# Patient Record
Sex: MALE | Race: OTHER | NOT HISPANIC OR LATINO | URBAN - METROPOLITAN AREA
[De-identification: names, ages, dates, MRNs, and addresses within clinical notes are randomized per-mention and may not be internally consistent; named-entity substitution may affect disease eponyms.]

---

## 2021-04-21 ENCOUNTER — INPATIENT (INPATIENT)
Facility: HOSPITAL | Age: 81
LOS: 25 days | Discharge: NURSING, REHAB OR LONG-TERM CARE | End: 2021-05-17
Attending: INTERNAL MEDICINE | Admitting: INTERNAL MEDICINE
Payer: MEDICARE

## 2021-04-21 VITALS
HEART RATE: 112 BPM | OXYGEN SATURATION: 98 % | RESPIRATION RATE: 18 BRPM | SYSTOLIC BLOOD PRESSURE: 168 MMHG | DIASTOLIC BLOOD PRESSURE: 73 MMHG | TEMPERATURE: 98 F
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
## 2021-04-21 LAB
ALBUMIN SERPL ELPH-MCNC: 3.4 G/DL — LOW (ref 3.5–5.2)
ALP SERPL-CCNC: 65 U/L — SIGNIFICANT CHANGE UP (ref 30–115)
ALT FLD-CCNC: 7 U/L — SIGNIFICANT CHANGE UP (ref 0–41)
ANION GAP SERPL CALC-SCNC: 10 MMOL/L — SIGNIFICANT CHANGE UP (ref 7–14)
ANISOCYTOSIS BLD QL: SIGNIFICANT CHANGE UP
APTT BLD: 33.4 SEC — SIGNIFICANT CHANGE UP (ref 27–39.2)
AST SERPL-CCNC: 15 U/L — SIGNIFICANT CHANGE UP (ref 0–41)
BASOPHILS # BLD AUTO: 0.07 K/UL — SIGNIFICANT CHANGE UP (ref 0–0.2)
BASOPHILS NFR BLD AUTO: 0.8 % — SIGNIFICANT CHANGE UP (ref 0–1)
BILIRUB SERPL-MCNC: 0.4 MG/DL — SIGNIFICANT CHANGE UP (ref 0.2–1.2)
BLD GP AB SCN SERPL QL: SIGNIFICANT CHANGE UP
BUN SERPL-MCNC: 14 MG/DL — SIGNIFICANT CHANGE UP (ref 10–20)
CALCIUM SERPL-MCNC: 8.4 MG/DL — LOW (ref 8.5–10.1)
CHLORIDE SERPL-SCNC: 104 MMOL/L — SIGNIFICANT CHANGE UP (ref 98–110)
CO2 SERPL-SCNC: 23 MMOL/L — SIGNIFICANT CHANGE UP (ref 17–32)
CREAT SERPL-MCNC: 0.6 MG/DL — LOW (ref 0.7–1.5)
EOSINOPHIL # BLD AUTO: 0 K/UL — SIGNIFICANT CHANGE UP (ref 0–0.7)
EOSINOPHIL NFR BLD AUTO: 0 % — SIGNIFICANT CHANGE UP (ref 0–8)
GIANT PLATELETS BLD QL SMEAR: PRESENT — SIGNIFICANT CHANGE UP
GLUCOSE SERPL-MCNC: 100 MG/DL — HIGH (ref 70–99)
HCT VFR BLD CALC: 22.6 % — LOW (ref 42–52)
HGB BLD-MCNC: 5.8 G/DL — CRITICAL LOW (ref 14–18)
INR BLD: 1.09 RATIO — SIGNIFICANT CHANGE UP (ref 0.65–1.3)
LACTATE SERPL-SCNC: 3 MMOL/L — HIGH (ref 0.7–2)
LIDOCAIN IGE QN: 44 U/L — SIGNIFICANT CHANGE UP (ref 7–60)
LYMPHOCYTES # BLD AUTO: 1.03 K/UL — LOW (ref 1.2–3.4)
LYMPHOCYTES # BLD AUTO: 12.2 % — LOW (ref 20.5–51.1)
MANUAL SMEAR VERIFICATION: SIGNIFICANT CHANGE UP
MCHC RBC-ENTMCNC: 17.7 PG — LOW (ref 27–31)
MCHC RBC-ENTMCNC: 25.7 G/DL — LOW (ref 32–37)
MCV RBC AUTO: 68.9 FL — LOW (ref 80–94)
MICROCYTES BLD QL: SIGNIFICANT CHANGE UP
MONOCYTES # BLD AUTO: 0.22 K/UL — SIGNIFICANT CHANGE UP (ref 0.1–0.6)
MONOCYTES NFR BLD AUTO: 2.6 % — SIGNIFICANT CHANGE UP (ref 1.7–9.3)
NEUTROPHILS # BLD AUTO: 7.13 K/UL — HIGH (ref 1.4–6.5)
NEUTROPHILS NFR BLD AUTO: 83.5 % — HIGH (ref 42.2–75.2)
NEUTS BAND # BLD: 0.9 % — SIGNIFICANT CHANGE UP (ref 0–6)
PLAT MORPH BLD: NORMAL — SIGNIFICANT CHANGE UP
PLATELET # BLD AUTO: 486 K/UL — HIGH (ref 130–400)
POIKILOCYTOSIS BLD QL AUTO: SIGNIFICANT CHANGE UP
POLYCHROMASIA BLD QL SMEAR: SLIGHT — SIGNIFICANT CHANGE UP
POTASSIUM SERPL-MCNC: 4.1 MMOL/L — SIGNIFICANT CHANGE UP (ref 3.5–5)
POTASSIUM SERPL-SCNC: 4.1 MMOL/L — SIGNIFICANT CHANGE UP (ref 3.5–5)
PROT SERPL-MCNC: 6.8 G/DL — SIGNIFICANT CHANGE UP (ref 6–8)
PROTHROM AB SERPL-ACNC: 12.5 SEC — SIGNIFICANT CHANGE UP (ref 9.95–12.87)
RBC # BLD: 3.28 M/UL — LOW (ref 4.7–6.1)
RBC # FLD: 19.3 % — HIGH (ref 11.5–14.5)
RBC BLD AUTO: NORMAL — SIGNIFICANT CHANGE UP
SICKLE CELLS BLD QL SMEAR: SLIGHT — SIGNIFICANT CHANGE UP
SODIUM SERPL-SCNC: 137 MMOL/L — SIGNIFICANT CHANGE UP (ref 135–146)
TROPONIN T SERPL-MCNC: <0.01 NG/ML — SIGNIFICANT CHANGE UP
WBC # BLD: 8.45 K/UL — SIGNIFICANT CHANGE UP (ref 4.8–10.8)
WBC # FLD AUTO: 8.45 K/UL — SIGNIFICANT CHANGE UP (ref 4.8–10.8)

## 2021-04-21 PROCEDURE — 99285 EMERGENCY DEPT VISIT HI MDM: CPT

## 2021-04-21 PROCEDURE — 93010 ELECTROCARDIOGRAM REPORT: CPT

## 2021-04-21 PROCEDURE — 71045 X-RAY EXAM CHEST 1 VIEW: CPT | Mod: 26

## 2021-04-21 RX ORDER — SODIUM CHLORIDE 9 MG/ML
1000 INJECTION, SOLUTION INTRAVENOUS ONCE
Refills: 0 | Status: COMPLETED | OUTPATIENT
Start: 2021-04-21 | End: 2021-04-21

## 2021-04-21 RX ADMIN — SODIUM CHLORIDE 1000 MILLILITER(S): 9 INJECTION, SOLUTION INTRAVENOUS at 21:59

## 2021-04-21 NOTE — ED PROVIDER NOTE - CARE PLAN
Principal Discharge DX:	Proctocolitis  Secondary Diagnosis:	Diarrhea  Secondary Diagnosis:	Social problem

## 2021-04-21 NOTE — ED PROVIDER NOTE - ATTENDING CONTRIBUTION TO CARE
80M unknown pmh / as not seen a doctor x many yrs bib daughter for worsening diarrhea. Pt awake & alert, conversant, but confused - hpi provided by daughter who states that her father has had chronic diarrhea x 1 yr, but has been worsening over last few days, is coming out uncontrollably. Daughter is an RN and states that diarrhea "smells like blood". Pt and clothes stained with feces. Per daughter pt lives alone, no HHA. Pt has no current complaints. No ha, cp/sob, nv, abd pain, flank pain, urinary sx, rash. No PCP.    PE:  elderly m, unkempt, scattered feces on body & clothes  skin warm, dry, +pallor  ncat  neck supple  tachy 110s reg rhythm nl s1s2 no mrg  ctab no wrr  abd soft ntnd no palpable masses no rgr  rectal- as per mlp note  back non-tender no cvat  ext no c/c dpi  neuro aaox3 but appears confused, grossly nf exam

## 2021-04-21 NOTE — ED ADULT NURSE NOTE - OBJECTIVE STATEMENT
Pt. is a 80yr male presenting to the ED for complaints of diarrhea for 1 year, pt. states he does not have any other complaints besides the watery stools, pt. also states he drinks 3-4 beers a day

## 2021-04-21 NOTE — ED ADULT NURSE NOTE - NSIMPLEMENTINTERV_GEN_ALL_ED
[FreeTextEntry1] : Cough\par Now resolved\par Likely multifactorial-\par ? related to fluid overload\par ? related to GERD\par ? related to postnasal drip with allergic rhinitis\par ? related to URI\par R/O tracheomalacia\par \par Cardiology f/u with Dr. Paiz - patient being monitored for possible TAVR\par Diuretics as per Cardiology\par Low salt diet\par Daily weight\par Fluid restriction\par GERD diet/precautions\par Daily PPI\par GI f/u\par Flonase and Allegra daily\par Allergy/ENT f/u\par To do dynamic CT of chest if cough recurs\par \par RTC 6-8 weeks and as needed\par To call for any medical issues\par To call if worse \par RX for PT given at his request\par D/W patient and wife in detail and all questions answered\par Continue meds, diet, exercise as outlined and to f/u with all consulting MD's\par TSH sent Implemented All Fall with Harm Risk Interventions:  [place] to call system. Call bell, personal items and telephone within reach. Instruct patient to call for assistance. Room bathroom lighting operational. Non-slip footwear when patient is off stretcher. Physically safe environment: no spills, clutter or unnecessary equipment. Stretcher in lowest position, wheels locked, appropriate side rails in place. Provide visual cue, wrist band, yellow gown, etc. Monitor gait and stability. Monitor for mental status changes and reorient to person, place, and time. Review medications for side effects contributing to fall risk. Reinforce activity limits and safety measures with patient and family. Provide visual clues: red socks.

## 2021-04-21 NOTE — ED PROVIDER NOTE - PHYSICAL EXAMINATION
CONSTITUTIONAL: Well-developed; well-nourished; in no acute distress.   SKIN: warm, dry, pale   HEAD: Normocephalic; atraumatic.  EYES: no conjunctival injection. EOMI.   ENT: No nasal discharge; airway clear.  NECK: Supple; non tender.  CARD: S1, S2 normal; Regular rate and rhythm.   RESP: No wheezes, rales or rhonchi.  ABD: soft ntnd. No CVA TTP.     RECTAL: nontender, no masses or fissures, no melena or BRBPR  EXT: Normal ROM.  No clubbing, cyanosis or edema.   LYMPH: No acute cervical adenopathy.  NEURO: Alert, oriented, grossly unremarkable. No FND.   PSYCH: Cooperative, appropriate.

## 2021-04-21 NOTE — ED ADULT TRIAGE NOTE - CHIEF COMPLAINT QUOTE
BIBA for diarrhea for 1 year, worsening over the past few weeks. Daughter reports he has been more confused lately. Pt admits to drinking 3 beers today

## 2021-04-21 NOTE — ED PROVIDER NOTE - CLINICAL SUMMARY MEDICAL DECISION MAKING FREE TEXT BOX
worsening diarrhea, no medical care x yrs - labs wig for Hg 5.8, no priors, no melena or brbpr on rectal exam, prbc ordered, ct ap sig for distal sigmoid/rectal thickening, ddx proctocolitis v underlying CA, subacute L 10th rib fx (pt & daughter deny any recent falls or trauma), RLL nodule - iv abx given - pt lives alone, confused, covered in feces on arrival - admitted for further mgmt, inpatient GI/PT-Rehab/SW consult

## 2021-04-21 NOTE — ED PROVIDER NOTE - NS ED ROS FT
Review of Systems:  CONSTITUTIONAL: No fever, No diaphoresis   SKIN: No rash  HEMATOLOGIC: No abnormal bleeding or bruising  EYES: No eye pain, No blurred vision  ENT:  No sore throat, No neck pain, No rhinorrhea   RESPIRATORY: No shortness of breath, No cough  CARDIAC: No chest pain, No palpitations  GI: No abdominal pain, No nausea, No vomiting, +diarrhea, No constipation, No bright red blood per rectum or melena. No flank pain  : No dysuria, frequency, hematuria.   MUSCULOSKELETAL: No joint paint, No swelling, No back pain  NEUROLOGIC: No numbness, No focal weakness, No headache, No dizziness  All other systems negative, unless specified in HPI

## 2021-04-22 DIAGNOSIS — S68.119A COMPLETE TRAUMATIC METACARPOPHALANGEAL AMPUTATION OF UNSPECIFIED FINGER, INITIAL ENCOUNTER: Chronic | ICD-10-CM

## 2021-04-22 LAB
A1C WITH ESTIMATED AVERAGE GLUCOSE RESULT: 5.2 % — SIGNIFICANT CHANGE UP (ref 4–5.6)
ABO RH CONFIRMATION: SIGNIFICANT CHANGE UP
ALBUMIN SERPL ELPH-MCNC: 2.9 G/DL — LOW (ref 3.5–5.2)
ALP SERPL-CCNC: 60 U/L — SIGNIFICANT CHANGE UP (ref 30–115)
ALT FLD-CCNC: 6 U/L — SIGNIFICANT CHANGE UP (ref 0–41)
ANION GAP SERPL CALC-SCNC: 11 MMOL/L — SIGNIFICANT CHANGE UP (ref 7–14)
APPEARANCE UR: CLEAR — SIGNIFICANT CHANGE UP
APTT BLD: 30.8 SEC — SIGNIFICANT CHANGE UP (ref 27–39.2)
AST SERPL-CCNC: 14 U/L — SIGNIFICANT CHANGE UP (ref 0–41)
BILIRUB SERPL-MCNC: 0.7 MG/DL — SIGNIFICANT CHANGE UP (ref 0.2–1.2)
BILIRUB UR-MCNC: NEGATIVE — SIGNIFICANT CHANGE UP
BUN SERPL-MCNC: 8 MG/DL — LOW (ref 10–20)
C DIFF BY PCR RESULT: NEGATIVE — SIGNIFICANT CHANGE UP
C DIFF TOX GENS STL QL NAA+PROBE: SIGNIFICANT CHANGE UP
CALCIUM SERPL-MCNC: 8.2 MG/DL — LOW (ref 8.5–10.1)
CHLORIDE SERPL-SCNC: 104 MMOL/L — SIGNIFICANT CHANGE UP (ref 98–110)
CHOLEST SERPL-MCNC: 128 MG/DL — SIGNIFICANT CHANGE UP
CO2 SERPL-SCNC: 24 MMOL/L — SIGNIFICANT CHANGE UP (ref 17–32)
COLOR SPEC: YELLOW — SIGNIFICANT CHANGE UP
CREAT SERPL-MCNC: 0.5 MG/DL — LOW (ref 0.7–1.5)
DIFF PNL FLD: NEGATIVE — SIGNIFICANT CHANGE UP
ESTIMATED AVERAGE GLUCOSE: 103 MG/DL — SIGNIFICANT CHANGE UP (ref 68–114)
GLUCOSE SERPL-MCNC: 84 MG/DL — SIGNIFICANT CHANGE UP (ref 70–99)
GLUCOSE UR QL: NEGATIVE — SIGNIFICANT CHANGE UP
HCT VFR BLD CALC: 28 % — LOW (ref 42–52)
HCT VFR BLD CALC: 30.6 % — LOW (ref 42–52)
HDLC SERPL-MCNC: 37 MG/DL — LOW
HGB BLD-MCNC: 7.8 G/DL — LOW (ref 14–18)
HGB BLD-MCNC: 8.9 G/DL — LOW (ref 14–18)
INR BLD: 1.1 RATIO — SIGNIFICANT CHANGE UP (ref 0.65–1.3)
KETONES UR-MCNC: NEGATIVE — SIGNIFICANT CHANGE UP
LACTATE SERPL-SCNC: 1.7 MMOL/L — SIGNIFICANT CHANGE UP (ref 0.7–2)
LEUKOCYTE ESTERASE UR-ACNC: NEGATIVE — SIGNIFICANT CHANGE UP
LIPID PNL WITH DIRECT LDL SERPL: 81 MG/DL — SIGNIFICANT CHANGE UP
MAGNESIUM SERPL-MCNC: 1.9 MG/DL — SIGNIFICANT CHANGE UP (ref 1.8–2.4)
MCHC RBC-ENTMCNC: 20.3 PG — LOW (ref 27–31)
MCHC RBC-ENTMCNC: 21.3 PG — LOW (ref 27–31)
MCHC RBC-ENTMCNC: 27.9 G/DL — LOW (ref 32–37)
MCHC RBC-ENTMCNC: 29.1 G/DL — LOW (ref 32–37)
MCV RBC AUTO: 72.7 FL — LOW (ref 80–94)
MCV RBC AUTO: 73.4 FL — LOW (ref 80–94)
NITRITE UR-MCNC: NEGATIVE — SIGNIFICANT CHANGE UP
NON HDL CHOLESTEROL: 91 MG/DL — SIGNIFICANT CHANGE UP
NRBC # BLD: 0 /100 WBCS — SIGNIFICANT CHANGE UP (ref 0–0)
NRBC # BLD: 0 /100 WBCS — SIGNIFICANT CHANGE UP (ref 0–0)
PH UR: 6.5 — SIGNIFICANT CHANGE UP (ref 5–8)
PLATELET # BLD AUTO: 312 K/UL — SIGNIFICANT CHANGE UP (ref 130–400)
PLATELET # BLD AUTO: 357 K/UL — SIGNIFICANT CHANGE UP (ref 130–400)
POTASSIUM SERPL-MCNC: 4.1 MMOL/L — SIGNIFICANT CHANGE UP (ref 3.5–5)
POTASSIUM SERPL-SCNC: 4.1 MMOL/L — SIGNIFICANT CHANGE UP (ref 3.5–5)
PROT SERPL-MCNC: 6.4 G/DL — SIGNIFICANT CHANGE UP (ref 6–8)
PROT UR-MCNC: NEGATIVE — SIGNIFICANT CHANGE UP
PROTHROM AB SERPL-ACNC: 12.6 SEC — SIGNIFICANT CHANGE UP (ref 9.95–12.87)
RAPID RVP RESULT: SIGNIFICANT CHANGE UP
RBC # BLD: 3.85 M/UL — LOW (ref 4.7–6.1)
RBC # BLD: 4.17 M/UL — LOW (ref 4.7–6.1)
RBC # FLD: 21.1 % — HIGH (ref 11.5–14.5)
RBC # FLD: 21.7 % — HIGH (ref 11.5–14.5)
SARS-COV-2 RNA SPEC QL NAA+PROBE: SIGNIFICANT CHANGE UP
SODIUM SERPL-SCNC: 139 MMOL/L — SIGNIFICANT CHANGE UP (ref 135–146)
SP GR SPEC: 1.03 — HIGH (ref 1.01–1.03)
TRIGL SERPL-MCNC: 47 MG/DL — SIGNIFICANT CHANGE UP
UROBILINOGEN FLD QL: ABNORMAL
WBC # BLD: 7.47 K/UL — SIGNIFICANT CHANGE UP (ref 4.8–10.8)
WBC # BLD: 8.93 K/UL — SIGNIFICANT CHANGE UP (ref 4.8–10.8)
WBC # FLD AUTO: 7.47 K/UL — SIGNIFICANT CHANGE UP (ref 4.8–10.8)
WBC # FLD AUTO: 8.93 K/UL — SIGNIFICANT CHANGE UP (ref 4.8–10.8)

## 2021-04-22 PROCEDURE — 99223 1ST HOSP IP/OBS HIGH 75: CPT

## 2021-04-22 PROCEDURE — 74177 CT ABD & PELVIS W/CONTRAST: CPT | Mod: 26,MA

## 2021-04-22 PROCEDURE — 70450 CT HEAD/BRAIN W/O DYE: CPT | Mod: 26

## 2021-04-22 RX ORDER — CIPROFLOXACIN LACTATE 400MG/40ML
400 VIAL (ML) INTRAVENOUS ONCE
Refills: 0 | Status: COMPLETED | OUTPATIENT
Start: 2021-04-22 | End: 2021-04-22

## 2021-04-22 RX ORDER — METRONIDAZOLE 500 MG
500 TABLET ORAL ONCE
Refills: 0 | Status: COMPLETED | OUTPATIENT
Start: 2021-04-22 | End: 2021-04-22

## 2021-04-22 RX ORDER — PANTOPRAZOLE SODIUM 20 MG/1
40 TABLET, DELAYED RELEASE ORAL
Refills: 0 | Status: DISCONTINUED | OUTPATIENT
Start: 2021-04-22 | End: 2021-04-28

## 2021-04-22 RX ORDER — CIPROFLOXACIN LACTATE 400MG/40ML
400 VIAL (ML) INTRAVENOUS EVERY 12 HOURS
Refills: 0 | Status: DISCONTINUED | OUTPATIENT
Start: 2021-04-22 | End: 2021-04-28

## 2021-04-22 RX ORDER — SOD SULF/SODIUM/NAHCO3/KCL/PEG
4000 SOLUTION, RECONSTITUTED, ORAL ORAL ONCE
Refills: 0 | Status: COMPLETED | OUTPATIENT
Start: 2021-04-22 | End: 2021-04-22

## 2021-04-22 RX ORDER — SODIUM CHLORIDE 9 MG/ML
1000 INJECTION INTRAMUSCULAR; INTRAVENOUS; SUBCUTANEOUS
Refills: 0 | Status: DISCONTINUED | OUTPATIENT
Start: 2021-04-22 | End: 2021-04-29

## 2021-04-22 RX ORDER — CIPROFLOXACIN LACTATE 400MG/40ML
200 VIAL (ML) INTRAVENOUS EVERY 12 HOURS
Refills: 0 | Status: DISCONTINUED | OUTPATIENT
Start: 2021-04-22 | End: 2021-04-22

## 2021-04-22 RX ORDER — CHLORHEXIDINE GLUCONATE 213 G/1000ML
1 SOLUTION TOPICAL
Refills: 0 | Status: DISCONTINUED | OUTPATIENT
Start: 2021-04-22 | End: 2021-05-06

## 2021-04-22 RX ORDER — METRONIDAZOLE 500 MG
500 TABLET ORAL EVERY 8 HOURS
Refills: 0 | Status: DISCONTINUED | OUTPATIENT
Start: 2021-04-22 | End: 2021-04-28

## 2021-04-22 RX ADMIN — Medication 200 MILLIGRAM(S): at 06:00

## 2021-04-22 RX ADMIN — PANTOPRAZOLE SODIUM 40 MILLIGRAM(S): 20 TABLET, DELAYED RELEASE ORAL at 17:27

## 2021-04-22 RX ADMIN — SODIUM CHLORIDE 75 MILLILITER(S): 9 INJECTION INTRAMUSCULAR; INTRAVENOUS; SUBCUTANEOUS at 10:34

## 2021-04-22 RX ADMIN — Medication 100 MILLIGRAM(S): at 04:48

## 2021-04-22 RX ADMIN — Medication 200 MILLIGRAM(S): at 17:27

## 2021-04-22 RX ADMIN — PANTOPRAZOLE SODIUM 40 MILLIGRAM(S): 20 TABLET, DELAYED RELEASE ORAL at 07:54

## 2021-04-22 RX ADMIN — Medication 4000 MILLILITER(S): at 17:16

## 2021-04-22 RX ADMIN — Medication 100 MILLIGRAM(S): at 13:10

## 2021-04-22 NOTE — H&P ADULT - ASSESSMENT
80 year old male with no past medical history (has not seen a doctor in many years) presented to the ED due to loose bowel movements.    #Chronic Diarrhea   #Bloody Bowel Movements  #Bowel Wall thickening   #lactic acidosis   - Hb 5.8  - s/p 2 units PRBC, f/u repeat  - active T + S  - Transfuse if Hb < 7  -CT of the abdomen:  Marked wall thickening of the distal sigmoid colon and rectum. Differential includes proctocolitis although underlying neoplastic process cannot be excluded.  -lactate 3, f/u repeat   - protonix 40mg IV BID  - GI consult   - c/w Cipro/ flagyl   - NPO     #AMS  - Metabolic vs dementia   - f/u CT head   - TSH, B12, folate, RPR   - Lipid pannel, TSH     #Lung nodule   - CT:  Right lower lobe solid nodule measuring 9 mm.   - f/u OP    #Subacute Rib Fracture   - CT:  Subacute fracture of the left 10th lateral rib.   - Pain control prn     #DVT PPX: Sequentials  #GI PPX: Protonix   #activity as tolerated  #Dispo: acute

## 2021-04-22 NOTE — H&P ADULT - HISTORY OF PRESENT ILLNESS
80 year old male with no past medical history (has not seen a doctor in many years) presented to the ED due to loose bowel movements. Patient is alert and orient, but appears to be confused and is a poor historian. History obtained from patient and the charts. Patient has had chronic diarrhea for the past year which is foul smelling, then over the past week it has gotten worse with amount and frequency. The patients daughter believed that their was blood in here stools so she brought him to the ED. The patient endorses decreased PO intake and nausea, but denies abdominal pain. He is not on AC, but will take a baby aspirin from time to time. He denies SOB, CP, changes in urination, headache. He lives alone and ambulates with a walker.   In the ED Vitals: Temp 97.6F, , / 73, RR 18, SpO2 98% on RA. Hb was found to be 5.8. Rectal performed by ED team negative. Given 2 units of PRBC. Lactate of 3. A CT of the abdomen:  Marked wall thickening of the distal sigmoid colon and rectum. Differential includes proctocolitis although underlying neoplastic process cannot be excluded. Direct visualization with colonoscopy is recommended. Subacute fracture of the left 10th lateral rib. Right lower lobe solid nodule measuring 9 mm. According to Fleischner society criteria, pulmonary nodules greater than 8 mm require 3 month follow-up, and possible correlation with pet scan or tissue sampling as clinically indicated. He was given Cipro flagyl in ED. Patient admitted for bloody bowel movements.        80 year old male with no past medical history (has not seen a doctor in many years, not on any meds) presented to the ED due to loose bowel movements. Patient is alert and orient, but appears to be confused and is a poor historian. History obtained from patient and the charts. Patient has had chronic diarrhea for the past year which is foul smelling, then over the past week it has gotten worse with amount and frequency. The patients daughter believed that their was blood in here stools so she brought him to the ED. The patient endorses decreased PO intake and nausea, but denies abdominal pain. He is not on AC, but will take a baby aspirin from time to time. He denies SOB, CP, changes in urination, headache. He lives alone and ambulates with a walker.   In the ED Vitals: Temp 97.6F, , / 73, RR 18, SpO2 98% on RA. Hb was found to be 5.8. Rectal performed by ED team negative. Given 2 units of PRBC. Lactate of 3. A CT of the abdomen:  Marked wall thickening of the distal sigmoid colon and rectum. Differential includes proctocolitis although underlying neoplastic process cannot be excluded. Direct visualization with colonoscopy is recommended. Subacute fracture of the left 10th lateral rib. Right lower lobe solid nodule measuring 9 mm. He was given Cipro flagyl in ED. Patient admitted for bloody bowel movements.

## 2021-04-22 NOTE — SBIRT NOTE ADULT - NSSBIRTBRIEFINTDET_GEN_A_CORE
Provided SBIRT services:  Full Screen Positive. Brief Intervention Performed. Screening results were reviewed with the patient and patient was provided information about healthy guidelines and potential negative consequences associated with level of risk. Motivation and readiness to reduce or stop use was discussed and goals and activities to make changes were suggested/offered.

## 2021-04-22 NOTE — H&P ADULT - NSHPPHYSICALEXAM_GEN_ALL_CORE
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

## 2021-04-22 NOTE — CONSULT NOTE ADULT - SUBJECTIVE AND OBJECTIVE BOX
Gastroenterology Consultation:    Patient is a 80y old  Male who presents with a chief complaint of     Admitted on: 04-22-21  HPI:  80 year old male with no past medical history (has not seen a doctor in many years, not on any meds) presented to the ED due to loose bowel movements. Patient has had chronic diarrhea for the past year which is foul smelling, then over the past week it has gotten worse with amount and frequency. The patients daughter believed that their was blood in here stools so she brought him to the ED. The patient endorses decreased PO intake and nausea, but denies abdominal pain. He is not on AC, but will take a baby aspirin from time to time. He denies SOB, CP, changes in urination, headache. He lives alone and ambulates with a walker.   In the ED Vitals: Temp 97.6F, , / 73, RR 18, SpO2 98% on RA. Hb was found to be 5.8. Rectal performed by ED team negative. Given 2 units of PRBC. Lactate of 3. A CT of the abdomen:  Marked wall thickening of the distal sigmoid colon and rectum. Differential includes proctocolitis although underlying neoplastic process cannot be excluded. Direct visualization with colonoscopy is recommended. Subacute fracture of the left 10th lateral rib. Right lower lobe solid nodule measuring 9 mm. He was given Cipro flagyl in ED.        Prior records Reviewed (Y/N): Y  History obtained from person other than patient (Y/N): N    Prior EGD: N  Prior Colonoscopy: n      PAST MEDICAL & SURGICAL HISTORY:  No pertinent past medical history    Amputation of digit of left hand        FAMILY HISTORY:  No pertinent family history in first degree relatives        Social History:  Tobacco: N  Alcohol: 3 beers daily  Drugs: N    Home Medications:    MEDICATIONS  (STANDING):  chlorhexidine 4% Liquid 1 Application(s) Topical <User Schedule>  ciprofloxacin   IVPB 400 milliGRAM(s) IV Intermittent every 12 hours  metroNIDAZOLE  IVPB 500 milliGRAM(s) IV Intermittent every 8 hours  pantoprazole  Injectable 40 milliGRAM(s) IV Push two times a day  sodium chloride 0.9%. 1000 milliLiter(s) (75 mL/Hr) IV Continuous <Continuous>    MEDICATIONS  (PRN):      Allergies  No Known Allergies      Review of Systems:   Constitutional:  No Fever, No Chills  ENT/Mouth:  No Hearing Changes,  No Difficulty Swallowing  Eyes:  No Eye Pain, No Vision Changes  Cardiovascular:  No Chest Pain, No Palpitations  Respiratory:  No Cough, No Dyspnea  Gastrointestinal:  As described in HPI  Musculoskeletal:  No Joint Swelling, No Back Pain  Skin:  No Skin Lesions, No Jaundice  Neuro:  No Syncope, No Dizziness  Heme/Lymph:  No Bruising, No Bleeding.          Physical Examination:  T(C): 37.1 (04-22-21 @ 07:22), Max: 37.1 (04-22-21 @ 07:22)  HR: 83 (04-22-21 @ 07:22) (83 - 112)  BP: 137/65 (04-22-21 @ 07:22) (137/65 - 168/73)  RR: 18 (04-22-21 @ 07:22) (18 - 18)  SpO2: 98% (04-22-21 @ 07:22) (98% - 98%)        Constitutional: No acute distress.  Eyes:. Conjunctivae are clear, Sclera is non-icteric.  Ears Nose and Throat: The external ears are normal appearing,  Oral mucosa is pink and moist.  Respiratory:  No signs of respiratory distress. Lung sounds are clear bilaterally.  Cardiovascular:  S1 S2, Regular rate and rhythm.  GI: Abdomen is soft, symmetric, and non-tender without distention. There are no visible lesions. Bowel sounds are present and normoactive in all four quadrants. No masses, hepatomegaly, or splenomegaly are noted.   Neuro: No Tremor, No involuntary movements  Skin: No rashes, No Jaundice.          Data: (reviewed by attending)                        5.8    8.45  )-----------( 486      ( 21 Apr 2021 21:54 )             22.6     Hgb Trend:  5.8  04-21-21 @ 21:54      04-21    137  |  104  |  14  ----------------------------<  100<H>  4.1   |  23  |  0.6<L>    Ca    8.4<L>      21 Apr 2021 21:54    TPro  6.8  /  Alb  3.4<L>  /  TBili  0.4  /  DBili  x   /  AST  15  /  ALT  7   /  AlkPhos  65  04-21    Liver panel trend:  TBili 0.4   /   AST 15   /   ALT 7   /   AlkP 65   /   Tptn 6.8   /   Alb 3.4    /   DBili --      04-21      PT/INR - ( 21 Apr 2021 21:54 )   PT: 12.50 sec;   INR: 1.09 ratio         PTT - ( 21 Apr 2021 21:54 )  PTT:33.4 sec        Radiology:(reviewed by attending)  CT Abdomen and Pelvis w/ IV Cont:   EXAM:  CT ABDOMEN AND PELVIS IC            PROCEDURE DATE:  04/22/2021            INTERPRETATION:  CLINICAL HISTORY / REASON FOR EXAM: Chronic diarrhea.    TECHNIQUE: Contiguous axial CT images were obtained from the lower chest to the pubic symphysis following administration of intravenous contrast. Oral contrast was not administered. Reformatted images in the coronal and sagittal planes were acquired.    COMPARISON: None.    FINDINGS:    LOWER CHEST: Trace left pleural effusion. Bilateral basilar subsegmental atelectasis. Right lower lobe solid nodule measuring 9 mm.    HEPATOBILIARY: Unremarkable.    SPLEEN: There are wedge-shaped hypodensities at the superior aspect of the spleen which may reflect splenic infarcts.    PANCREAS: 1.8 cm hypodensity in the posterior pancreatic body appears to measure fluid attenuation, possible cyst/IPMN. No main duct dilatation.    ADRENAL GLANDS: Unremarkable.    KIDNEYS: Symmetric enhancement. No hydronephrosis. Left renal cyst and hypodensities toosmall to characterize.    ABDOMINOPELVIC NODES: No definite pathologically enlarged lymph nodes.    PELVIC ORGANS: Distended urinary bladder with diverticula. Rectal mass abuts the right posterior lateral prostate gland    PERITONEUM/MESENTERY/BOWEL: There is circumferential rectal wall thickening spanning at least 14 cm in length with a partially exophytic mass extending into the right mesorectal fat measuring approximately 5.7 x 3.0 x 6.8 cm. The exophytic mass abuts the right posterior lateralprostate. No bowel obstruction, ascites or intraperitoneal free air.      BONES/SOFT TISSUES: Degenerative change of the spine with bridging syndesmophytes. Subacute or chronic fracture of the left 10th lateral rib.    OTHER: Atherosclerotic calcification.    IMPRESSION:    Circumferential rectal wall thickening spanning at least 14 cm in length with a partially exophytic mass extending into the right mesorectal fat measuring approximately 5.7 x 3.0 x 6.8 cm. The exophytic mass abuts the right posterior lateral prostate gland. Primary consideration is rectal cancer. Limited ability to exclude superimposed proctitis.    9 mm right lower lobe pulmonary nodule.    Small wedge-shaped hypodensities at the superior aspect of the spleen may reflect splenic infarcts.                  LUDY PETTIT M.D., RESIDENT RADIOLOGIST  This document has been electronically signed.  FARIBA SIERRA MD; Attending Radiologist  This document has been electronically signed. Apr 22 2021  5:08AM (04-22-21 @ 01:12)

## 2021-04-22 NOTE — CONSULT NOTE ADULT - ASSESSMENT
80 year old male with no past medical history (has not seen a doctor in many years, not on any meds) presented to the ED due to loose bowel movements. Patient has had chronic diarrhea for the past year which is foul smelling, then over the past week it has gotten worse with amount and frequency. The patients daughter believed that their was blood in here stools so she brought him to the ED. The patient endorses decreased PO intake and nausea, but denies abdominal pain. He is not on AC, but will take a baby aspirin from time to time. He denies SOB, CP, changes in urination, headache. He lives alone and ambulates with a walker.         # Microcytic anemia with rectal wall thickening suspicious for CRC:  -Hb was found to be 5.8 ( No baseline)  -ROYCE brown stool  - VS stable  - s/p 2 units of PRBC  - Lactate of 3.   -CT of the abdomen:  Marked wall thickening of the distal sigmoid colon and rectum. Differential includes proctocolitis although underlying neoplastic process cannot be excluded. Direct visualization with colonoscopy is recommended. Subacute fracture of the left 10th lateral rib. Right lower lobe solid nodule measuring 9 mm.     Rec:  - Send stool C. diff + GI PCR  - Iron studies  - Monitor CBC  - active type and screen  - keep hgb>8  - clear liquid diet today  - 4 L golytely start at 4 pm today  - dulcolax 20 mg PO once at 10 pm  - NPO after midnight  - will plan for EGD/colonoscopy tomorrow      # 1.8 cm hypodensity in the posterior pancreatic body:   - appears to measure fluid attenuation  - possible cyst/IPMN. No main duct dilatation    Rec:  - MRI pancreatic protocol which can be done as outpatient  80 year old male with no past medical history (has not seen a doctor in many years, not on any meds) presented to the ED due to loose bowel movements. Patient has had chronic diarrhea for the past year which is foul smelling, then over the past week it has gotten worse with amount and frequency. The patients daughter believed that their was blood in here stools so she brought him to the ED. The patient endorses decreased PO intake and nausea, but denies abdominal pain. He is not on AC, but will take a baby aspirin from time to time. He denies SOB, CP, changes in urination, headache. He lives alone and ambulates with a walker.         # Microcytic anemia with rectal wall thickening suspicious for CRC:  -Hb was found to be 5.8 ( No baseline)  -ROYCE brown stool  - VS stable  - s/p 2 units of PRBC  - Lactate of 3.   -CT of the abdomen:  Marked wall thickening of the distal sigmoid colon and rectum. Differential includes proctocolitis although underlying neoplastic process cannot be excluded. Direct visualization with colonoscopy is recommended. Subacute fracture of the left 10th lateral rib. Right lower lobe solid nodule measuring 9 mm.     Rec:  - Send stool C. diff + GI PCR  - Iron studies  - Monitor CBC  - active type and screen  - keep hgb>8  - clear liquid diet today  - 4 L golytely start at 4 pm today  - dulcolax 20 mg PO once at 10 pm  - NPO after midnight  - will plan for colonoscopy tomorrow      # 1.8 cm hypodensity in the posterior pancreatic body:   - appears to measure fluid attenuation  - possible cyst/IPMN. No main duct dilatation    Rec:  - MRI pancreatic protocol which can be done as outpatient

## 2021-04-22 NOTE — H&P ADULT - ATTENDING COMMENTS
HPI:  80 year old male with no past medical history (has not seen a doctor in many years, not on any meds) presented to the ED due to loose bowel movements. Patient is alert and orient, but appears to be confused and is a poor historian. History obtained from patient and the charts. Patient has had chronic diarrhea for the past year which is foul smelling, then over the past week it has gotten worse with amount and frequency. The patients daughter believed that their was blood in here stools so she brought him to the ED. The patient endorses decreased PO intake and nausea, but denies abdominal pain. He is not on AC, but will take a baby aspirin from time to time. He denies SOB, CP, changes in urination, headache. He lives alone and ambulates with a walker.   In the ED Vitals: Temp 97.6F, , / 73, RR 18, SpO2 98% on RA. Hb was found to be 5.8. Rectal performed by ED team negative. Given 2 units of PRBC. Lactate of 3. A CT of the abdomen:  Marked wall thickening of the distal sigmoid colon and rectum. Differential includes proctocolitis although underlying neoplastic process cannot be excluded. Direct visualization with colonoscopy is recommended. Subacute fracture of the left 10th lateral rib. Right lower lobe solid nodule measuring 9 mm. He was given Cipro flagyl in ED. Patient admitted for bloody bowel movements.        (22 Apr 2021 04:35)    REVIEW OF SYSTEMS: see cc/HPI   CONSTITUTIONAL: No weakness, fevers or chills  EYES/ENT: No visual changes;  No vertigo or throat pain   NECK: No pain or stiffness  RESPIRATORY: No cough, wheezing, hemoptysis; No shortness of breath  CARDIOVASCULAR: No chest pain or palpitations  GASTROINTESTINAL: (+) abdominal  pain. No nausea, vomiting, or hematemesis; (+) diarrhea or constipation. No melena (+) hematochezia.  GENITOURINARY: No dysuria, frequency or hematuria  NEUROLOGICAL: No numbness or weakness  SKIN: No itching, rashes    Physical Exam:  General: WN/WD NAD  Neurology: A&Ox3, nonfocal, follows commands  Eyes: PERRLA/ EOMI  ENT/Neck: Neck supple, trachea midline, No JVD  Respiratory: CTA B/L, No wheezing, rales, rhonchi  CV: Normal rate regular rhythm, S1S2, no murmurs, rubs or gallops  Abdominal: Soft, NT, ND +BS,   Extremities: No edema, + peripheral pulses  Skin: No Rashes, Hematoma, Ecchymosis  Incisions: n/a  Tubes: n/a    A/p  Acute proctocolitis w/ acute blood loss rectally  Anemia 2/2 blood loss   Chronic diarrhea  Lactic acidosis   -IV fluid resuscitation / IV PPI   -serial LA level   -IV Abx  -GI eval - will need outpatient colonoscopy   -serial H/H S/p transfusion     Dementia w/ suspected metabolic encephalopathy   -agree w/ CT head   -TSH/B12 /Folate / RPR /TSH    DVT prophylaxis - SCD to Bilateral LEs HPI:  80 year old male with no past medical history (has not seen a doctor in many years, not on any meds) presented to the ED due to loose bowel movements. Patient is alert and orient, but appears to be confused and is a poor historian. History obtained from patient and the charts. Patient has had chronic diarrhea for the past year which is foul smelling, then over the past week it has gotten worse with amount and frequency. The patients daughter believed that their was blood in here stools so she brought him to the ED. The patient endorses decreased PO intake and nausea, but denies abdominal pain. He is not on AC, but will take a baby aspirin from time to time. He denies SOB, CP, changes in urination, headache. He lives alone and ambulates with a walker.   In the ED Vitals: Temp 97.6F, , / 73, RR 18, SpO2 98% on RA. Hb was found to be 5.8. Rectal performed by ED team negative. Given 2 units of PRBC. Lactate of 3. A CT of the abdomen:  Marked wall thickening of the distal sigmoid colon and rectum. Differential includes proctocolitis although underlying neoplastic process cannot be excluded. Direct visualization with colonoscopy is recommended. Subacute fracture of the left 10th lateral rib. Right lower lobe solid nodule measuring 9 mm. He was given Cipro flagyl in ED. Patient admitted for bloody bowel movements.        (22 Apr 2021 04:35)    REVIEW OF SYSTEMS: see cc/HPI   CONSTITUTIONAL: No weakness, fevers or chills  EYES/ENT: No visual changes;  No vertigo or throat pain   NECK: No pain or stiffness  RESPIRATORY: No cough, wheezing, hemoptysis; No shortness of breath  CARDIOVASCULAR: No chest pain or palpitations  GASTROINTESTINAL: (+) abdominal  pain. No nausea, vomiting, or hematemesis; (+) diarrhea or constipation. No melena (+) hematochezia.  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

## 2021-04-22 NOTE — CHART NOTE - NSCHARTNOTEFT_GEN_A_CORE
Pt. seen on am rounds, Pt. reports no complaints, last BM early morning was loose, pending collection for stool studies.  Hb on admission was 5.8 on admission s/p 2 units rbc, pending repeat Hb, vitals have been stable.  Pt. never had a c-scope before and is pending GI consult.  Lactate was 3 on admission s/p 1 L LR, will start LR @75 pending repeat lactate.

## 2021-04-22 NOTE — H&P ADULT - NSHPLABSRESULTS_GEN_ALL_CORE
VITALS:   T(F): 97.6  HR: 112  BP: 168/73  RR: 18  SpO2: 98%    LABS:                        5.8    8.45  )-----------( 486      ( 21 Apr 2021 21:54 )             22.6     04-21    137  |  104  |  14  ----------------------------<  100<H>  4.1   |  23  |  0.6<L>    Ca    8.4<L>      21 Apr 2021 21:54    TPro  6.8  /  Alb  3.4<L>  /  TBili  0.4  /  DBili  x   /  AST  15  /  ALT  7   /  AlkPhos  65  04-21    PT/INR - ( 21 Apr 2021 21:54 )   PT: 12.50 sec;   INR: 1.09 ratio         PTT - ( 21 Apr 2021 21:54 )  PTT:33.4 sec      Troponin T, Serum: <0.01 ng/mL (04-21-21 @ 21:54)  Lactate, Blood: 3.0 mmol/L *H* (04-21-21 @ 21:54)      CARDIAC MARKERS ( 21 Apr 2021 21:54 )  x     / <0.01 ng/mL / x     / x     / x

## 2021-04-22 NOTE — ED ADULT NURSE REASSESSMENT NOTE - NS ED NURSE REASSESS COMMENT FT1
pt is aaox3, nad, respirations easy and regular, skin is warm, dry, and normal in color, pt is resting and comfortable

## 2021-04-23 ENCOUNTER — TRANSCRIPTION ENCOUNTER (OUTPATIENT)
Age: 81
End: 2021-04-23

## 2021-04-23 LAB
FOLATE SERPL-MCNC: 5.1 NG/ML — SIGNIFICANT CHANGE UP
SARS-COV-2 RNA SPEC QL NAA+PROBE: SIGNIFICANT CHANGE UP
T PALLIDUM AB TITR SER: NEGATIVE — SIGNIFICANT CHANGE UP
TSH SERPL-MCNC: 4.23 UIU/ML — HIGH (ref 0.27–4.2)
VIT B12 SERPL-MCNC: 277 PG/ML — SIGNIFICANT CHANGE UP (ref 232–1245)

## 2021-04-23 PROCEDURE — 99233 SBSQ HOSP IP/OBS HIGH 50: CPT

## 2021-04-23 RX ADMIN — Medication 100 MILLIGRAM(S): at 17:26

## 2021-04-23 RX ADMIN — PANTOPRAZOLE SODIUM 40 MILLIGRAM(S): 20 TABLET, DELAYED RELEASE ORAL at 05:53

## 2021-04-23 RX ADMIN — Medication 20 MILLIGRAM(S): at 21:39

## 2021-04-23 RX ADMIN — Medication 100 MILLIGRAM(S): at 05:53

## 2021-04-23 RX ADMIN — Medication 200 MILLIGRAM(S): at 17:56

## 2021-04-23 RX ADMIN — Medication 200 MILLIGRAM(S): at 05:53

## 2021-04-23 RX ADMIN — Medication 100 MILLIGRAM(S): at 21:38

## 2021-04-23 RX ADMIN — CHLORHEXIDINE GLUCONATE 1 APPLICATION(S): 213 SOLUTION TOPICAL at 05:53

## 2021-04-23 RX ADMIN — PANTOPRAZOLE SODIUM 40 MILLIGRAM(S): 20 TABLET, DELAYED RELEASE ORAL at 17:53

## 2021-04-23 NOTE — DISCHARGE NOTE PROVIDER - NSDCPNSUBOBJ_GEN_ALL_CORE
<<<RESIDENT DISCHARGE NOTE>>>     MISAEL HUFFMAN  MRN-[id number]    VITAL SIGNS:  T(F): 97.5 (05-10-21 @ 05:09), Max: 97.7 (05-09-21 @ 19:35)  HR: 68 (05-10-21 @ 05:09)  BP: 114/61 (05-10-21 @ 05:09)  SpO2: 98% (05-10-21 @ 07:47)      PHYSICAL EXAMINATION:  General: NAD  Head & Neck: NCAT  Pulmonary: audible breath sounds bilaterally  Cardiovascular: Regular rate and rhythm   Gastrointestinal/Abdomen & Pelvis: soft, nontender, nondistended   Neurologic/Motor: AOx3     TEST RESULTS:                        8.9    6.46  )-----------( 216      ( 10 May 2021 07:41 )             30.6       05-10    141  |  106  |  15  ----------------------------<  92  5.1<H>   |  25  |  0.5<L>    Ca    8.5      10 May 2021 07:41  Mg     1.9     05-10        FINAL DISCHARGE INTERVIEW:  Resident(s) Present: (Name:CINDI Durant Present: (Name:  ___________)    DISCHARGE MEDICATION RECONCILIATION  reviewed with Attending (Name: Dr. Stanton)    DISPOSITION:   [  ] Home,    [  ] Home with Visiting Nursing Services,   [  x  ]  SNF/ NH,    [   ] Acute Rehab (4A),   [   ] Other (Specify:_________)

## 2021-04-23 NOTE — DISCHARGE NOTE PROVIDER - CARE PROVIDERS DIRECT ADDRESSES
[email or web address]_Unknown

## 2021-04-23 NOTE — DISCHARGE NOTE PROVIDER - CARE PROVIDER_API CALL
Yamel Tubbs)  Hematology; Internal Medicine; Medical Oncology  [address] [place] [address]  Phone: [phone]  Fax: [phone]  Follow Up Time: 1-3 days   Yamel Tubbs)  Hematology; Internal Medicine; Medical Oncology  [address] [place] [address]  Phone: [phone]  Fax: [phone]  Follow Up Time: 1-3 days    Nelson Mast ()  Medicine  Physicians  [address] [facility], [address] [place] [address]  Phone: [phone]  Fax: [phone]  Follow Up Time: 1 week    Tyree Gomez)  Critical Care Medicine; Pulmonary Disease; Sleep Medicine  [address] [place] [address]  Phone: [phone]  Fax: [phone]  Follow Up Time: Routine

## 2021-04-23 NOTE — DISCHARGE NOTE PROVIDER - PROVIDER TOKENS
PROVIDER:[TOKEN:[[id number]],FOLLOWUP:[1-3 days]] PROVIDER:[TOKEN:[[id number]],FOLLOWUP:[1-3 days]],PROVIDER:[TOKEN:[[id number]],FOLLOWUP:[1 week]],PROVIDER:[TOKEN:[[id number]],FOLLOWUP:[Routine]]

## 2021-04-23 NOTE — DISCHARGE NOTE PROVIDER - NSDCMRMEDTOKEN_GEN_ALL_CORE_FT
ferrous sulfate 325 mg (65 mg elemental iron) oral tablet: 1 tab(s) orally 3 times a day  pantoprazole 40 mg oral delayed release tablet: 1 tab(s) orally once a day (before a meal)   aluminum hydroxide-magnesium hydroxide 200 mg-200 mg/5 mL oral suspension: 30 milliliter(s) orally every 6 hours, As needed, Dyspepsia  ferrous sulfate 325 mg (65 mg elemental iron) oral tablet: 1 tab(s) orally 3 times a day  levoFLOXacin 750 mg oral tablet: 1 tab(s) orally every 24 hours until 5/21  loperamide 2 mg oral capsule: 1 cap(s) orally 2 times a day, As needed, Diarrhea  oxycodone-acetaminophen 5 mg-325 mg oral tablet: 1 tab(s) orally every 6 hours, As needed, Moderate Pain (4 - 6)  pantoprazole 40 mg oral delayed release tablet: 1 tab(s) orally once a day (before a meal)  simethicone 80 mg oral tablet, chewable: 1 tab(s) orally 2 times a day, As needed, Gas   ferrous sulfate 325 mg (65 mg elemental iron) oral tablet: 1 tab(s) orally 3 times a day  levoFLOXacin 750 mg oral tablet: 1 tab(s) orally every 24 hours on 5/18 then stop  loperamide 2 mg oral capsule: 1 cap(s) orally 2 times a day, As needed, Diarrhea  oxycodone-acetaminophen 5 mg-325 mg oral tablet: 1 tab(s) orally every 6 hours, As needed, Moderate Pain (4 - 6)  pantoprazole 40 mg oral delayed release tablet: 1 tab(s) orally once a day (before a meal)  simethicone 80 mg oral tablet, chewable: 1 tab(s) orally 2 times a day, As needed, Gas

## 2021-04-23 NOTE — DISCHARGE NOTE PROVIDER - HOSPITAL COURSE
80 year old male with no past medical history (has not seen a doctor in many years, not on any meds) presented to the ED due to loose bowel movements. Patient is alert and orient, but appears to be confused and is a poor historian. History obtained from patient and the charts. Patient has had chronic diarrhea for the past year which is foul smelling, then over the past week it has gotten worse with amount and frequency. The patients daughter believed that their was blood in here stools so she brought him to the ED. The patient endorses decreased PO intake and nausea, but denies abdominal pain. He is not on AC, but will take a baby aspirin from time to time. He denies SOB, CP, changes in urination, headache. He lives alone and ambulates with a walker.   In the ED Vitals: Temp 97.6F, , / 73, RR 18, SpO2 98% on RA. Hb was found to be 5.8. Rectal performed by ED team negative. Given 2 units of PRBC. Lactate of 3. A CT of the abdomen:  Marked wall thickening of the distal sigmoid colon and rectum. Differential includes proctocolitis although underlying neoplastic process cannot be excluded. Direct visualization with colonoscopy is recommended. Subacute fracture of the left 10th lateral rib. Right lower lobe solid nodule measuring 9 mm. He was given Cipro flagyl in ED. Patient admitted for bloo  dy bowel movements.   Pt. was given 3 units prbc transfusion. Was seen by GI and went for a colonscopy. 80 year old male with no past medical history (has not seen a doctor in many years, not on any meds) presented to the ED due to loose bowel movements. Patient is alert and orient, but appears to be confused and is a poor historian. History obtained from patient and the charts. Patient has had chronic diarrhea for the past year which is foul smelling, then over the past week it has gotten worse with amount and frequency.  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   pt underwent placement of mediport and will undergo subsequent Chemo/RT.   pt's daughter is unable to support him socially, pt will be discharged to SNF.          79 yo M with no significant PMHx (has not seen a doctor in many years, not on any meds) presented to the ED due to loose bowel movements.   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   pt underwent placement of mediport, s/p first cycle of FOLFOX on 5/7, will be getting 4 cycles every 2 weeks followed by evaluation for chemo RT and then surgical eval.  pt's daughter is unable to support him socially, pt will be discharged to SNF.

## 2021-04-23 NOTE — CHART NOTE - NSCHARTNOTEFT_GEN_A_CORE
Patient was brought to endoscopy unit for colonoscopy.  Patient had brown stool in endoscopy suit.  Procedure was cancelled due to poor prep      Rec:  - Miralax BID  - Senna BID  - Monitor CBC  - active type and screen  - Transfuse as needed to keep hgb>8  - will plan for colonoscopy next week

## 2021-04-23 NOTE — PROGRESS NOTE ADULT - SUBJECTIVE AND OBJECTIVE BOX
SUBJECTIVE:    Patient is a 80y old Male who presents with a chief complaint of   Currently admitted to medicine with the primary diagnosis of Proctocolitis       Today is hospital day 1d. This morning he is resting comfortably in bed and reports no new issues or overnight events.   Per GI give 2 tap water enemas, planned for c-scope with GI today    PAST MEDICAL & SURGICAL HISTORY  No pertinent past medical history    Amputation of digit of left hand      SOCIAL HISTORY:  Negative for smoking/alcohol/drug use.     ALLERGIES:  No Known Allergies    MEDICATIONS:  STANDING MEDICATIONS  bisacodyl 20 milliGRAM(s) Oral at bedtime  chlorhexidine 4% Liquid 1 Application(s) Topical <User Schedule>  ciprofloxacin   IVPB 400 milliGRAM(s) IV Intermittent every 12 hours  metroNIDAZOLE  IVPB 500 milliGRAM(s) IV Intermittent every 8 hours  pantoprazole  Injectable 40 milliGRAM(s) IV Push two times a day  sodium chloride 0.9%. 1000 milliLiter(s) IV Continuous <Continuous>    PRN MEDICATIONS    VITALS:   T(F): 98.8  HR: 85  BP: 145/77  RR: 18  SpO2: 98%    LABS:                        8.9    8.93  )-----------( 312      ( 2021 20:14 )             30.6     04-22    139  |  104  |  8<L>  ----------------------------<  84  4.1   |  24  |  0.5<L>    Ca    8.2<L>      2021 11:00  Mg     1.9     -    TPro  6.4  /  Alb  2.9<L>  /  TBili  0.7  /  DBili  x   /  AST  14  /  ALT  6   /  AlkPhos  60  04-22    PT/INR - ( 2021 11:00 )   PT: 12.60 sec;   INR: 1.10 ratio         PTT - ( 2021 11:00 )  PTT:30.8 sec  Urinalysis Basic - ( 2021 07:53 )    Color: Yellow / Appearance: Clear / S.032 / pH: x  Gluc: x / Ketone: Negative  / Bili: Negative / Urobili: 6 mg/dL   Blood: x / Protein: Negative / Nitrite: Negative   Leuk Esterase: Negative / RBC: x / WBC x   Sq Epi: x / Non Sq Epi: x / Bacteria: x        Lactate, Blood: 1.7 mmol/L (21 @ 11:00)      Culture - Blood (collected 2021 21:58)  Source: .Blood Blood-Peripheral  Preliminary Report (2021 04:02):    No growth to date.    Culture - Blood (collected 2021 21:58)  Source: .Blood Blood-Peripheral  Preliminary Report (2021 04:02):    No growth to date.      CARDIAC MARKERS ( 2021 21:54 )  x     / <0.01 ng/mL / x     / x     / x          RADIOLOGY:  Chest X-ray:  Xray Chest 1 View- PORTABLE-Urgent:   EXAM:  XR CHEST PORTABLE URGENT 1V            PROCEDURE DATE:  2021            INTERPRETATION:  Clinical History / Reason for exam: Sepsis    Comparison : Chest radiograph None.    Technique/Positioning: AP portable.    Findings:    Support devices: None.    Cardiac/mediastinum/hilum: Unremarkable.    Lung parenchyma/Pleura: Within normal limits.    Skeleton/soft tissues: Unremarkable.    Impression:    No radiographic evidence of acute cardiopulmonary disease.                  DANIEL MCCLURE MD; Attending Radiologist  This document has been electronically signed. 2021 11:16AM (21 @ 22:51)    PHYSICAL EXAM:  GEN: No acute distress  LUNGS: Clear to auscultation bilaterally   HEART: Regular  ABD: Soft, non-tender, non-distended.  EXT: NC/NC/NE/2+PP/ANDERSON/Skin Intact.   NEURO: AAOX3    Intravenous access:   NG tube:   Villeda Catheter:        SUBJECTIVE:    Patient is a 80y old Male who presents with a chief complaint of   Currently admitted to medicine with the primary diagnosis of Proctocolitis       Today is hospital day 1d. This morning he is resting comfortably in bed and reports no new issues or overnight events.   Per GI give 2 tap water enemas, planned for c-scope with GI today    Attending Note: Pt seen and examined at bedside. No cp or sob. Pt did prep in evening.     PAST MEDICAL & SURGICAL HISTORY  No pertinent past medical history    Amputation of digit of left hand      SOCIAL HISTORY:  Negative for smoking/alcohol/drug use.     ALLERGIES:  No Known Allergies    MEDICATIONS:  STANDING MEDICATIONS  bisacodyl 20 milliGRAM(s) Oral at bedtime  chlorhexidine 4% Liquid 1 Application(s) Topical <User Schedule>  ciprofloxacin   IVPB 400 milliGRAM(s) IV Intermittent every 12 hours  metroNIDAZOLE  IVPB 500 milliGRAM(s) IV Intermittent every 8 hours  pantoprazole  Injectable 40 milliGRAM(s) IV Push two times a day  sodium chloride 0.9%. 1000 milliLiter(s) IV Continuous <Continuous>    PRN MEDICATIONS    VITALS:   T(F): 98.8  HR: 85  BP: 145/77  RR: 18  SpO2: 98%    LABS:                        8.9    8.93  )-----------( 312      ( 2021 20:14 )             30.6     04-22    139  |  104  |  8<L>  ----------------------------<  84  4.1   |  24  |  0.5<L>    Ca    8.2<L>      2021 11:00  Mg     1.9     -    TPro  6.4  /  Alb  2.9<L>  /  TBili  0.7  /  DBili  x   /  AST  14  /  ALT  6   /  AlkPhos  60  04-22    PT/INR - ( 2021 11:00 )   PT: 12.60 sec;   INR: 1.10 ratio         PTT - ( 2021 11:00 )  PTT:30.8 sec  Urinalysis Basic - ( 2021 07:53 )    Color: Yellow / Appearance: Clear / S.032 / pH: x  Gluc: x / Ketone: Negative  / Bili: Negative / Urobili: 6 mg/dL   Blood: x / Protein: Negative / Nitrite: Negative   Leuk Esterase: Negative / RBC: x / WBC x   Sq Epi: x / Non Sq Epi: x / Bacteria: x        Lactate, Blood: 1.7 mmol/L (21 @ 11:00)      Culture - Blood (collected 2021 21:58)  Source: .Blood Blood-Peripheral  Preliminary Report (2021 04:02):    No growth to date.    Culture - Blood (collected 2021 21:58)  Source: .Blood Blood-Peripheral  Preliminary Report (2021 04:02):    No growth to date.      CARDIAC MARKERS ( 2021 21:54 )  x     / <0.01 ng/mL / x     / x     / x          RADIOLOGY:  Chest X-ray:  Xray Chest 1 View- PORTABLE-Urgent:   EXAM:  XR CHEST PORTABLE URGENT 1V            PROCEDURE DATE:  2021            INTERPRETATION:  Clinical History / Reason for exam: Sepsis    Comparison : Chest radiograph None.    Technique/Positioning: AP portable.    Findings:    Support devices: None.    Cardiac/mediastinum/hilum: Unremarkable.    Lung parenchyma/Pleura: Within normal limits.    Skeleton/soft tissues: Unremarkable.    Impression:    No radiographic evidence of acute cardiopulmonary disease.        DANIEL MCCLURE MD; Attending Radiologist  This document has been electronically signed. 2021 11:16AM (21 @ 22:51)    PHYSICAL EXAM:  GEN: No acute distress  LUNGS: Clear to auscultation bilaterally   HEART: Regular  ABD: Soft, non-tender, non-distended.  EXT: NC/NC/NE/2+PP/ANDERSON/Skin Intact.   NEURO: AAOX3    Intravenous access:   NG tube:   Villeda Catheter:

## 2021-04-23 NOTE — DISCHARGE NOTE PROVIDER - NSDCCAREPROVSEEN_GEN_ALL_CORE_FT
[facility] Medicine   [facility] Heme/onc  Dr. Almanzra Dr. Gannon

## 2021-04-23 NOTE — PROGRESS NOTE ADULT - ASSESSMENT
80 year old male with no past medical history (has not seen a doctor in many years) presented to the ED due to loose bowel movements.       Today is hospital day 1d. This morning he is resting comfortably in bed and reports no new issues or overnight events.   Per GI give 2 tap water enemas, planned for c-scope with GI today      #Chronic Diarrhea   #Bloody Bowel Movements  #Bowel Wall thickening - cant rule out rectal Ca  #lactic acidosis -resolved  - Hb 5.8> 7.8 s/p 2 units then was given one more unit last night> now 8.9  -CT of the abdomen:  Marked wall thickening of the distal sigmoid colon and rectum. Differential includes proctocolitis although underlying neoplastic process cannot be excluded.  -lactate 3>1.8  - protonix 40mg IV BID  - GI > scope today   - c/w Cipro/ flagyl   - NPO     #AMS> appears to be baseline at this point, per daughter Pt. will not follow up and has not taken care of himself and has not seen a doctor in years   - CT head- no acute changes   - TSH, B12, folate, RPR     #Lung nodule   - CT:  Right lower lobe solid nodule measuring 9 mm.   - f/u OP    #Subacute Rib Fracture   - CT:  Subacute fracture of the left 10th lateral rib.   - Pain control prn     #DVT PPX: Sequentials  #GI PPX: Protonix   #activity as tolerated  #Dispo: acute

## 2021-04-24 LAB
ALBUMIN SERPL ELPH-MCNC: 2.7 G/DL — LOW (ref 3.5–5.2)
ALP SERPL-CCNC: 56 U/L — SIGNIFICANT CHANGE UP (ref 30–115)
ALT FLD-CCNC: 5 U/L — SIGNIFICANT CHANGE UP (ref 0–41)
ANION GAP SERPL CALC-SCNC: 12 MMOL/L — SIGNIFICANT CHANGE UP (ref 7–14)
AST SERPL-CCNC: 12 U/L — SIGNIFICANT CHANGE UP (ref 0–41)
BASOPHILS # BLD AUTO: 0.06 K/UL — SIGNIFICANT CHANGE UP (ref 0–0.2)
BASOPHILS NFR BLD AUTO: 1 % — SIGNIFICANT CHANGE UP (ref 0–1)
BILIRUB SERPL-MCNC: 0.7 MG/DL — SIGNIFICANT CHANGE UP (ref 0.2–1.2)
BUN SERPL-MCNC: 9 MG/DL — LOW (ref 10–20)
CALCIUM SERPL-MCNC: 8.5 MG/DL — SIGNIFICANT CHANGE UP (ref 8.5–10.1)
CHLORIDE SERPL-SCNC: 103 MMOL/L — SIGNIFICANT CHANGE UP (ref 98–110)
CO2 SERPL-SCNC: 21 MMOL/L — SIGNIFICANT CHANGE UP (ref 17–32)
CREAT SERPL-MCNC: 0.6 MG/DL — LOW (ref 0.7–1.5)
CULTURE RESULTS: SIGNIFICANT CHANGE UP
EOSINOPHIL # BLD AUTO: 0.15 K/UL — SIGNIFICANT CHANGE UP (ref 0–0.7)
EOSINOPHIL NFR BLD AUTO: 2.4 % — SIGNIFICANT CHANGE UP (ref 0–8)
GLUCOSE SERPL-MCNC: 95 MG/DL — SIGNIFICANT CHANGE UP (ref 70–99)
HCT VFR BLD CALC: 31.1 % — LOW (ref 42–52)
HGB BLD-MCNC: 8.8 G/DL — LOW (ref 14–18)
IMM GRANULOCYTES NFR BLD AUTO: 1.5 % — HIGH (ref 0.1–0.3)
LYMPHOCYTES # BLD AUTO: 0.85 K/UL — LOW (ref 1.2–3.4)
LYMPHOCYTES # BLD AUTO: 13.8 % — LOW (ref 20.5–51.1)
MCHC RBC-ENTMCNC: 20.8 PG — LOW (ref 27–31)
MCHC RBC-ENTMCNC: 28.3 G/DL — LOW (ref 32–37)
MCV RBC AUTO: 73.5 FL — LOW (ref 80–94)
MONOCYTES # BLD AUTO: 0.63 K/UL — HIGH (ref 0.1–0.6)
MONOCYTES NFR BLD AUTO: 10.2 % — HIGH (ref 1.7–9.3)
NEUTROPHILS # BLD AUTO: 4.4 K/UL — SIGNIFICANT CHANGE UP (ref 1.4–6.5)
NEUTROPHILS NFR BLD AUTO: 71.1 % — SIGNIFICANT CHANGE UP (ref 42.2–75.2)
NRBC # BLD: 0 /100 WBCS — SIGNIFICANT CHANGE UP (ref 0–0)
PLATELET # BLD AUTO: 275 K/UL — SIGNIFICANT CHANGE UP (ref 130–400)
POTASSIUM SERPL-MCNC: 3.5 MMOL/L — SIGNIFICANT CHANGE UP (ref 3.5–5)
POTASSIUM SERPL-SCNC: 3.5 MMOL/L — SIGNIFICANT CHANGE UP (ref 3.5–5)
PROT SERPL-MCNC: 6.1 G/DL — SIGNIFICANT CHANGE UP (ref 6–8)
RBC # BLD: 4.23 M/UL — LOW (ref 4.7–6.1)
RBC # FLD: 22.4 % — HIGH (ref 11.5–14.5)
SODIUM SERPL-SCNC: 136 MMOL/L — SIGNIFICANT CHANGE UP (ref 135–146)
SPECIMEN SOURCE: SIGNIFICANT CHANGE UP
WBC # BLD: 6.18 K/UL — SIGNIFICANT CHANGE UP (ref 4.8–10.8)
WBC # FLD AUTO: 6.18 K/UL — SIGNIFICANT CHANGE UP (ref 4.8–10.8)

## 2021-04-24 PROCEDURE — 99233 SBSQ HOSP IP/OBS HIGH 50: CPT

## 2021-04-24 RX ORDER — SENNA PLUS 8.6 MG/1
1 TABLET ORAL
Refills: 0 | Status: DISCONTINUED | OUTPATIENT
Start: 2021-04-24 | End: 2021-05-04

## 2021-04-24 RX ORDER — POLYETHYLENE GLYCOL 3350 17 G/17G
17 POWDER, FOR SOLUTION ORAL
Refills: 0 | Status: DISCONTINUED | OUTPATIENT
Start: 2021-04-24 | End: 2021-05-06

## 2021-04-24 RX ADMIN — SODIUM CHLORIDE 75 MILLILITER(S): 9 INJECTION INTRAMUSCULAR; INTRAVENOUS; SUBCUTANEOUS at 05:09

## 2021-04-24 RX ADMIN — CHLORHEXIDINE GLUCONATE 1 APPLICATION(S): 213 SOLUTION TOPICAL at 05:10

## 2021-04-24 RX ADMIN — SODIUM CHLORIDE 75 MILLILITER(S): 9 INJECTION INTRAMUSCULAR; INTRAVENOUS; SUBCUTANEOUS at 21:57

## 2021-04-24 RX ADMIN — PANTOPRAZOLE SODIUM 40 MILLIGRAM(S): 20 TABLET, DELAYED RELEASE ORAL at 05:37

## 2021-04-24 RX ADMIN — Medication 100 MILLIGRAM(S): at 21:53

## 2021-04-24 RX ADMIN — Medication 200 MILLIGRAM(S): at 05:10

## 2021-04-24 RX ADMIN — PANTOPRAZOLE SODIUM 40 MILLIGRAM(S): 20 TABLET, DELAYED RELEASE ORAL at 18:28

## 2021-04-24 RX ADMIN — Medication 100 MILLIGRAM(S): at 05:10

## 2021-04-24 RX ADMIN — Medication 200 MILLIGRAM(S): at 18:28

## 2021-04-24 RX ADMIN — Medication 100 MILLIGRAM(S): at 13:43

## 2021-04-24 RX ADMIN — POLYETHYLENE GLYCOL 3350 17 GRAM(S): 17 POWDER, FOR SOLUTION ORAL at 18:28

## 2021-04-24 RX ADMIN — Medication 20 MILLIGRAM(S): at 21:52

## 2021-04-24 RX ADMIN — SENNA PLUS 1 TABLET(S): 8.6 TABLET ORAL at 18:29

## 2021-04-24 NOTE — PROGRESS NOTE ADULT - ASSESSMENT
80 year old male with no past medical history (has not seen a doctor in many years) presented to the ED due to loose bowel movements.      #Chronic Diarrhea   #Bloody Bowel Movements  #Bowel Wall thickening - cant rule out rectal Ca  #lactic acidosis -resolved  - Hb 5.8> 7.8 s/p 2 units then was given one more unit last night> now 8.9  -CT of the abdomen:  Marked wall thickening of the distal sigmoid colon and rectum. Differential includes proctocolitis although underlying neoplastic process cannot be excluded.  -lactate 3>1.8  - protonix 40mg IV BID  - c/w Cipro/ flagyl   - pt had poor prep yesterday, GI to do scope next week. Started on mirralax and senna BID   - Pt not a good candidate for oupt follow up, scope to be done inpt   - keep hgb >7, transfuse as needed , active TS, hgb stable today     #AMS> appears to be baseline at this point, per daughter Pt. will not follow up and has not taken care of himself and has not seen a doctor in years   - CT head- no acute changes   - TSH, B12, folate, RPR - wnl     #Lung nodule   - CT:  Right lower lobe solid nodule measuring 9 mm.   - f/u OP    #Subacute Rib Fracture   - CT:  Subacute fracture of the left 10th lateral rib.   - Pain control prn     #DVT PPX: Sequentials  #GI PPX: Protonix   #activity as tolerated  #Dispo: acute     #Progress Note Handoff  Pending (specify): GI scope next week., can be downgraded to rmf   Family discussion: dw daughter and agreed to plan   Disposition: [facility]   Pt seen during COVID 19 Pandemic.

## 2021-04-24 NOTE — PROGRESS NOTE ADULT - SUBJECTIVE AND OBJECTIVE BOX
Pt seen and examined at bedside. No CP or SOB. Pt had poor prep. Will need another scope this week.         PAST MEDICAL & SURGICAL HISTORY:  No pertinent past medical history    Amputation of digit of left hand        VITAL SIGNS (Last 24 hrs):  T(C): 37.1 (04-24-21 @ 16:11), Max: 37.1 (04-24-21 @ 16:11)  HR: 84 (04-24-21 @ 16:11) (75 - 85)  BP: 124/60 (04-24-21 @ 16:11) (124/60 - 152/70)  RR: 20 (04-24-21 @ 16:11) (18 - 20)  SpO2: 98% (04-24-21 @ 16:11) (97% - 98%)  Wt(kg): --  Daily     Daily     I&O's Summary    23 Apr 2021 07:01  -  24 Apr 2021 07:00  --------------------------------------------------------  IN: 1225 mL / OUT: 2 mL / NET: 1223 mL    24 Apr 2021 07:01  -  24 Apr 2021 17:00  --------------------------------------------------------  IN: 0 mL / OUT: 200 mL / NET: -200 mL        PHYSICAL EXAM:  GENERAL: NAD, well-developed  HEAD:  Atraumatic, Normocephalic  EYES: EOMI, PERRLA, conjunctiva and sclera clear  NECK: Supple, No JVD  CHEST/LUNG: Clear to auscultation bilaterally; No wheeze  HEART: Regular rate and rhythm; No murmurs, rubs, or gallops  ABDOMEN: Soft, Nontender, Nondistended; Bowel sounds present  EXTREMITIES:  2+ Peripheral Pulses, No clubbing, cyanosis, or edema  PSYCH: AAOx3  NEUROLOGY: non-focal  SKIN: No rashes or lesions    Labs Reviewed  Spoke to patient in regards to abnormal labs.    CBC Full  -  ( 24 Apr 2021 07:46 )  WBC Count : 6.18 K/uL  Hemoglobin : 8.8 g/dL  Hematocrit : 31.1 %  Platelet Count - Automated : 275 K/uL  Mean Cell Volume : 73.5 fL  Mean Cell Hemoglobin : 20.8 pg  Mean Cell Hemoglobin Concentration : 28.3 g/dL  Auto Neutrophil # : 4.40 K/uL  Auto Lymphocyte # : 0.85 K/uL  Auto Monocyte # : 0.63 K/uL  Auto Eosinophil # : 0.15 K/uL  Auto Basophil # : 0.06 K/uL  Auto Neutrophil % : 71.1 %  Auto Lymphocyte % : 13.8 %  Auto Monocyte % : 10.2 %  Auto Eosinophil % : 2.4 %  Auto Basophil % : 1.0 %    BMP:    04-24 @ 07:46    Blood Urea Nitrogen - 9  Calcium - 8.5  Carbond Dioxide - 21  Chloride - 103  Creatinine - 0.6  Glucose - 95  Potassium - 3.5  Sodium - 136      Hemoglobin A1c -   PT/INR - ( 22 Apr 2021 11:00 )   PT: 12.60 sec;   INR: 1.10 ratio         PTT - ( 22 Apr 2021 11:00 )  PTT:30.8 sec  Urine Culture:  04-22 @ 07:53 Urine culture: --    Culture Results:   >=3 organisms. Probable collection contamination.  Method Type: --  Organism: --  Organism Identification: --  Specimen Source: .Urine Clean Catch (Midstream)  04-21 @ 21:58 Urine culture: --    Culture Results:   No growth to date.  Method Type: --  Organism: --  Organism Identification: --  Specimen Source: .Blood Blood-Peripheral        MEDICATIONS  (STANDING):  bisacodyl 20 milliGRAM(s) Oral at bedtime  chlorhexidine 4% Liquid 1 Application(s) Topical <User Schedule>  ciprofloxacin   IVPB 400 milliGRAM(s) IV Intermittent every 12 hours  metroNIDAZOLE  IVPB 500 milliGRAM(s) IV Intermittent every 8 hours  pantoprazole  Injectable 40 milliGRAM(s) IV Push two times a day  sodium chloride 0.9%. 1000 milliLiter(s) (75 mL/Hr) IV Continuous <Continuous>    MEDICATIONS  (PRN):

## 2021-04-24 NOTE — CHART NOTE - NSCHARTNOTEFT_GEN_A_CORE
80 year old male with no past medical history (has not seen a doctor in many years) presented to the ED due to loose bowel movements. Bowel wall thickening on imaging. Patient was supposed to get colonoscopy/, poor prep       Today is hospital day 1d. This morning he is resting comfortably in bed and reports no new issues or overnight events.   Per GI give 2 tap water enemas, planned for c-scope with GI today      #Chronic Diarrhea   #Bloody Bowel Movements  #Bowel Wall thickening - cant rule out rectal Ca  #lactic acidosis -resolved  - Hb 5.8> 7.8 s/p 2 units then was given one more unit last night> now 8.9  -CT of the abdomen:  Marked wall thickening of the distal sigmoid colon and rectum. Differential includes proctocolitis although underlying neoplastic process cannot be excluded.  -lactate 3>1.8  - protonix 40mg IV BID  - GI > scope today   - c/w Cipro/ flagyl   - NPO     #AMS> appears to be baseline at this point, per daughter Pt. will not follow up and has not taken care of himself and has not seen a doctor in years   - CT head- no acute changes   - TSH, B12, folate, RPR     #Lung nodule   - CT:  Right lower lobe solid nodule measuring 9 mm.   - f/u OP    #Subacute Rib Fracture   - CT:  Subacute fracture of the left 10th lateral rib.   - Pain control prn     #DVT PPX: Sequentials  #GI PPX: Protonix   #activity as tolerated  #Dispo: acute 80 year old male with no past medical history (has not seen a doctor in many years) presented to the ED due to loose bowel movements. Bowel wall thickening on imaging. Patient was supposed to get colonoscopy, however it was poor prep, Pending colonoscopy next week. Reason for colonoscopy, ruling out malignancy      #Chronic Diarrhea   #Bloody Bowel Movements  #Bowel Wall thickening - cant rule out rectal Ca  #lactic acidosis -resolved  - Hb 5.8> s/p 2 unit  -CT of the abdomen:  Marked wall thickening of the distal sigmoid colon and rectum. Differential includes proctocolitis although underlying neoplastic process cannot be excluded.  - protonix 40mg IV BID  - GI > scope today   - c/w Cipro/ flagyl     #AMS> appears to be baseline at this point, per daughter Pt. will not follow up and has not taken care of himself and has not seen a doctor in years   - CT head- no acute changes   - B12, folate, RPR WNL  - TSH elevated, fu T4    #Lung nodule   - CT:  Right lower lobe solid nodule measuring 9 mm.   - f/u OP    #Subacute Rib Fracture   - CT:  Subacute fracture of the left 10th lateral rib.   - Pain control prn     #DVT PPX: Sequentials  #GI PPX: Protonix   #activity as tolerated  #Dispo: pending colonoscopy 80 year old male with no past medical history (has not seen a doctor in many years) presented to the ED due to loose bowel movements. Bowel wall thickening on imaging, suspecting GI malignancy. Patient was initially in SDU for GI bleed, s/p 2 unit. Patient was supposed to get colonoscopy, however it was poorly prep, Pending colonoscopy next week. Reason for colonoscopy, ruling out malignancy.       #Chronic Diarrhea   #Bloody Bowel Movements  #Bowel Wall thickening - cant rule out rectal Ca  #lactic acidosis -resolved  - Hb 5.8> s/p 2 unit  -CT of the abdomen:  Marked wall thickening of the distal sigmoid colon and rectum. Differential includes proctocolitis although underlying neoplastic process cannot be excluded.  - protonix 40mg IV BID  - GI > scope next week  - c/w Cipro/ flagyl     #AMS> appears to be baseline at this point, per daughter Pt. will not follow up and has not taken care of himself and has not seen a doctor in years   - CT head- no acute changes   - B12, folate, RPR WNL  - TSH elevated, fu T4    #Lung nodule   - CT:  Right lower lobe solid nodule measuring 9 mm.   - f/u OP    #Subacute Rib Fracture   - CT:  Subacute fracture of the left 10th lateral rib.   - Pain control prn     #DVT PPX: Sequentials  #GI PPX: Protonix   #activity as tolerated  #Dispo: pending colonoscopy

## 2021-04-25 LAB
ALBUMIN SERPL ELPH-MCNC: 2.7 G/DL — LOW (ref 3.5–5.2)
ALP SERPL-CCNC: 51 U/L — SIGNIFICANT CHANGE UP (ref 30–115)
ALT FLD-CCNC: <5 U/L — SIGNIFICANT CHANGE UP (ref 0–41)
ANION GAP SERPL CALC-SCNC: 8 MMOL/L — SIGNIFICANT CHANGE UP (ref 7–14)
AST SERPL-CCNC: 12 U/L — SIGNIFICANT CHANGE UP (ref 0–41)
BASOPHILS # BLD AUTO: 0.06 K/UL — SIGNIFICANT CHANGE UP (ref 0–0.2)
BASOPHILS NFR BLD AUTO: 1 % — SIGNIFICANT CHANGE UP (ref 0–1)
BILIRUB SERPL-MCNC: 0.4 MG/DL — SIGNIFICANT CHANGE UP (ref 0.2–1.2)
BUN SERPL-MCNC: 9 MG/DL — LOW (ref 10–20)
CALCIUM SERPL-MCNC: 8 MG/DL — LOW (ref 8.5–10.1)
CHLORIDE SERPL-SCNC: 108 MMOL/L — SIGNIFICANT CHANGE UP (ref 98–110)
CO2 SERPL-SCNC: 24 MMOL/L — SIGNIFICANT CHANGE UP (ref 17–32)
CREAT SERPL-MCNC: 0.6 MG/DL — LOW (ref 0.7–1.5)
EOSINOPHIL # BLD AUTO: 0.18 K/UL — SIGNIFICANT CHANGE UP (ref 0–0.7)
EOSINOPHIL NFR BLD AUTO: 3.1 % — SIGNIFICANT CHANGE UP (ref 0–8)
GLUCOSE SERPL-MCNC: 104 MG/DL — HIGH (ref 70–99)
HCT VFR BLD CALC: 30.9 % — LOW (ref 42–52)
HGB BLD-MCNC: 8.6 G/DL — LOW (ref 14–18)
IMM GRANULOCYTES NFR BLD AUTO: 1.4 % — HIGH (ref 0.1–0.3)
IRON SATN MFR SERPL: 12 % — LOW (ref 15–50)
IRON SATN MFR SERPL: 33 UG/DL — LOW (ref 35–150)
LYMPHOCYTES # BLD AUTO: 1.03 K/UL — LOW (ref 1.2–3.4)
LYMPHOCYTES # BLD AUTO: 17.5 % — LOW (ref 20.5–51.1)
MCHC RBC-ENTMCNC: 20.6 PG — LOW (ref 27–31)
MCHC RBC-ENTMCNC: 27.8 G/DL — LOW (ref 32–37)
MCV RBC AUTO: 74.1 FL — LOW (ref 80–94)
MONOCYTES # BLD AUTO: 0.5 K/UL — SIGNIFICANT CHANGE UP (ref 0.1–0.6)
MONOCYTES NFR BLD AUTO: 8.5 % — SIGNIFICANT CHANGE UP (ref 1.7–9.3)
NEUTROPHILS # BLD AUTO: 4.02 K/UL — SIGNIFICANT CHANGE UP (ref 1.4–6.5)
NEUTROPHILS NFR BLD AUTO: 68.5 % — SIGNIFICANT CHANGE UP (ref 42.2–75.2)
NRBC # BLD: 0 /100 WBCS — SIGNIFICANT CHANGE UP (ref 0–0)
PLATELET # BLD AUTO: 255 K/UL — SIGNIFICANT CHANGE UP (ref 130–400)
POTASSIUM SERPL-MCNC: 3.8 MMOL/L — SIGNIFICANT CHANGE UP (ref 3.5–5)
POTASSIUM SERPL-SCNC: 3.8 MMOL/L — SIGNIFICANT CHANGE UP (ref 3.5–5)
PROT SERPL-MCNC: 5.8 G/DL — LOW (ref 6–8)
RBC # BLD: 4.17 M/UL — LOW (ref 4.7–6.1)
RBC # FLD: 22.4 % — HIGH (ref 11.5–14.5)
SODIUM SERPL-SCNC: 140 MMOL/L — SIGNIFICANT CHANGE UP (ref 135–146)
TIBC SERPL-MCNC: 270 UG/DL — SIGNIFICANT CHANGE UP (ref 220–430)
UIBC SERPL-MCNC: 237 UG/DL — SIGNIFICANT CHANGE UP (ref 110–370)
WBC # BLD: 5.87 K/UL — SIGNIFICANT CHANGE UP (ref 4.8–10.8)
WBC # FLD AUTO: 5.87 K/UL — SIGNIFICANT CHANGE UP (ref 4.8–10.8)

## 2021-04-25 PROCEDURE — 99233 SBSQ HOSP IP/OBS HIGH 50: CPT

## 2021-04-25 RX ORDER — SOD SULF/SODIUM/NAHCO3/KCL/PEG
4000 SOLUTION, RECONSTITUTED, ORAL ORAL ONCE
Refills: 0 | Status: COMPLETED | OUTPATIENT
Start: 2021-04-25 | End: 2021-04-25

## 2021-04-25 RX ADMIN — PANTOPRAZOLE SODIUM 40 MILLIGRAM(S): 20 TABLET, DELAYED RELEASE ORAL at 17:29

## 2021-04-25 RX ADMIN — SENNA PLUS 1 TABLET(S): 8.6 TABLET ORAL at 05:09

## 2021-04-25 RX ADMIN — Medication 100 MILLIGRAM(S): at 05:09

## 2021-04-25 RX ADMIN — Medication 100 MILLIGRAM(S): at 20:56

## 2021-04-25 RX ADMIN — CHLORHEXIDINE GLUCONATE 1 APPLICATION(S): 213 SOLUTION TOPICAL at 05:08

## 2021-04-25 RX ADMIN — POLYETHYLENE GLYCOL 3350 17 GRAM(S): 17 POWDER, FOR SOLUTION ORAL at 17:58

## 2021-04-25 RX ADMIN — Medication 20 MILLIGRAM(S): at 20:58

## 2021-04-25 RX ADMIN — SENNA PLUS 1 TABLET(S): 8.6 TABLET ORAL at 17:58

## 2021-04-25 RX ADMIN — POLYETHYLENE GLYCOL 3350 17 GRAM(S): 17 POWDER, FOR SOLUTION ORAL at 05:09

## 2021-04-25 RX ADMIN — Medication 200 MILLIGRAM(S): at 17:29

## 2021-04-25 RX ADMIN — Medication 4000 MILLILITER(S): at 18:39

## 2021-04-25 RX ADMIN — PANTOPRAZOLE SODIUM 40 MILLIGRAM(S): 20 TABLET, DELAYED RELEASE ORAL at 05:09

## 2021-04-25 RX ADMIN — Medication 200 MILLIGRAM(S): at 05:09

## 2021-04-25 RX ADMIN — Medication 100 MILLIGRAM(S): at 14:02

## 2021-04-25 NOTE — PROGRESS NOTE ADULT - SUBJECTIVE AND OBJECTIVE BOX
Pt seen and examined at bedside. No CP or SOB. No bleeding overnight.       PAST MEDICAL & SURGICAL HISTORY:  No pertinent past medical history    Amputation of digit of left hand    VITAL SIGNS (Last 24 hrs):  T(C): 37.2 (04-25-21 @ 08:00), Max: 37.2 (04-25-21 @ 08:00)  HR: 72 (04-25-21 @ 08:00) (72 - 84)  BP: 134/63 (04-25-21 @ 08:00) (117/- - 134/63)  RR: 20 (04-25-21 @ 08:00) (20 - 20)  SpO2: 98% (04-25-21 @ 08:00) (98% - 98%)  Wt(kg): --  Daily     Daily     I&O's Summary    24 Apr 2021 07:01  -  25 Apr 2021 07:00  --------------------------------------------------------  IN: 975 mL / OUT: 500 mL / NET: 475 mL        PHYSICAL EXAM:  GENERAL: NAD, well-developed  HEAD:  Atraumatic, Normocephalic  EYES: EOMI, PERRLA, conjunctiva and sclera clear  NECK: Supple, No JVD  CHEST/LUNG: Clear to auscultation bilaterally; No wheeze  HEART: Regular rate and rhythm; No murmurs, rubs, or gallops  ABDOMEN: Soft, Nontender, Nondistended; Bowel sounds present  EXTREMITIES:  2+ Peripheral Pulses, No clubbing, cyanosis, or edema  PSYCH: AAOx3  NEUROLOGY: non-focal  SKIN: No rashes or lesions    Labs Reviewed  Spoke to patient in regards to abnormal labs.    CBC Full  -  ( 25 Apr 2021 06:53 )  WBC Count : 5.87 K/uL  Hemoglobin : 8.6 g/dL  Hematocrit : 30.9 %  Platelet Count - Automated : 255 K/uL  Mean Cell Volume : 74.1 fL  Mean Cell Hemoglobin : 20.6 pg  Mean Cell Hemoglobin Concentration : 27.8 g/dL  Auto Neutrophil # : 4.02 K/uL  Auto Lymphocyte # : 1.03 K/uL  Auto Monocyte # : 0.50 K/uL  Auto Eosinophil # : 0.18 K/uL  Auto Basophil # : 0.06 K/uL  Auto Neutrophil % : 68.5 %  Auto Lymphocyte % : 17.5 %  Auto Monocyte % : 8.5 %  Auto Eosinophil % : 3.1 %  Auto Basophil % : 1.0 %    BMP:    04-25 @ 06:53    Blood Urea Nitrogen - 9  Calcium - 8.0  Carbond Dioxide - 24  Chloride - 108  Creatinine - 0.6  Glucose - 104  Potassium - 3.8  Sodium - 140      Hemoglobin A1c -   PT/INR - ( 22 Apr 2021 11:00 )   PT: 12.60 sec;   INR: 1.10 ratio         PTT - ( 22 Apr 2021 11:00 )  PTT:30.8 sec  Urine Culture:  04-22 @ 07:53 Urine culture: --    Culture Results:   >=3 organisms. Probable collection contamination.  Method Type: --  Organism: --  Organism Identification: --  Specimen Source: .Urine Clean Catch (Midstream)  04-21 @ 21:58 Urine culture: --    Culture Results:   No growth to date.  Method Type: --  Organism: --  Organism Identification: --  Specimen Source: .Blood Blood-Peripheral        COVID Labs      D-Dimer:        Imaging reviewed      MEDICATIONS  (STANDING):  bisacodyl Suppository 20 milliGRAM(s) Rectal once  chlorhexidine 4% Liquid 1 Application(s) Topical <User Schedule>  ciprofloxacin   IVPB 400 milliGRAM(s) IV Intermittent every 12 hours  metroNIDAZOLE  IVPB 500 milliGRAM(s) IV Intermittent every 8 hours  pantoprazole  Injectable 40 milliGRAM(s) IV Push two times a day  polyethylene glycol 3350 17 Gram(s) Oral two times a day  polyethylene glycol/electrolyte Solution. 4000 milliLiter(s) Oral once  senna 1 Tablet(s) Oral two times a day  sodium chloride 0.9%. 1000 milliLiter(s) (75 mL/Hr) IV Continuous <Continuous>    MEDICATIONS  (PRN):

## 2021-04-25 NOTE — PROGRESS NOTE ADULT - ASSESSMENT
80 year old male with no past medical history (has not seen a doctor in many years) presented to the ED due to loose bowel movements.    #Chronic Diarrhea   #Bloody Bowel Movements  #Bowel Wall thickening - cant rule out rectal Ca  #lactic acidosis -resolved  - Hb 5.8> 7.8 s/p 2 units then was given one more unit last night> now 8.9  -CT of the abdomen:  Marked wall thickening of the distal sigmoid colon and rectum. Differential includes proctocolitis although underlying neoplastic process cannot be excluded.  -lactate 3>1.8  - protonix 40mg IV BID  - c/w Cipro/ flagyl   - pt had poor prep on friday scope, GI to do scope next week. Started on mirralax and senna BID   - Pt not a good candidate for oupt follow up, scope to be done inpt   - keep hgb >7, transfuse as needed , active TS, hgb stable today     #AMS> appears to be baseline at this point, per daughter Pt. will not follow up and has not taken care of himself and has not seen a doctor in years   - CT head- no acute changes   - TSH, B12, folate, RPR - wnl     #Lung nodule   - CT:  Right lower lobe solid nodule measuring 9 mm.   - f/u OP    #Subacute Rib Fracture   - CT:  Subacute fracture of the left 10th lateral rib.   - Pain control prn     #DVT PPX: Sequentials  #GI PPX: Protonix   #activity as tolerated  #Dispo: acute     #Progress Note Handoff  Pending (specify): GI scope next week., can be downgraded to rmf   Family discussion: dw daughter and agreed to plan   Disposition: RMF   Pt seen during COVID 19 Pandemic.

## 2021-04-26 LAB
ALBUMIN SERPL ELPH-MCNC: 2.8 G/DL — LOW (ref 3.5–5.2)
ALP SERPL-CCNC: 53 U/L — SIGNIFICANT CHANGE UP (ref 30–115)
ALT FLD-CCNC: <5 U/L — SIGNIFICANT CHANGE UP (ref 0–41)
ANION GAP SERPL CALC-SCNC: 6 MMOL/L — LOW (ref 7–14)
AST SERPL-CCNC: 13 U/L — SIGNIFICANT CHANGE UP (ref 0–41)
BASOPHILS # BLD AUTO: 0.07 K/UL — SIGNIFICANT CHANGE UP (ref 0–0.2)
BASOPHILS NFR BLD AUTO: 1.2 % — HIGH (ref 0–1)
BILIRUB SERPL-MCNC: 0.4 MG/DL — SIGNIFICANT CHANGE UP (ref 0.2–1.2)
BUN SERPL-MCNC: 4 MG/DL — LOW (ref 10–20)
CALCIUM SERPL-MCNC: 8.1 MG/DL — LOW (ref 8.5–10.1)
CHLORIDE SERPL-SCNC: 105 MMOL/L — SIGNIFICANT CHANGE UP (ref 98–110)
CO2 SERPL-SCNC: 26 MMOL/L — SIGNIFICANT CHANGE UP (ref 17–32)
CREAT SERPL-MCNC: 0.5 MG/DL — LOW (ref 0.7–1.5)
EOSINOPHIL # BLD AUTO: 0.19 K/UL — SIGNIFICANT CHANGE UP (ref 0–0.7)
EOSINOPHIL NFR BLD AUTO: 3.3 % — SIGNIFICANT CHANGE UP (ref 0–8)
FERRITIN SERPL-MCNC: 34 NG/ML — SIGNIFICANT CHANGE UP (ref 30–400)
GLUCOSE SERPL-MCNC: 83 MG/DL — SIGNIFICANT CHANGE UP (ref 70–99)
HCT VFR BLD CALC: 32.1 % — LOW (ref 42–52)
HGB BLD-MCNC: 9.2 G/DL — LOW (ref 14–18)
IMM GRANULOCYTES NFR BLD AUTO: 1 % — HIGH (ref 0.1–0.3)
LYMPHOCYTES # BLD AUTO: 0.97 K/UL — LOW (ref 1.2–3.4)
LYMPHOCYTES # BLD AUTO: 16.8 % — LOW (ref 20.5–51.1)
MAGNESIUM SERPL-MCNC: 1.8 MG/DL — SIGNIFICANT CHANGE UP (ref 1.8–2.4)
MCHC RBC-ENTMCNC: 21.1 PG — LOW (ref 27–31)
MCHC RBC-ENTMCNC: 28.7 G/DL — LOW (ref 32–37)
MCV RBC AUTO: 73.6 FL — LOW (ref 80–94)
MONOCYTES # BLD AUTO: 0.28 K/UL — SIGNIFICANT CHANGE UP (ref 0.1–0.6)
MONOCYTES NFR BLD AUTO: 4.8 % — SIGNIFICANT CHANGE UP (ref 1.7–9.3)
NEUTROPHILS # BLD AUTO: 4.21 K/UL — SIGNIFICANT CHANGE UP (ref 1.4–6.5)
NEUTROPHILS NFR BLD AUTO: 72.9 % — SIGNIFICANT CHANGE UP (ref 42.2–75.2)
NRBC # BLD: 0 /100 WBCS — SIGNIFICANT CHANGE UP (ref 0–0)
PLATELET # BLD AUTO: 222 K/UL — SIGNIFICANT CHANGE UP (ref 130–400)
POTASSIUM SERPL-MCNC: 3.9 MMOL/L — SIGNIFICANT CHANGE UP (ref 3.5–5)
POTASSIUM SERPL-SCNC: 3.9 MMOL/L — SIGNIFICANT CHANGE UP (ref 3.5–5)
PROT SERPL-MCNC: 5.9 G/DL — LOW (ref 6–8)
RBC # BLD: 4.36 M/UL — LOW (ref 4.7–6.1)
RBC # FLD: 22.6 % — HIGH (ref 11.5–14.5)
SARS-COV-2 RNA SPEC QL NAA+PROBE: SIGNIFICANT CHANGE UP
SODIUM SERPL-SCNC: 137 MMOL/L — SIGNIFICANT CHANGE UP (ref 135–146)
T4 AB SER-ACNC: 4.9 UG/DL — SIGNIFICANT CHANGE UP (ref 4.6–12)
WBC # BLD: 5.78 K/UL — SIGNIFICANT CHANGE UP (ref 4.8–10.8)
WBC # FLD AUTO: 5.78 K/UL — SIGNIFICANT CHANGE UP (ref 4.8–10.8)

## 2021-04-26 PROCEDURE — 99233 SBSQ HOSP IP/OBS HIGH 50: CPT

## 2021-04-26 RX ORDER — SOD SULF/SODIUM/NAHCO3/KCL/PEG
4000 SOLUTION, RECONSTITUTED, ORAL ORAL ONCE
Refills: 0 | Status: COMPLETED | OUTPATIENT
Start: 2021-04-26 | End: 2021-04-26

## 2021-04-26 RX ORDER — POLYETHYLENE GLYCOL 3350 17 G/17G
17 POWDER, FOR SOLUTION ORAL ONCE
Refills: 0 | Status: COMPLETED | OUTPATIENT
Start: 2021-04-26 | End: 2021-04-26

## 2021-04-26 RX ADMIN — PANTOPRAZOLE SODIUM 40 MILLIGRAM(S): 20 TABLET, DELAYED RELEASE ORAL at 17:56

## 2021-04-26 RX ADMIN — SENNA PLUS 1 TABLET(S): 8.6 TABLET ORAL at 04:40

## 2021-04-26 RX ADMIN — Medication 200 MILLIGRAM(S): at 04:39

## 2021-04-26 RX ADMIN — CHLORHEXIDINE GLUCONATE 1 APPLICATION(S): 213 SOLUTION TOPICAL at 04:39

## 2021-04-26 RX ADMIN — Medication 200 MILLIGRAM(S): at 17:56

## 2021-04-26 RX ADMIN — Medication 100 MILLIGRAM(S): at 04:39

## 2021-04-26 RX ADMIN — POLYETHYLENE GLYCOL 3350 17 GRAM(S): 17 POWDER, FOR SOLUTION ORAL at 04:39

## 2021-04-26 RX ADMIN — Medication 100 MILLIGRAM(S): at 21:25

## 2021-04-26 RX ADMIN — PANTOPRAZOLE SODIUM 40 MILLIGRAM(S): 20 TABLET, DELAYED RELEASE ORAL at 04:39

## 2021-04-26 RX ADMIN — SODIUM CHLORIDE 75 MILLILITER(S): 9 INJECTION INTRAMUSCULAR; INTRAVENOUS; SUBCUTANEOUS at 09:53

## 2021-04-26 RX ADMIN — Medication 100 MILLIGRAM(S): at 13:57

## 2021-04-26 RX ADMIN — Medication 4000 MILLILITER(S): at 17:56

## 2021-04-26 RX ADMIN — POLYETHYLENE GLYCOL 3350 17 GRAM(S): 17 POWDER, FOR SOLUTION ORAL at 09:07

## 2021-04-26 NOTE — CHART NOTE - NSCHARTNOTEFT_GEN_A_CORE
patient was scheduled for colonoscopy today , patient is still having brown stool , procedure to be postponed due to inadequate prep    REC:  Clear liquid diet   NPO after midnight   Golytely 4l this after noon   Colonoscopy in am

## 2021-04-26 NOTE — PROGRESS NOTE ADULT - ASSESSMENT
80 year old male with no past medical history (has not seen a doctor in many years) presented to the ED due to loose bowel movements.    #Chronic Diarrhea   #Bloody Bowel Movements  #Bowel Wall thickening - cant rule out rectal Ca  #lactic acidosis -resolved  - Hb 5.8> 7.8 s/p 2 units then was given one more unit last night> now 8.9  -CT of the abdomen:  Marked wall thickening of the distal sigmoid colon and rectum. Differential includes proctocolitis although underlying neoplastic process cannot be excluded.  -lactate 3>1.8  - protonix 40mg IV BID  - c/w Cipro/ flagyl   - pt had poor prep GI to do scope tomorrow. Clear liquid diet, golytely, and miralax ordered   - Pt not a good candidate for oupt follow up, scope to be done inpt   - keep hgb >7, transfuse as needed , active TS, hgb stable today     #AMS  - Appears to be baseline  - CT head- no acute changes   - TSH, B12, folate, RPR - wnl     #Lung nodule   - CT:  Right lower lobe solid nodule measuring 9 mm.   - f/u OP    #Subacute Rib Fracture   - CT:  Subacute fracture of the left 10th lateral rib.   - Pain control prn     #DVT PPX: Sequentials  #GI PPX: Protonix   #activity as tolerated  #Dispo: acute     #Progress Note Handoff  Pending (specify): GI scope tomorrow   Family discussion: dw daughter and agreed to plan   Disposition: RMF   Pt seen during COVID 19 Pandemic.    80 year old male with no past medical history (has not seen a doctor in many years) presented to the ED due to loose bowel movements.    #Chronic Diarrhea   #Bloody Bowel Movements  #Bowel Wall thickening - cant rule out rectal Ca  #lactic acidosis -resolved  - Hb 5.8> 7.8 s/p 2 units then was given one more unit last night> now 8.9  -CT of the abdomen:  Marked wall thickening of the distal sigmoid colon and rectum. Differential includes proctocolitis although underlying neoplastic process cannot be excluded.  -lactate 3>1.8  - protonix 40mg IV BID  - c/w Cipro/ flagyl   - pt had poor prep GI to do scope tomorrow. Clear liquid diet, golytely, and miralax ordered   - Pt not a good candidate for oupt follow up, scope to be done inpt   - keep hgb >7, transfuse as needed , active TS, hgb stable today     #encephalopathy  - Appears to be baseline  - CT head- no acute changes   - TSH, B12, folate, RPR - wnl     #Lung nodule   - CT:  Right lower lobe solid nodule measuring 9 mm.   - f/u OP    #Subacute Rib Fracture   - CT:  Subacute fracture of the left 10th lateral rib.   - Pain control prn     #DVT PPX: Sequentials  #GI PPX: Protonix   #activity as tolerated  #Dispo: acute     #Progress Note Handoff  Pending (specify): GI scope tomorrow   Family discussion: dw daughter and agreed to plan   Disposition: RMF   Pt seen during COVID 19 Pandemic.

## 2021-04-26 NOTE — PROGRESS NOTE ADULT - SUBJECTIVE AND OBJECTIVE BOX
SUBJECTIVE:    Patient is a 80y old  Male who presents with a chief complaint of loose bloody bowel movements  Currently admitted to medicine with the primary diagnosis of Proctocolitis    Today is hospital day 4d. Patient was seen and examined at bedside. This morning he is resting comfortably in bed and reports no new issues or overnight events. Patient was inadequately prepared for colonoscopy today. Aiming for colonoscopy tomorrow (4/27)    PAST MEDICAL & SURGICAL HISTORY  PAST MEDICAL & SURGICAL HISTORY:  No pertinent past medical history    Amputation of digit of left hand    ALLERGIES:  No Known Allergies    MEDICATIONS:  STANDING MEDICATIONS  chlorhexidine 4% Liquid 1 Application(s) Topical <User Schedule>  ciprofloxacin   IVPB 400 milliGRAM(s) IV Intermittent every 12 hours  metroNIDAZOLE  IVPB 500 milliGRAM(s) IV Intermittent every 8 hours  pantoprazole  Injectable 40 milliGRAM(s) IV Push two times a day  polyethylene glycol 3350 17 Gram(s) Oral two times a day  senna 1 Tablet(s) Oral two times a day  sodium chloride 0.9%. 1000 milliLiter(s) IV Continuous <Continuous>    PRN MEDICATIONS    VITALS:   T(F): 96.5  HR: 83  BP: 136/71  RR: 16  SpO2: 100%    LABS:                        8.6    5.87  )-----------( 255      ( 25 Apr 2021 06:53 )             30.9     04-25    140  |  108  |  9<L>  ----------------------------<  104<H>  3.8   |  24  |  0.6<L>    Ca    8.0<L>      25 Apr 2021 06:53    TPro  5.8<L>  /  Alb  2.7<L>  /  TBili  0.4  /  DBili  x   /  AST  12  /  ALT  <5  /  AlkPhos  51  04-25    PHYSICAL EXAM:  GENERAL: NAD, speaks in full sentences, no signs of respiratory distress  HEAD: Atraumatic  NECK: Supple  CHEST/LUNG: Clear to auscultation bilaterally; No wheeze or crackles  HEART: S1, S2; RRR; No murmurs, rubs, or gallops  ABDOMEN: BS+; Soft, Non-tender, Non-distended  EXTREMITIES:  2+ Peripheral Pulses, No clubbing, cyanosis, or edema  PSYCH: AAOx3  NEUROLOGY: non-focal  SKIN: No rashes or lesions     SUBJECTIVE:    Patient is a 80y old  Male who presents with a chief complaint of loose bloody bowel movements  Currently admitted to medicine with the primary diagnosis of Proctocolitis    Today is hospital day 4d. Patient was seen and examined at bedside. This morning he is resting comfortably in bed and reports no new issues or overnight events. Patient was inadequately prepared for colonoscopy today. Aiming for colonoscopy tomorrow (4/27)    Attending Note: Pt seen and examined at bedside. No cp or sob.  Pt had poor prep again in evening, GI cancelled procedure today and will reschedule til tom 4/27    PAST MEDICAL & SURGICAL HISTORY  PAST MEDICAL & SURGICAL HISTORY:  No pertinent past medical history    Amputation of digit of left hand    ALLERGIES:  No Known Allergies    MEDICATIONS:  STANDING MEDICATIONS  chlorhexidine 4% Liquid 1 Application(s) Topical <User Schedule>  ciprofloxacin   IVPB 400 milliGRAM(s) IV Intermittent every 12 hours  metroNIDAZOLE  IVPB 500 milliGRAM(s) IV Intermittent every 8 hours  pantoprazole  Injectable 40 milliGRAM(s) IV Push two times a day  polyethylene glycol 3350 17 Gram(s) Oral two times a day  senna 1 Tablet(s) Oral two times a day  sodium chloride 0.9%. 1000 milliLiter(s) IV Continuous <Continuous>    PRN MEDICATIONS    VITALS:   T(F): 96.5  HR: 83  BP: 136/71  RR: 16  SpO2: 100%    LABS:                        8.6    5.87  )-----------( 255      ( 25 Apr 2021 06:53 )             30.9     04-25    140  |  108  |  9<L>  ----------------------------<  104<H>  3.8   |  24  |  0.6<L>    Ca    8.0<L>      25 Apr 2021 06:53    TPro  5.8<L>  /  Alb  2.7<L>  /  TBili  0.4  /  DBili  x   /  AST  12  /  ALT  <5  /  AlkPhos  51  04-25    PHYSICAL EXAM:  GENERAL: NAD, speaks in full sentences, no signs of respiratory distress  HEAD: Atraumatic  NECK: Supple  CHEST/LUNG: Clear to auscultation bilaterally; No wheeze or crackles  HEART: S1, S2; RRR; No murmurs, rubs, or gallops  ABDOMEN: BS+; Soft, Non-tender, Non-distended  EXTREMITIES:  2+ Peripheral Pulses, No clubbing, cyanosis, or edema  PSYCH: AAOx3  NEUROLOGY: non-focal  SKIN: No rashes or lesions

## 2021-04-27 ENCOUNTER — TRANSCRIPTION ENCOUNTER (OUTPATIENT)
Age: 81
End: 2021-04-27

## 2021-04-27 ENCOUNTER — RESULT REVIEW (OUTPATIENT)
Age: 81
End: 2021-04-27

## 2021-04-27 LAB
BLD GP AB SCN SERPL QL: SIGNIFICANT CHANGE UP
CULTURE RESULTS: SIGNIFICANT CHANGE UP
CULTURE RESULTS: SIGNIFICANT CHANGE UP
SPECIMEN SOURCE: SIGNIFICANT CHANGE UP
SPECIMEN SOURCE: SIGNIFICANT CHANGE UP

## 2021-04-27 PROCEDURE — 99233 SBSQ HOSP IP/OBS HIGH 50: CPT

## 2021-04-27 PROCEDURE — 88341 IMHCHEM/IMCYTCHM EA ADD ANTB: CPT | Mod: 26

## 2021-04-27 PROCEDURE — 45385 COLONOSCOPY W/LESION REMOVAL: CPT

## 2021-04-27 PROCEDURE — 45380 COLONOSCOPY AND BIOPSY: CPT | Mod: 59

## 2021-04-27 PROCEDURE — 88305 TISSUE EXAM BY PATHOLOGIST: CPT | Mod: 26

## 2021-04-27 PROCEDURE — 88342 IMHCHEM/IMCYTCHM 1ST ANTB: CPT | Mod: 26

## 2021-04-27 RX ADMIN — SODIUM CHLORIDE 75 MILLILITER(S): 9 INJECTION INTRAMUSCULAR; INTRAVENOUS; SUBCUTANEOUS at 18:29

## 2021-04-27 RX ADMIN — Medication 200 MILLIGRAM(S): at 18:05

## 2021-04-27 RX ADMIN — CHLORHEXIDINE GLUCONATE 1 APPLICATION(S): 213 SOLUTION TOPICAL at 05:46

## 2021-04-27 RX ADMIN — PANTOPRAZOLE SODIUM 40 MILLIGRAM(S): 20 TABLET, DELAYED RELEASE ORAL at 05:46

## 2021-04-27 RX ADMIN — PANTOPRAZOLE SODIUM 40 MILLIGRAM(S): 20 TABLET, DELAYED RELEASE ORAL at 18:05

## 2021-04-27 RX ADMIN — Medication 100 MILLIGRAM(S): at 13:26

## 2021-04-27 RX ADMIN — Medication 100 MILLIGRAM(S): at 05:45

## 2021-04-27 RX ADMIN — Medication 200 MILLIGRAM(S): at 05:45

## 2021-04-27 RX ADMIN — Medication 100 MILLIGRAM(S): at 21:30

## 2021-04-27 NOTE — PROGRESS NOTE ADULT - SUBJECTIVE AND OBJECTIVE BOX
SUBJECTIVE:    Patient is a 80y old  Male who presents with a chief complaint of loose bloody bowel movements  Currently admitted to medicine with the primary diagnosis of Proctocolitis    Today is hospital day 5d. Patient was seen and examined at bedside. This morning he is resting comfortably in bed and reports no new issues or overnight events. Eager to have colonoscopy performed today      PAST MEDICAL & SURGICAL HISTORY  PAST MEDICAL & SURGICAL HISTORY:  No pertinent past medical history    Amputation of digit of left hand    ALLERGIES:  No Known Allergies    MEDICATIONS:  STANDING MEDICATIONS  chlorhexidine 4% Liquid 1 Application(s) Topical <User Schedule>  ciprofloxacin   IVPB 400 milliGRAM(s) IV Intermittent every 12 hours  metroNIDAZOLE  IVPB 500 milliGRAM(s) IV Intermittent every 8 hours  pantoprazole  Injectable 40 milliGRAM(s) IV Push two times a day  polyethylene glycol 3350 17 Gram(s) Oral two times a day  senna 1 Tablet(s) Oral two times a day  sodium chloride 0.9%. 1000 milliLiter(s) IV Continuous <Continuous>    PRN MEDICATIONS    VITALS:   T(F): 96.4  HR: 68  BP: 144/70  RR: 18  SpO2: 100%    LABS:                        9.2    5.78  )-----------( 222      ( 26 Apr 2021 16:00 )             32.1     04-26    137  |  105  |  4<L>  ----------------------------<  83  3.9   |  26  |  0.5<L>    Ca    8.1<L>      26 Apr 2021 16:00  Mg     1.8     04-26    TPro  5.9<L>  /  Alb  2.8<L>  /  TBili  0.4  /  DBili  x   /  AST  13  /  ALT  <5  /  AlkPhos  53  04-26    PHYSICAL EXAM:  GENERAL: NAD, speaks in full sentences, no signs of respiratory distress  HEAD: Atraumatic  NECK: Supple  CHEST/LUNG: Clear to auscultation bilaterally; No wheeze or crackles  HEART: S1, S2; RRR; No murmurs, rubs, or gallops  ABDOMEN: BS+; Soft, Non-tender, Non-distended  EXTREMITIES:  2+ Peripheral Pulses, No clubbing, cyanosis, or edema  PSYCH: AAOx3  NEUROLOGY: non-focal  SKIN: No rashes or lesions       SUBJECTIVE:    Patient is a 80y old  Male who presents with a chief complaint of loose bloody bowel movements  Currently admitted to medicine with the primary diagnosis of Proctocolitis    Today is hospital day 5d. Patient was seen and examined at bedside. This morning he is resting comfortably in bed and reports no new issues or overnight events. Eager to have colonoscopy performed today     Attending note: Pt seen and examined at bedside.  No cp or sob. No overnight events.      PAST MEDICAL & SURGICAL HISTORY  PAST MEDICAL & SURGICAL HISTORY:  No pertinent past medical history    Amputation of digit of left hand    ALLERGIES:  No Known Allergies    MEDICATIONS:  STANDING MEDICATIONS  chlorhexidine 4% Liquid 1 Application(s) Topical <User Schedule>  ciprofloxacin   IVPB 400 milliGRAM(s) IV Intermittent every 12 hours  metroNIDAZOLE  IVPB 500 milliGRAM(s) IV Intermittent every 8 hours  pantoprazole  Injectable 40 milliGRAM(s) IV Push two times a day  polyethylene glycol 3350 17 Gram(s) Oral two times a day  senna 1 Tablet(s) Oral two times a day  sodium chloride 0.9%. 1000 milliLiter(s) IV Continuous <Continuous>    PRN MEDICATIONS    VITALS:   T(F): 96.4  HR: 68  BP: 144/70  RR: 18  SpO2: 100%    LABS:                        9.2    5.78  )-----------( 222      ( 26 Apr 2021 16:00 )             32.1     04-26    137  |  105  |  4<L>  ----------------------------<  83  3.9   |  26  |  0.5<L>    Ca    8.1<L>      26 Apr 2021 16:00  Mg     1.8     04-26    TPro  5.9<L>  /  Alb  2.8<L>  /  TBili  0.4  /  DBili  x   /  AST  13  /  ALT  <5  /  AlkPhos  53  04-26    PHYSICAL EXAM:  GENERAL: NAD, speaks in full sentences, no signs of respiratory distress  HEAD: Atraumatic  NECK: Supple  CHEST/LUNG: Clear to auscultation bilaterally; No wheeze or crackles  HEART: S1, S2; RRR; No murmurs, rubs, or gallops  ABDOMEN: BS+; Soft, Non-tender, Non-distended  EXTREMITIES:  2+ Peripheral Pulses, No clubbing, cyanosis, or edema  PSYCH: AAOx3  NEUROLOGY: non-focal  SKIN: No rashes or lesions

## 2021-04-27 NOTE — CHART NOTE - NSCHARTNOTEFT_GEN_A_CORE
PACU ANESTHESIA ADMISSION NOTE      Procedure:   Post op diagnosis:      ____  Intubated  TV:______       Rate: ______      FiO2: ______    __x__  Patent Airway    __x__  Full return of protective reflexes    __x__  Full recovery from anesthesia / back to baseline     Vitals:   T:  36         R: 18                 BP: 133/63                  Sat:  100                 P: 82      Mental Status:  __x__ Awake   ___x__ Alert   _____ Drowsy   _____ Sedated    Nausea/Vomiting:  _x___ NO  ______Yes,   See Post - Op Orders          Pain Scale (0-10):  _____    Treatment: __x__ None    ____ See Post - Op/PCA Orders    Post - Operative Fluids:   ____ Oral   ___x_ See Post - Op Orders    Plan: Discharge:   __x__Home       _____Floor     _____Critical Care    _____  Other:_________________    Comments:    Uneventful anesthesia. Patient transported to  spontaneously breathing and hemodynamically stable.

## 2021-04-27 NOTE — PROGRESS NOTE ADULT - ASSESSMENT
80 year old male with no past medical history (has not seen a doctor in many years) presented to the ED due to loose bowel movements.    #Chronic Diarrhea   #Bloody Bowel Movements  #Bowel Wall thickening - cant rule out rectal Ca  #lactic acidosis -resolved  - Hb 5.8> 7.8 s/p 2 units then was given one more unit; hgb now >8  -CT of the abdomen:  Marked wall thickening of the distal sigmoid colon and rectum. Differential includes proctocolitis although underlying neoplastic process cannot be excluded.  -lactate 3>1.8  - protonix 40mg IV BID  - c/w Cipro/ flagyl   - pt had poor prep GI to do scope today. Has been tolerating clear liquid diet, golytely, and miralax   - Pt not a good candidate for oupt follow up, scope to be done inpatient   - keep hgb >7, transfuse as needed , active TS, hgb stable today     #encephalopathy  - Appears to be baseline  - CT head- no acute changes   - TSH, B12, folate, RPR - wnl     #Lung nodule   - CT:  Right lower lobe solid nodule measuring 9 mm.   - f/u OP    #Subacute Rib Fracture   - CT:  Subacute fracture of the left 10th lateral rib.   - Pain control prn     #DVT PPX: Sequentials  #GI PPX: Protonix   #activity as tolerated  #Dispo: acute     #Progress Note Handoff  Pending (specify): GI scope today  Disposition: [facility]

## 2021-04-27 NOTE — DIETITIAN INITIAL EVALUATION ADULT. - PERTINENT MEDS FT
MEDICATIONS  (STANDING):  ciprofloxacin   IVPB 400 milliGRAM(s) IV Intermittent every 12 hours  metroNIDAZOLE  IVPB 500 milliGRAM(s) IV Intermittent every 8 hours  pantoprazole  Injectable 40 milliGRAM(s) IV Push two times a day  polyethylene glycol 3350 17 Gram(s) Oral two times a day  senna 1 Tablet(s) Oral two times a day

## 2021-04-27 NOTE — DIETITIAN INITIAL EVALUATION ADULT. - OTHER INFO
Bloody Bowel Movements/Chronic diarrhea. Bowel Wall thickening - cant rule out rectal Ca. encephalopathy - at baseline. Lung nodule

## 2021-04-27 NOTE — DIETITIAN INITIAL EVALUATION ADULT. - ORAL INTAKE PTA/DIET HISTORY
Attempted to see pt but was off unit for colonoscopy today. No previous admissions to [facility]. NKFA per EMR.

## 2021-04-27 NOTE — DIETITIAN INITIAL EVALUATION ADULT. - OTHER CALCULATIONS
Calories: 1910 - 2069 kcals (MSJ x 1.2 - 1.3 AF) Protein: 83 - 100 gms (1.0 - 1.2 gm/kg) Fluid: per team

## 2021-04-28 PROCEDURE — 99222 1ST HOSP IP/OBS MODERATE 55: CPT

## 2021-04-28 PROCEDURE — 99233 SBSQ HOSP IP/OBS HIGH 50: CPT

## 2021-04-28 PROCEDURE — 71260 CT THORAX DX C+: CPT | Mod: 26

## 2021-04-28 RX ORDER — IRON SUCROSE 20 MG/ML
200 INJECTION, SOLUTION INTRAVENOUS
Refills: 0 | Status: COMPLETED | OUTPATIENT
Start: 2021-04-28 | End: 2021-05-02

## 2021-04-28 RX ORDER — PANTOPRAZOLE SODIUM 20 MG/1
40 TABLET, DELAYED RELEASE ORAL
Refills: 0 | Status: DISCONTINUED | OUTPATIENT
Start: 2021-04-28 | End: 2021-05-06

## 2021-04-28 RX ADMIN — Medication 100 MILLIGRAM(S): at 06:30

## 2021-04-28 RX ADMIN — Medication 200 MILLIGRAM(S): at 05:21

## 2021-04-28 RX ADMIN — POLYETHYLENE GLYCOL 3350 17 GRAM(S): 17 POWDER, FOR SOLUTION ORAL at 17:15

## 2021-04-28 RX ADMIN — SENNA PLUS 1 TABLET(S): 8.6 TABLET ORAL at 17:15

## 2021-04-28 RX ADMIN — CHLORHEXIDINE GLUCONATE 1 APPLICATION(S): 213 SOLUTION TOPICAL at 06:30

## 2021-04-28 RX ADMIN — PANTOPRAZOLE SODIUM 40 MILLIGRAM(S): 20 TABLET, DELAYED RELEASE ORAL at 05:35

## 2021-04-28 RX ADMIN — SODIUM CHLORIDE 75 MILLILITER(S): 9 INJECTION INTRAMUSCULAR; INTRAVENOUS; SUBCUTANEOUS at 19:01

## 2021-04-28 RX ADMIN — IRON SUCROSE 110 MILLIGRAM(S): 20 INJECTION, SOLUTION INTRAVENOUS at 18:57

## 2021-04-28 NOTE — CONSULT NOTE ADULT - ASSESSMENT
80 year old male with no past medical history (has not seen a doctor in many years, not on any meds) presented to the ED due to loose bowel movements, found to be anemic and has imaging and colonoscopy findings significant for large rectal mass, concerning for adenocarcinoma. Colorectal Surgery consulted for recommendations regarding potential surgery intervention.     Plan:  80 year old male with no past medical history (has not seen a doctor in many years, not on any meds) presented to the ED due to loose bowel movements, found to be anemic and has imaging and colonoscopy findings significant for large rectal mass, concerning for adenocarcinoma. Colorectal Surgery consulted for recommendations regarding potential surgery intervention.     Plan:   -Recommend EUS with [facility]   -F/U surgical pathology of mass biopsied on 4/27   -Heme/Onc recommendations, will likely require chemo/radiation prior to any potential surgical intervention   -Full staging scans including CT of the chest   -Plan discussed with Dr. Segovia    80 year old male with no past medical history (has not seen a doctor in many years, not on any meds) presented to the ED due to loose bowel movements, found to be anemic and has imaging and colonoscopy findings significant for large rectal mass, concerning for adenocarcinoma. Colorectal Surgery consulted for recommendations regarding potential surgery intervention.     Plan:   -Recommend EUS with [facility]   -Please obtain CEA and CA 19-9   -F/U surgical pathology of mass biopsied on 4/27   -Heme/Onc recommendations, will likely require chemo/radiation prior to any potential surgical intervention   -Full staging scans including CT of the chest   -Plan discussed with Dr. Segovia

## 2021-04-28 NOTE — PHYSICAL THERAPY INITIAL EVALUATION ADULT - PERTINENT HX OF CURRENT PROBLEM, REHAB EVAL
80 year old male with no past medical history (has not seen a doctor in many years, not on any meds) presented to the ED due to loose bowel movements. Patient is alert and orient, but appears to be confused and is a poor historian.

## 2021-04-28 NOTE — CONSULT NOTE ADULT - ASSESSMENT
80M w/ no past medical history presented to the ED due to loose bowel movements. Patient has had decreased PO intake, nausea, chronic diarrhea for about a year and hematochezia.  80M w/ no past medical history presented to the ED due to loose bowel movements. Patient has had decreased PO intake, nausea, chronic diarrhea for about a year and hematochezia.     # Rectal mass suspicious for colorectal adenocarcinoma   - CTAP 4/22: Circumferential rectal wall thickening with partially exophytic mass, 9mm Rt pulmonary lower lobe pulmonary nodule. (see full report)   - Colonoscopy 4/27: Circumferential, ulcerated, irregular and friable mass, Diverticulosis, multiple polyps in colon. (see full report)  - f/u Biopsy result    # Acute blood loss anemia 2/2 LGIB  # HUSEYIN  - Hgb 5.8 on admission, s/p PRBC x3  - Iron Saturation: 12%, Ferritin 34, MCV 68.9 - consistent with microcytic Iron Deficiency Anemia      # This is Prelim. Please wait for Final Recommendations.  80M w/ no past medical history presented to the ED due to loose bowel movements. Patient has had decreased PO intake, nausea, chronic diarrhea for about a year and hematochezia.     # Rectal mass suspicious for colorectal adenocarcinoma   - CTAP 4/22: Circumferential rectal wall thickening with partially exophytic mass, 9mm Rt pulmonary lower lobe pulmonary nodule. (see full report)   - CT chest IV: performed, read pending  - Colonoscopy 4/27: Circumferential, ulcerated, irregular and friable mass, Diverticulosis, multiple polyps in colon. (see full report)  - f/u Biopsy result    # Acute blood loss anemia 2/2 LGIB  # HUSEYIN  - Hgb 5.8 on admission, s/p PRBC x3  - Iron Saturation: 12%, Ferritin 34, MCV 68.9 - consistent with microcytic Iron Deficiency Anemia      # This is Prelim. Please wait for Final Recommendations.  80M w/ no past medical history presented to the ED due to loose bowel movements. Patient has had decreased PO intake, nausea, chronic diarrhea for about a year and hematochezia so the daughter decided to bring him to the hospital. Heme/Onc is consulted for Rectal mass.     # Rectal mass suspicious for colorectal adenocarcinoma   - CTAP 4/22: Circumferential rectal wall thickening with partially exophytic mass, 9mm Rt pulmonary lower lobe pulmonary nodule. (see full report)   - CT chest IV: performed, read pending  - Colonoscopy 4/27: Circumferential, ulcerated, irregular and friable mass, Diverticulosis, multiple polyps in colon. (see full report)  - f/u Biopsy result    # Acute blood loss anemia 2/2 LGIB  # HUSEYIN  - Hgb 5.8 on admission, s/p PRBC x3  - Iron Saturation: 12%, Ferritin 34, MCV 68.9 - consistent with microcytic Iron Deficiency Anemia      # This is Prelim. Please wait for Final Recommendations.  80M w/ no past medical history presented to the ED due to loose bowel movements. Patient has had decreased PO intake, nausea, chronic diarrhea for about a year and hematochezia so the daughter decided to bring him to the hospital. Heme/Onc is consulted for Rectal mass.     # Rectal mass suspicious for colorectal adenocarcinoma   - CTAP 4/22: Circumferential rectal wall thickening with partially exophytic mass, 9mm Rt pulmonary lower lobe pulmonary nodule. (see full report)   - CT chest IV: Small b/l pleural effusion, mediastinal lymph nodes (see full report)  - Colonoscopy 4/27: Circumferential, ulcerated, irregular and friable mass, Diverticulosis, multiple polyps in colon. (see full report)  - f/u Biopsy result  - obtain CEA, Echo, MRI of pelvis  - Consult Radiation Oncology    # Acute blood loss anemia 2/2 LGIB  # HUSEYIN  - Hgb 5.8 on admission, s/p PRBC x3  - Iron Saturation: 12%, Ferritin 34, MCV 68.9 - consistent with microcytic Iron Deficiency Anemia  - start IV Venofer 200mg qd x 5 doses.

## 2021-04-28 NOTE — PROGRESS NOTE ADULT - SUBJECTIVE AND OBJECTIVE BOX
Gastroenterology progress note:     Patient is a 80y old  Male who presents with a chief complaint of      Admitted on: 04-22-21    We are following the patient for: rectal cancer      Interval History: patient is S/P colonoscopy yesterday showing rectal cancer awaiting pathology results , multiple polyps s/p polypectomy , a 5 cm transverse colon polyp  that was not removed . Patient denies any abdominal pain , nausea or vomting , hematemesis , melena , or hematochezia     Patient's medical problems are stable   Prior records reviewed (Y/N): Y  History obtained from someone other than patient (Y/N): Y      PAST MEDICAL & SURGICAL HISTORY:  No pertinent past medical history    Amputation of digit of left hand        MEDICATIONS  (STANDING):  chlorhexidine 4% Liquid 1 Application(s) Topical <User Schedule>  ciprofloxacin   IVPB 400 milliGRAM(s) IV Intermittent every 12 hours  metroNIDAZOLE  IVPB 500 milliGRAM(s) IV Intermittent every 8 hours  pantoprazole  Injectable 40 milliGRAM(s) IV Push two times a day  polyethylene glycol 3350 17 Gram(s) Oral two times a day  senna 1 Tablet(s) Oral two times a day  sodium chloride 0.9%. 1000 milliLiter(s) (75 mL/Hr) IV Continuous <Continuous>    MEDICATIONS  (PRN):      Allergies  No Known Allergies      Review of Systems:   Cardiovascular:  No Chest Pain, No Palpitations  Respiratory:  No Cough, No Dyspnea  Gastrointestinal:  As described in HPI    Physical Examination:  T(C): 35.8 (04-28-21 @ 07:30), Max: 36.5 (04-28-21 @ 05:57)  HR: 79 (04-28-21 @ 07:30) (65 - 80)  BP: 150/72 (04-28-21 @ 07:30) (133/63 - 176/81)  RR: 18 (04-28-21 @ 07:30) (16 - 18)  SpO2: 99% (04-28-21 @ 00:00) (98% - 100%)  Weight (kg): 79.8 (04-28-21 @ 05:57)    04-27-21 @ 07:01  -  04-28-21 @ 07:00  --------------------------------------------------------  IN: 1175 mL / OUT: 695 mL / NET: 480 mL      Constitutional: No acute distress.  Respiratory:  No signs of respiratory distress. Lung sounds are clear bilaterally.  Cardiovascular:  S1 S2, Regular rate and rhythm.  Abdominal: Abdomen is soft, symmetric, and non-tender without distention.  Bowel sounds are present and normoactive in all four quadrants. No masses, hepatomegaly, or splenomegaly are noted.   Skin: No rashes, No Jaundice.        Data: (reviewed by attending)                        9.2    5.78  )-----------( 222      ( 26 Apr 2021 16:00 )             32.1     Hgb trend:  9.2  04-26-21 @ 16:00        04-26    137  |  105  |  4<L>  ----------------------------<  83  3.9   |  26  |  0.5<L>    Ca    8.1<L>      26 Apr 2021 16:00  Mg     1.8     04-26    TPro  5.9<L>  /  Alb  2.8<L>  /  TBili  0.4  /  DBili  x   /  AST  13  /  ALT  <5  /  AlkPhos  53  04-26    Liver panel trend:  TBili 0.4   /   AST 13   /   ALT <5   /   AlkP 53   /   Tptn 5.9   /   Alb 2.8    /   DBili --      04-26  TBili 0.4   /   AST 12   /   ALT <5   /   AlkP 51   /   Tptn 5.8   /   Alb 2.7    /   DBili --      04-25  TBili 0.7   /   AST 12   /   ALT 5   /   AlkP 56   /   Tptn 6.1   /   Alb 2.7    /   DBili --      04-24  TBili 0.7   /   AST 14   /   ALT 6   /   AlkP 60   /   Tptn 6.4   /   Alb 2.9    /   DBili --      04-22  TBili 0.4   /   AST 15   /   ALT 7   /   AlkP 65   /   Tptn 6.8   /   Alb 3.4    /   DBili --      04-21             Radiology: (reviewed by attending)

## 2021-04-28 NOTE — CHART NOTE - NSCHARTNOTEFT_GEN_A_CORE
spoke to daughter diego murguia and updated her about the colonoscopy findings of rectal mass suggestive of colon cancer awaiting pathology results , and that patient will undergo staging workup , and that surgery , oncology will also be involved

## 2021-04-28 NOTE — PROGRESS NOTE ADULT - ASSESSMENT
80 year old male with no past medical history (has not seen a doctor in many years) presented to the ED due to loose bowel movements.    #Chronic Diarrhea   #Bloody Bowel Movements  #Rectal mass  #lactic acidosis -resolved  - Hgb was 5.8, received 3u pRBC  - Lactate initially 3, improved; was on cipro/Flagyl, d/c  - Protonix change to PO  - Surgical consult for rectal mass  - CT chest with contrast for workup  - [facility] for transverse colon polyp  - F/u pathology  - Heme-onc    #encephalopathy, resolved  - Appears to be baseline  - CT head- no acute changes   - TSH, B12, folate, RPR - wnl     #Pancreatic hypodensity  - MRI pancreatic protocol (w/wo contrast)    #Lung nodule   - CT:  Right lower lobe solid nodule measuring 9 mm.   - f/u OP    #Subacute Rib Fracture   - CT:  Subacute fracture of the left 10th lateral rib.   - Pain control prn     DVT PPx: SCDs  GI PPX: Protonix 40 mg PO  Diet: Clears  Activity: Increase as tolerated  Dispo: from home  Code Status: full code

## 2021-04-28 NOTE — PROGRESS NOTE ADULT - ASSESSMENT
80 year old male with no past medical history presented with chronic diarrhea , hematochezia , and weight loss CT scan abdomen and pelvis showed marked wall thickening of the distal sigmoid colon and rectum, was found to have rectal cancer on colonoscopy 4/27         #rectal cancer / multiple polyps s/p polypectomy  , large flat transverse colon polyp not removed : s/p colonoscopy 4/27  rectal mass extending from the anus up to 17 cm proximally,   multiple polyps s/p polypectomy , a 5 cm transverse colon polyp  that was not removed    REC:   await pathology results   Staging workup including CT chest , r/o metastasis  , ct abdomen showed Right lower lobe solid nodule measuring 9 mm in lower chest   consult        - clear liquid diet today  - 4 L golytely start at 4 pm today  - dulcolax 20 mg PO once at 10 pm  - NPO after midnight  - will plan for colonoscopy tomorrow      # 1.8 cm hypodensity in the posterior pancreatic body:   - appears to measure fluid attenuation  - possible cyst/ IPMN. No main duct dilatation    Rec:  - MRI pancreatic protocol which can be done as outpatient  80 year old male with no past medical history presented with chronic diarrhea , hematochezia , and weight loss CT scan abdomen and pelvis showed marked wall thickening of the distal sigmoid colon and rectum, was found to have rectal cancer on colonoscopy 4/27         #rectal cancer  s/p colonoscopy 4/27  rectal mass extending from the anus up to 17 cm proximally,   multiple polyps s/p polypectomy , a 5 cm transverse colon polyp  that was not removed    REC:   await pathology results   Staging workup including CT chest , r/o metastasis  , ct abdomen showed Right lower lobe solid nodule measuring 9 mm in lower chest   consult surgery and oncology   will discuss with [facility] rectal EUS     #multiple polyps s/p polypectomy  , large flat transverse colon polyp not removed :  rec:   Await pathology results   EMR by [facility]       # 1.8 cm hypodensity in the posterior pancreatic body:   appears to measure fluid attenuation ,  possible cyst/ IPMN. No main duct dilatation  Rec:  MRI pancreatic protocol

## 2021-04-28 NOTE — PHYSICAL THERAPY INITIAL EVALUATION ADULT - GENERAL OBSERVATIONS, REHAB EVAL
9:25-9:50; Pt encountered in bed, + IV. Slightly agitated and confused. Required encouragement and education to participate.

## 2021-04-28 NOTE — CONSULT NOTE ADULT - SUBJECTIVE AND OBJECTIVE BOX
DATE OF ADMISSION: 04-22-21  HOSPITAL DAY: 6d    REASON FOR CONSULT: New Rectal Mass     HISTORY OF PRESENT ILLNESS:   80 year old male with no past medical history (has not seen a doctor in many years, not on any meds) presented to the ED due to loose bowel movements. Patient is alert and orient, but appears to be confused and is a poor historian. History obtained from patient and the charts. Patient has had chronic diarrhea for the past year which is foul smelling, then over the past week it has gotten worse with amount and frequency. The patients daughter believed that their was blood in here stools so she brought him to the ED. The patient endorses decreased PO intake and nausea, but denies abdominal pain. He is not on AC, but will take a baby aspirin from time to time. He denies SOB, CP, changes in urination, headache. He lives alone and ambulates with a walker.   In the ED Vitals: Temp 97.6F, , / 73, RR 18, SpO2 98% on RA. Hb was found to be 5.8. Rectal performed by ED team negative. Given 2 units of PRBC. Lactate of 3. A CT of the abdomen:  Marked wall thickening of the distal sigmoid colon and rectum. Differential includes proctocolitis although underlying neoplastic process cannot be excluded. Direct visualization with colonoscopy is recommended. Subacute fracture of the left 10th lateral rib. Right lower lobe solid nodule measuring 9 mm. He was given Cipro flagyl in ED. Patient admitted for bloody bowel movements.     PAST MEDICAL & SURGICAL HISTORY:  No pertinent past medical history    Amputation of digit of left hand    FAMILY HISTORY:  Mother:   Father:   Siblings:     SOCIAL HISTORY:  Smoker:  Alcohol use:  Drug use:     ALLERGIES:  No Known Allergies    MEDICATIONS:  MEDICATIONS  (STANDING):  chlorhexidine 4% Liquid 1 Application(s) Topical <User Schedule>  pantoprazole    Tablet 40 milliGRAM(s) Oral before breakfast  polyethylene glycol 3350 17 Gram(s) Oral two times a day  senna 1 Tablet(s) Oral two times a day  sodium chloride 0.9%. 1000 milliLiter(s) (75 mL/Hr) IV Continuous <Continuous>    MEDICATIONS  (PRN):    HOME MEDICATIONS:  Home Medications:      VITALS:   T(C): 35.8 (04-28-21 @ 07:30), Max: 36.5 (04-28-21 @ 05:57)  HR: 79 (04-28-21 @ 07:30) (65 - 80)  BP: 150/72 (04-28-21 @ 07:30) (133/63 - 176/81)  RR: 18 (04-28-21 @ 07:30) (16 - 18)  SpO2: 99% (04-28-21 @ 00:00) (98% - 100%)  Wt(kg): --    04-25-21 @ 07:01  -  04-26-21 @ 07:00  --------------------------------------------------------  IN: 75 mL / OUT: 650 mL / NET: -575 mL    04-26-21 @ 07:01  -  04-27-21 @ 07:00  --------------------------------------------------------  IN: 3160 mL / OUT: 1000 mL / NET: 2160 mL    04-27-21 @ 07:01  -  04-28-21 @ 07:00  --------------------------------------------------------  IN: 1175 mL / OUT: 695 mL / NET: 480 mL    04-28-21 @ 07:01  -  04-28-21 @ 11:22  --------------------------------------------------------  IN: 450 mL / OUT: 200 mL / NET: 250 mL      PHYSICAL EXAM:  GENERAL: NAD, Resting in bed  HEENT: NCAT  CHEST/LUNG: Clear to auscultation bilaterally; No wheezing or rubs.   HEART: Regular rate and rhythm; No murmurs, rubs, or gallops  ABDOMEN: Bowel sounds present; Soft, Nontender, Nondistended.   EXTREMITIES:  No clubbing, cyanosis, or edema  NERVOUS SYSTEM:  Alert & Oriented X3    LABS:                        9.2    5.78  )-----------( 222      ( 26 Apr 2021 16:00 )             32.1     04-26    137  |  105  |  4<L>  ----------------------------<  83  3.9   |  26  |  0.5<L>    Ca    8.1<L>      26 Apr 2021 16:00  Mg     1.8     04-26    TPro  5.9<L>  /  Alb  2.8<L>  /  TBili  0.4  /  DBili  x   /  AST  13  /  ALT  <5  /  AlkPhos  53  04-26    < from: CT Abdomen and Pelvis w/ IV Cont (04.22.21 @ 01:12) >  IMPRESSION: Circumferential rectal wall thickening spanning at least 14 cm in length with a partially exophytic mass extending into the right mesorectal fat measuring approximately 5.7 x 3.0 x 6.8 cm. The exophytic mass abuts the right posterior lateral prostate gland. Primary consideration is rectal cancer. Limited ability to exclude superimposed proctitis.    9 mm right lower lobe pulmonary nodule.  Small wedge-shaped hypodensities at the superior aspect of the spleen may reflect splenic infarcts.  < end of copied text >        < from: Colonoscopy (04.27.21 @ 08:30) >  Impressions:    Circumferential, ulcerated, irregular and friable infiltrating thickening  rectal thickening (mass) starting from anal verge up to 17 cm proximally and  suggestive of adenocarcinoma. (Biopsy).    Hard rectal mass felt on digital exam.    Diverticulosis of the whole colon.    Polyp (7 mm) in the ascending colon. (Polypectomy).    Polyp (5 mm) in the proximal transverse colon. (Polypectomy).    Polyp (8 mm) in the distal transverse colon. (Polypectomy).    Polyp (1 cm) in the mid-transverse colon. (Polypectomy).    Polyp (5 cm) inthe distal transverse colon.    Polyp (3 cm) in the descending colon. (Polypectomy).     < end of copied text >             DATE OF ADMISSION: 04-22-21  HOSPITAL DAY: 6d    REASON FOR CONSULT: New Rectal Mass     HISTORY OF PRESENT ILLNESS:   80 year old male with no past medical history (has not seen a doctor in many years, not on any meds) presented to the ED due to loose bowel movements. Patient is alert and orient, but appears to be confused and is a poor historian. History obtained from patient and the charts. Patient has had chronic diarrhea for the past year which is foul smelling, then over the past week it has gotten worse with amount and frequency. The patients daughter believed that their was blood in here stools so she brought him to the ED. The patient endorses decreased PO intake and nausea, but denies abdominal pain. He is not on AC, but will take a baby aspirin from time to time. He denies SOB, CP, changes in urination, headache. He lives alone and ambulates with a walker.   In the ED Vitals: Temp 97.6F, , / 73, RR 18, SpO2 98% on RA. Hb was found to be 5.8. Rectal performed by ED team negative. Given 2 units of PRBC. Lactate of 3. A CT of the abdomen:  Marked wall thickening of the distal sigmoid colon and rectum. Differential includes proctocolitis although underlying neoplastic process cannot be excluded. Direct visualization with colonoscopy is recommended. Subacute fracture of the left 10th lateral rib. Right lower lobe solid nodule measuring 9 mm. He was given Cipro flagyl in ED. Patient admitted for bloody bowel movements.     PAST MEDICAL & SURGICAL HISTORY:  No pertinent past medical history    Amputation of digit of left hand    FAMILY HISTORY:  Mother:   Father:   Siblings:     SOCIAL HISTORY:  Smoker:  Alcohol use:  Drug use:     ALLERGIES:  No Known Allergies    MEDICATIONS:  MEDICATIONS  (STANDING):  chlorhexidine 4% Liquid 1 Application(s) Topical <User Schedule>  pantoprazole    Tablet 40 milliGRAM(s) Oral before breakfast  polyethylene glycol 3350 17 Gram(s) Oral two times a day  senna 1 Tablet(s) Oral two times a day  sodium chloride 0.9%. 1000 milliLiter(s) (75 mL/Hr) IV Continuous <Continuous>    MEDICATIONS  (PRN):    HOME MEDICATIONS:  Home Medications:      VITALS:   T(C): 35.8 (04-28-21 @ 07:30), Max: 36.5 (04-28-21 @ 05:57)  HR: 79 (04-28-21 @ 07:30) (65 - 80)  BP: 150/72 (04-28-21 @ 07:30) (133/63 - 176/81)  RR: 18 (04-28-21 @ 07:30) (16 - 18)  SpO2: 99% (04-28-21 @ 00:00) (98% - 100%)  Wt(kg): --    04-25-21 @ 07:01  -  04-26-21 @ 07:00  --------------------------------------------------------  IN: 75 mL / OUT: 650 mL / NET: -575 mL    04-26-21 @ 07:01  -  04-27-21 @ 07:00  --------------------------------------------------------  IN: 3160 mL / OUT: 1000 mL / NET: 2160 mL    04-27-21 @ 07:01  -  04-28-21 @ 07:00  --------------------------------------------------------  IN: 1175 mL / OUT: 695 mL / NET: 480 mL    04-28-21 @ 07:01  -  04-28-21 @ 11:22  --------------------------------------------------------  IN: 450 mL / OUT: 200 mL / NET: 250 mL      PHYSICAL EXAM:  GENERAL: NAD, Resting in bed  HEENT: NCAT  CHEST/LUNG: Clear to auscultation bilaterally; No wheezing or rubs.   HEART: Regular rate and rhythm; No murmurs, rubs, or gallops  ABDOMEN: Bowel sounds present; Soft, Nontender, Nondistended.   EXTREMITIES:  No clubbing, cyanosis, or edema  NERVOUS SYSTEM:  Alert & Oriented X3    LABS:                        9.2    5.78  )-----------( 222      ( 26 Apr 2021 16:00 )             32.1     04-26    137  |  105  |  4<L>  ----------------------------<  83  3.9   |  26  |  0.5<L>    Ca    8.1<L>      26 Apr 2021 16:00  Mg     1.8     04-26    TPro  5.9<L>  /  Alb  2.8<L>  /  TBili  0.4  /  DBili  x   /  AST  13  /  ALT  <5  /  AlkPhos  53  04-26    < from: CT Abdomen and Pelvis w/ IV Cont (04.22.21 @ 01:12) >  IMPRESSION: Circumferential rectal wall thickening spanning at least 14 cm in length with a partially exophytic mass extending into the right mesorectal fat measuring approximately 5.7 x 3.0 x 6.8 cm. The exophytic mass abuts the right posterior lateral prostate gland. Primary consideration is rectal cancer. Limited ability to exclude superimposed proctitis.    9 mm right lower lobe pulmonary nodule.  Small wedge-shaped hypodensities at the superior aspect of the spleen may reflect splenic infarcts.  < end of copied text >        < from: Colonoscopy (04.27.21 @ 08:30) >  Impressions:    Circumferential, ulcerated, irregular and friable infiltrating thickening  rectal thickening (mass) starting from anal verge up to 17 cm proximally and  suggestive of adenocarcinoma. (Biopsy).    Hard rectal mass felt on digital exam.    Diverticulosis of the whole colon.    Polyp (7 mm) in the ascending colon. (Polypectomy).    Polyp (5 mm) in the proximal transverse colon. (Polypectomy).    Polyp (8 mm) in the distal transverse colon. (Polypectomy).    Polyp (1 cm) in the mid-transverse colon. (Polypectomy).    Polyp (5 cm) inthe distal transverse colon.    Polyp (3 cm) in the descending colon. (Polypectomy).   < end of copied text >   DATE OF ADMISSION: 04-22-21  HOSPITAL DAY: 6d    REASON FOR CONSULT: New Rectal Mass     HISTORY OF PRESENT ILLNESS:   80 year old male with no past medical history (has not seen a doctor in many years, not on any meds) presented to the ED due to loose bowel movements. Patient is alert and orient, but appears to be confused and is a poor historian. History obtained from patient and the charts. Patient has had chronic diarrhea for the past year which is foul smelling, then over the past week it has gotten worse with amount and frequency. The patients daughter believed that their was blood in here stools so she brought him to the ED. The patient endorses decreased PO intake and nausea, but denies abdominal pain. He is not on AC, but will take a baby aspirin from time to time. He denies SOB, CP, changes in urination, headache. He lives alone and ambulates with a walker.   In the ED Vitals: Temp 97.6F, , / 73, RR 18, SpO2 98% on RA. Hb was found to be 5.8. Rectal performed by ED team negative. Given 2 units of PRBC. Lactate of 3. A CT of the abdomen:  Marked wall thickening of the distal sigmoid colon and rectum. Differential includes proctocolitis although underlying neoplastic process cannot be excluded. Direct visualization with colonoscopy is recommended. Subacute fracture of the left 10th lateral rib. Right lower lobe solid nodule measuring 9 mm. He was given Cipro flagyl in ED. Patient admitted for bloody bowel movements.     PAST MEDICAL & SURGICAL HISTORY:  No pertinent past medical history    Amputation of digit of left hand    FAMILY HISTORY:  Mother:   Father:   Siblings:     SOCIAL HISTORY:  Smoker:  Alcohol use:  Drug use:     ALLERGIES:  No Known Allergies    MEDICATIONS:  MEDICATIONS  (STANDING):  chlorhexidine 4% Liquid 1 Application(s) Topical <User Schedule>  pantoprazole    Tablet 40 milliGRAM(s) Oral before breakfast  polyethylene glycol 3350 17 Gram(s) Oral two times a day  senna 1 Tablet(s) Oral two times a day  sodium chloride 0.9%. 1000 milliLiter(s) (75 mL/Hr) IV Continuous <Continuous>    MEDICATIONS  (PRN):    HOME MEDICATIONS:  Home Medications:      VITALS:   T(C): 35.8 (04-28-21 @ 07:30), Max: 36.5 (04-28-21 @ 05:57)  HR: 79 (04-28-21 @ 07:30) (65 - 80)  BP: 150/72 (04-28-21 @ 07:30) (133/63 - 176/81)  RR: 18 (04-28-21 @ 07:30) (16 - 18)  SpO2: 99% (04-28-21 @ 00:00) (98% - 100%)  Wt(kg): --    04-25-21 @ 07:01  -  04-26-21 @ 07:00  --------------------------------------------------------  IN: 75 mL / OUT: 650 mL / NET: -575 mL    04-26-21 @ 07:01  -  04-27-21 @ 07:00  --------------------------------------------------------  IN: 3160 mL / OUT: 1000 mL / NET: 2160 mL    04-27-21 @ 07:01  -  04-28-21 @ 07:00  --------------------------------------------------------  IN: 1175 mL / OUT: 695 mL / NET: 480 mL    04-28-21 @ 07:01  -  04-28-21 @ 11:22  --------------------------------------------------------  IN: 450 mL / OUT: 200 mL / NET: 250 mL      PHYSICAL EXAM:  GENERAL: NAD, Resting in bed  HEENT: NCAT  CHEST/LUNG: Clear to auscultation bilaterally; No wheezing or rubs.   HEART: Regular rate and rhythm; No murmurs, rubs, or gallops  ABDOMEN: Bowel sounds present; Soft, Nontender, Nondistended.   EXTREMITIES:  No clubbing, cyanosis, or edema  NERVOUS SYSTEM:  Alert & Oriented X3    LABS:                        9.2    5.78  )-----------( 222      ( 26 Apr 2021 16:00 )             32.1     04-26    137  |  105  |  4<L>  ----------------------------<  83  3.9   |  26  |  0.5<L>    Ca    8.1<L>      26 Apr 2021 16:00  Mg     1.8     04-26    TPro  5.9<L>  /  Alb  2.8<L>  /  TBili  0.4  /  DBili  x   /  AST  13  /  ALT  <5  /  AlkPhos  53  04-26    Ferritin, Serum: 34 ng/mL [30 - 400] (04-25-21 @ 09:56)  % Saturation, Iron: 12 % [15 - 50] (04-25-21 @ 09:56)  Iron Total, Serum: 33 ug/dL [35 - 150] (04-25-21 @ 09:56)  Iron - Total Binding Capacity.: 270 ug/dL [220 - 430] (04-25-21 @ 09:56)    < from: CT Abdomen and Pelvis w/ IV Cont (04.22.21 @ 01:12) >  IMPRESSION: Circumferential rectal wall thickening spanning at least 14 cm in length with a partially exophytic mass extending into the right mesorectal fat measuring approximately 5.7 x 3.0 x 6.8 cm. The exophytic mass abuts the right posterior lateral prostate gland. Primary consideration is rectal cancer. Limited ability to exclude superimposed proctitis.    9 mm right lower lobe pulmonary nodule.  Small wedge-shaped hypodensities at the superior aspect of the spleen may reflect splenic infarcts.  < end of copied text >    < from: Colonoscopy (04.27.21 @ 08:30) >  Impressions:    Circumferential, ulcerated, irregular and friable infiltrating thickening  rectal thickening (mass) starting from anal verge up to 17 cm proximally and  suggestive of adenocarcinoma. (Biopsy).    Hard rectal mass felt on digital exam.    Diverticulosis of the whole colon.    Polyp (7 mm) in the ascending colon. (Polypectomy).    Polyp (5 mm) in the proximal transverse colon. (Polypectomy).    Polyp (8 mm) in the distal transverse colon. (Polypectomy).    Polyp (1 cm) in the mid-transverse colon. (Polypectomy).    Polyp (5 cm) inthe distal transverse colon.    Polyp (3 cm) in the descending colon. (Polypectomy).   < end of copied text >   DATE OF ADMISSION: 04-22-21  HOSPITAL DAY: 6d    REASON FOR CONSULT: New Rectal Mass     HISTORY OF PRESENT ILLNESS:   80 year old male with no past medical history (has not seen a doctor in many years, not on any meds) presented to the ED due to loose bowel movements. Patient is alert and orient, but appears to be confused and is a poor historian. History obtained from patient and the charts. Patient has had chronic diarrhea for the past year which is foul smelling, then over the past week it has gotten worse with amount and frequency. The patients daughter believed that their was blood in here stools so she brought him to the ED. The patient endorses decreased PO intake and nausea, but denies abdominal pain. He is not on AC, but will take a baby aspirin from time to time. He denies SOB, CP, changes in urination, headache. He lives alone and ambulates with a walker.   In the ED Vitals: Temp 97.6F, , / 73, RR 18, SpO2 98% on RA. Hb was found to be 5.8. Rectal performed by ED team negative. Given 2 units of PRBC. Lactate of 3. A CT of the abdomen:  Marked wall thickening of the distal sigmoid colon and rectum. Differential includes proctocolitis although underlying neoplastic process cannot be excluded. Direct visualization with colonoscopy is recommended. Subacute fracture of the left 10th lateral rib. Right lower lobe solid nodule measuring 9 mm. He was given Cipro flagyl in ED. Patient admitted for bloody bowel movements.     PAST MEDICAL & SURGICAL HISTORY:  No pertinent past medical history    Amputation of digit of left hand    FAMILY HISTORY:  Mother: None Reported  Father: None Reported  Siblings: None Reported    SOCIAL HISTORY:  Smoker: Denies use  Alcohol use: former heavy drinker, now drinks about 4 beers/day  Drug use: Denies use    ALLERGIES:  No Known Allergies    MEDICATIONS:  MEDICATIONS  (STANDING):  chlorhexidine 4% Liquid 1 Application(s) Topical <User Schedule>  pantoprazole    Tablet 40 milliGRAM(s) Oral before breakfast  polyethylene glycol 3350 17 Gram(s) Oral two times a day  senna 1 Tablet(s) Oral two times a day  sodium chloride 0.9%. 1000 milliLiter(s) (75 mL/Hr) IV Continuous <Continuous>    MEDICATIONS  (PRN):    HOME MEDICATIONS:  Home Medications:      VITALS:   T(C): 35.8 (04-28-21 @ 07:30), Max: 36.5 (04-28-21 @ 05:57)  HR: 79 (04-28-21 @ 07:30) (65 - 80)  BP: 150/72 (04-28-21 @ 07:30) (133/63 - 176/81)  RR: 18 (04-28-21 @ 07:30) (16 - 18)  SpO2: 99% (04-28-21 @ 00:00) (98% - 100%)  Wt(kg): --    04-25-21 @ 07:01  -  04-26-21 @ 07:00  --------------------------------------------------------  IN: 75 mL / OUT: 650 mL / NET: -575 mL    04-26-21 @ 07:01  -  04-27-21 @ 07:00  --------------------------------------------------------  IN: 3160 mL / OUT: 1000 mL / NET: 2160 mL    04-27-21 @ 07:01  -  04-28-21 @ 07:00  --------------------------------------------------------  IN: 1175 mL / OUT: 695 mL / NET: 480 mL    04-28-21 @ 07:01  -  04-28-21 @ 11:22  --------------------------------------------------------  IN: 450 mL / OUT: 200 mL / NET: 250 mL      PHYSICAL EXAM:  GENERAL: NAD, Resting in bed  HEENT: NCAT  CHEST/LUNG: Clear to auscultation bilaterally; No wheezing or rubs.   HEART: Regular rate and rhythm; No murmurs, rubs, or gallops  ABDOMEN: Bowel sounds present; Soft, Nontender, Nondistended.   EXTREMITIES:  No clubbing, cyanosis, or edema  NERVOUS SYSTEM:  Alert & Oriented X3    LABS:                        9.2    5.78  )-----------( 222      ( 26 Apr 2021 16:00 )             32.1     04-26    137  |  105  |  4<L>  ----------------------------<  83  3.9   |  26  |  0.5<L>    Ca    8.1<L>      26 Apr 2021 16:00  Mg     1.8     04-26    TPro  5.9<L>  /  Alb  2.8<L>  /  TBili  0.4  /  DBili  x   /  AST  13  /  ALT  <5  /  AlkPhos  53  04-26    Ferritin, Serum: 34 ng/mL [30 - 400] (04-25-21 @ 09:56)  % Saturation, Iron: 12 % [15 - 50] (04-25-21 @ 09:56)  Iron Total, Serum: 33 ug/dL [35 - 150] (04-25-21 @ 09:56)  Iron - Total Binding Capacity.: 270 ug/dL [220 - 430] (04-25-21 @ 09:56)    < from: CT Abdomen and Pelvis w/ IV Cont (04.22.21 @ 01:12) >  IMPRESSION: Circumferential rectal wall thickening spanning at least 14 cm in length with a partially exophytic mass extending into the right mesorectal fat measuring approximately 5.7 x 3.0 x 6.8 cm. The exophytic mass abuts the right posterior lateral prostate gland. Primary consideration is rectal cancer. Limited ability to exclude superimposed proctitis.    9 mm right lower lobe pulmonary nodule.  Small wedge-shaped hypodensities at the superior aspect of the spleen may reflect splenic infarcts.  < end of copied text >    < from: Colonoscopy (04.27.21 @ 08:30) >  Impressions:    Circumferential, ulcerated, irregular and friable infiltrating thickening  rectal thickening (mass) starting from anal verge up to 17 cm proximally and  suggestive of adenocarcinoma. (Biopsy).    Hard rectal mass felt on digital exam.    Diverticulosis of the whole colon.    Polyp (7 mm) in the ascending colon. (Polypectomy).    Polyp (5 mm) in the proximal transverse colon. (Polypectomy).    Polyp (8 mm) in the distal transverse colon. (Polypectomy).    Polyp (1 cm) in the mid-transverse colon. (Polypectomy).    Polyp (5 cm) inthe distal transverse colon.    Polyp (3 cm) in the descending colon. (Polypectomy).   < end of copied text >

## 2021-04-28 NOTE — CONSULT NOTE ADULT - SUBJECTIVE AND OBJECTIVE BOX
MISAEL HUFFMAN [id number]  80y Male  6d    HPI:  80 year old male with no past medical history (has not seen a doctor in many years, not on any meds) presented to the ED due to loose bowel movements. Patient is alert and orient, but appears to be confused and is a poor historian. History obtained from patient and the charts. Patient has had chronic diarrhea for the past year which is foul smelling, then over the past week it has gotten worse with amount and frequency. The patients daughter believed that their was blood in here stools so she brought him to the ED. The patient endorses decreased PO intake and nausea, but denies abdominal pain. He is not on AC, but will take a baby aspirin from time to time. He denies SOB, CP, changes in urination, headache. He lives alone and ambulates with a walker.   In the ED Vitals: Temp 97.6F, , / 73, RR 18, SpO2 98% on RA. Hb was found to be 5.8. Rectal performed by ED team negative. Given 2 units of PRBC. Lactate of 3. A CT of the abdomen:  Marked wall thickening of the distal sigmoid colon and rectum. Differential includes proctocolitis although underlying neoplastic process cannot be excluded. Direct visualization with colonoscopy is recommended. Subacute fracture of the left 10th lateral rib. Right lower lobe solid nodule measuring 9 mm. He was given Cipro flagyl in ED. Patient admitted for bloody bowel movements. (22 Apr 2021 04:35)    Hospital Course: Patient admitted for workup and management of anemia to 5.8 as well as findings on imaging of large rectal mass, circumferential. Patient seen by Gastroenterology, colonoscopy canceled twice due to poor prep. Patient went yesterday for colonoscopy, the following was found during the procedure:     Protruding lesions A single sessile 7 mm polyp of benign appearance was found in  the ascending colon. A single-piecepolypectomy was performed using a cold  snare. The polyp was completely removed.   A single sessile 5 mm polyp was found in the proximal transverse colon. A  single-piece polypectomy was performed using a cold forceps. The polyp was  completely removed.   A single sessile 8 mm polyp of benign appearance was found in the distal  transverse colon. A single-piece polypectomy was performed using a cold snare.  The polyp was completely removed.   A single sessile 1 cm polyp of benign appearance was found in the mid-transverse  colon. Polyp was lifted with [place] with adequate cushion obtained. A  single-piece polypectomy was performed using a hot snare. The polyp was  completely removed.   A single 5 cm polyp was found in the distal transverse colon. Lobulated and  laterally spreading through the colon fold. Area was tattooed 2 fold distally.    A single pedunculated 3 cm polyp of benign appearance was found in the  descending colon. Poly stalk was injected with epinephrine. A single-piece  polypectomy was performed using a hot snare. The polyp was completely removed.    Additional findings Circumferential , ulcerated , irregular and friable  infiltrating thickening rectal thickening (mass) starting from anal verge up to  17 cm proximally and suggestive of adenocarcinoma.     Reason for Consult: Finding on CT scan and seen on colonoscopy, circumferential, ulcerated, irregular and friable infiltrating mass from the anal verge up to 17 cm proximally. Biopsied, concern for adenocarcinoma.     PAST MEDICAL & SURGICAL HISTORY:  No pertinent past medical history  Amputation of digit of left hand    MEDICATIONS  (STANDING):  chlorhexidine 4% Liquid 1 Application(s) Topical <User Schedule>  pantoprazole    Tablet 40 milliGRAM(s) Oral before breakfast  polyethylene glycol 3350 17 Gram(s) Oral two times a day  senna 1 Tablet(s) Oral two times a day  sodium chloride 0.9%. 1000 milliLiter(s) (75 mL/Hr) IV Continuous <Continuous>    Allergies  No Known Allergies    REVIEW OF SYSTEMS    [ X ] A ten-point review of systems was otherwise negative except as noted.  [ ] Due to altered mental status/intubation, subjective information were not able to be obtained from the patient. History was obtained, to the extent possible, from review of the chart and collateral sources of information.    Vital Signs Last 24 Hrs  T(C): 35.8 (28 Apr 2021 07:30), Max: 36.5 (28 Apr 2021 05:57)  T(F): 96.5 (28 Apr 2021 07:30), Max: 97.7 (28 Apr 2021 05:57)  HR: 79 (28 Apr 2021 07:30) (68 - 80)  BP: 150/72 (28 Apr 2021 07:30) (148/68 - 176/81)  RR: 18 (28 Apr 2021 07:30) (16 - 18)  SpO2: 99% (28 Apr 2021 00:00) (98% - 99%)    PHYSICAL EXAM:  GENERAL: NAD, well-appearing  CHEST/LUNG: Clear to auscultation bilaterally  HEART: Regular rate and rhythm  ABDOMEN: Soft, Nontender, Nondistended  EXTREMITIES:  No clubbing, cyanosis, or edema  RECTAL: Blood and mucus on external exam, ROYCE significant for blood and mucus     LABS:               9.2    5.78  )-----------( 222      ( 26 Apr 2021 16:00 )             32.1       04-26    137  |  105  |  4<L>  ----------------------------<  83  3.9   |  26  |  0.5<L>    LFTs:             5.9  | 0.4  | 13       ------------------[53      ( 26 Apr 2021 16:00 )  2.8  | x    | <5          RADIOLOGY & ADDITIONAL STUDIES:  < from: CT Abdomen and Pelvis w/ IV Cont (04.22.21 @ 01:12) >  IMPRESSION:  Circumferential rectal wall thickening spanning at least 14 cm in length with a partially exophytic mass extending into the right mesorectal fat measuring approximately 5.7 x 3.0 x 6.8 cm. The exophytic mass abuts the right posterior lateral prostate gland. Primary consideration is rectal cancer. Limited ability to exclude superimposed proctitis.  9 mm right lower lobe pulmonary nodule.  Small wedge-shaped hypodensities at the superior aspect of the spleen may reflect splenic infarcts.    < from: Colonoscopy (04.27.21 @ 08:30) >  Findings:   Excavated lesions Multiple diverticula were seen in the whole colon.   Protruding lesions A single sessile 7 mm polyp of benign appearance was found in  the ascending colon. A single-piecepolypectomy was performed using a cold  snare. The polyp was completely removed.   A single sessile 5 mm polyp was found in the proximal transverse colon. A  single-piece polypectomy was performed using a cold forceps. The polyp was  completely removed.   A single sessile 8 mm polyp of benign appearance was found in the distal  transverse colon. A single-piece polypectomy was performed using a cold snare.  The polyp was completely removed.   A single sessile 1 cm polyp of benign appearance was found in the mid-transverse  colon. Polyp was lifted with [place] with adequate cushion obtained. A  single-piece polypectomy was performed using a hot snare. The polyp was  completely removed.   A single 5 cm polyp was found in the distal transverse colon. Lobulated and  laterally spreading through the colon fold. Area was tattooed 2 fold distally.    A single pedunculated 3 cm polyp of benign appearance was found in the  descending colon. Poly stalk was injected with epinephrine. A single-piece  polypectomy was performed using a hot snare. The polyp was completely removed.    Additional findings Circumferential , ulcerated , irregular and friable  infiltrating thickening rectal thickening (mass) starting from anal verge up to  17 cm proximally and suggestive of adenocarcinoma. Multiple cold forceps  biopsies were performed for histology..   Hard rectal mass felt on digital exam.

## 2021-04-29 LAB
ANION GAP SERPL CALC-SCNC: 15 MMOL/L — HIGH (ref 7–14)
ANION GAP SERPL CALC-SCNC: 15 MMOL/L — HIGH (ref 7–14)
APTT BLD: 35.9 SEC — SIGNIFICANT CHANGE UP (ref 27–39.2)
BLD GP AB SCN SERPL QL: SIGNIFICANT CHANGE UP
BUN SERPL-MCNC: <3 MG/DL — LOW (ref 10–20)
BUN SERPL-MCNC: <3 MG/DL — LOW (ref 10–20)
CALCIUM SERPL-MCNC: 7.7 MG/DL — LOW (ref 8.5–10.1)
CALCIUM SERPL-MCNC: 8.3 MG/DL — LOW (ref 8.5–10.1)
CANCER AG19-9 SERPL-ACNC: 31 U/ML — SIGNIFICANT CHANGE UP
CEA SERPL-MCNC: 24.3 NG/ML — HIGH (ref 0–3.8)
CHLORIDE SERPL-SCNC: 104 MMOL/L — SIGNIFICANT CHANGE UP (ref 98–110)
CHLORIDE SERPL-SCNC: 106 MMOL/L — SIGNIFICANT CHANGE UP (ref 98–110)
CO2 SERPL-SCNC: 20 MMOL/L — SIGNIFICANT CHANGE UP (ref 17–32)
CO2 SERPL-SCNC: 20 MMOL/L — SIGNIFICANT CHANGE UP (ref 17–32)
CREAT SERPL-MCNC: 0.5 MG/DL — LOW (ref 0.7–1.5)
CREAT SERPL-MCNC: 0.6 MG/DL — LOW (ref 0.7–1.5)
GLUCOSE BLDC GLUCOMTR-MCNC: 72 MG/DL — SIGNIFICANT CHANGE UP (ref 70–99)
GLUCOSE SERPL-MCNC: 47 MG/DL — CRITICAL LOW (ref 70–99)
GLUCOSE SERPL-MCNC: 50 MG/DL — CRITICAL LOW (ref 70–99)
HCT VFR BLD CALC: 31.3 % — LOW (ref 42–52)
HGB BLD-MCNC: 8.8 G/DL — LOW (ref 14–18)
INR BLD: 1.22 RATIO — SIGNIFICANT CHANGE UP (ref 0.65–1.3)
MAGNESIUM SERPL-MCNC: 1.6 MG/DL — LOW (ref 1.8–2.4)
MAGNESIUM SERPL-MCNC: 1.9 MG/DL — SIGNIFICANT CHANGE UP (ref 1.8–2.4)
MCHC RBC-ENTMCNC: 21.1 PG — LOW (ref 27–31)
MCHC RBC-ENTMCNC: 28.1 G/DL — LOW (ref 32–37)
MCV RBC AUTO: 74.9 FL — LOW (ref 80–94)
NRBC # BLD: 0 /100 WBCS — SIGNIFICANT CHANGE UP (ref 0–0)
PLATELET # BLD AUTO: 230 K/UL — SIGNIFICANT CHANGE UP (ref 130–400)
POTASSIUM SERPL-MCNC: 3.4 MMOL/L — LOW (ref 3.5–5)
POTASSIUM SERPL-MCNC: 3.8 MMOL/L — SIGNIFICANT CHANGE UP (ref 3.5–5)
POTASSIUM SERPL-SCNC: 3.4 MMOL/L — LOW (ref 3.5–5)
POTASSIUM SERPL-SCNC: 3.8 MMOL/L — SIGNIFICANT CHANGE UP (ref 3.5–5)
PROTHROM AB SERPL-ACNC: 14 SEC — HIGH (ref 9.95–12.87)
RBC # BLD: 4.18 M/UL — LOW (ref 4.7–6.1)
RBC # FLD: 22.5 % — HIGH (ref 11.5–14.5)
SARS-COV-2 RNA SPEC QL NAA+PROBE: SIGNIFICANT CHANGE UP
SODIUM SERPL-SCNC: 139 MMOL/L — SIGNIFICANT CHANGE UP (ref 135–146)
SODIUM SERPL-SCNC: 141 MMOL/L — SIGNIFICANT CHANGE UP (ref 135–146)
WBC # BLD: 6.21 K/UL — SIGNIFICANT CHANGE UP (ref 4.8–10.8)
WBC # FLD AUTO: 6.21 K/UL — SIGNIFICANT CHANGE UP (ref 4.8–10.8)

## 2021-04-29 PROCEDURE — 99233 SBSQ HOSP IP/OBS HIGH 50: CPT

## 2021-04-29 PROCEDURE — 93306 TTE W/DOPPLER COMPLETE: CPT | Mod: 26

## 2021-04-29 PROCEDURE — 99232 SBSQ HOSP IP/OBS MODERATE 35: CPT

## 2021-04-29 RX ORDER — SOD SULF/SODIUM/NAHCO3/KCL/PEG
4000 SOLUTION, RECONSTITUTED, ORAL ORAL ONCE
Refills: 0 | Status: COMPLETED | OUTPATIENT
Start: 2021-04-29 | End: 2021-04-29

## 2021-04-29 RX ORDER — MAGNESIUM SULFATE 500 MG/ML
2 VIAL (ML) INJECTION ONCE
Refills: 0 | Status: COMPLETED | OUTPATIENT
Start: 2021-04-29 | End: 2021-04-29

## 2021-04-29 RX ORDER — POTASSIUM CHLORIDE 20 MEQ
40 PACKET (EA) ORAL ONCE
Refills: 0 | Status: COMPLETED | OUTPATIENT
Start: 2021-04-29 | End: 2021-04-29

## 2021-04-29 RX ADMIN — POLYETHYLENE GLYCOL 3350 17 GRAM(S): 17 POWDER, FOR SOLUTION ORAL at 05:31

## 2021-04-29 RX ADMIN — SENNA PLUS 1 TABLET(S): 8.6 TABLET ORAL at 17:34

## 2021-04-29 RX ADMIN — SENNA PLUS 1 TABLET(S): 8.6 TABLET ORAL at 05:31

## 2021-04-29 RX ADMIN — Medication 20 MILLIGRAM(S): at 21:25

## 2021-04-29 RX ADMIN — CHLORHEXIDINE GLUCONATE 1 APPLICATION(S): 213 SOLUTION TOPICAL at 05:31

## 2021-04-29 RX ADMIN — Medication 40 MILLIEQUIVALENT(S): at 12:23

## 2021-04-29 RX ADMIN — Medication 50 GRAM(S): at 12:22

## 2021-04-29 RX ADMIN — POLYETHYLENE GLYCOL 3350 17 GRAM(S): 17 POWDER, FOR SOLUTION ORAL at 17:34

## 2021-04-29 RX ADMIN — PANTOPRAZOLE SODIUM 40 MILLIGRAM(S): 20 TABLET, DELAYED RELEASE ORAL at 05:31

## 2021-04-29 RX ADMIN — Medication 4000 MILLILITER(S): at 15:42

## 2021-04-29 NOTE — PROGRESS NOTE ADULT - SUBJECTIVE AND OBJECTIVE BOX
GENERAL SURGERY PROGRESS NOTE     MISAEL HUFFMAN  80y  Male  Hospital day :7d    OVERNIGHT EVENTS: no acute events overnight     T(F): 96.3 (04-29-21 @ 07:49), Max: 96.3 (04-29-21 @ 07:49)  HR: 72 (04-29-21 @ 07:49) (72 - 72)  BP: 136/63 (04-29-21 @ 07:49) (136/63 - 136/63)  RR: 18 (04-29-21 @ 07:49) (18 - 18)    DIET/FLUIDS: iron sucrose IVPB 200 milliGRAM(s) IV Intermittent every 48 hours  magnesium sulfate  IVPB 2 Gram(s) IV Intermittent once  potassium chloride   Powder 40 milliEquivalent(s) Oral once       GI proph:  pantoprazole    Tablet 40 milliGRAM(s) Oral before breakfast    AC/ proph:   ABx:     PHYSICAL EXAM:  GENERAL: NAD, well-appearing  CHEST/LUNG: Clear to auscultation bilaterally  HEART: Regular rate and rhythm  ABDOMEN: Soft, Nontender, Nondistended;   EXTREMITIES:  No clubbing, cyanosis, or edema      LABS  Labs:  CAPILLARY BLOOD GLUCOSE                              8.8    6.21  )-----------( 230      ( 29 Apr 2021 05:34 )             31.3         04-29    139  |  104  |  <3<L>  ----------------------------<  47<LL>  3.4<L>   |  20  |  0.5<L>      Calcium, Total Serum: 7.7 mg/dL (04-29-21 @ 05:34)

## 2021-04-29 NOTE — PROGRESS NOTE ADULT - SUBJECTIVE AND OBJECTIVE BOX
Patient is a 79 y/o patient  S/P colonoscopy showing rectal cancer, a 5 cm transverse colon polyp  that was not removed . [facility] consulted for EUS as well as EMR of Transverse Colon Polyp.        PAST MEDICAL & SURGICAL HISTORY:  No pertinent past medical history  Amputation of digit of left hand      MEDICATIONS  (STANDING):  chlorhexidine 4% Liquid 1 Application(s) Topical <User Schedule>  ciprofloxacin   IVPB 400 milliGRAM(s) IV Intermittent every 12 hours  metroNIDAZOLE  IVPB 500 milliGRAM(s) IV Intermittent every 8 hours  pantoprazole  Injectable 40 milliGRAM(s) IV Push two times a day  polyethylene glycol 3350 17 Gram(s) Oral two times a day  senna 1 Tablet(s) Oral two times a day  sodium chloride 0.9%. 1000 milliLiter(s) (75 mL/Hr) IV Continuous <Continuous>    MEDICATIONS  (PRN):      Allergies  No Known Allergies      Review of Systems:   Cardiovascular:  No Chest Pain, No Palpitations  Respiratory:  No Cough, No Dyspnea  Gastrointestinal:  As described in HPI          Vital Signs   T(F): 96.3 (29 Apr 2021 07:49), Max: 96.3 (29 Apr 2021 07:49)  HR: 72 (29 Apr 2021 07:49) (72 - 72)  BP: 136/63 (29 Apr 2021 07:49) (136/63 - 136/63)  RR: 18 (29 Apr 2021 07:49) (18 - 18)  Constitutional: No acute distress.  Respiratory:  No signs of respiratory distress. Lung sounds are clear bilaterally.  Cardiovascular:  S1 S2, Regular rate and rhythm.  Abdominal: Abdomen is soft, symmetric, and non-tender without distention.  Bowel sounds are present and normoactive in all four quadrants. No masses, hepatomegaly, or splenomegaly are noted.   Skin: No rashes, No Jaundice.       Labs:                        8.8    6.21  )-----------( 230      ( 29 Apr 2021 05:34 )             31.3     04-29    139  |  104  |  <3<L>  ----------------------------<  47<LL>  3.4<L>   |  20  |  0.5<L>    Ca    7.7<L>      29 Apr 2021 05:34  Mg     1.6     04-29

## 2021-04-29 NOTE — PROGRESS NOTE ADULT - SUBJECTIVE AND OBJECTIVE BOX
MISAEL HUFFMAN    Patient is a 80y old  Male who presents with a chief complaint of Diarrhea, bloody stools (29 Apr 2021 09:22)    INTERVAL HPI/OVERNIGHT EVENTS: no events overnight, no new complaints     CONSTITUTIONAL: No weakness, fevers or chills  EYES/ENT: No visual changes;  No vertigo or throat pain   NECK: No pain or stiffness  RESPIRATORY: No cough, wheezing, hemoptysis; No shortness of breath  CARDIOVASCULAR: No chest pain or palpitations  GASTROINTESTINAL: No abdominal or epigastric pain. No nausea, vomiting, or hematemesis; No diarrhea or constipation. No melena or hematochezia.  GENITOURINARY: No dysuria, frequency or hematuria  NEUROLOGICAL: No numbness or weakness  SKIN: No itching, rashes     PHYSICAL EXAM:  T(C): 36.2, Max: 36.2 (04-29-21 @ 15:33)  HR: 74 (72 - 74)  BP: 137/65 (136/63 - 137/65)  RR: 18 (18 - 18)  SpO2: --    GENERAL: NAD  PULMONARY/CHEST: No rales, rhonchi, wheezing  CARDIOVASC: Regular rate and rhythm; No murmurs  GI/ABDOMEN: Soft, Nontender, Nondistended; Bowel sounds present  EXTREMITIES:  2+ Peripheral Pulses, No clubbing, cyanosis, or edema, no deformity. No calf tenderness b/l.  SKIN: No rashes or lesions  NERVOUS SYSTEM:  Alert & Oriented X3, no focal deficit     Consultant(s) Notes Reviewed by me.     LABS:                        8.8    6.21  )-----------( 230      ( 29 Apr 2021 05:34 )             31.3     04-29    139  |  104  |  <3<L>  ----------------------------<  47<LL>  3.4<L>   |  20  |  0.5<L>    Ca    7.7<L>      29 Apr 2021 05:34  Mg     1.6     04-29      RADIOLOGY & ADDITIONAL TESTS:  < from: CT Chest w/ IV Cont (04.28.21 @ 13:05) >  Central airways/ Lung parenchyma/ pleura:  Bilateral pleural effusions are seen with compressive atelectasis. No suspicious-appearing pulmonary nodules are recognized. Well-defined right lower lobe nodule measuring 6 mm, series 4 image#147 as well as 3 mm lingular nodule, series 4 image #154.    Mediastinum/Chest wall/axilla: There is no axillary lymphadenopathy. Scattered small mediastinal lymph nodes are seen. There is no hilar lymphadenopathy.    Great vessels:Great vessels areatherosclerotic. The heart size is top normal. Coronary calcifications are seen.      Upper abdomen:Please see concurrent CT scan of April 22, 2021.    Osseous structures:The patient is kyphotic. There is spondylosis of the thoracic spine.      Impression:    Small bilateral pleural effusions with compressive atelectasis.    Nonspecific mediastinal lymph nodes.    < end of copied text >    < from: CT Head No Cont (04.22.21 @ 06:46) >  FINDINGS:    There is prominence of the sulci, sylvian fissures, and ventricles, reflecting mild diffuse parenchymal volume loss.    There are scattered patchy low attenuations in the periventricular cerebral white matter consistent with mild chronic microvascular ischemic changes.    There is no evidence of acute territorial/transcortical infarction or intracranial hemorrhage. There is no space-occupying lesion or midline shift.    There is no evidence of hydrocephalus. There are no extra-axial fluid collections.    The visualized intraorbital contents are normal. There are small retention cysts within the right maxillary sinus.The mastoid air cells are aerated. The visualized soft tissues and osseous structures appear normal. Partiallyvisualized parotid glands are normal.      IMPRESSION:    No acute intracranial pathology.    Mild chronic microvascular ischemic changes.    < end of copied text >    < from: CT Abdomen and Pelvis w/ IV Cont (04.22.21 @ 01:12) >  IMPRESSION:    Circumferential rectal wall thickening spanning at least 14 cm in length with a partially exophytic mass extending into the right mesorectal fat measuring approximately 5.7 x 3.0 x 6.8 cm. The exophytic mass abuts the right posterior lateral prostate gland. Primary consideration is rectal cancer. Limited ability to exclude superimposed proctitis.    9 mm right lower lobe pulmonary nodule.    Small wedge-shaped hypodensities at the superior aspect of the spleen may reflect splenic infarcts.    < end of copied text >        MEDICATIONS  (STANDING):  bisacodyl 20 milliGRAM(s) Oral once  chlorhexidine 4% Liquid 1 Application(s) Topical <User Schedule>  iron sucrose IVPB 200 milliGRAM(s) IV Intermittent every 48 hours  pantoprazole    Tablet 40 milliGRAM(s) Oral before breakfast  polyethylene glycol 3350 17 Gram(s) Oral two times a day  senna 1 Tablet(s) Oral two times a day    MEDICATIONS  (PRN):

## 2021-04-29 NOTE — PROGRESS NOTE ADULT - ASSESSMENT
80 year old male with no past medical history (has not seen a doctor in many years) presented to the ED due to loose bowel movements.    # Rectal cancer  - s/p colonoscopy 4/27  - rectal mass extending from the anus up to 17 cm proximally, multiple polyps s/p polypectomy, a 5 cm transverse colon polyp  that was not removed  - pathology pending   - Seen by colorectal surgeon   - colonoscopy with EMR of transverse polyp and EUS on 4/30 by [facility]. Bowel prep today, NPO after MN, clears today  - CEA, CA 19-9 pending  - ECHO done, pending results  - Pan CT completed for staging: Well-defined right lower lobe nodule measuring 6 mm, 3 mm lingular nodule  - heme/onc recommended Rad onc eval, consult placed    # 1.8 cm hypodensity in the posterior pancreatic body:   appears to measure fluid attenuation ,  possible cyst/ IPMN. No main duct dilatation  - EUS tomorrow  - MRI pancreatic protocol as OP    # Hypokalemia - supplemented today, repeat tomorrow    # hypomagnesemia - supplemented today, repeat tomorrow    # Iron deficiency anemia  - Hb 5.8> 7.8, s/p 3 units  - cont Venofer   - keep hgb >7, transfuse as needed , active TS, hgb stable today     # Subacute Rib Fracture   - CT:  Subacute fracture of the left 10th lateral rib.   - Pain control prn     DVT PPX: SCD  GI PPX: Protonix   activity as tolerated    Pending colonoscopy with EUS tomorrow. Rad onc eval. Pathology results pending.

## 2021-04-29 NOTE — PROGRESS NOTE ADULT - SUBJECTIVE AND OBJECTIVE BOX
DAILY PROGRESS NOTE  ===========================================================    Patient Information:  MISAEL HUFFMAN  /  80y  /  Male  /  MRN#: [id number]    Hospital Day: 7d   Location: [address] 0[address] ([address])    |:::::::::::::::::::::::::::| SUBJECTIVE |:::::::::::::::::::::::::::|    OVERNIGHT EVENTS: No acute events overnight.   TODAY: Patient was seen today at bedside. States he feels fine. Review of systems is otherwise negative.    |:::::::::::::::::::::::::::| ADMISSION HPI |:::::::::::::::::::::::::::|    HPI:  80 year old male with no past medical history (has not seen a doctor in many years, not on any meds) presented to the ED due to loose bowel movements. Patient is alert and orient, but appears to be confused and is a poor historian. History obtained from patient and the charts. Patient has had chronic diarrhea for the past year which is foul smelling, then over the past week it has gotten worse with amount and frequency. The patients daughter believed that their was blood in here stools so she brought him to the ED. The patient endorses decreased PO intake and nausea, but denies abdominal pain. He is not on AC, but will take a baby aspirin from time to time. He denies SOB, CP, changes in urination, headache. He lives alone and ambulates with a walker.   In the ED Vitals: Temp 97.6F, , / 73, RR 18, SpO2 98% on RA. Hb was found to be 5.8. Rectal performed by ED team negative. Given 2 units of PRBC. Lactate of 3. A CT of the abdomen:  Marked wall thickening of the distal sigmoid colon and rectum. Differential includes proctocolitis although underlying neoplastic process cannot be excluded. Direct visualization with colonoscopy is recommended. Subacute fracture of the left 10th lateral rib. Right lower lobe solid nodule measuring 9 mm. He was given Cipro flagyl in ED. Patient admitted for bloody bowel movements.        (22 Apr 2021 04:35)      |:::::::::::::::::::::::::::| OBJECTIVE |:::::::::::::::::::::::::::|    STANDING MEDICATIONS  chlorhexidine 4% Liquid 1 Application(s) Topical <User Schedule>  iron sucrose IVPB 200 milliGRAM(s) IV Intermittent every 48 hours  pantoprazole    Tablet 40 milliGRAM(s) Oral before breakfast  polyethylene glycol 3350 17 Gram(s) Oral two times a day  senna 1 Tablet(s) Oral two times a day  sodium chloride 0.9%. 1000 milliLiter(s) IV Continuous <Continuous>    PRN MEDICATIONS    HOME MEDICATIONS      VITAL SIGNS: Last 24 Hours  T(C): 35.7 (29 Apr 2021 07:49), Max: 35.7 (29 Apr 2021 07:49)  T(F): 96.3 (29 Apr 2021 07:49), Max: 96.3 (29 Apr 2021 07:49)  HR: 72 (29 Apr 2021 07:49) (72 - 72)  BP: 136/63 (29 Apr 2021 07:49) (136/63 - 136/63)  BP(mean): --  RR: 18 (29 Apr 2021 07:49) (18 - 18)  SpO2: --    Input-Output    04-28-21 @ 07:01  -  04-29-21 @ 07:00  --------------------------------------------------------  IN:    Oral Fluid: 450 mL    sodium chloride 0.9%: 900 mL  Total IN: 1350 mL    OUT:    Voided (mL): 200 mL  Total OUT: 200 mL    Total NET: 1150 mL          PHYSICAL EXAM:  GEN: No acute distress.  LUNGS: Clear to auscultation bilaterally.  HEART: Regular rate and rhythm. No murmurs.  ABD: Soft, non-tender, non-distended.  EXT: Normal range of motion. No edema.  NEURO: Alert, attentive, oriented. Clear, fluent speech. Eye movements intact. Moves all extremities.    LAB RESULTS:                        8.8    6.21  )-----------( 230      ( 29 Apr 2021 05:34 )             31.3     04-29    139  |  104  |  <3<L>  ----------------------------<  47<LL>  3.4<L>   |  20  |  0.5<L>    Ca    7.7<L>      29 Apr 2021 05:34  Mg     1.6     04-29                  MICROBIOLOGY:    Culture - Urine (collected 04-22-21 @ 07:53)  Source: .Urine Clean Catch (Midstream)  Final Report (04-24-21 @ 09:29):    >=3 organisms. Probable collection contamination.    Culture - Blood (collected 04-21-21 @ 21:58)  Source: .Blood Blood-Peripheral  Final Report (04-27-21 @ 04:00):    No Growth Final    Culture - Blood (collected 04-21-21 @ 21:58)  Source: .Blood Blood-Peripheral  Final Report (04-27-21 @ 04:00):    No Growth Final      IMAGING/STUDIES:    < from: CT Chest w/ IV Cont (04.28.21 @ 13:05) >  Findings:  Tubes/lines:None    Central airways/ Lung parenchyma/ pleura:  Bilateral pleural effusions are seen with compressive atelectasis. No suspicious-appearing pulmonary nodules are recognized. Well-defined right lower lobe nodule measuring 6 mm, series 4 image#147 as well as 3 mm lingular nodule, series 4 image #154.    Mediastinum/Chest wall/axilla: There is no axillary lymphadenopathy. Scattered small mediastinal lymph nodes are seen. There is no hilar lymphadenopathy.    Great vessels:Great vessels areatherosclerotic. The heart size is top normal. Coronary calcifications are seen.      Upper abdomen:Please see concurrent CT scan of April 22, 2021.    Osseous structures:The patient is kyphotic. There is spondylosis of the thoracic spine.      Impression:    Small bilateral pleural effusions with compressive atelectasis.    Nonspecific mediastinal lymph nodes.    < end of copied text >      ALLERGIES:  No Known Allergies      ===========================================================

## 2021-04-29 NOTE — PROGRESS NOTE ADULT - ASSESSMENT
Patient is a 81 y/o patient  S/P colonoscopy showing rectal cancer, a 5 cm transverse colon polyp  that was not removed . [facility] consulted for EUS as well as EMR of Transverse Colon Polyp.    Rectal cancer  s/p colonoscopy 4/27  - rectal mass extending from the anus up to 17 cm proximally,   multiple polyps s/p polypectomy , a 5 cm transverse colon polyp  that was not removed  - Seen by [facility]  - Added for Colonoscopy with EMR of transverse polyp and EUS 4/30   - Replete electrolytes  - Needs updated Covid for procedure tomorrow  - Clear liquid diet, Golytely prep this afternoon  - NPO after midnight   - TnS is active, No on AC  - Will follow

## 2021-04-29 NOTE — PROGRESS NOTE ADULT - ASSESSMENT
80 year old male with no past medical history (has not seen a doctor in many years) presented to the ED due to loose bowel movements.    #Chronic Diarrhea   #Bloody Bowel Movements  #Rectal mass  #Iron deficiency anemia  #lactic acidosis -resolved  - Hgb was 5.8, received 3u pRBC  - Lactate initially 3, improved; was on cipro/Flagyl, d/c'ed  - Protonix changed to PO  - Surgical consult for rectal mass  - CT chest with contrast for workup showed b/l small pleural effusions, nonspecific mediastinal lymph nodes  - [facility] for transverse colon polyp, EUS for rectal mass evaluation  - F/u pathology  - Heme-onc F/u  - IV iron per Heme-onc  - F/u CEA level, pre-chemo echocardiogram  - Per radiology MRI pelvis as outpatient    #encephalopathy, resolved  - Appears to be at baseline  - CT head- no acute changes   - TSH, B12, folate, RPR - wnl     #Pancreatic hypodensity  - MRI pancreatic protocol (w/wo contrast) as an outpatient    #Lung nodule   - CT:  Right lower lobe solid nodule measuring 9 mm.   - f/u OP    #Subacute Rib Fracture   - CT:  Subacute fracture of the left 10th lateral rib.   - Pain control prn     A1C 5.2, lipid panel normal    DVT PPx: SCDs  GI PPX: Protonix 40 mg PO  Diet: Regular  Activity: Increase as tolerated  Dispo: from home  Code Status: full code       D/c IV fluids  Giving IV iron  Surgery and heme-onc to follow  [facility] for EUS and endoscopic mucosal resection of transverse colon polyp  MRI pelvis and pancreatic as outpatient

## 2021-04-29 NOTE — PROGRESS NOTE ADULT - ASSESSMENT
80 year old male with no past medical history (has not seen a doctor in many years, not on any meds) presented to the ED due to loose bowel movements, found to be anemic and has imaging and colonoscopy findings significant for large rectal mass, concerning for adenocarcinoma. Colorectal Surgery consulted for recommendations regarding potential surgery intervention.     Plan:   -Recommend EUS with advanced GI   -Please obtain CEA and CA 19-9   -F/U surgical pathology of mass biopsied on 4/27   -Heme/Onc recommendations, will likely require chemo/radiation prior to any potential surgical intervention   -Full staging scans including CT of the chest   -Plan discussed with Dr. Segovia

## 2021-04-30 LAB
GLUCOSE BLDC GLUCOMTR-MCNC: 55 MG/DL — LOW (ref 70–99)
SURGICAL PATHOLOGY STUDY: SIGNIFICANT CHANGE UP

## 2021-04-30 PROCEDURE — 99233 SBSQ HOSP IP/OBS HIGH 50: CPT

## 2021-04-30 RX ADMIN — POLYETHYLENE GLYCOL 3350 17 GRAM(S): 17 POWDER, FOR SOLUTION ORAL at 17:26

## 2021-04-30 RX ADMIN — SENNA PLUS 1 TABLET(S): 8.6 TABLET ORAL at 05:50

## 2021-04-30 RX ADMIN — IRON SUCROSE 110 MILLIGRAM(S): 20 INJECTION, SOLUTION INTRAVENOUS at 17:23

## 2021-04-30 RX ADMIN — SENNA PLUS 1 TABLET(S): 8.6 TABLET ORAL at 17:26

## 2021-04-30 RX ADMIN — CHLORHEXIDINE GLUCONATE 1 APPLICATION(S): 213 SOLUTION TOPICAL at 05:50

## 2021-04-30 NOTE — PROGRESS NOTE ADULT - ASSESSMENT
· Assessment	  80 year old male with no past medical history (has not seen a doctor in many years, not on any meds) presented to the ED due to loose bowel movements, found to be anemic and has imaging and colonoscopy findings significant for large rectal mass, concerning for adenocarcinoma. Colorectal Surgery consulted for recommendations regarding potential surgery intervention.     Plan:   - EUS today with advanced GI   -F/U surgical pathology of mass biopsied on 4/27   -Heme/Onc recommendations, will likely require chemo/radiation prior to any potential surgical intervention   -Full staging scans including CT of the chest   -Plan discussed with Dr. Segovia       Date/Time: 04-30-21 @ 02:20

## 2021-04-30 NOTE — PROGRESS NOTE ADULT - SUBJECTIVE AND OBJECTIVE BOX
DAILY PROGRESS NOTE  ===========================================================    Patient Information:  MISAEL HUFFMAN  /  80y  /  Male  /  MRN#: [id number]    Hospital Day: 8d   Location: [place] [address] A ([address])    |:::::::::::::::::::::::::::| SUBJECTIVE |:::::::::::::::::::::::::::|    OVERNIGHT EVENTS: No acute events overnight.   TODAY: Patient was seen today at bedside. Only finished ~40% of bowel prep, still having brown stool -- Adv GI procedure cancelled. Review of systems is otherwise negative.    |:::::::::::::::::::::::::::| ADMISSION HPI |:::::::::::::::::::::::::::|    HPI:  80 year old male with no past medical history (has not seen a doctor in many years, not on any meds) presented to the ED due to loose bowel movements. Patient is alert and orient, but appears to be confused and is a poor historian. History obtained from patient and the charts. Patient has had chronic diarrhea for the past year which is foul smelling, then over the past week it has gotten worse with amount and frequency. The patients daughter believed that their was blood in here stools so she brought him to the ED. The patient endorses decreased PO intake and nausea, but denies abdominal pain. He is not on AC, but will take a baby aspirin from time to time. He denies SOB, CP, changes in urination, headache. He lives alone and ambulates with a walker.   In the ED Vitals: Temp 97.6F, , / 73, RR 18, SpO2 98% on RA. Hb was found to be 5.8. Rectal performed by ED team negative. Given 2 units of PRBC. Lactate of 3. A CT of the abdomen:  Marked wall thickening of the distal sigmoid colon and rectum. Differential includes proctocolitis although underlying neoplastic process cannot be excluded. Direct visualization with colonoscopy is recommended. Subacute fracture of the left 10th lateral rib. Right lower lobe solid nodule measuring 9 mm. He was given Cipro flagyl in ED. Patient admitted for bloody bowel movements.        (22 Apr 2021 04:35)      |:::::::::::::::::::::::::::| OBJECTIVE |:::::::::::::::::::::::::::|    STANDING MEDICATIONS  chlorhexidine 4% Liquid 1 Application(s) Topical <User Schedule>  iron sucrose IVPB 200 milliGRAM(s) IV Intermittent every 48 hours  pantoprazole    Tablet 40 milliGRAM(s) Oral before breakfast  polyethylene glycol 3350 17 Gram(s) Oral two times a day  senna 1 Tablet(s) Oral two times a day    PRN MEDICATIONS    HOME MEDICATIONS      VITAL SIGNS: Last 24 Hours  T(C): 36.1 (30 Apr 2021 07:30), Max: 36.2 (29 Apr 2021 15:33)  T(F): 97 (30 Apr 2021 07:30), Max: 97.2 (30 Apr 2021 00:29)  HR: 71 (30 Apr 2021 07:30) (71 - 81)  BP: 133/64 (30 Apr 2021 07:30) (133/64 - 137/65)  BP(mean): --  RR: 18 (30 Apr 2021 07:30) (18 - 18)  SpO2: --    Input-Output    04-29-21 @ 07:01  - 04-30-21 @ 07:00  --------------------------------------------------------  IN:  Total IN: 0 mL    OUT:    Oral Fluid: 0 mL    Voided (mL): 325 mL  Total OUT: 325 mL    Total NET: -325 mL          PHYSICAL EXAM:  GEN: No acute distress.  LUNGS: Clear to auscultation bilaterally.  HEART: Regular rate and rhythm. No murmurs.  ABD: Soft, non-tender, non-distended.  EXT: Normal range of motion. No edema.  NEURO: Alert, attentive, oriented. Clear, fluent speech. Eye movements intact. Moves all extremities.    LAB RESULTS:                        8.8    6.21  )-----------( 230      ( 29 Apr 2021 05:34 )             31.3     04-29    141  |  106  |  <3<L>  ----------------------------<  50<LL>  3.8   |  20  |  0.6<L>    Ca    8.3<L>      29 Apr 2021 20:00  Mg     1.9     04-29      PT/INR - ( 29 Apr 2021 20:00 )   PT: 14.00 sec;   INR: 1.22 ratio         PTT - ( 29 Apr 2021 20:00 )  PTT:35.9 sec            MICROBIOLOGY:    Culture - Urine (collected 04-22-21 @ 07:53)  Source: .Urine Clean Catch (Midstream)  Final Report (04-24-21 @ 09:29):    >=3 organisms. Probable collection contamination.    Culture - Blood (collected 04-21-21 @ 21:58)  Source: .Blood Blood-Peripheral  Final Report (04-27-21 @ 04:00):    No Growth Final    Culture - Blood (collected 04-21-21 @ 21:58)  Source: .Blood Blood-Peripheral  Final Report (04-27-21 @ 04:00):    No Growth Final      IMAGING/STUDIES:    < from: TTE Echo Complete w/o Contrast w/ Doppler (04.29.21 @ 10:23) >  Summary:   1. LV Ejection Fraction by Stanford's Method with a biplane EF of 67 %.   2. Spectral Doppler shows impaired relaxation pattern of left ventricular myocardial filling (Grade I diastolic dysfunction).   3. Mildly enlarged left atrium.   4. Normal right atrial size.   5. Mild mitral annular calcification.   6. Mild thickening of the anterior and posterior mitral valve leaflets.   7. Trace mitral valve regurgitation.   8. Mild tricuspid regurgitation.   9. Mobile echodensity associated with the lateral leflet of the tricuspid valve. Cannot rule out vegetation. Correlate clincially.    < end of copied text >      ALLERGIES:  No Known Allergies      ===========================================================

## 2021-04-30 NOTE — CHART NOTE - NSCHARTNOTEFT_GEN_A_CORE
Registered Dietitian Follow-Up     Patient Profile Reviewed                           Yes [x]   No []     Nutrition History Previously Obtained        Yes []  No [x]  pt sleeping in bed at time of visit, and does not arouse upon RD attempt to awaken.      Pertinent Subjective Information: Pt remains on clear liquid diet at this time, not meeting est pro/kcal requirements. Plan for EUS with advanced GI team today. Prep observed at bedside.      Pertinent Medical Interventions: Pt found with rectal mass during colonoscopy-- concern for adenocarcinoma; surgical team consulted. Heme/onc following-- to f/u when pathology available (biopsied on 4/27). Procedure with advanced GI team EUS and endoscopic mucosal resection of transverse colonic polyp cancelled as pt consumed <50% of bowel prep; pt continues having brown stools.      Diet order: Diet, Clear Liquid (04-29-21 @ 09:43) [Active]    Anthropometrics:  Height (cm): 182.9 (04-28-21 @ 05:57)  Weight (kg): 79.8 (04-28-21 @ 05:57)  BMI (kg/m2): 23.9 (04-28-21 @ 05:57)  IBW: 80.9 kg    Daily   % Weight Change no new wts recorded.    MEDICATIONS  (STANDING):  chlorhexidine 4% Liquid 1 Application(s) Topical <User Schedule>  iron sucrose IVPB 200 milliGRAM(s) IV Intermittent every 48 hours  pantoprazole    Tablet 40 milliGRAM(s) Oral before breakfast  polyethylene glycol 3350 17 Gram(s) Oral two times a day  senna 1 Tablet(s) Oral two times a day      Pertinent Labs: 04-29 RBC 4.18, H/H 8.8/31.3, BUN <3, Cr 0.6, glucose 50    Finger Sticks:  POCT Blood Glucose.: 72 mg/dL (04-29 @ 23:12)  POCT Blood Glucose.: 55 mg/dL (04-29 @ 22:32)    Physical Findings:  - Appearance: BMI is WNL; (4/23) + 1 edema (R foot)  - GI function: last BM 4/29 per EMR documentation.  - Tubes: N/A  - Oral/Mouth cavity: no noted chewing/swallowing issues.  - Skin: (4/30) ecchymosis.     Nutrition Requirements:  Weight Used: CBW 83.4 kg (continue from initial assessment on 4/27)     Estimated Energy Needs    Continue [x]  1910 - 2069 kcals (MSJ x 1.2 - 1.3 AF)     Estimated Protein Needs    Continue [x]  83 - 100 gms (1.0 - 1.2 gm/kg)     Estimated Fluid Needs        Continue [x] per team     Nutrient Intake: not meeting est protein/kcal requirements with clear liquid diet.     [x] Previous Nutrition Diagnosis: Inadequate oral intake.            [x] Ongoing               Nutrition Intervention meals and snacks, coordination of care     Goal/Expected Outcome: pt diet to be advanced as medically feasible within the next 4 days.     Indicator/Monitoring: RD to monitor diet order, energy intake, body composition, NFPF, glucose/renal profiles.    Recommendation: when medically feasible, advance to GI soft diet.

## 2021-04-30 NOTE — PROGRESS NOTE ADULT - ASSESSMENT
80 year old male with no past medical history (has not seen a doctor in many years) presented to the ED due to loose bowel movements.    #Chronic Diarrhea   #Bloody Bowel Movements  #Rectal mass  #Iron deficiency anemia  #lactic acidosis -resolved  - Hgb was 5.8, received 3u pRBC  - Lactate initially 3, improved; was on cipro/Flagyl, d/c'ed  - Protonix changed to PO  - Surgical consult for rectal mass  - CT chest with contrast for workup showed b/l small pleural effusions, nonspecific mediastinal lymph nodes  - F/u pathology  - Heme-onc F/u  - IV iron per Heme-onc  - Rad-onc consult - will see him when pathology available  - CEA 24 (high)  - F/u pre-chemo echocardiogram  - Per radiology MRI pelvis as outpatient  - Advanced GI rectal endoscopic ultrasound and endoscopic mucosal resection of transverse colonic polyp cancelled due to incomplete prep    #encephalopathy, resolved  - Appears to be at baseline  - CT head- no acute changes   - TSH, B12, folate, RPR - wnl     #Pancreatic hypodensity  - MRI pancreatic protocol (w/wo contrast) as an outpatient    #Lung nodule   - CT:  Right lower lobe solid nodule measuring 9 mm.   - f/u OP    #Subacute Rib Fracture   - CT:  Subacute fracture of the left 10th lateral rib.   - Pain control prn     #Echo tricuspid echodensity cannot rule out vegetation  - BCx  - Afebrile    A1C 5.2, lipid panel normal    DVT PPx: SCDs  GI PPX: Protonix 40 mg PO  Diet: Regular  Activity: Increase as tolerated  Dispo: from home  Code Status: full code       Giving IV iron  MRI pelvis 80 year old male with no past medical history (has not seen a doctor in many years) presented to the ED due to loose bowel movements.    #Chronic Diarrhea   #Bloody Bowel Movements  #Rectal mass  #Iron deficiency anemia  #lactic acidosis -resolved  - Hgb was 5.8, received 3u pRBC  - Lactate initially 3, improved; was on cipro/Flagyl, d/c'ed  - Protonix changed to PO  - Surgical consult for rectal mass  - CT chest with contrast for workup showed b/l small pleural effusions, nonspecific mediastinal lymph nodes  - F/u pathology  - Heme-onc F/u  - IV iron per Heme-onc  - Rad-onc consult - will see him when pathology available  - CEA 24 (high)  - F/u pre-chemo echocardiogram  - Per radiology MRI pelvis as outpatient  - Advanced GI rectal endoscopic ultrasound and endoscopic mucosal resection of transverse colonic polyp cancelled due to incomplete prep    #encephalopathy, resolved  - Appears to be at baseline  - CT head- no acute changes   - TSH, B12, folate, RPR - wnl     #Pancreatic hypodensity  - Possible cyst/IPMN  - MRI pancreatic protocol (w/wo contrast) as an outpatient    #Lung nodule   - CT:  Right lower lobe solid nodule measuring 9 mm.   - f/u OP    #Subacute Rib Fracture   - CT:  Subacute fracture of the left 10th lateral rib.   - Pain control prn     #Echo tricuspid echodensity cannot rule out vegetation  - BCx  - Afebrile    A1C 5.2, lipid panel normal    DVT PPx: SCDs  GI PPX: Protonix 40 mg PO  Diet: Regular  Activity: Increase as tolerated  Dispo: from home  Code Status: full code       Giving IV iron  MRI pelvis 80 year old male with no past medical history (has not seen a doctor in many years) presented to the ED due to loose bowel movements.    #Chronic Diarrhea   #Bloody Bowel Movements  #Rectal mass  #Iron deficiency anemia  #lactic acidosis -resolved  - Hgb was 5.8, received 3u pRBC  - Lactate initially 3, improved; was on cipro/Flagyl, d/c'ed  - Protonix changed to PO  - Surgical consult for rectal mass  - CT chest with contrast for workup showed b/l small pleural effusions, nonspecific mediastinal lymph nodes  - F/u pathology  - Heme-onc F/u  - IV iron per Heme-onc  - Rad-onc consult - will see him when pathology available  - CEA 24 (high)  - Advanced GI rectal endoscopic ultrasound and endoscopic mucosal resection of transverse colonic polyp cancelled due to incomplete prep  - MRI pelvis requested by heme-onc and surgery    #encephalopathy, resolved  - Appears to be at baseline  - CT head- no acute changes   - TSH, B12, folate, RPR - wnl     #Pancreatic hypodensity  - Possible cyst/IPMN  - MRI pancreatic protocol (w/wo contrast) as an outpatient    #Lung nodule   - CT:  Right lower lobe solid nodule measuring 9 mm.   - f/u OP    #Subacute Rib Fracture   - CT:  Subacute fracture of the left 10th lateral rib.   - Pain control prn     #Echo tricuspid echodensity cannot rule out vegetation  - BCx  - Afebrile    A1C 5.2, lipid panel normal    DVT PPx: SCDs  GI PPX: Protonix 40 mg PO  Diet: Regular  Activity: Increase as tolerated  Dispo: from home  Code Status: full code       Giving IV iron  MRI pelvis 80 year old male with no past medical history (has not seen a doctor in many years) presented to the ED due to loose bowel movements.    #Chronic Diarrhea   #Bloody Bowel Movements  #Rectal mass  #Iron deficiency anemia  #lactic acidosis -resolved  - Hgb was 5.8, received 3u pRBC  - Lactate initially 3, improved; was on cipro/Flagyl, d/c'ed  - Protonix changed to PO  - Surgical consult for rectal mass  - CT chest with contrast for workup showed b/l small pleural effusions, nonspecific mediastinal lymph nodes  - F/u pathology  - Heme-onc F/u  - IV iron per Heme-onc  - Rad-onc consult - will see him when pathology available  - CEA 24 (high)  - Advanced GI rectal endoscopic ultrasound and endoscopic mucosal resection of transverse colonic polyp cancelled due to incomplete prep  - MRI pelvis requested by heme-onc and surgery    #encephalopathy, resolved  - Appears to be at baseline  - CT head- no acute changes   - TSH, B12, folate, RPR - wnl     #Pancreatic hypodensity  - Possible cyst/IPMN  - MRI pancreatic protocol (w/wo contrast) as an outpatient    #Lung nodule   - CT:  Right lower lobe solid nodule measuring 9 mm.   - f/u OP    #Subacute Rib Fracture   - CT:  Subacute fracture of the left 10th lateral rib.   - Pain control prn     #Echo tricuspid echodensity cannot rule out vegetation  Ddx includes infective endocarditis vs metastasis  - Cardiology consult for MYA  - BCx was negative 4/21, will repeat  - Afebrile    A1C 5.2, lipid panel normal    DVT PPx: SCDs  GI PPX: Protonix 40 mg PO  Diet: Regular  Activity: Increase as tolerated  Dispo: from home  Code Status: full code       Giving IV iron  MRI pelvis  Cardiology consult for MYA

## 2021-04-30 NOTE — PROGRESS NOTE ADULT - SUBJECTIVE AND OBJECTIVE BOX
Progress Note: General Surgery  Patient: MISAEL HUFFMAN , 80y (1940)Male   MRN: [id number]  Location: [address]  Visit: 04-22-21 Inpatient  Date: 04-30-21 @ 02:20    Events/ 24h: No acute events overnight. Pain controlled.    Vitals: T(F): 97.2 (04-30-21 @ 00:29), Max: 97.2 (04-30-21 @ 00:29)  HR: 81 (04-30-21 @ 00:29)  BP: 136/62 (04-30-21 @ 00:29) (136/62 - 137/65)  RR: 18 (04-30-21 @ 00:29)  SpO2: --    In:   04-28-21 @ 07:01  -  04-29-21 @ 07:00  --------------------------------------------------------  IN: 1350 mL    04-29-21 @ 07:01  -  04-30-21 @ 02:20  --------------------------------------------------------  IN: 0 mL      Out:   04-28-21 @ 07:01  -  04-29-21 @ 07:00  --------------------------------------------------------  OUT:    Voided (mL): 200 mL  Total OUT: 200 mL      04-29-21 @ 07:01  -  04-30-21 @ 02:20  --------------------------------------------------------  OUT:    Oral Fluid: 0 mL    Voided (mL): 325 mL  Total OUT: 325 mL        Net:   04-28-21 @ 07:01  -  04-29-21 @ 07:00  --------------------------------------------------------  NET: 1150 mL    04-29-21 @ 07:01  -  04-30-21 @ 02:20  --------------------------------------------------------  NET: -325 mL        Diet: Diet, NPO after Midnight:      NPO Start Date: 29-Apr-2021,   NPO Start Time: 23:59 (04-29-21 @ 09:43)  Diet, Clear Liquid (04-29-21 @ 09:43)    IV Fluids: iron sucrose IVPB 200 milliGRAM(s) IV Intermittent every 48 hours      Medications: [Standing]  chlorhexidine 4% Liquid 1 Application(s) Topical <User Schedule>  iron sucrose IVPB 200 milliGRAM(s) IV Intermittent every 48 hours  pantoprazole    Tablet 40 milliGRAM(s) Oral before breakfast  polyethylene glycol 3350 17 Gram(s) Oral two times a day  senna 1 Tablet(s) Oral two times a day    DVT Prophylaxis:   GI Prophylaxis: pantoprazole    Tablet 40 milliGRAM(s) Oral before breakfast    Antibiotics:   Anticoagulation:   Medications:[PRN]      Labs:                        8.8    6.21  )-----------( 230      ( 29 Apr 2021 05:34 )             31.3     04-29    141  |  106  |  <3<L>  ----------------------------<  50<LL>  3.8   |  20  |  0.6<L>    Ca    8.3<L>      29 Apr 2021 20:00  Mg     1.9     04-29        PT/INR - ( 29 Apr 2021 20:00 )   PT: 14.00 sec;   INR: 1.22 ratio         PTT - ( 29 Apr 2021 20:00 )  PTT:35.9 sec

## 2021-04-30 NOTE — CHART NOTE - NSCHARTNOTEFT_GEN_A_CORE
pt has MYA scheduled on Monday  keep NPO after midnight on sunday  COVID-19 swap has to be done over weekend to be valid for the test on Monday pt initially agreed for MYA but later refused  initial MYA schedule for Monday is cancelled  recall when pt is agreeable

## 2021-05-01 LAB
ANION GAP SERPL CALC-SCNC: 10 MMOL/L — SIGNIFICANT CHANGE UP (ref 7–14)
BUN SERPL-MCNC: <3 MG/DL — LOW (ref 10–20)
CALCIUM SERPL-MCNC: 8.1 MG/DL — LOW (ref 8.5–10.1)
CHLORIDE SERPL-SCNC: 104 MMOL/L — SIGNIFICANT CHANGE UP (ref 98–110)
CO2 SERPL-SCNC: 25 MMOL/L — SIGNIFICANT CHANGE UP (ref 17–32)
CREAT SERPL-MCNC: 0.5 MG/DL — LOW (ref 0.7–1.5)
GLUCOSE SERPL-MCNC: 100 MG/DL — HIGH (ref 70–99)
HCT VFR BLD CALC: 31.4 % — LOW (ref 42–52)
HGB BLD-MCNC: 9.2 G/DL — LOW (ref 14–18)
MAGNESIUM SERPL-MCNC: 1.5 MG/DL — LOW (ref 1.8–2.4)
MCHC RBC-ENTMCNC: 22 PG — LOW (ref 27–31)
MCHC RBC-ENTMCNC: 29.3 G/DL — LOW (ref 32–37)
MCV RBC AUTO: 74.9 FL — LOW (ref 80–94)
NRBC # BLD: 0 /100 WBCS — SIGNIFICANT CHANGE UP (ref 0–0)
PLATELET # BLD AUTO: 193 K/UL — SIGNIFICANT CHANGE UP (ref 130–400)
POTASSIUM SERPL-MCNC: 3.7 MMOL/L — SIGNIFICANT CHANGE UP (ref 3.5–5)
POTASSIUM SERPL-SCNC: 3.7 MMOL/L — SIGNIFICANT CHANGE UP (ref 3.5–5)
RBC # BLD: 4.19 M/UL — LOW (ref 4.7–6.1)
RBC # FLD: 23 % — HIGH (ref 11.5–14.5)
SODIUM SERPL-SCNC: 139 MMOL/L — SIGNIFICANT CHANGE UP (ref 135–146)
WBC # BLD: 5.87 K/UL — SIGNIFICANT CHANGE UP (ref 4.8–10.8)
WBC # FLD AUTO: 5.87 K/UL — SIGNIFICANT CHANGE UP (ref 4.8–10.8)

## 2021-05-01 PROCEDURE — 72197 MRI PELVIS W/O & W/DYE: CPT | Mod: 26

## 2021-05-01 PROCEDURE — 99233 SBSQ HOSP IP/OBS HIGH 50: CPT

## 2021-05-01 RX ORDER — MAGNESIUM SULFATE 500 MG/ML
2 VIAL (ML) INJECTION ONCE
Refills: 0 | Status: DISCONTINUED | OUTPATIENT
Start: 2021-05-01 | End: 2021-05-04

## 2021-05-01 RX ADMIN — PANTOPRAZOLE SODIUM 40 MILLIGRAM(S): 20 TABLET, DELAYED RELEASE ORAL at 05:33

## 2021-05-01 RX ADMIN — CHLORHEXIDINE GLUCONATE 1 APPLICATION(S): 213 SOLUTION TOPICAL at 05:33

## 2021-05-01 RX ADMIN — SENNA PLUS 1 TABLET(S): 8.6 TABLET ORAL at 05:33

## 2021-05-01 RX ADMIN — POLYETHYLENE GLYCOL 3350 17 GRAM(S): 17 POWDER, FOR SOLUTION ORAL at 05:33

## 2021-05-01 NOTE — PROGRESS NOTE ADULT - SUBJECTIVE AND OBJECTIVE BOX
MISAEL HUFFMAN    Patient is a 80y old  Male who presents with a chief complaint of diarrhea with bloody stools (01 May 2021 09:48)    INTERVAL HPI/OVERNIGHT EVENTS: no events overnight. No new complaints.    ROS: All ROS negative     PHYSICAL EXAM:  T(C): 36.9, Max: 36.9 (05-01-21 @ 16:08)  HR: 83 (70 - 83)  BP: 138/65 (138/65 - 147/70)  RR: 18 (18 - 18)  SpO2: --    GENERAL: NAD  PULMONARY/CHEST: No rales, rhonchi, wheezing  CARDIOVASC: Regular rate and rhythm; No murmurs  GI/ABDOMEN: Soft, Nontender, Nondistended; Bowel sounds present  EXTREMITIES:  2+ Peripheral Pulses, No clubbing, cyanosis, or edema, no deformity. No calf tenderness b/l.  NERVOUS SYSTEM:  Alert & Oriented X3, no focal deficit     Consultant(s) Notes Reviewed by me.     LABS:                        9.2    5.87  )-----------( 193      ( 01 May 2021 08:50 )             31.4     05-01    139  |  104  |  <3<L>  ----------------------------<  100<H>  3.7   |  25  |  0.5<L>    Ca    8.1<L>      01 May 2021 08:50  Mg     1.5     05-01      PT/INR - ( 29 Apr 2021 20:00 )   PT: 14.00 sec;   INR: 1.22 ratio    PTT - ( 29 Apr 2021 20:00 )  PTT:35.9 sec      RADIOLOGY & ADDITIONAL TESTS:  Pathology: Final Diagnosis  1.  Ascending colon polyp:  -  Tubular adenoma.  - No high grade dysplasia seen.    2.  Transverse colon polyp #1:  - Mucosal fold.    3.  Transverse colon polyp #2:  - Tubular adenoma.  - No high grade dysplasia seen.    4.  Descending colon polyp:  -  Tubulovillous adenoma showing focal area of squamous  metaplasia.  -  No high grade dysplasia seen.  -  No evidence of carcinoma seen.    5.  Rectal mass, biopsies:  - Invasive adenocarcinoma, moderately to poorly differentiated,  involving and undermining squamous mucosa (see Comment).    Comment: Immunohistochemical studies for mismatch repair proteins  are in progress. Addendum to follow.    6.  Transverse colon polyp #3:  -  Multiple fragments of tubulovillous adenoma, with rare foci of  high grade dysplasia.  - Two tissue fragments with invasive adenocarcinoma, one  involving colonic type mucosa without precursor lesion (without  adenoma) and the second involving squamous mucosa (see Comment).    Comment: Presence of squamous mucosa in one of the biopsy  fragments which contains carcinoma in specimen 6 (designated as  transverse colon polyp #3) , indicates that this represents  contamination from the rectal mass. The second fragment with  carcinoma consists of colonic mucosa. The microscopy of invasive  carcinoma in both of these two fragments is similar to the  carcinoma in specimen 5 (designated as rectal mass).        MEDICATIONS  (STANDING):  chlorhexidine 4% Liquid 1 Application(s) Topical <User Schedule>  iron sucrose IVPB 200 milliGRAM(s) IV Intermittent every 48 hours  magnesium sulfate  IVPB 2 Gram(s) IV Intermittent once  pantoprazole    Tablet 40 milliGRAM(s) Oral before breakfast  polyethylene glycol 3350 17 Gram(s) Oral two times a day  senna 1 Tablet(s) Oral two times a day    MEDICATIONS  (PRN):

## 2021-05-01 NOTE — PROGRESS NOTE ADULT - ASSESSMENT
80 year old male with no past medical history (has not seen a doctor in many years) presented to the ED due to loose bowel movements.    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  - will do MRI pelvis instead  - CEA 24, CA 19-9 31  - ECHO: EF 67%, Mobile echodensity associated with the lateral leflet of the tricuspid valve. Cannot rule out vegetation  - Pan CT completed for staging: Well-defined right lower lobe nodule measuring 6 mm, 3 mm lingular nodule  - heme/onc recommended Rad onc eval, consult placed    # 1.8 cm hypodensity in the posterior pancreatic body:   appears to measure fluid attenuation,  possible cyst/ IPMN. No main duct dilatation  - MRI pancreatic protocol as OP    # mobile echodensity on Tricuspid valve   - BCx negative  - no fever  - pt needs MYA, he agreed, likely on Monday, cardiac fellow informed, will swab for COVID    # Hypokalemia - supplement today, repeat tomorrow    # hypomagnesemia - supplement today, repeat tomorrow    # Iron deficiency anemia  - Hb 5.8> 7.8 >>9.2, s/p 3 units  - cont Venofer for 5 doses  - keep hgb >7, transfuse as needed, active TS, hgb stable today     # Subacute Rib Fracture   - CT:  Subacute fracture of the left 10th lateral rib.   - Pain control prn     DVT PPX: SCD  GI PPX: Protonix   activity as tolerated  Pt asked to get up from bed and walk as tolerated and OOBTC.    Pending MYA on Monday, MRI pelvis .     I had lengthy discussion with pt regarding tests results and plan of care, explained need for MYA, he is very upset that he still did not get chemotherapy or surgery for colon cancer. he agreed to proceed with testing after discussion.    I also called daughter Sally, but no response, will try to contact again.

## 2021-05-01 NOTE — PROGRESS NOTE ADULT - ASSESSMENT
80 year old male with no past medical history (has not seen a doctor in many years) presented to the ED due to loose bowel movements.    # Chronic Diarrhea   # Bloody Bowel Movements  # Rectal mass  # Iron deficiency anemia  # lactic acidosis -resolved  - Hgb was 5.8, received 3u pRBC  - Lactate initially 3, improved; was on cipro/Flagyl, d/c'ed as afebrile and normal WBC  - Protonix changed to PO  - Surgical consult for rectal mass  - CT chest with contrast for workup showed b/l small pleural effusions, nonspecific mediastinal lymph nodes  - Pathology showed adenocarcinoma  - Heme-onc F/u  - IV iron per Heme-onc  - Rad-onc saw the patient  - CEA 24 (high)  - Advanced GI rectal endoscopic ultrasound and endoscopic mucosal resection of transverse colonic polyp cancelled due to incomplete prep  - MRI pelvis requested by heme-onc and surgery    # encephalopathy, resolved  - Appears to be at baseline  - CT head- no acute changes   - TSH, B12, folate, RPR - wnl     # Pancreatic hypodensity  - Possible cyst/IPMN  - MRI pancreatic protocol (w/wo contrast) as an outpatient    # Lung nodule   - CT:  Right lower lobe solid nodule measuring 9 mm.   - f/u OP    # Subacute Rib Fracture   - CT:  Subacute fracture of the left 10th lateral rib.   - Pain control prn     # Echo tricuspid echodensity cannot rule out vegetation  Ddx includes infective endocarditis vs metastasis  - Cardiology consult for MYA  - BCx was negative 4/21, will repeat  - Afebrile    A1C 5.2, lipid panel normal    DVT PPx: SCDs  GI PPX: Protonix 40 mg PO  Diet: Regular  Activity: Increase as tolerated  Dispo: from home  Code Status: full code       Giving IV iron  MRI pelvis  Patient refused MYA 80 year old male with no past medical history (has not seen a doctor in many years) presented to the ED due to loose bowel movements.    # Chronic Diarrhea   # Bloody Bowel Movements  # Rectal mass  # Iron deficiency anemia  # lactic acidosis -resolved  - Hgb was 5.8, received 3u pRBC  - Lactate initially 3, improved; was on cipro/Flagyl, d/c'ed as afebrile and normal WBC  - Protonix changed to PO  - Surgical consult for rectal mass  - CT chest with contrast for workup showed b/l small pleural effusions, nonspecific mediastinal lymph nodes  - Pathology showed adenocarcinoma  - Heme-onc F/u  - IV iron per Heme-onc  - Rad-onc saw the patient  - CEA 24 (high)  - Advanced GI rectal endoscopic ultrasound and endoscopic mucosal resection of transverse colonic polyp cancelled due to incomplete prep  - MRI pelvis requested by heme-onc and surgery    # encephalopathy, resolved  - Appears to be at baseline  - CT head- no acute changes   - TSH, B12, folate, RPR - wnl     # Pancreatic hypodensity  - Possible cyst/IPMN  - MRI pancreatic protocol (w/wo contrast) as an outpatient    # Lung nodule   - CT:  Right lower lobe solid nodule measuring 9 mm.   - f/u OP    # Subacute Rib Fracture   - CT:  Subacute fracture of the left 10th lateral rib.   - Pain control prn     # Echo tricuspid echodensity cannot rule out vegetation  Ddx includes infective endocarditis vs metastasis  - Cardiology consulted for MYA but patient refused  - BCx was negative 4/21, repeated 4/30  - Afebrile    A1C 5.2, lipid panel normal    DVT PPx: SCDs  GI PPX: Protonix 40 mg PO  Diet: Regular  Activity: Increase as tolerated  Dispo: from home  Code Status: full code       Giving IV iron  MRI pelvis  Patient refused MYA

## 2021-05-01 NOTE — PROGRESS NOTE ADULT - SUBJECTIVE AND OBJECTIVE BOX
DAILY PROGRESS NOTE  ===========================================================    Patient Information:  MISAEL HUFFMAN  /  80y  /  Male  /  MRN#: [id number]    Hospital Day: 9d   Location: [address] ([address])    |:::::::::::::::::::::::::::| SUBJECTIVE |:::::::::::::::::::::::::::|    OVERNIGHT EVENTS: No acute events overnight.   TODAY: Patient was seen today at bedside. Requesting that we take care of transportation to radiation therapy sessions which had been discussed with him. Review of systems is otherwise negative.    |:::::::::::::::::::::::::::| ADMISSION HPI |:::::::::::::::::::::::::::|    HPI:  80 year old male with no past medical history (has not seen a doctor in many years, not on any meds) presented to the ED due to loose bowel movements. Patient is alert and orient, but appears to be confused and is a poor historian. History obtained from patient and the charts. Patient has had chronic diarrhea for the past year which is foul smelling, then over the past week it has gotten worse with amount and frequency. The patients daughter believed that their was blood in here stools so she brought him to the ED. The patient endorses decreased PO intake and nausea, but denies abdominal pain. He is not on AC, but will take a baby aspirin from time to time. He denies SOB, CP, changes in urination, headache. He lives alone and ambulates with a walker.   In the ED Vitals: Temp 97.6F, , / 73, RR 18, SpO2 98% on RA. Hb was found to be 5.8. Rectal performed by ED team negative. Given 2 units of PRBC. Lactate of 3. A CT of the abdomen:  Marked wall thickening of the distal sigmoid colon and rectum. Differential includes proctocolitis although underlying neoplastic process cannot be excluded. Direct visualization with colonoscopy is recommended. Subacute fracture of the left 10th lateral rib. Right lower lobe solid nodule measuring 9 mm. He was given Cipro flagyl in ED. Patient admitted for bloody bowel movements.        (22 Apr 2021 04:35)      |:::::::::::::::::::::::::::| OBJECTIVE |:::::::::::::::::::::::::::|    STANDING MEDICATIONS  chlorhexidine 4% Liquid 1 Application(s) Topical <User Schedule>  iron sucrose IVPB 200 milliGRAM(s) IV Intermittent every 48 hours  pantoprazole    Tablet 40 milliGRAM(s) Oral before breakfast  polyethylene glycol 3350 17 Gram(s) Oral two times a day  senna 1 Tablet(s) Oral two times a day    PRN MEDICATIONS    HOME MEDICATIONS      VITAL SIGNS: Last 24 Hours  T(C): 36.8 (01 May 2021 07:49), Max: 36.8 (01 May 2021 07:49)  T(F): 98.2 (01 May 2021 07:49), Max: 98.2 (01 May 2021 07:49)  HR: 77 (01 May 2021 07:49) (70 - 77)  BP: 146/73 (01 May 2021 07:49) (142/67 - 147/70)  BP(mean): --  RR: 18 (01 May 2021 07:49) (18 - 18)  SpO2: --    Input-Output    04-30-21 @ 07:01  -  05-01-21 @ 07:00  --------------------------------------------------------  IN:    Oral Fluid: 480 mL  Total IN: 480 mL    OUT:    Stool (mL): 0 mL    Voided (mL): 1300 mL  Total OUT: 1300 mL    Total NET: -820 mL          PHYSICAL EXAM:  GEN: No acute distress.  LUNGS: Clear to auscultation bilaterally.  HEART: Regular rate and rhythm. No murmurs.  ABD: Soft, non-tender, non-distended.  EXT: Normal range of motion. No edema.  NEURO: Alert, attentive, oriented. Clear, fluent speech. Eye movements intact. Moves all extremities.    LAB RESULTS:                        9.2    5.87  )-----------( 193      ( 01 May 2021 08:50 )             31.4     04-29    141  |  106  |  <3<L>  ----------------------------<  50<LL>  3.8   |  20  |  0.6<L>    Ca    8.3<L>      29 Apr 2021 20:00  Mg     1.9     04-29      PT/INR - ( 29 Apr 2021 20:00 )   PT: 14.00 sec;   INR: 1.22 ratio         PTT - ( 29 Apr 2021 20:00 )  PTT:35.9 sec            MICROBIOLOGY:    Culture - Urine (collected 04-22-21 @ 07:53)  Source: .Urine Clean Catch (Midstream)  Final Report (04-24-21 @ 09:29):    >=3 organisms. Probable collection contamination.    Culture - Blood (collected 04-21-21 @ 21:58)  Source: .Blood Blood-Peripheral  Final Report (04-27-21 @ 04:00):    No Growth Final    Culture - Blood (collected 04-21-21 @ 21:58)  Source: .Blood Blood-Peripheral  Final Report (04-27-21 @ 04:00):    No Growth Final      IMAGING/STUDIES:    ALLERGIES:  No Known Allergies      ===========================================================

## 2021-05-02 LAB
ANION GAP SERPL CALC-SCNC: 10 MMOL/L — SIGNIFICANT CHANGE UP (ref 7–14)
BLD GP AB SCN SERPL QL: SIGNIFICANT CHANGE UP
BUN SERPL-MCNC: 4 MG/DL — LOW (ref 10–20)
CALCIUM SERPL-MCNC: 8.5 MG/DL — SIGNIFICANT CHANGE UP (ref 8.5–10.1)
CHLORIDE SERPL-SCNC: 102 MMOL/L — SIGNIFICANT CHANGE UP (ref 98–110)
CO2 SERPL-SCNC: 26 MMOL/L — SIGNIFICANT CHANGE UP (ref 17–32)
CREAT SERPL-MCNC: 0.6 MG/DL — LOW (ref 0.7–1.5)
GLUCOSE SERPL-MCNC: 66 MG/DL — LOW (ref 70–99)
HCT VFR BLD CALC: 33.1 % — LOW (ref 42–52)
HGB BLD-MCNC: 9.4 G/DL — LOW (ref 14–18)
MAGNESIUM SERPL-MCNC: 1.9 MG/DL — SIGNIFICANT CHANGE UP (ref 1.8–2.4)
MCHC RBC-ENTMCNC: 21.5 PG — LOW (ref 27–31)
MCHC RBC-ENTMCNC: 28.4 G/DL — LOW (ref 32–37)
MCV RBC AUTO: 75.6 FL — LOW (ref 80–94)
NRBC # BLD: 0 /100 WBCS — SIGNIFICANT CHANGE UP (ref 0–0)
PLATELET # BLD AUTO: 226 K/UL — SIGNIFICANT CHANGE UP (ref 130–400)
POTASSIUM SERPL-MCNC: 3.9 MMOL/L — SIGNIFICANT CHANGE UP (ref 3.5–5)
POTASSIUM SERPL-SCNC: 3.9 MMOL/L — SIGNIFICANT CHANGE UP (ref 3.5–5)
RBC # BLD: 4.38 M/UL — LOW (ref 4.7–6.1)
RBC # FLD: 23.3 % — HIGH (ref 11.5–14.5)
SARS-COV-2 RNA SPEC QL NAA+PROBE: SIGNIFICANT CHANGE UP
SODIUM SERPL-SCNC: 138 MMOL/L — SIGNIFICANT CHANGE UP (ref 135–146)
WBC # BLD: 5.51 K/UL — SIGNIFICANT CHANGE UP (ref 4.8–10.8)
WBC # FLD AUTO: 5.51 K/UL — SIGNIFICANT CHANGE UP (ref 4.8–10.8)

## 2021-05-02 PROCEDURE — 99233 SBSQ HOSP IP/OBS HIGH 50: CPT

## 2021-05-02 PROCEDURE — 99231 SBSQ HOSP IP/OBS SF/LOW 25: CPT

## 2021-05-02 RX ADMIN — PANTOPRAZOLE SODIUM 40 MILLIGRAM(S): 20 TABLET, DELAYED RELEASE ORAL at 06:30

## 2021-05-02 RX ADMIN — SENNA PLUS 1 TABLET(S): 8.6 TABLET ORAL at 05:23

## 2021-05-02 RX ADMIN — CHLORHEXIDINE GLUCONATE 1 APPLICATION(S): 213 SOLUTION TOPICAL at 05:22

## 2021-05-02 RX ADMIN — POLYETHYLENE GLYCOL 3350 17 GRAM(S): 17 POWDER, FOR SOLUTION ORAL at 05:24

## 2021-05-02 RX ADMIN — IRON SUCROSE 110 MILLIGRAM(S): 20 INJECTION, SOLUTION INTRAVENOUS at 17:23

## 2021-05-02 NOTE — PROGRESS NOTE ADULT - ASSESSMENT
80 year old male with no past medical history (has not seen a doctor in many years, not on any meds) presented to the ED due to loose bowel movements, found to be anemic and has imaging and colonoscopy findings significant for large rectal mass, concerning for adenocarcinoma. Colorectal Surgery consulted for recommendations regarding potential surgery intervention.     Plan:   - EUS today with advanced GI   -F/U surgical pathology of mass biopsied on 4/27   -Heme/Onc recommendations, will likely require chemo/radiation prior to any potential surgical intervention   -Full staging scans including CT of the chest   -Plan discussed with Dr. Segovia

## 2021-05-02 NOTE — CONSULT NOTE ADULT - SUBJECTIVE AND OBJECTIVE BOX
Everett Orellana is a hard of hearing 80 year old who does not pursue routine medical care and presented with a one year history of diarrhea which recently was bloody and prompted a visit to the ER.  While the ER visit states the ROYCE was negative, CT, endoscopy and now MRI all confirm a 14 cm rectal tumor starting at the verge.  By CT there are no nodes or other findings of distant spread.  The MRI has not been read.   Biopsy is positive for adenocarcinoma.  The patient admits to pain.      Given the CT/MRI findings, he did not have a rectal exam by me.      Assessment/Impression:  At least T3 rectal cancer (C20)    Plan:  Awaiting the MRI reading.  By CT, other than a 9 mm nodule in the lung, there are no suspicious metastatic lesions.  I am waiting to speak with his daughter who is a nurse.  As noted, pre-operative chemo/RT is the likely recommended course.  This can be further discussed this week.

## 2021-05-02 NOTE — PROGRESS NOTE ADULT - SUBJECTIVE AND OBJECTIVE BOX
GENERAL SURGERY PROGRESS NOTE     MISAEL HUFFMAN  80y  Male  Hospital day :10d  POD:  Procedure:   OVERNIGHT EVENTS:    T(F): 96 (05-02-21 @ 00:00), Max: 98.4 (05-01-21 @ 16:08)  HR: 71 (05-02-21 @ 00:00) (71 - 83)  BP: 135/63 (05-02-21 @ 00:00) (135/63 - 146/73)  ABP: --  ABP(mean): --  RR: 17 (05-02-21 @ 00:00) (17 - 18)  SpO2: --    DIET/FLUIDS: iron sucrose IVPB 200 milliGRAM(s) IV Intermittent every 48 hours  magnesium sulfate  IVPB 2 Gram(s) IV Intermittent once    NG:                                                                                DRAINS:     BM:   04-30-21 @ 07:01  -  05-01-21 @ 07:00  --------------------------------------------------------  OUT: 0 mL      EMESIS:     URINE:      GI proph:  pantoprazole    Tablet 40 milliGRAM(s) Oral before breakfast    AC/ proph:   ABx:       LABS  Labs:  CAPILLARY BLOOD GLUCOSE                              9.2    5.87  )-----------( 193      ( 01 May 2021 08:50 )             31.4         05-01    139  |  104  |  <3<L>  ----------------------------<  100<H>  3.7   |  25  |  0.5<L>      Calcium, Total Serum: 8.1 mg/dL (05-01-21 @ 08:50)      LFTs:         Coags:                Culture - Blood (collected 30 Apr 2021 11:18)  Source: .Blood None  Preliminary Report (01 May 2021 23:01):    No growth to date.          RADIOLOGY & ADDITIONAL TESTS:      A/P

## 2021-05-02 NOTE — PROGRESS NOTE ADULT - ASSESSMENT
80 year old male with no past medical history (has not seen a doctor in many years) presented to the ED due to loose bowel movements.    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  - MRI pelvis done, results pending  - CEA 24, CA 19-9 31  - ECHO: EF 67%, Mobile echodensity associated with the lateral leflet of the tricuspid valve. Cannot rule out vegetation  - Pan CT completed for staging: Well-defined right lower lobe nodule measuring 6 mm, 3 mm lingular nodule  - heme/onc recommended Rad onc eval, consult placed  - Sx and Rad onc follow up on results of MRI    # 1.8 cm hypodensity in the posterior pancreatic body:   appears to measure fluid attenuation,  possible cyst/ IPMN. No main duct dilatation  - MRI pancreatic protocol as OP    # mobile echodensity on Tricuspid valve   - BCx negative  - no fever  - pt needs MYA, he agreed, likely on Monday, cardiac fellow informed, swab for COVID sent    # Hypokalemia - supplement today, repeat tomorrow    # hypomagnesemia - supplement today, repeat tomorrow    # Iron deficiency anemia due to GI bleed  - Hb 5.8> 7.8 >>9.2, s/p 3 units  - Venofer for 5 doses  - keep hgb >7, transfuse as needed, active TS, hgb stable today     # Subacute Rib Fracture   - CT:  Subacute fracture of the left 10th lateral rib.   - Pain control prn     DVT PPX: SCD  GI PPX: Protonix   activity as tolerated  Pt asked to get up from bed and walk as tolerated and OOBTC.    Pending MYA on Monday, MRI pelvis results, Rad onc and Sx f/u.     Spoke to daughter Sally, all tests results and plan of care discussed with her.

## 2021-05-02 NOTE — PROGRESS NOTE ADULT - SUBJECTIVE AND OBJECTIVE BOX
MISAEL HUFFMAN    Patient is a 80y old  Male who presents with a chief complaint of Bloody stools (02 May 2021 05:48)    INTERVAL HPI/OVERNIGHT EVENTS: no new events, no new complaints. Pt states he gets up from bed and walks with a cane.    ROS: All ROS negative     PHYSICAL EXAM:  T(C): 36.3, Max: 36.3 (05-02-21 @ 07:59)  HR: 66 (66 - 71)  BP: 122/59 (122/59 - 135/63)  RR: 19 (17 - 19)  SpO2: --    GENERAL: NAD  PULMONARY/CHEST: No rales, rhonchi, wheezing  CARDIOVASC: Regular rate and rhythm; No murmurs  GI/ABDOMEN: Soft, Nontender, Nondistended; Bowel sounds present  EXTREMITIES:  2+ Peripheral Pulses, No clubbing, cyanosis, or edema, no deformity. No calf tenderness b/l.  NERVOUS SYSTEM:  Alert & Oriented X3, no focal deficit       Consultant(s) Notes Reviewed by me.       LABS:                        9.4    5.51  )-----------( 226      ( 02 May 2021 06:51 )             33.1     05-02    138  |  102  |  4<L>  ----------------------------<  66<L>  3.9   |  26  |  0.6<L>    Ca    8.5      02 May 2021 06:51  Mg     1.9     05-02        Culture - Blood (collected 30 Apr 2021 11:18)  Source: .Blood None  Preliminary Report (01 May 2021 23:01):    No growth to date.      RADIOLOGY & ADDITIONAL TESTS:  no new tests      MEDICATIONS  (STANDING):  chlorhexidine 4% Liquid 1 Application(s) Topical <User Schedule>  iron sucrose IVPB 200 milliGRAM(s) IV Intermittent every 48 hours  magnesium sulfate  IVPB 2 Gram(s) IV Intermittent once  pantoprazole    Tablet 40 milliGRAM(s) Oral before breakfast  polyethylene glycol 3350 17 Gram(s) Oral two times a day  senna 1 Tablet(s) Oral two times a day    MEDICATIONS  (PRN):

## 2021-05-03 LAB
ANION GAP SERPL CALC-SCNC: 9 MMOL/L — SIGNIFICANT CHANGE UP (ref 7–14)
BUN SERPL-MCNC: 4 MG/DL — LOW (ref 10–20)
CALCIUM SERPL-MCNC: 8.2 MG/DL — LOW (ref 8.5–10.1)
CHLORIDE SERPL-SCNC: 104 MMOL/L — SIGNIFICANT CHANGE UP (ref 98–110)
CO2 SERPL-SCNC: 27 MMOL/L — SIGNIFICANT CHANGE UP (ref 17–32)
CREAT SERPL-MCNC: 0.5 MG/DL — LOW (ref 0.7–1.5)
GLUCOSE SERPL-MCNC: 86 MG/DL — SIGNIFICANT CHANGE UP (ref 70–99)
HCT VFR BLD CALC: 31.8 % — LOW (ref 42–52)
HGB BLD-MCNC: 9.2 G/DL — LOW (ref 14–18)
MAGNESIUM SERPL-MCNC: 1.7 MG/DL — LOW (ref 1.8–2.4)
MCHC RBC-ENTMCNC: 21.6 PG — LOW (ref 27–31)
MCHC RBC-ENTMCNC: 28.9 G/DL — LOW (ref 32–37)
MCV RBC AUTO: 74.6 FL — LOW (ref 80–94)
NRBC # BLD: 0 /100 WBCS — SIGNIFICANT CHANGE UP (ref 0–0)
PLATELET # BLD AUTO: 217 K/UL — SIGNIFICANT CHANGE UP (ref 130–400)
POTASSIUM SERPL-MCNC: 3.6 MMOL/L — SIGNIFICANT CHANGE UP (ref 3.5–5)
POTASSIUM SERPL-SCNC: 3.6 MMOL/L — SIGNIFICANT CHANGE UP (ref 3.5–5)
RBC # BLD: 4.26 M/UL — LOW (ref 4.7–6.1)
RBC # FLD: 23.9 % — HIGH (ref 11.5–14.5)
SODIUM SERPL-SCNC: 140 MMOL/L — SIGNIFICANT CHANGE UP (ref 135–146)
WBC # BLD: 5.3 K/UL — SIGNIFICANT CHANGE UP (ref 4.8–10.8)
WBC # FLD AUTO: 5.3 K/UL — SIGNIFICANT CHANGE UP (ref 4.8–10.8)

## 2021-05-03 PROCEDURE — 93320 DOPPLER ECHO COMPLETE: CPT | Mod: 26

## 2021-05-03 PROCEDURE — 93312 ECHO TRANSESOPHAGEAL: CPT | Mod: 26,XU

## 2021-05-03 PROCEDURE — 93325 DOPPLER ECHO COLOR FLOW MAPG: CPT | Mod: 26

## 2021-05-03 PROCEDURE — 99233 SBSQ HOSP IP/OBS HIGH 50: CPT

## 2021-05-03 RX ORDER — IRON SUCROSE 20 MG/ML
100 INJECTION, SOLUTION INTRAVENOUS
Refills: 0 | Status: COMPLETED | OUTPATIENT
Start: 2021-05-03 | End: 2021-05-05

## 2021-05-03 RX ORDER — MAGNESIUM SULFATE 500 MG/ML
2 VIAL (ML) INJECTION ONCE
Refills: 0 | Status: COMPLETED | OUTPATIENT
Start: 2021-05-03 | End: 2021-05-03

## 2021-05-03 RX ADMIN — CHLORHEXIDINE GLUCONATE 1 APPLICATION(S): 213 SOLUTION TOPICAL at 05:31

## 2021-05-03 RX ADMIN — Medication 50 GRAM(S): at 09:54

## 2021-05-03 RX ADMIN — PANTOPRAZOLE SODIUM 40 MILLIGRAM(S): 20 TABLET, DELAYED RELEASE ORAL at 05:33

## 2021-05-03 RX ADMIN — POLYETHYLENE GLYCOL 3350 17 GRAM(S): 17 POWDER, FOR SOLUTION ORAL at 17:01

## 2021-05-03 RX ADMIN — IRON SUCROSE 210 MILLIGRAM(S): 20 INJECTION, SOLUTION INTRAVENOUS at 16:32

## 2021-05-03 NOTE — PROGRESS NOTE ADULT - SUBJECTIVE AND OBJECTIVE BOX
Progress Note: Surgery  Patient: MISAEL HUFFMAN , 80y (1940)Male   MRN: [id number]  Location: [place] [address] [address]  Visit: 04-22-21 Inpatient  Date: 05-03-21 @ 07:41    Events over 24h: No acute event overnight. No new complaint. Pt is hemodynamically stable.     Vitals: T(F): 98 (05-03-21 @ 00:00), Max: 98.4 (05-02-21 @ 15:19)  HR: 76 (05-03-21 @ 00:00)  BP: 133/65 (05-03-21 @ 00:00) (122/59 - 139/65)  RR: 17 (05-03-21 @ 00:00)  SpO2: --      Diet: Diet, Clear Liquid (04-29-21 @ 09:43)    IV Fluid: magnesium sulfate  IVPB 2 Gram(s) IV Intermittent once      In:   05-02-21 @ 07:01  -  05-03-21 @ 07:00  --------------------------------------------------------  IN: 240 mL      Out:   05-02-21 @ 07:01  -  05-03-21 @ 07:00  --------------------------------------------------------  OUT:    Voided (mL): 1200 mL  Total OUT: 1200 mL        Net:   05-02-21 @ 07:01  -  05-03-21 @ 07:00  --------------------------------------------------------  NET: -960 mL        Physical Examination:  General Appearance: NAD, alert and cooperative  Heart: S1 and S2. No murmurs. Rhythm is regular.  Lungs: Clear to auscultation BL without rales, rhonchi, wheezing, crackles or diminished breath sounds.  Abdomen: Soft, nondistended, nontender. No rigidity, guarding, or rebound tenderness.     Medications: [Standing]  chlorhexidine 4% Liquid 1 Application(s) Topical <User Schedule>  magnesium sulfate  IVPB 2 Gram(s) IV Intermittent once  pantoprazole    Tablet 40 milliGRAM(s) Oral before breakfast  polyethylene glycol 3350 17 Gram(s) Oral two times a day  senna 1 Tablet(s) Oral two times a day    Medications:[PRN]    Labs:                        9.2    5.30  )-----------( 217      ( 03 May 2021 06:35 )             31.8   05-02    138  |  102  |  4<L>  ----------------------------<  66<L>  3.9   |  26  |  0.6<L>    Ca    8.5      02 May 2021 06:51  Mg     1.9     05-02    Micro/Urine:    Imaging:  None/24h

## 2021-05-03 NOTE — PRE-ANESTHESIA EVALUATION ADULT - NSANTHADDINFOFT_GEN_ALL_CORE
Summary:   1. LV Ejection Fraction by Stanford's Method with a biplane EF of 67 %.   2. Spectral Doppler shows impaired relaxation pattern of left ventricular myocardial filling (Grade I diastolic dysfunction).   3. Mildly enlarged left atrium.   4. Normal right atrial size.   5. Mild mitral annular calcification.   6. Mild thickening of the anterior and posterior mitral valve leaflets.   7. Trace mitral valve regurgitation.   8. Mild tricuspid regurgitation.   9. Mobile echodensity associated with the lateral leflet of the tricuspid valve. Cannot rule out vegetation. Correlate clincially.

## 2021-05-03 NOTE — CHART NOTE - NSCHARTNOTEFT_GEN_A_CORE
POST OPERATIVE PROCEDURAL DOCUMENTATION    PRE-OP DIAGNOSIS: Echogenic density of tricuspid valve    POST-OP DIAGNOSIS: Thickened tricuspid valve. No definite evidence of vegetation.    PROCEDURE: Transesophageal echocardiogram    Primary Physician: Dr. Mccollum  Assistant: Concha    ANESTHESIA TYPE  [  ] General Anesthesia  [ x ] Conscious Sedation  [  ] Local/Regional    CONDITION  [  ] Critical  [  ] Serious  [x] Fair  [  ] Good    SPECIMENS REMOVED (IF APPLICABLE): N/A    IMPLANTS (IF APPLICABLE): None    ESTIMATED BLOOD LOSS: None    COMPLICATIONS: None      FINDINGS:    After risks and benefits of procedures were explained, informed consent was obtained and placed in chart.   The patient received topical anesthestic to the oropharynx with viscous lidocaine and benzocaine spray.  Refer to Anesthesia note for sedation details.  The MYA probe was passed into the esophagus without difficulty.  Transesophageal and transgastric images were obtained.  The MYA probe was removed without difficulty and examined.  There was no evidence for bleeding.  The patient tolerated the procedure well without any immediate MYA-related complications.      Preliminary Findings:  LA: Enlarged.  RA: Normal size.  SHALOM: Left atrial appendage was clear of clot and spontaneous echo contrast.  LV: LVEF was estimated at 55-65%.  RV: Normal size and systolic function.  MV: Mild MR, No evidence for MS.   AV: No evidence of AI. No evidence for AS.   TV: Thickened tricuspid valve with mild TR. NO definite evidence of vegetation on tricuspid valve.  IAS: No PFO. NO R-> L shunt on color doppler and injection of agitated saline contrast. Intra-atrial aneurysm.  There was mild, non-mobile atheroma seen in the thoracic aorta.     DIAGNOSIS/IMPRESSION:  Thickened tricuspid valve. No definite evidence of vegetation on this study. Correlate clinically.    PLAN OF CARE:  [x] Further medical management as per medical team.    Results of procedure/ plan of care discussed with patient/  in detail. POST OPERATIVE PROCEDURAL DOCUMENTATION    PRE-OP DIAGNOSIS: Echogenic density of tricuspid valve    POST-OP DIAGNOSIS: Thickened tricuspid valve. No definite evidence of vegetation.    PROCEDURE: Transesophageal echocardiogram    Primary Physician: Dr. Mccollum  Assistant: Concha    ANESTHESIA TYPE  [  ] General Anesthesia  [ x ] Conscious Sedation  [  ] Local/Regional    CONDITION  [  ] Critical  [  ] Serious  [x] Fair  [  ] Good    SPECIMENS REMOVED (IF APPLICABLE): N/A    IMPLANTS (IF APPLICABLE): None    ESTIMATED BLOOD LOSS: None    COMPLICATIONS: None      FINDINGS:    After risks and benefits of procedures were explained, informed consent was obtained and placed in chart. Refer to Anesthesia note for sedation details.  The MYA probe was passed into the esophagus without difficulty.  Transesophageal and transgastric images were obtained.  The MYA probe was removed without difficulty and examined.  There was no evidence for bleeding.  The patient tolerated the procedure well without any immediate MYA-related complications.      Preliminary Findings:  LA: Enlarged.  RA: Normal size.  SHALOM: Left atrial appendage was clear of clot and spontaneous echo contrast.  LV: LVEF was estimated at 55-65%.  RV: Normal size and systolic function.  MV: Mild MR, No evidence for MS.   AV: No evidence of AI. No evidence for AS.   TV: Thickened tricuspid valve with mild TR. NO definite evidence of vegetation on tricuspid valve.  IAS: No PFO. NO R-> L shunt on color doppler and injection of agitated saline contrast. Intra-atrial aneurysm.  There was mild, non-mobile atheroma seen in the thoracic aorta.     DIAGNOSIS/IMPRESSION:  Thickened tricuspid valve. No definite evidence of vegetation on this study. Correlate clinically.    PLAN OF CARE:  [x] Further medical management as per medical team.    Results of procedure/ plan of care discussed with patient/  in detail.

## 2021-05-03 NOTE — PROGRESS NOTE ADULT - SUBJECTIVE AND OBJECTIVE BOX
SUBJECTIVE  Patient is a 80y old Male who presents with a chief complaint of Bloody stools (02 May 2021 05:48)  Currently admitted to medicine with the primary diagnosis of Proctocolitis    Today is hospital day 11d, and this morning he is alert oriented  and reports no overnight events.   planned for MYA. Pending MRI pelvis results.     OBJECTIVE  PAST MEDICAL & SURGICAL HISTORY  No pertinent past medical history    Amputation of digit of left hand      ALLERGIES:  No Known Allergies    MEDICATIONS:  STANDING MEDICATIONS  chlorhexidine 4% Liquid 1 Application(s) Topical <User Schedule>  magnesium sulfate  IVPB 2 Gram(s) IV Intermittent once  pantoprazole    Tablet 40 milliGRAM(s) Oral before breakfast  polyethylene glycol 3350 17 Gram(s) Oral two times a day  senna 1 Tablet(s) Oral two times a day    PRN MEDICATIONS      VITAL SIGNS: Last 24 Hours  T(C): 36.7 (03 May 2021 00:00), Max: 36.9 (02 May 2021 15:19)  T(F): 98 (03 May 2021 00:00), Max: 98.4 (02 May 2021 15:19)  HR: 83 (03 May 2021 08:00) (75 - 83)  BP: 133/65 (03 May 2021 00:00) (133/65 - 139/65)  BP(mean): --  RR: 20 (03 May 2021 08:00) (17 - 20)  SpO2: 96% (03 May 2021 08:00) (96% - 96%)    LABS:                        9.2    5.30  )-----------( 217      ( 03 May 2021 06:35 )             31.8     05-03    140  |  104  |  4<L>  ----------------------------<  86  3.6   |  27  |  0.5<L>    Ca    8.2<L>      03 May 2021 06:35  Mg     1.7     05-03                Culture - Blood (collected 30 Apr 2021 11:18)  Source: .Blood None  Preliminary Report (01 May 2021 23:01):    No growth to date.          RADIOLOGY:      PHYSICAL EXAM:    GENERAL: NAD, well-developed, AAOx3  HEENT:  Atraumatic, Normocephalic. EOMI, PERRLA, conjunctiva and sclera clear, No JVD  PULMONARY: Clear to auscultation bilaterally; No wheeze  CARDIOVASCULAR: Regular rate and rhythm; No murmurs, rubs, or gallops  GASTROINTESTINAL: Soft, Nontender, Nondistended; Bowel sounds present  MUSCULOSKELETAL:  2+ Peripheral Pulses, No clubbing, cyanosis, or edema  NEUROLOGY: non-focal  SKIN: No rashes or lesions      ADMISSION SUMMARY  80 year old male with no past medical history (has not seen a doctor in many years) presented to the ED due to loose bowel movements.    # Chronic Diarrhea   # Bloody Bowel Movements  # Iron deficiency anemia  # lactic acidosis -resolved  # Rectal adenocarcinoma moderate to poorly differentiated  - s/p colonoscopy 4/27  - rectal mass extending from the anus up to 17 cm proximally, multiple polyps s/p polypectomy, a 5 cm transverse colon polyp  that was not removed  - Seen by colorectal surgeon, requested colonoscopy with EMR of transverse polyp and EUS on 4/30 cancelled due to poor prep.  - MRI pelvis done, pending read. FU with surgery when read available.   - CEA 24, CA 19-9 31  - ECHO: EF 67%, Mobile echodensity associated with the lateral leflet of the tricuspid valve. Cannot rule out vegetation, Planned for MYA 5/3  - Pan CT completed for staging: Well-defined right lower lobe nodule measuring 6 mm, 3 mm lingular nodule  - heme/onc F/u  - Sx and Rad onc follow up on results of MRI      # encephalopathy, resolved  - Appears to be at baseline  - CT head- no acute changes   - TSH, B12, folate, RPR - wnl     # Pancreatic hypodensity  - Possible cyst/IPMN  - MRI pancreatic protocol (w/wo contrast) as an outpatient    # Lung nodule   - CT:  Right lower lobe solid nodule measuring 9 mm.   - f/u OP    # Subacute Rib Fracture   - CT:  Subacute fracture of the left 10th lateral rib.   - Pain control prn     # Echo tricuspid echodensity cannot rule out vegetation  Ddx includes infective endocarditis vs metastasis  - Cardiology consulted for MYA   - BCx was negative 4/21, repeated 4/30  - Planned for MYA on 5/3  - Afebrile    A1C 5.2, lipid panel normal    DVT PPx: SCDs  GI PPX: Protonix 40 mg PO  Diet: Regular  Activity: Increase as tolerated  Dispo: from home  Code Status: full code       Pending:  MRI pelvis pending read, RAD ONC, SURG, Hem/onc  MYA on 5/3

## 2021-05-03 NOTE — PROGRESS NOTE ADULT - ASSESSMENT
Assessment:  80y Male patient admitted with rectal mass, invasive adenocarcinoma, pelvic MRI done pending read, with the above physical exam, labs, and imaging findings.    Plan:  - f/u MRI read  -Pain control as needed  -Hemodynamic monitoring as per routine  -Encourage ambulation and incentive spirometer use (10x/hr when awake)  -GI and DVT prophylaxis  -Check and replete CBC and BMP q daily  -Strict input and output monitoring  -Continue current management    Date/Time: 05-03-21 @ 07:41

## 2021-05-04 LAB
ANION GAP SERPL CALC-SCNC: 9 MMOL/L — SIGNIFICANT CHANGE UP (ref 7–14)
BASOPHILS # BLD AUTO: 0.06 K/UL — SIGNIFICANT CHANGE UP (ref 0–0.2)
BASOPHILS NFR BLD AUTO: 1 % — SIGNIFICANT CHANGE UP (ref 0–1)
BUN SERPL-MCNC: 5 MG/DL — LOW (ref 10–20)
CALCIUM SERPL-MCNC: 8.3 MG/DL — LOW (ref 8.5–10.1)
CHLORIDE SERPL-SCNC: 103 MMOL/L — SIGNIFICANT CHANGE UP (ref 98–110)
CO2 SERPL-SCNC: 26 MMOL/L — SIGNIFICANT CHANGE UP (ref 17–32)
CREAT SERPL-MCNC: 0.5 MG/DL — LOW (ref 0.7–1.5)
EOSINOPHIL # BLD AUTO: 0.16 K/UL — SIGNIFICANT CHANGE UP (ref 0–0.7)
EOSINOPHIL NFR BLD AUTO: 2.5 % — SIGNIFICANT CHANGE UP (ref 0–8)
GLUCOSE SERPL-MCNC: 78 MG/DL — SIGNIFICANT CHANGE UP (ref 70–99)
HCT VFR BLD CALC: 33.9 % — LOW (ref 42–52)
HGB BLD-MCNC: 9.6 G/DL — LOW (ref 14–18)
IMM GRANULOCYTES NFR BLD AUTO: 1 % — HIGH (ref 0.1–0.3)
LYMPHOCYTES # BLD AUTO: 0.98 K/UL — LOW (ref 1.2–3.4)
LYMPHOCYTES # BLD AUTO: 15.6 % — LOW (ref 20.5–51.1)
MAGNESIUM SERPL-MCNC: 1.9 MG/DL — SIGNIFICANT CHANGE UP (ref 1.8–2.4)
MCHC RBC-ENTMCNC: 21.7 PG — LOW (ref 27–31)
MCHC RBC-ENTMCNC: 28.3 G/DL — LOW (ref 32–37)
MCV RBC AUTO: 76.7 FL — LOW (ref 80–94)
MONOCYTES # BLD AUTO: 0.45 K/UL — SIGNIFICANT CHANGE UP (ref 0.1–0.6)
MONOCYTES NFR BLD AUTO: 7.2 % — SIGNIFICANT CHANGE UP (ref 1.7–9.3)
NEUTROPHILS # BLD AUTO: 4.57 K/UL — SIGNIFICANT CHANGE UP (ref 1.4–6.5)
NEUTROPHILS NFR BLD AUTO: 72.7 % — SIGNIFICANT CHANGE UP (ref 42.2–75.2)
NRBC # BLD: 0 /100 WBCS — SIGNIFICANT CHANGE UP (ref 0–0)
PLATELET # BLD AUTO: 206 K/UL — SIGNIFICANT CHANGE UP (ref 130–400)
POTASSIUM SERPL-MCNC: 4.1 MMOL/L — SIGNIFICANT CHANGE UP (ref 3.5–5)
POTASSIUM SERPL-SCNC: 4.1 MMOL/L — SIGNIFICANT CHANGE UP (ref 3.5–5)
RBC # BLD: 4.42 M/UL — LOW (ref 4.7–6.1)
RBC # FLD: 24.3 % — HIGH (ref 11.5–14.5)
SODIUM SERPL-SCNC: 138 MMOL/L — SIGNIFICANT CHANGE UP (ref 135–146)
WBC # BLD: 6.28 K/UL — SIGNIFICANT CHANGE UP (ref 4.8–10.8)
WBC # FLD AUTO: 6.28 K/UL — SIGNIFICANT CHANGE UP (ref 4.8–10.8)

## 2021-05-04 PROCEDURE — 99232 SBSQ HOSP IP/OBS MODERATE 35: CPT

## 2021-05-04 PROCEDURE — 99223 1ST HOSP IP/OBS HIGH 75: CPT

## 2021-05-04 RX ORDER — SENNA PLUS 8.6 MG/1
2 TABLET ORAL
Refills: 0 | Status: DISCONTINUED | OUTPATIENT
Start: 2021-05-04 | End: 2021-05-06

## 2021-05-04 RX ADMIN — SENNA PLUS 1 TABLET(S): 8.6 TABLET ORAL at 05:02

## 2021-05-04 RX ADMIN — Medication 5 MILLIGRAM(S): at 11:01

## 2021-05-04 RX ADMIN — PANTOPRAZOLE SODIUM 40 MILLIGRAM(S): 20 TABLET, DELAYED RELEASE ORAL at 05:01

## 2021-05-04 RX ADMIN — POLYETHYLENE GLYCOL 3350 17 GRAM(S): 17 POWDER, FOR SOLUTION ORAL at 05:02

## 2021-05-04 RX ADMIN — CHLORHEXIDINE GLUCONATE 1 APPLICATION(S): 213 SOLUTION TOPICAL at 05:01

## 2021-05-04 NOTE — PROGRESS NOTE ADULT - SUBJECTIVE AND OBJECTIVE BOX
Progress Note: General Surgery  Patient: MISAEL HUFFMAN , 80y (1940)Male   MRN: [id number]  Location: [address]  Visit: 04-22-21 Inpatient  Date: 05-04-21 @ 04:20      Events/ 24h: Patient seen and examined at bedside. No acute events overnight. Afebrile, VSS.    Vitals: T(F): 96.1 (05-04-21 @ 00:00), Max: 97.3 (05-03-21 @ 16:58)  HR: 74 (05-04-21 @ 00:00)  BP: 121/66 (05-04-21 @ 00:00) (119/60 - 129/60)  RR: 16 (05-04-21 @ 00:00)  SpO2: 96% (05-03-21 @ 08:00)    In:   05-02-21 @ 07:01  -  05-03-21 @ 07:00  --------------------------------------------------------  IN: 240 mL    05-03-21 @ 07:01  -  05-04-21 @ 04:20  --------------------------------------------------------  IN: 395 mL      Out:   05-02-21 @ 07:01  -  05-03-21 @ 07:00  --------------------------------------------------------  OUT:    Voided (mL): 1200 mL  Total OUT: 1200 mL      05-03-21 @ 07:01  -  05-04-21 @ 04:20  --------------------------------------------------------  OUT:    Voided (mL): 825 mL  Total OUT: 825 mL        Net:   05-02-21 @ 07:01  -  05-03-21 @ 07:00  --------------------------------------------------------  NET: -960 mL    05-03-21 @ 07:01  -  05-04-21 @ 04:20  --------------------------------------------------------  NET: -430 mL        Diet: Diet, Clear Liquid (05-03-21 @ 16:51)    IV Fluids: iron sucrose IVPB 100 milliGRAM(s) IV Intermittent every 48 hours  magnesium sulfate  IVPB 2 Gram(s) IV Intermittent once      Physical Examination:  General Appearance: NAD   HEENT: EOMI, sclera anicteric.  Heart: RRR   Lungs: Symmetric chest wall expansion, equal rise and fall.  Abdomen:  Soft, nontender, nondistended.   MSK/Extremities: Warm & well-perfused.   Skin: Warm, dry. No jaundice.       Medications: [Standing]  chlorhexidine 4% Liquid 1 Application(s) Topical <User Schedule>  iron sucrose IVPB 100 milliGRAM(s) IV Intermittent every 48 hours  magnesium sulfate  IVPB 2 Gram(s) IV Intermittent once  pantoprazole    Tablet 40 milliGRAM(s) Oral before breakfast  polyethylene glycol 3350 17 Gram(s) Oral two times a day  senna 1 Tablet(s) Oral two times a day    DVT Prophylaxis:   GI Prophylaxis: pantoprazole    Tablet 40 milliGRAM(s) Oral before breakfast    Antibiotics:   Anticoagulation:   Medications:[PRN]      Labs:                        9.2    5.30  )-----------( 217      ( 03 May 2021 06:35 )             31.8     05-03    140  |  104  |  4<L>  ----------------------------<  86  3.6   |  27  |  0.5<L>    Ca    8.2<L>      03 May 2021 06:35  Mg     1.7     05-03                  Urine/Micro:        Imaging:   < from: MR Pelvis w/wo IV Cont (05.01.21 @ 18:26) >  IMPRESSION:    18 cm long polypoidal rectal mass with invasion of the right meso rectal fascia and possibly the prostate gland. Additional involvement of the analsphincter complex--T4(a or b)N0Mx      < end of copied text >      MYA 5/3  DIAGNOSIS/IMPRESSION:  Thickened tricuspid valve. No definite evidence of vegetation on this study. Correlate clinically.

## 2021-05-04 NOTE — PROGRESS NOTE ADULT - SUBJECTIVE AND OBJECTIVE BOX
SUBJECTIVE  Patient is a 80y old Male who presents with a chief complaint of Bloody stools (02 May 2021 05:48)  Currently admitted to medicine with the primary diagnosis of Proctocolitis    Today is hospital day 12d, and this morning he is doing good, reporting passing flatus, no BM since the colonscopy and reports no overnight events.         OBJECTIVE  PAST MEDICAL & SURGICAL HISTORY  No pertinent past medical history    Amputation of digit of left hand      ALLERGIES:  No Known Allergies    MEDICATIONS:  STANDING MEDICATIONS  chlorhexidine 4% Liquid 1 Application(s) Topical <User Schedule>  iron sucrose IVPB 100 milliGRAM(s) IV Intermittent every 48 hours  magnesium sulfate  IVPB 2 Gram(s) IV Intermittent once  pantoprazole    Tablet 40 milliGRAM(s) Oral before breakfast  polyethylene glycol 3350 17 Gram(s) Oral two times a day  senna 1 Tablet(s) Oral two times a day    PRN MEDICATIONS      VITAL SIGNS: Last 24 Hours  T(C): 35.8 (04 May 2021 08:07), Max: 36.3 (03 May 2021 16:58)  T(F): 96.5 (04 May 2021 08:07), Max: 97.3 (03 May 2021 16:58)  HR: 75 (04 May 2021 08:07) (67 - 75)  BP: 139/63 (04 May 2021 08:07) (119/60 - 139/63)  BP(mean): --  RR: 19 (04 May 2021 08:07) (16 - 19)  SpO2: --    LABS:                        9.6    6.28  )-----------( 206      ( 04 May 2021 05:20 )             33.9     05-04    138  |  103  |  5<L>  ----------------------------<  78  4.1   |  26  |  0.5<L>    Ca    8.3<L>      04 May 2021 05:20  Mg     1.9     05-04                    RADIOLOGY:      PHYSICAL EXAM:    GENERAL: NAD, well-developed, AAOx3  HEENT:  Atraumatic, Normocephalic. EOMI, PERRLA, conjunctiva and sclera clear, No JVD  PULMONARY: Clear to auscultation bilaterally; No wheeze  CARDIOVASCULAR: Regular rate and rhythm; No murmurs, rubs, or gallops  GASTROINTESTINAL: Soft, Nontender, Nondistended; Bowel sounds present  MUSCULOSKELETAL:  2+ Peripheral Pulses, No clubbing, cyanosis, or edema  NEUROLOGY: non-focal  SKIN: No rashes or lesions      ADMISSION SUMMARY    80 year old male with no past medical history (has not seen a doctor in many years) presented to the ED due to loose bowel movements.    # Chronic Diarrhea   # Bloody Bowel Movements  # Iron deficiency anemia  # lactic acidosis -resolved  # Rectal adenocarcinoma moderate to poorly differentiated  - s/p colonoscopy 4/27  - rectal mass extending from the anus up to 17 cm proximally, multiple polyps s/p polypectomy, a 5 cm transverse colon polyp  that was not removed  - Seen by colorectal surgeon, requested colonoscopy with EMR of transverse polyp and EUS on 4/30 cancelled due to poor prep.  - MRI pelvis done, pending read. FU with surgery when read available.   - CEA 24, CA 19-9 31  - ECHO: EF 67%, Mobile echodensity associated with the lateral leflet of the tricuspid valve. Cannot rule out vegetation,MYA : No vegetations.   - Pan CT completed for staging: Well-defined right lower lobe nodule measuring 6 mm, 3 mm lingular nodule  - Mri pelvis done showed: < from: MR Pelvis w/wo IV Cont (05.01.21 @ 18:26) >  18 cm long polypoidal rectal mass with invasion of the right meso rectal fascia and possibly the prostate gland. 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   - f/u OP    # Subacute Rib Fracture   - CT:  Subacute fracture of the left 10th lateral rib.   - Pain control prn     # Echo tricuspid echodensity cannot rule out vegetation  Ddx includes infective endocarditis vs metastasis  - Cardiology consulted for MYA   - BCx was negative 4/21, repeated 4/30  - Planned for MYA on 5/3  - Afebrile    A1C 5.2, lipid panel normal    DVT PPx: SCDs  GI PPX: Protonix 40 mg PO  Diet: Regular  Activity: Increase as tolerated  Dispo: from home  Code Status: full code          SUBJECTIVE  Patient is a 80y old Male who presents with a chief complaint of Bloody stools (02 May 2021 05:48)  Currently admitted to medicine with the primary diagnosis of Proctocolitis    Today is hospital day 12d, and this morning he is doing good, reporting passing flatus, no BM since the colonscopy and reports no overnight events.         OBJECTIVE  PAST MEDICAL & SURGICAL HISTORY  No pertinent past medical history    Amputation of digit of left hand      ALLERGIES:  No Known Allergies    MEDICATIONS:  STANDING MEDICATIONS  chlorhexidine 4% Liquid 1 Application(s) Topical <User Schedule>  iron sucrose IVPB 100 milliGRAM(s) IV Intermittent every 48 hours  magnesium sulfate  IVPB 2 Gram(s) IV Intermittent once  pantoprazole    Tablet 40 milliGRAM(s) Oral before breakfast  polyethylene glycol 3350 17 Gram(s) Oral two times a day  senna 1 Tablet(s) Oral two times a day    PRN MEDICATIONS      VITAL SIGNS: Last 24 Hours  T(C): 35.8 (04 May 2021 08:07), Max: 36.3 (03 May 2021 16:58)  T(F): 96.5 (04 May 2021 08:07), Max: 97.3 (03 May 2021 16:58)  HR: 75 (04 May 2021 08:07) (67 - 75)  BP: 139/63 (04 May 2021 08:07) (119/60 - 139/63)  BP(mean): --  RR: 19 (04 May 2021 08:07) (16 - 19)  SpO2: --    LABS:                        9.6    6.28  )-----------( 206      ( 04 May 2021 05:20 )             33.9     05-04    138  |  103  |  5<L>  ----------------------------<  78  4.1   |  26  |  0.5<L>    Ca    8.3<L>      04 May 2021 05:20  Mg     1.9     05-04                    RADIOLOGY:      PHYSICAL EXAM:    GENERAL: NAD, well-developed, AAOx3  HEENT:  Atraumatic, Normocephalic. EOMI, PERRLA, conjunctiva and sclera clear, No JVD  PULMONARY: Clear to auscultation bilaterally; No wheeze  CARDIOVASCULAR: Regular rate and rhythm; No murmurs, rubs, or gallops  GASTROINTESTINAL: Soft, Nontender, Nondistended; Bowel sounds present  MUSCULOSKELETAL:  2+ Peripheral Pulses, No clubbing, cyanosis, or edema  NEUROLOGY: non-focal  SKIN: No rashes or lesions      ADMISSION SUMMARY    80 year old male with no past medical history (has not seen a doctor in many years) presented to the ED due to loose bowel movements.    # Chronic Diarrhea --Now constipation  # Bloody Bowel Movements  # Iron deficiency anemia  # lactic acidosis -resolved  # Rectal adenocarcinoma moderate to poorly differentiated  - s/p colonoscopy 4/27  - rectal mass extending from the anus up to 17 cm proximally, multiple polyps s/p polypectomy, a 5 cm transverse colon polyp  that was not removed  - Seen by colorectal surgeon, requested colonoscopy with EMR of transverse polyp and EUS on 4/30 cancelled due to poor prep.  - MRI pelvis done, pending read. FU with surgery when read available.   - CEA 24, CA 19-9 31  - ECHO: EF 67%, Mobile echodensity associated with the lateral leflet of the tricuspid valve. Cannot rule out vegetation,MYA : No vegetations.   - Pan CT completed for staging: Well-defined right lower lobe nodule measuring 6 mm, 3 mm lingular nodule  - Mri pelvis done showed: < from: MR Pelvis w/wo IV Cont (05.01.21 @ 18:26) >  18 cm long polypoidal rectal mass with invasion of the right meso rectal fascia and possibly the prostate gland. Additional involvement of the analsphincter complex--T4(a or b)N0Mx  - heme/onc F/u  - Sx and Rad onc follow up on results of MRI  - GI : no intervention  - For constipation: increased meds: senna, bisacodyl, miralax.     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   - f/u OP    # Subacute Rib Fracture   - CT:  Subacute fracture of the left 10th lateral rib.   - Pain control prn     # Echo tricuspid echodensity cannot rule out vegetation  Ddx includes infective endocarditis vs metastasis  - Cardiology consulted for MYA   - BCx was negative 4/21, repeated 4/30  - Planned for MYA on 5/3  - Afebrile    A1C 5.2, lipid panel normal    DVT PPx: SCDs  GI PPX: Protonix 40 mg PO  Diet: Regular  Activity: Increase as tolerated  Dispo: from home  Code Status: full code

## 2021-05-04 NOTE — CHART NOTE - NSCHARTNOTEFT_GEN_A_CORE
Registered Dietitian Follow-Up     Patient Profile Reviewed                           Yes [x]   No []     Nutrition History Previously Obtained        Yes []  No [x]  -- pt poor historian      Pertinent Subjective Information: Pt annoyed he is on clears diet, wants solid food. Reports passing gas, no BM since coloscopy prep. Noted pt npo/clears since 4/22. Per discussion with LIP x [phone], plan to advance diet today.     Pt poor historian, frequently needs to be redirected to answer question. Reports he was able to make his own meals and had no issues with his appetite and po intake PTA. Confirms NKFA. Unable to clarify UBW.      Pertinent Medical Interventions:  Presented for loose BMs  Rectal cancer s/p colonoscopy 4/27  [facility]-rectal and Radiology to determine treatments     Diet order: clears      Anthropometrics:  - Ht. 72"  - Wt.  175.9lbs (4/28); dosing  184.9lbs (4/27)  - %wt change  - BMI 23.9 -- based on dosing   - IBW 178lbs     Pertinent Lab Data: (5/4) H/H 9.6/33.9, BUN 5, Cr 0.5, A1c 5.2% (4/22)     Pertinent Meds: dulcolax, senna, iron sucrose, protonix, miralax      Physical Findings:  - Appearance: alert, poor historian. +1 R foot edema noted per RN flow sheets  - GI function: LBM 5/2, no c/o N+V, constipation or diarrhea   - Tubes: N/A  - Oral/Mouth cavity: no issues chewing/swallowing per unit staff/pt   - Skin: WDL      Nutrition Requirements:  Weight Used: CBW 83.4 kg (continue from initial assessment on 4/27)     Estimated Energy Needs    Continue [x]  1910 - 2069 kcals (MSJ x 1.2 - 1.3 AF)     Estimated Protein Needs    Continue [x]  83 - 100 gms (1.0 - 1.2 gm/kg)     Estimated Fluid Needs        Continue [x] per team     Nutrient Intake: on clears, tolerating. Current diet order not meeting pt's kcal/protein needs.         [x] Previous Nutrition Diagnosis: Inadequate oral intake            [x] Ongoing          [] Resolved     Nutrition Intervention: meals and snacks, medical food supplements     Goal/Expected Outcome: Pt to meet greater than 75% of estimated needs within 3 days      Indicator/Monitoring: energy intake, body composition, NFPF    Recommendations:  As medically feasible advance diet to low fiber   Once advanced, add ensure enlive q24hrs and prosource gelatin plus q24hrs to optimize po intake   Plan to advance diet today per discussion with resident, will f/u Registered Dietitian Follow-Up     Patient Profile Reviewed                           Yes [x]   No []     Nutrition History Previously Obtained        Yes []  No [x]  -- pt poor historian. Tried to contact pt's daughter (Sally), no answer.      Pertinent Subjective Information: Pt annoyed he is on clears diet, wants solid food. Reports passing gas, no BM since coloscopy prep. Noted pt npo/clears since 4/22. Per discussion with LIP x [phone], plan to advance diet today.     Pt poor historian, frequently needs to be redirected to answer question. Reports he was able to make his own meals and had no issues with his appetite and po intake PTA. Confirms NKFA. Unable to clarify UBW. No previous adm wts or RD notes.      Pertinent Medical Interventions:  Presented for loose BMs  Rectal cancer s/p colonoscopy 4/27  [facility]-rectal and Radiology to determine treatments     Diet order: clears      Anthropometrics:  - Ht. 72"  - Wt.  175.9lbs (4/28); dosing  184.9lbs (4/27)  - %wt change  - BMI 23.9 -- based on dosing   - IBW 178lbs     Pertinent Lab Data: (5/4) H/H 9.6/33.9, BUN 5, Cr 0.5, A1c 5.2% (4/22)     Pertinent Meds: dulcolax, senna, iron sucrose, protonix, miralax      Physical Findings:  - Appearance: alert, poor historian. +1 R foot edema noted per RN flow sheets  - GI function: LBM 5/2, no c/o N+V, constipation or diarrhea   - Tubes: N/A  - Oral/Mouth cavity: no issues chewing/swallowing per unit staff/pt   - Skin: WDL      Nutrition Requirements:  Weight Used: CBW 83.4 kg (continue from initial assessment on 4/27)     Estimated Energy Needs    Continue [x]  1910 - 2069 kcals (MSJ x 1.2 - 1.3 AF)     Estimated Protein Needs    Continue [x]  83 - 100 gms (1.0 - 1.2 gm/kg)     Estimated Fluid Needs        Continue [x] per team     Nutrient Intake: on clears, tolerating. Current diet order not meeting pt's kcal/protein needs.         [x] Previous Nutrition Diagnosis: Inadequate oral intake            [x] Ongoing          [] Resolved     Nutrition Intervention: meals and snacks, medical food supplements     Goal/Expected Outcome: Pt to meet greater than 75% of estimated needs within 3 days      Indicator/Monitoring: energy intake, body composition, NFPF    Recommendations:  As medically feasible advance diet to low fiber   Once advanced, add ensure enlive q24hrs and prosource gelatin plus q24hrs to optimize po intake   Plan to advance diet today per discussion with resident, will f/u

## 2021-05-04 NOTE — PROGRESS NOTE ADULT - SUBJECTIVE AND OBJECTIVE BOX
Patient is a 79 y/o patient  S/P colonoscopy showing rectal cancer, a 5 cm transverse colon polyp  that was not removed . [facility] consulted for EUS as well as EMR of Transverse Colon Polyp. Patient initially refused to prep again for procedure. MRI done and was notable for possible involvement of Prostate. Patient feels well, had some rectal bleeding self resolved         PAST MEDICAL & SURGICAL HISTORY:  No pertinent past medical history  Amputation of digit of left hand      MEDICATIONS  (STANDING):  chlorhexidine 4% Liquid 1 Application(s) Topical <User Schedule>  ciprofloxacin   IVPB 400 milliGRAM(s) IV Intermittent every 12 hours  metroNIDAZOLE  IVPB 500 milliGRAM(s) IV Intermittent every 8 hours  pantoprazole  Injectable 40 milliGRAM(s) IV Push two times a day  polyethylene glycol 3350 17 Gram(s) Oral two times a day  senna 1 Tablet(s) Oral two times a day  sodium chloride 0.9%. 1000 milliLiter(s) (75 mL/Hr) IV Continuous <Continuous>    MEDICATIONS  (PRN):      Allergies  No Known Allergies      Review of Systems:   Cardiovascular:  No Chest Pain, No Palpitations  Respiratory:  No Cough, No Dyspnea  Gastrointestinal:  As described in HPI          Vital Signs   T(F): 96.5 (04 May 2021 08:07), Max: 97.3 (03 May 2021 16:58)  HR: 75 (04 May 2021 08:07) (67 - 75)  BP: 139/63 (04 May 2021 08:07) (119/60 - 139/63)  RR: 19 (04 May 2021 08:07) (16 - 19)  Constitutional: No acute distress.  Respiratory:  No signs of respiratory distress. Lung sounds are clear bilaterally.  Cardiovascular:  S1 S2, Regular rate and rhythm.  Abdominal: Abdomen is soft, symmetric, and non-tender without distention.  Bowel sounds are present and normoactive in all four quadrants. No masses, hepatomegaly, or splenomegaly are noted.   Skin: No rashes, No Jaundice.       Labs:                        9.6    6.28  )-----------( 206      ( 04 May 2021 05:20 )             33.9     05-04    138  |  103  |  5<L>  ----------------------------<  78  4.1   |  26  |  0.5<L>    Ca    8.3<L>      04 May 2021 05:20  Mg     1.9     05-04    Radiology:  MR Pelvis w/wo IV Cont 05.01.21   IMPRESSION:    18 cm long polypoidal rectal mass with invasion of the right meso rectal fascia and possibly the prostate gland. Additional involvement of the analsphincter complex--T4(a or b)N0Mx

## 2021-05-04 NOTE — PROGRESS NOTE ADULT - ASSESSMENT
80M w/ no past medical history presented to the ED due to loose bowel movements. Patient has had decreased PO intake, nausea, chronic diarrhea for about a year and hematochezia so the daughter decided to bring him to the hospital. Heme/Onc is consulted for Rectal mass.     # Rectal adenocarcinoma Locally advanced   MRI showing : 18 cm long polypoidal rectal mass with invasion of the right meso rectal fascia and possibly the prostate gland. Additional involvement of the analsphincter complex--T4(a or b)N0M0    - MRI results were discussed with pt in detail .   He was told that he has locally advanced disease and we recommend total neoadjuvant therapy . Consisting of oxaliplatin based chemotherapy followed by chemo RT and then evaluation for surgical resection .   -Pt will need a mediport , requested medical team to get surgery eval .   -Treatment can be done out pt .   -Pt wishes to go home and resume treatment out pt .       # Acute blood loss anemia 2/2 LGIB  # HUSEYIN  - Hgb 5.8 on admission, s/p PRBC x3  - Iron Saturation: 12%, Ferritin 34, MCV 68.9 - consistent with microcytic Iron Deficiency Anemia  - s/p iv venofer

## 2021-05-04 NOTE — PROVIDER CONTACT NOTE (OTHER) - BACKGROUND
Pt was admitted for bloody stools, pt did not have any other episodes of bloody stool witnessed by this RN

## 2021-05-04 NOTE — PROGRESS NOTE ADULT - SUBJECTIVE AND OBJECTIVE BOX
Patient is a 80y old  Male who presents with a chief complaint of Bloody stools (02 May 2021 05:48)      Subjective: Pt seen and examined , lying on bed . Feels fine and wishes to go home.       Vital Signs Last 24 Hrs  T(C): 35.8 (04 May 2021 08:07), Max: 36.3 (03 May 2021 16:58)  T(F): 96.5 (04 May 2021 08:07), Max: 97.3 (03 May 2021 16:58)  HR: 75 (04 May 2021 08:07) (67 - 75)  BP: 139/63 (04 May 2021 08:07) (121/66 - 139/63)  BP(mean): --  RR: 19 (04 May 2021 08:07) (16 - 19)  SpO2: --    PHYSICAL EXAM  GENERAL: NAD, Resting in bed  HEENT: NCAT  CHEST/LUNG: Clear to auscultation bilaterally; No wheezing or rubs.   HEART: Regular rate and rhythm; No murmurs, rubs, or gallops  ABDOMEN: Bowel sounds present; Soft, Nontender, Nondistended.   EXTREMITIES:  No clubbing, cyanosis, or edema  NERVOUS SYSTEM:  Alert & Oriented X3    MEDICATIONS  (STANDING):  bisacodyl 5 milliGRAM(s) Oral daily  chlorhexidine 4% Liquid 1 Application(s) Topical <User Schedule>  iron sucrose IVPB 100 milliGRAM(s) IV Intermittent every 48 hours  pantoprazole    Tablet 40 milliGRAM(s) Oral before breakfast  polyethylene glycol 3350 17 Gram(s) Oral two times a day  senna 2 Tablet(s) Oral two times a day    MEDICATIONS  (PRN):      LABS:                          9.6    6.28  )-----------( 206      ( 04 May 2021 05:20 )             33.9         Mean Cell Volume : 76.7 fL  Mean Cell Hemoglobin : 21.7 pg  Mean Cell Hemoglobin Concentration : 28.3 g/dL  Auto Neutrophil # : 4.57 K/uL  Auto Lymphocyte # : 0.98 K/uL  Auto Monocyte # : 0.45 K/uL  Auto Eosinophil # : 0.16 K/uL  Auto Basophil # : 0.06 K/uL  Auto Neutrophil % : 72.7 %  Auto Lymphocyte % : 15.6 %  Auto Monocyte % : 7.2 %  Auto Eosinophil % : 2.5 %  Auto Basophil % : 1.0 %      Serial CBC's  05-04 @ 05:20  Hct-33.9 / Hgb-9.6 / Plat-206 / RBC-4.42 / WBC-6.28  Serial CBC's  05-03 @ 06:35  Hct-31.8 / Hgb-9.2 / Plat-217 / RBC-4.26 / WBC-5.30  Serial CBC's  05-02 @ 06:51  Hct-33.1 / Hgb-9.4 / Plat-226 / RBC-4.38 / WBC-5.51  Serial CBC's  05-01 @ 08:50  Hct-31.4 / Hgb-9.2 / Plat-193 / RBC-4.19 / WBC-5.87      05-04    138  |  103  |  5<L>  ----------------------------<  78  4.1   |  26  |  0.5<L>    Ca    8.3<L>      04 May 2021 05:20  Mg     1.9     05-04                                    BLOOD SMEAR INTERPRETATION:       RADIOLOGY & ADDITIONAL STUDIES:    < from: MR Pelvis w/wo IV Cont (05.01.21 @ 18:26) >  IMPRESSION:    18 cm long polypoidal rectal mass with invasion of the right meso rectal fascia and possibly the prostate gland. Additional involvement of the analsphincter complex--T4(a or b)N0Mx                    < end of copied text >

## 2021-05-04 NOTE — CONSULT NOTE ADULT - ATTENDING COMMENTS
rectal cancer  pt examined5/4  will schedule for mediport
Pt seen and examined.  Agree with above A/p.  Proceed with MRI pelvis, CEA and Rad/Onc consult.  Give Venofer x 5 doses.

## 2021-05-04 NOTE — PROGRESS NOTE ADULT - ASSESSMENT
Patient is a 81 y/o patient  S/P colonoscopy showing rectal cancer, a 5 cm transverse colon polyp  that was not removed . [facility] consulted for EUS as well as EMR of Transverse Colon Polyp. Patient initially refused to prep again for procedure. MRI done and was notable for possible involvement of Prostate. Patient feels well, had some rectal bleeding self resolved. MRI notable for rectal mass invading into meso rectal fascia and possible prostate. No need for EUS or EMR at this time given current findings. [place]-rectal and Radiology to determine treatments.        Rectal cancer  s/p colonoscopy 4/27  - Rectal mass extending from the anus up to 17 cm proximally,   multiple polyps s/p polypectomy , a 5 cm transverse colon polyp  that was not removed  - MRI with rectal mass invading into meso rectal fascia and possible prostate  - Given the above no plan for EUS or EMR at this time  - Recall GI if needed

## 2021-05-04 NOTE — CONSULT NOTE ADULT - SUBJECTIVE AND OBJECTIVE BOX
MISAEL HUFFMAN [id number]  80y Male  12d    HPI:  80 year old male with no past medical history (has not seen a doctor in many years, not on any meds) presented to the ED due to loose bowel movements. Patient is alert and orient, but appears to be confused and is a poor historian. History obtained from patient and the charts. Patient has had chronic diarrhea for the past year which is foul smelling, then over the past week it has gotten worse with amount and frequency. The patients daughter believed that their was blood in here stools so she brought him to the ED. The patient endorses decreased PO intake and nausea, but denies abdominal pain. He is not on AC, but will take a baby aspirin from time to time. He denies SOB, CP, changes in urination, headache. He lives alone and ambulates with a walker.   In the ED Vitals: Temp 97.6F, , / 73, RR 18, SpO2 98% on RA. Hb was found to be 5.8. Rectal performed by ED team negative. Given 2 units of PRBC. Lactate of 3. A CT of the abdomen:  Marked wall thickening of the distal sigmoid colon and rectum. Differential includes proctocolitis although underlying neoplastic process cannot be excluded. Direct visualization with colonoscopy is recommended. Subacute fracture of the left 10th lateral rib. Right lower lobe solid nodule measuring 9 mm. He was given Cipro flagyl in ED. Patient admitted for bloody bowel movements.     Hospital course:  Due to concern for possible malignancy patient underwent staging workup. CT and MRI confirm a 14 cm rectal tumor starting at the verge.  By CT there are no nodes or other findings of distant spread. MRI showed a T4 N0 Mx lesion. Biopsy is positive for adenocarcinoma. Patient will need medi port placement for chemotherapy.     PAST MEDICAL & SURGICAL HISTORY:  No pertinent past medical history  Amputation of digit of left hand    MEDICATIONS  (STANDING):  bisacodyl 5 milliGRAM(s) Oral daily  chlorhexidine 4% Liquid 1 Application(s) Topical <User Schedule>  iron sucrose IVPB 100 milliGRAM(s) IV Intermittent every 48 hours  pantoprazole    Tablet 40 milliGRAM(s) Oral before breakfast  polyethylene glycol 3350 17 Gram(s) Oral two times a day  senna 2 Tablet(s) Oral two times a day    MEDICATIONS  (PRN):  Allergies  No Known Allergies  Intolerances    REVIEW OF SYSTEMS    [x ] A ten-point review of systems was otherwise negative except as noted.  [ ] Due to altered mental status/intubation, subjective information were not able to be obtained from the patient. History was obtained, to the extent possible, from review of the chart and collateral sources of information.    Vital Signs Last 24 Hrs  T(C): 35.8 (04 May 2021 08:07), Max: 35.8 (04 May 2021 08:07)  T(F): 96.5 (04 May 2021 08:07), Max: 96.5 (04 May 2021 08:07)  HR: 75 (04 May 2021 08:07) (74 - 75)  BP: 139/63 (04 May 2021 08:07) (121/66 - 139/63)  BP(mean): --  RR: 19 (04 May 2021 08:07) (16 - 19)  SpO2: --    PHYSICAL EXAM:  GENERAL: NAD, well-appearing  CHEST/LUNG: Clear to auscultation bilaterally  HEART: Regular rate and rhythm  ABDOMEN: Soft, Nontender, Nondistended;   EXTREMITIES:  No clubbing, cyanosis, or edema    LABS:  Labs:  CAPILLARY BLOOD GLUCOSE                    9.6    6.28  )-----------( 206      ( 04 May 2021 05:20 )             33.9       Auto Neutrophil %: 72.7 % (05-04-21 @ 05:20)  Auto Immature Granulocyte %: 1.0 % (05-04-21 @ 05:20)    05-04    138  |  103  |  5<L>  ----------------------------<  78  4.1   |  26  |  0.5<L>    Calcium, Total Serum: 8.3 mg/dL (05-04-21 @ 05:20)    RADIOLOGY & ADDITIONAL STUDIES:  < from: MR Pelvis w/wo IV Cont (05.01.21 @ 18:26) >  IMPRESSION:  18 cm long polypoidal rectal mass with invasion of the right meso rectal fascia and possibly the prostate gland. Additional involvement of the analsphincter complex--T4(a or b)N0Mx  < end of copied text >    < from: CT Chest w/ IV Cont (04.28.21 @ 13:05) >  Impression:  Small bilateral pleural effusions with compressive atelectasis.  Nonspecific mediastinal lymph nodes.  < end of copied text >

## 2021-05-04 NOTE — CONSULT NOTE ADULT - ASSESSMENT
Assessment  80 year old male with no past medical history presenting with bloody diarrhea, found to have invasive adenocarcinoma of the rectum currently staged at T4 N0 Mx, Heme/Onc requesting medi-port placement for chemotherapy    Plan  - Port placement on Thursday @ 3PM with Dr. Carvajal  - B[facility] wednesday night  - Preoperative labs Wednesday night

## 2021-05-04 NOTE — PROGRESS NOTE ADULT - ASSESSMENT
80y Male patient admitted with rectal mass, invasive adenocarcinoma, pelvic MRI done pending read, with the above physical exam, labs, and imaging findings. pt seen and examined, NAD. +bm. mri and ami done yesterday    Plan:  -Pain control as needed  -Hemodynamic monitoring as per routine  -Encourage ambulation and incentive spirometer use (10x/hr when awake)  -GI and DVT prophylaxis  -Check and replete CBC and BMP q daily  -Strict input and output monitoring  -Continue current management      Date/Time: 05-04-21 @ 04:20

## 2021-05-05 LAB
ANION GAP SERPL CALC-SCNC: 11 MMOL/L — SIGNIFICANT CHANGE UP (ref 7–14)
ANION GAP SERPL CALC-SCNC: 8 MMOL/L — SIGNIFICANT CHANGE UP (ref 7–14)
APTT BLD: 35.2 SEC — SIGNIFICANT CHANGE UP (ref 27–39.2)
BASOPHILS # BLD AUTO: 0.05 K/UL — SIGNIFICANT CHANGE UP (ref 0–0.2)
BASOPHILS # BLD AUTO: 0.07 K/UL — SIGNIFICANT CHANGE UP (ref 0–0.2)
BASOPHILS NFR BLD AUTO: 0.7 % — SIGNIFICANT CHANGE UP (ref 0–1)
BASOPHILS NFR BLD AUTO: 0.8 % — SIGNIFICANT CHANGE UP (ref 0–1)
BLD GP AB SCN SERPL QL: SIGNIFICANT CHANGE UP
BUN SERPL-MCNC: 15 MG/DL — SIGNIFICANT CHANGE UP (ref 10–20)
BUN SERPL-MCNC: 6 MG/DL — LOW (ref 10–20)
CALCIUM SERPL-MCNC: 8.3 MG/DL — LOW (ref 8.5–10.1)
CALCIUM SERPL-MCNC: 8.3 MG/DL — LOW (ref 8.5–10.1)
CHLORIDE SERPL-SCNC: 101 MMOL/L — SIGNIFICANT CHANGE UP (ref 98–110)
CHLORIDE SERPL-SCNC: 102 MMOL/L — SIGNIFICANT CHANGE UP (ref 98–110)
CO2 SERPL-SCNC: 25 MMOL/L — SIGNIFICANT CHANGE UP (ref 17–32)
CO2 SERPL-SCNC: 25 MMOL/L — SIGNIFICANT CHANGE UP (ref 17–32)
CREAT SERPL-MCNC: 0.6 MG/DL — LOW (ref 0.7–1.5)
CREAT SERPL-MCNC: 0.7 MG/DL — SIGNIFICANT CHANGE UP (ref 0.7–1.5)
CULTURE RESULTS: SIGNIFICANT CHANGE UP
EOSINOPHIL # BLD AUTO: 0.13 K/UL — SIGNIFICANT CHANGE UP (ref 0–0.7)
EOSINOPHIL # BLD AUTO: 0.18 K/UL — SIGNIFICANT CHANGE UP (ref 0–0.7)
EOSINOPHIL NFR BLD AUTO: 1.4 % — SIGNIFICANT CHANGE UP (ref 0–8)
EOSINOPHIL NFR BLD AUTO: 2.9 % — SIGNIFICANT CHANGE UP (ref 0–8)
GLUCOSE SERPL-MCNC: 149 MG/DL — HIGH (ref 70–99)
GLUCOSE SERPL-MCNC: 86 MG/DL — SIGNIFICANT CHANGE UP (ref 70–99)
HCT VFR BLD CALC: 34 % — LOW (ref 42–52)
HCT VFR BLD CALC: 35.8 % — LOW (ref 42–52)
HGB BLD-MCNC: 10 G/DL — LOW (ref 14–18)
HGB BLD-MCNC: 9.5 G/DL — LOW (ref 14–18)
IMM GRANULOCYTES NFR BLD AUTO: 0.6 % — HIGH (ref 0.1–0.3)
IMM GRANULOCYTES NFR BLD AUTO: 0.8 % — HIGH (ref 0.1–0.3)
INR BLD: 1.26 RATIO — SIGNIFICANT CHANGE UP (ref 0.65–1.3)
LYMPHOCYTES # BLD AUTO: 1.1 K/UL — LOW (ref 1.2–3.4)
LYMPHOCYTES # BLD AUTO: 1.11 K/UL — LOW (ref 1.2–3.4)
LYMPHOCYTES # BLD AUTO: 11.7 % — LOW (ref 20.5–51.1)
LYMPHOCYTES # BLD AUTO: 17.7 % — LOW (ref 20.5–51.1)
MAGNESIUM SERPL-MCNC: 1.7 MG/DL — LOW (ref 1.8–2.4)
MAGNESIUM SERPL-MCNC: 1.8 MG/DL — SIGNIFICANT CHANGE UP (ref 1.8–2.4)
MCHC RBC-ENTMCNC: 21.5 PG — LOW (ref 27–31)
MCHC RBC-ENTMCNC: 21.6 PG — LOW (ref 27–31)
MCHC RBC-ENTMCNC: 27.9 G/DL — LOW (ref 32–37)
MCHC RBC-ENTMCNC: 27.9 G/DL — LOW (ref 32–37)
MCV RBC AUTO: 76.9 FL — LOW (ref 80–94)
MCV RBC AUTO: 77.2 FL — LOW (ref 80–94)
MONOCYTES # BLD AUTO: 0.45 K/UL — SIGNIFICANT CHANGE UP (ref 0.1–0.6)
MONOCYTES # BLD AUTO: 0.68 K/UL — HIGH (ref 0.1–0.6)
MONOCYTES NFR BLD AUTO: 7.2 % — SIGNIFICANT CHANGE UP (ref 1.7–9.3)
MONOCYTES NFR BLD AUTO: 7.2 % — SIGNIFICANT CHANGE UP (ref 1.7–9.3)
NEUTROPHILS # BLD AUTO: 4.4 K/UL — SIGNIFICANT CHANGE UP (ref 1.4–6.5)
NEUTROPHILS # BLD AUTO: 7.39 K/UL — HIGH (ref 1.4–6.5)
NEUTROPHILS NFR BLD AUTO: 70.8 % — SIGNIFICANT CHANGE UP (ref 42.2–75.2)
NEUTROPHILS NFR BLD AUTO: 78.2 % — HIGH (ref 42.2–75.2)
NRBC # BLD: 0 /100 WBCS — SIGNIFICANT CHANGE UP (ref 0–0)
NRBC # BLD: 0 /100 WBCS — SIGNIFICANT CHANGE UP (ref 0–0)
PLATELET # BLD AUTO: 212 K/UL — SIGNIFICANT CHANGE UP (ref 130–400)
PLATELET # BLD AUTO: 229 K/UL — SIGNIFICANT CHANGE UP (ref 130–400)
POTASSIUM SERPL-MCNC: 4.1 MMOL/L — SIGNIFICANT CHANGE UP (ref 3.5–5)
POTASSIUM SERPL-MCNC: 4.6 MMOL/L — SIGNIFICANT CHANGE UP (ref 3.5–5)
POTASSIUM SERPL-SCNC: 4.1 MMOL/L — SIGNIFICANT CHANGE UP (ref 3.5–5)
POTASSIUM SERPL-SCNC: 4.6 MMOL/L — SIGNIFICANT CHANGE UP (ref 3.5–5)
PROTHROM AB SERPL-ACNC: 14.5 SEC — HIGH (ref 9.95–12.87)
RBC # BLD: 4.42 M/UL — LOW (ref 4.7–6.1)
RBC # BLD: 4.64 M/UL — LOW (ref 4.7–6.1)
RBC # FLD: 24.4 % — HIGH (ref 11.5–14.5)
RBC # FLD: 24.9 % — HIGH (ref 11.5–14.5)
SARS-COV-2 RNA SPEC QL NAA+PROBE: SIGNIFICANT CHANGE UP
SODIUM SERPL-SCNC: 134 MMOL/L — LOW (ref 135–146)
SODIUM SERPL-SCNC: 138 MMOL/L — SIGNIFICANT CHANGE UP (ref 135–146)
SPECIMEN SOURCE: SIGNIFICANT CHANGE UP
WBC # BLD: 6.22 K/UL — SIGNIFICANT CHANGE UP (ref 4.8–10.8)
WBC # BLD: 9.46 K/UL — SIGNIFICANT CHANGE UP (ref 4.8–10.8)
WBC # FLD AUTO: 6.22 K/UL — SIGNIFICANT CHANGE UP (ref 4.8–10.8)
WBC # FLD AUTO: 9.46 K/UL — SIGNIFICANT CHANGE UP (ref 4.8–10.8)

## 2021-05-05 PROCEDURE — 99232 SBSQ HOSP IP/OBS MODERATE 35: CPT

## 2021-05-05 PROCEDURE — 99233 SBSQ HOSP IP/OBS HIGH 50: CPT

## 2021-05-05 RX ORDER — SODIUM CHLORIDE 9 MG/ML
1000 INJECTION INTRAMUSCULAR; INTRAVENOUS; SUBCUTANEOUS
Refills: 0 | Status: DISCONTINUED | OUTPATIENT
Start: 2021-05-05 | End: 2021-05-08

## 2021-05-05 RX ORDER — MAGNESIUM OXIDE 400 MG ORAL TABLET 241.3 MG
400 TABLET ORAL
Refills: 0 | Status: DISCONTINUED | OUTPATIENT
Start: 2021-05-05 | End: 2021-05-06

## 2021-05-05 RX ORDER — SODIUM CHLORIDE 9 MG/ML
1000 INJECTION, SOLUTION INTRAVENOUS
Refills: 0 | Status: DISCONTINUED | OUTPATIENT
Start: 2021-05-05 | End: 2021-05-06

## 2021-05-05 RX ADMIN — SENNA PLUS 2 TABLET(S): 8.6 TABLET ORAL at 17:28

## 2021-05-05 RX ADMIN — PANTOPRAZOLE SODIUM 40 MILLIGRAM(S): 20 TABLET, DELAYED RELEASE ORAL at 05:13

## 2021-05-05 RX ADMIN — Medication 5 MILLIGRAM(S): at 11:11

## 2021-05-05 RX ADMIN — IRON SUCROSE 210 MILLIGRAM(S): 20 INJECTION, SOLUTION INTRAVENOUS at 17:28

## 2021-05-05 RX ADMIN — POLYETHYLENE GLYCOL 3350 17 GRAM(S): 17 POWDER, FOR SOLUTION ORAL at 05:14

## 2021-05-05 RX ADMIN — CHLORHEXIDINE GLUCONATE 1 APPLICATION(S): 213 SOLUTION TOPICAL at 05:16

## 2021-05-05 RX ADMIN — POLYETHYLENE GLYCOL 3350 17 GRAM(S): 17 POWDER, FOR SOLUTION ORAL at 17:28

## 2021-05-05 RX ADMIN — SODIUM CHLORIDE 100 MILLILITER(S): 9 INJECTION INTRAMUSCULAR; INTRAVENOUS; SUBCUTANEOUS at 21:04

## 2021-05-05 RX ADMIN — SENNA PLUS 2 TABLET(S): 8.6 TABLET ORAL at 05:13

## 2021-05-05 NOTE — PROGRESS NOTE ADULT - ASSESSMENT
Assessment:  80y Male patient admitted with T4 invasive rectal adenocarcinoma, will need chemotherapy and mediport placement , with the above physical exam, labs, and imaging findings.    Plan:  - OR Thursday for mediport placement at 3PM  - NPO after midnight  - will f/u preop lab work and replete as necessary  -Pain control as needed  -Hemodynamic monitoring as per routine  -Encourage ambulation and incentive spirometer use (10x/hr when awake)  -GI and DVT prophylaxis  -Check and replete CBC and BMP q daily  -Strict input and output monitoring  -Continue current management    Date/Time: 05-05-21 @ 09:15

## 2021-05-05 NOTE — PROGRESS NOTE ADULT - SUBJECTIVE AND OBJECTIVE BOX
Progress Note: Surgery  Patient: MISAEL HUFFMAN , 80y (1940)Male   MRN: [id number]  Location: [place] [address] A  Visit: 04-22-21 Inpatient  Date: 05-05-21 @ 09:15    Events over 24h: No acute event overnight. No new complaint. Pt is hemodynamically stable.    Vitals: T(F): 96.3 (05-05-21 @ 07:27), Max: 98.6 (05-04-21 @ 15:16)  HR: 68 (05-05-21 @ 07:27)  BP: 136/64 (05-05-21 @ 07:27) (130/63 - 136/64)  RR: 20 (05-05-21 @ 07:27)  SpO2: --      Diet: Diet, NPO after Midnight:      NPO Start Date: 05-May-2021,   NPO Start Time: 23:59 (05-05-21 @ 07:18)  Diet, Low Fiber:   Prosource Gelatein 20 Sugar Free     Qty per Day:  1  Supplement Feeding Modality:  Oral  Ensure Enlive Cans or Servings Per Day:  1       Frequency:  Daily (05-04-21 @ 14:59)    IV Fluid: iron sucrose IVPB 100 milliGRAM(s) IV Intermittent every 48 hours      In:   05-04-21 @ 07:01  -  05-05-21 @ 07:00  --------------------------------------------------------  IN: 210 mL    05-05-21 @ 07:01  -  05-05-21 @ 09:15  --------------------------------------------------------  IN: 360 mL      Out:   05-04-21 @ 07:01  -  05-05-21 @ 07:00  --------------------------------------------------------  OUT:    Voided (mL): 900 mL  Total OUT: 900 mL      05-05-21 @ 07:01  -  05-05-21 @ 09:15  --------------------------------------------------------  OUT:    Voided (mL): 400 mL  Total OUT: 400 mL        Net:   05-04-21 @ 07:01  -  05-05-21 @ 07:00  --------------------------------------------------------  NET: -690 mL    05-05-21 @ 07:01  -  05-05-21 @ 09:15  --------------------------------------------------------  NET: -40 mL        Physical Examination:  General Appearance: NAD, alert and cooperative  Heart: S1 and S2. No murmurs. Rhythm is regular.  Lungs: Clear to auscultation BL without rales, rhonchi, wheezing, crackles or diminished breath sounds.  Abdomen: Soft, nondistended, nontender. No rigidity, guarding, or rebound tenderness.     Medications: [Standing]  bisacodyl 5 milliGRAM(s) Oral daily  chlorhexidine 4% Liquid 1 Application(s) Topical <User Schedule>  iron sucrose IVPB 100 milliGRAM(s) IV Intermittent every 48 hours  pantoprazole    Tablet 40 milliGRAM(s) Oral before breakfast  polyethylene glycol 3350 17 Gram(s) Oral two times a day  senna 2 Tablet(s) Oral two times a day    Medications:[PRN]    Labs:                        9.5    6.22  )-----------( 212      ( 05 May 2021 06:57 )             34.0   05-05    138  |  102  |  6<L>  ----------------------------<  86  4.1   |  25  |  0.6<L>    Ca    8.3<L>      05 May 2021 06:57  Mg     1.8     05-05        Micro/Urine:    Imaging:  None/24h

## 2021-05-05 NOTE — PRE-ANESTHESIA EVALUATION ADULT - NSRADCARDRESULTSFT_GEN_ALL_CORE
Summary:   1. Left ventricular ejection fraction, by visual estimation, is 60 to 65%.   2. Normal global left ventricular systolic function.   3. Mild thickening of the anterior and posterior mitral valve leaflets.   4. No evidence of mitral valve regurgitation.   5. Sclerotic aortic valve with normal opening.   6. Myxomatous tricuspid valve.   7. Increased Tricuspid valve leaflet thickening with no evidence of vegetation.   8. Mild tricuspid regurgitation.   9. Color flow doppler and intravenous injection of agitated saline demonstrates the presence of an intact intra atrial septum.  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  11. Simple atheroma seen in the aortic arch and descending aorta.    -No evidence of vegetation as per MYA

## 2021-05-05 NOTE — PROGRESS NOTE ADULT - SUBJECTIVE AND OBJECTIVE BOX
SUBJECTIVE  Patient is a 80y old Male who presents with a chief complaint of Bloody stools (02 May 2021 05:48)  Currently admitted to medicine with the primary diagnosis of Proctocolitis    Today is hospital day 13d, and this morning he is stable, not in distress and reports no overnight events.   Planned for port placement tomorrow and Chemo/RT.       OBJECTIVE  PAST MEDICAL & SURGICAL HISTORY  No pertinent past medical history    Amputation of digit of left hand      ALLERGIES:  No Known Allergies    MEDICATIONS:  STANDING MEDICATIONS  bisacodyl 5 milliGRAM(s) Oral daily  chlorhexidine 4% Liquid 1 Application(s) Topical <User Schedule>  iron sucrose IVPB 100 milliGRAM(s) IV Intermittent every 48 hours  pantoprazole    Tablet 40 milliGRAM(s) Oral before breakfast  polyethylene glycol 3350 17 Gram(s) Oral two times a day  senna 2 Tablet(s) Oral two times a day    PRN MEDICATIONS      VITAL SIGNS: Last 24 Hours  T(C): 35.7 (05 May 2021 07:27), Max: 37 (04 May 2021 15:16)  T(F): 96.3 (05 May 2021 07:27), Max: 98.6 (04 May 2021 15:16)  HR: 68 (05 May 2021 07:27) (68 - 81)  BP: 136/64 (05 May 2021 07:27) (130/63 - 139/63)  BP(mean): --  RR: 20 (05 May 2021 07:27) (18 - 20)  SpO2: --    LABS:                        9.6    6.28  )-----------( 206      ( 04 May 2021 05:20 )             33.9     05-04    138  |  103  |  5<L>  ----------------------------<  78  4.1   |  26  |  0.5<L>    Ca    8.3<L>      04 May 2021 05:20  Mg     1.9     05-04                    RADIOLOGY:      PHYSICAL EXAM:    GENERAL: NAD, well-developed, AAOx3  HEENT:  Atraumatic, Normocephalic. EOMI, PERRLA, conjunctiva and sclera clear, No JVD  PULMONARY: Clear to auscultation bilaterally; No wheeze  CARDIOVASCULAR: Regular rate and rhythm; No murmurs, rubs, or gallops  GASTROINTESTINAL: Soft, Nontender, Nondistended; Bowel sounds present  MUSCULOSKELETAL:  2+ Peripheral Pulses, No clubbing, cyanosis, or edema  NEUROLOGY: non-focal  SKIN: No rashes or lesions      ADMISSION SUMMARY  80 year old male with no past medical history (has not seen a doctor in many years) presented to the ED due to loose bowel movements.    # Chronic Diarrhea --Now constipation  # Bloody Bowel Movements  # Iron deficiency anemia  # lactic acidosis -resolved  # Rectal adenocarcinoma moderate to poorly differentiated  - s/p colonoscopy 4/27  - rectal mass extending from the anus up to 17 cm proximally, multiple polyps s/p polypectomy, a 5 cm transverse colon polyp  that was not removed  - Seen by colorectal surgeon, requested colonoscopy with EMR of transverse polyp and EUS on 4/30 cancelled due to poor prep.  - MRI pelvis done, pending read. FU with surgery when read available.   - CEA 24, CA 19-9 31  - ECHO: EF 67%, Mobile echodensity associated with the lateral leflet of the tricuspid valve. Cannot rule out vegetation,MYA : No vegetations.   - Pan CT completed for staging: Well-defined right lower lobe nodule measuring 6 mm, 3 mm lingular nodule  - Mri pelvis done showed: < from: MR Pelvis w/wo IV Cont (05.01.21 @ 18:26) >  18 cm long polypoidal rectal mass with invasion of the right meso rectal fascia and possibly the prostate gland. Additional involvement of the analsphincter complex--T4(a or b)N0Mx  - heme/onc F/u  - Sx and Rad onc follow up on results of MRI  - GI : no intervention  - For constipation: increased meds: senna, bisacodyl, miralax.   - Pt is planned for Port placement tomorrow 5/6, Chemo/RT afterwards.     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   - f/u OP    # Subacute Rib Fracture   - CT:  Subacute fracture of the left 10th lateral rib.   - Pain control prn     # Echo tricuspid echodensity cannot rule out vegetation  Ddx includes infective endocarditis vs metastasis  - Cardiology consulted for MYA : negative  - BCx was negative 4/21, repeated 4/30  - Afebrile    A1C 5.2, lipid panel normal    DVT PPx: SCDs  GI PPX: Protonix 40 mg PO  Diet: Regular  Activity: Increase as tolerated  Dispo: home with [facility] or SNF  Code Status: full code

## 2021-05-06 LAB
ALBUMIN SERPL ELPH-MCNC: 2.7 G/DL — LOW (ref 3.5–5.2)
ALP SERPL-CCNC: 46 U/L — SIGNIFICANT CHANGE UP (ref 30–115)
ALT FLD-CCNC: <5 U/L — SIGNIFICANT CHANGE UP (ref 0–41)
ANION GAP SERPL CALC-SCNC: 10 MMOL/L — SIGNIFICANT CHANGE UP (ref 7–14)
ANION GAP SERPL CALC-SCNC: 9 MMOL/L — SIGNIFICANT CHANGE UP (ref 7–14)
AST SERPL-CCNC: 22 U/L — SIGNIFICANT CHANGE UP (ref 0–41)
BASOPHILS # BLD AUTO: 0.05 K/UL — SIGNIFICANT CHANGE UP (ref 0–0.2)
BASOPHILS NFR BLD AUTO: 0.6 % — SIGNIFICANT CHANGE UP (ref 0–1)
BILIRUB SERPL-MCNC: 0.5 MG/DL — SIGNIFICANT CHANGE UP (ref 0.2–1.2)
BUN SERPL-MCNC: 12 MG/DL — SIGNIFICANT CHANGE UP (ref 10–20)
BUN SERPL-MCNC: 13 MG/DL — SIGNIFICANT CHANGE UP (ref 10–20)
CALCIUM SERPL-MCNC: 8 MG/DL — LOW (ref 8.5–10.1)
CALCIUM SERPL-MCNC: 8.3 MG/DL — LOW (ref 8.5–10.1)
CHLORIDE SERPL-SCNC: 102 MMOL/L — SIGNIFICANT CHANGE UP (ref 98–110)
CHLORIDE SERPL-SCNC: 102 MMOL/L — SIGNIFICANT CHANGE UP (ref 98–110)
CO2 SERPL-SCNC: 25 MMOL/L — SIGNIFICANT CHANGE UP (ref 17–32)
CO2 SERPL-SCNC: 26 MMOL/L — SIGNIFICANT CHANGE UP (ref 17–32)
CREAT SERPL-MCNC: 0.6 MG/DL — LOW (ref 0.7–1.5)
CREAT SERPL-MCNC: 0.7 MG/DL — SIGNIFICANT CHANGE UP (ref 0.7–1.5)
EOSINOPHIL # BLD AUTO: 0.13 K/UL — SIGNIFICANT CHANGE UP (ref 0–0.7)
EOSINOPHIL NFR BLD AUTO: 1.6 % — SIGNIFICANT CHANGE UP (ref 0–8)
GLUCOSE SERPL-MCNC: 105 MG/DL — HIGH (ref 70–99)
GLUCOSE SERPL-MCNC: 134 MG/DL — HIGH (ref 70–99)
HCT VFR BLD CALC: 33.3 % — LOW (ref 42–52)
HGB BLD-MCNC: 9.5 G/DL — LOW (ref 14–18)
IMM GRANULOCYTES NFR BLD AUTO: 0.8 % — HIGH (ref 0.1–0.3)
LYMPHOCYTES # BLD AUTO: 0.96 K/UL — LOW (ref 1.2–3.4)
LYMPHOCYTES # BLD AUTO: 12 % — LOW (ref 20.5–51.1)
MAGNESIUM SERPL-MCNC: 1.6 MG/DL — LOW (ref 1.8–2.4)
MAGNESIUM SERPL-MCNC: 1.7 MG/DL — LOW (ref 1.8–2.4)
MCHC RBC-ENTMCNC: 21.9 PG — LOW (ref 27–31)
MCHC RBC-ENTMCNC: 28.5 G/DL — LOW (ref 32–37)
MCV RBC AUTO: 76.7 FL — LOW (ref 80–94)
MONOCYTES # BLD AUTO: 0.55 K/UL — SIGNIFICANT CHANGE UP (ref 0.1–0.6)
MONOCYTES NFR BLD AUTO: 6.9 % — SIGNIFICANT CHANGE UP (ref 1.7–9.3)
NEUTROPHILS # BLD AUTO: 6.23 K/UL — SIGNIFICANT CHANGE UP (ref 1.4–6.5)
NEUTROPHILS NFR BLD AUTO: 78.1 % — HIGH (ref 42.2–75.2)
NRBC # BLD: 0 /100 WBCS — SIGNIFICANT CHANGE UP (ref 0–0)
PLATELET # BLD AUTO: 185 K/UL — SIGNIFICANT CHANGE UP (ref 130–400)
POTASSIUM SERPL-MCNC: 4.3 MMOL/L — SIGNIFICANT CHANGE UP (ref 3.5–5)
POTASSIUM SERPL-MCNC: 5.2 MMOL/L — HIGH (ref 3.5–5)
POTASSIUM SERPL-SCNC: 4.3 MMOL/L — SIGNIFICANT CHANGE UP (ref 3.5–5)
POTASSIUM SERPL-SCNC: 5.2 MMOL/L — HIGH (ref 3.5–5)
PROT SERPL-MCNC: 6 G/DL — SIGNIFICANT CHANGE UP (ref 6–8)
RBC # BLD: 4.34 M/UL — LOW (ref 4.7–6.1)
RBC # FLD: 25.2 % — HIGH (ref 11.5–14.5)
SODIUM SERPL-SCNC: 137 MMOL/L — SIGNIFICANT CHANGE UP (ref 135–146)
SODIUM SERPL-SCNC: 137 MMOL/L — SIGNIFICANT CHANGE UP (ref 135–146)
WBC # BLD: 7.98 K/UL — SIGNIFICANT CHANGE UP (ref 4.8–10.8)
WBC # FLD AUTO: 7.98 K/UL — SIGNIFICANT CHANGE UP (ref 4.8–10.8)

## 2021-05-06 PROCEDURE — 99232 SBSQ HOSP IP/OBS MODERATE 35: CPT | Mod: 25,57

## 2021-05-06 PROCEDURE — 71045 X-RAY EXAM CHEST 1 VIEW: CPT | Mod: 26

## 2021-05-06 PROCEDURE — 99233 SBSQ HOSP IP/OBS HIGH 50: CPT

## 2021-05-06 PROCEDURE — 36561 INSERT TUNNELED CV CATH: CPT

## 2021-05-06 PROCEDURE — 76000 FLUOROSCOPY <1 HR PHYS/QHP: CPT | Mod: 26,59

## 2021-05-06 PROCEDURE — 76998 US GUIDE INTRAOP: CPT | Mod: 26,59

## 2021-05-06 RX ORDER — HYDROMORPHONE HYDROCHLORIDE 2 MG/ML
1 INJECTION INTRAMUSCULAR; INTRAVENOUS; SUBCUTANEOUS
Refills: 0 | Status: DISCONTINUED | OUTPATIENT
Start: 2021-05-06 | End: 2021-05-06

## 2021-05-06 RX ORDER — MAGNESIUM OXIDE 400 MG ORAL TABLET 241.3 MG
400 TABLET ORAL
Refills: 0 | Status: DISCONTINUED | OUTPATIENT
Start: 2021-05-06 | End: 2021-05-07

## 2021-05-06 RX ORDER — HYDROMORPHONE HYDROCHLORIDE 2 MG/ML
0.5 INJECTION INTRAMUSCULAR; INTRAVENOUS; SUBCUTANEOUS
Refills: 0 | Status: DISCONTINUED | OUTPATIENT
Start: 2021-05-06 | End: 2021-05-06

## 2021-05-06 RX ORDER — PANTOPRAZOLE SODIUM 20 MG/1
40 TABLET, DELAYED RELEASE ORAL
Refills: 0 | Status: DISCONTINUED | OUTPATIENT
Start: 2021-05-06 | End: 2021-05-17

## 2021-05-06 RX ORDER — CHLORHEXIDINE GLUCONATE 213 G/1000ML
1 SOLUTION TOPICAL
Refills: 0 | Status: DISCONTINUED | OUTPATIENT
Start: 2021-05-06 | End: 2021-05-17

## 2021-05-06 RX ORDER — ENOXAPARIN SODIUM 100 MG/ML
40 INJECTION SUBCUTANEOUS DAILY
Refills: 0 | Status: DISCONTINUED | OUTPATIENT
Start: 2021-05-06 | End: 2021-05-17

## 2021-05-06 RX ORDER — POLYETHYLENE GLYCOL 3350 17 G/17G
17 POWDER, FOR SOLUTION ORAL
Refills: 0 | Status: DISCONTINUED | OUTPATIENT
Start: 2021-05-06 | End: 2021-05-07

## 2021-05-06 RX ORDER — MEPERIDINE HYDROCHLORIDE 50 MG/ML
12.5 INJECTION INTRAMUSCULAR; INTRAVENOUS; SUBCUTANEOUS
Refills: 0 | Status: DISCONTINUED | OUTPATIENT
Start: 2021-05-06 | End: 2021-05-06

## 2021-05-06 RX ORDER — SODIUM CHLORIDE 9 MG/ML
1000 INJECTION, SOLUTION INTRAVENOUS
Refills: 0 | Status: DISCONTINUED | OUTPATIENT
Start: 2021-05-06 | End: 2021-05-06

## 2021-05-06 RX ORDER — ONDANSETRON 8 MG/1
4 TABLET, FILM COATED ORAL ONCE
Refills: 0 | Status: DISCONTINUED | OUTPATIENT
Start: 2021-05-06 | End: 2021-05-06

## 2021-05-06 RX ORDER — MAGNESIUM SULFATE 500 MG/ML
2 VIAL (ML) INJECTION ONCE
Refills: 0 | Status: COMPLETED | OUTPATIENT
Start: 2021-05-06 | End: 2021-05-06

## 2021-05-06 RX ORDER — SODIUM CHLORIDE 9 MG/ML
1000 INJECTION, SOLUTION INTRAVENOUS
Refills: 0 | Status: DISCONTINUED | OUTPATIENT
Start: 2021-05-06 | End: 2021-05-08

## 2021-05-06 RX ORDER — SENNA PLUS 8.6 MG/1
2 TABLET ORAL
Refills: 0 | Status: DISCONTINUED | OUTPATIENT
Start: 2021-05-06 | End: 2021-05-14

## 2021-05-06 RX ORDER — MAGNESIUM OXIDE 400 MG ORAL TABLET 241.3 MG
400 TABLET ORAL ONCE
Refills: 0 | Status: COMPLETED | OUTPATIENT
Start: 2021-05-06 | End: 2021-05-06

## 2021-05-06 RX ADMIN — SODIUM CHLORIDE 75 MILLILITER(S): 9 INJECTION, SOLUTION INTRAVENOUS at 00:16

## 2021-05-06 RX ADMIN — Medication 50 GRAM(S): at 17:41

## 2021-05-06 RX ADMIN — POLYETHYLENE GLYCOL 3350 17 GRAM(S): 17 POWDER, FOR SOLUTION ORAL at 17:41

## 2021-05-06 RX ADMIN — SENNA PLUS 2 TABLET(S): 8.6 TABLET ORAL at 05:13

## 2021-05-06 RX ADMIN — CHLORHEXIDINE GLUCONATE 1 APPLICATION(S): 213 SOLUTION TOPICAL at 05:13

## 2021-05-06 RX ADMIN — SENNA PLUS 2 TABLET(S): 8.6 TABLET ORAL at 15:59

## 2021-05-06 RX ADMIN — SODIUM CHLORIDE 125 MILLILITER(S): 9 INJECTION, SOLUTION INTRAVENOUS at 14:30

## 2021-05-06 RX ADMIN — MAGNESIUM OXIDE 400 MG ORAL TABLET 400 MILLIGRAM(S): 241.3 TABLET ORAL at 17:40

## 2021-05-06 RX ADMIN — Medication 5 MILLIGRAM(S): at 15:59

## 2021-05-06 RX ADMIN — ENOXAPARIN SODIUM 40 MILLIGRAM(S): 100 INJECTION SUBCUTANEOUS at 17:41

## 2021-05-06 RX ADMIN — PANTOPRAZOLE SODIUM 40 MILLIGRAM(S): 20 TABLET, DELAYED RELEASE ORAL at 08:29

## 2021-05-06 RX ADMIN — POLYETHYLENE GLYCOL 3350 17 GRAM(S): 17 POWDER, FOR SOLUTION ORAL at 15:59

## 2021-05-06 RX ADMIN — POLYETHYLENE GLYCOL 3350 17 GRAM(S): 17 POWDER, FOR SOLUTION ORAL at 05:13

## 2021-05-06 RX ADMIN — MAGNESIUM OXIDE 400 MG ORAL TABLET 400 MILLIGRAM(S): 241.3 TABLET ORAL at 05:13

## 2021-05-06 NOTE — PRE-ANESTHESIA EVALUATION ADULT - NSANTHAPLANRD_GEN_ALL_CORE
monitored anesthesia care (MAC)
monitored anesthesia care (MAC)
general/monitored anesthesia care (MAC)
monitored anesthesia care (MAC)
general/monitored anesthesia care (MAC)

## 2021-05-06 NOTE — PRE-ANESTHESIA EVALUATION ADULT - NSANTHPEFT_GEN_ALL_CORE
Gen: NAD  CV: s1 s2 rrr
Pleasant cooperative male in NAD  Heart RR  Lungs CTA
cor: rrr s1s2 nl  lungs: decreased bs bilat bases

## 2021-05-06 NOTE — PRE-ANESTHESIA EVALUATION ADULT - MALLAMPATI CLASS
Class III - visualization of the soft palate and the base of the uvula
Class II - visualization of the soft palate, fauces, and uvula
Class II - visualization of the soft palate, fauces, and uvula
Class III - visualization of the soft palate and the base of the uvula

## 2021-05-06 NOTE — PRE-ANESTHESIA EVALUATION ADULT - NSANTHOSAYNRD_GEN_A_CORE
No. [place] screening performed.  STOP BANG Legend: 0-2 = LOW Risk; 3-4 = INTERMEDIATE Risk; 5-8 = HIGH Risk

## 2021-05-06 NOTE — CHART NOTE - NSCHARTNOTEFT_GEN_A_CORE
Post Operative Note  Patient: MISAEL HUFFMAN 80y (1940) Male   MRN: [id number]  Location: [place] [address] A  Visit: 04-22-21 Inpatient  Date: 05-06-21 @ 19:05    Procedure: Rectal adenocarcinoma   S/P Revision, Port-a-Cath insertion    Subjective: Patient resting comfortably, tolerated procedure well. No pain. No N.V. No SOB, no CP    Objective:  Vitals: T(F): 96.9 (05-06-21 @ 16:12), Max: 99 (05-06-21 @ 00:48)  HR: 88 (05-06-21 @ 16:12)  BP: 153/83 (05-06-21 @ 16:12) (114/57 - 172/92)  RR: 20 (05-06-21 @ 16:12)  SpO2: 99% (05-06-21 @ 14:25)  Vent Settings:     In:   05-05-21 @ 07:01  -  05-06-21 @ 07:00  --------------------------------------------------------  IN: 720 mL    05-06-21 @ 07:01  -  05-06-21 @ 19:05  --------------------------------------------------------  IN: 0 mL      IV Fluids: dextrose 5% + sodium chloride 0.45%. 1000 milliLiter(s) (75 mL/Hr) IV Continuous <Continuous>  magnesium oxide 400 milliGRAM(s) Oral three times a day with meals  sodium chloride 0.9%. 1000 milliLiter(s) (100 mL/Hr) IV Continuous <Continuous>      Out:   05-05-21 @ 07:01  -  05-06-21 @ 07:00  --------------------------------------------------------  OUT: 800 mL    05-06-21 @ 07:01  -  05-06-21 @ 19:05  --------------------------------------------------------  OUT: 200 mL      EBL:     Voided Urine:   05-05-21 @ 07:01  -  05-06-21 @ 07:00  --------------------------------------------------------  OUT: 800 mL    05-06-21 @ 07:01  -  05-06-21 @ 19:05  --------------------------------------------------------  OUT: 200 mL    Physical Examination:  General Appearance: NAD  Heart: RRR  Lungs: CTABL  Incisions/Wounds: Dressings in place, clean, dry and intact, no signs of infection/active bleeding/drainage    Medications: [Standing]  bisacodyl 5 milliGRAM(s) Oral daily  chlorhexidine 4% Liquid 1 Application(s) Topical <User Schedule>  dextrose 5% + sodium chloride 0.45%. 1000 milliLiter(s) IV Continuous <Continuous>  enoxaparin Injectable 40 milliGRAM(s) SubCutaneous daily  magnesium oxide 400 milliGRAM(s) Oral three times a day with meals  pantoprazole    Tablet 40 milliGRAM(s) Oral before breakfast  polyethylene glycol 3350 17 Gram(s) Oral two times a day  senna 2 Tablet(s) Oral two times a day  sodium chloride 0.9%. 1000 milliLiter(s) IV Continuous <Continuous>    Medications: [PRN]  bisacodyl 5 milliGRAM(s) Oral daily  chlorhexidine 4% Liquid 1 Application(s) Topical <User Schedule>  dextrose 5% + sodium chloride 0.45%. 1000 milliLiter(s) IV Continuous <Continuous>  enoxaparin Injectable 40 milliGRAM(s) SubCutaneous daily  magnesium oxide 400 milliGRAM(s) Oral three times a day with meals  pantoprazole    Tablet 40 milliGRAM(s) Oral before breakfast  polyethylene glycol 3350 17 Gram(s) Oral two times a day  senna 2 Tablet(s) Oral two times a day  sodium chloride 0.9%. 1000 milliLiter(s) IV Continuous <Continuous>    Labs:                     9.5    7.98  )-----------( 185      ( 06 May 2021 08:30 )             33.3     05-06    137  |  102  |  12  ----------------------------<  105<H>  5.2<H>   |  26  |  0.6<L>    Ca    8.0<L>      06 May 2021 08:30  Mg     1.6     05-06    TPro  6.0  /  Alb  2.7<L>  /  TBili  0.5  /  DBili  x   /  AST  22  /  ALT  <5  /  AlkPhos  46  05-06    PT/INR - ( 05 May 2021 16:00 )   PT: 14.50 sec;   INR: 1.26 ratio         PTT - ( 05 May 2021 16:00 )  PTT:35.2 sec    Imaging:  No post-op imaging studies    Assessment:  80yMale patient s/p mediport placement    Plan:  - Monitor vitals  - Monitor for bowel function  - Continue Pain Medications if necessary  - Encourage ambulation as tolerated  - Monitor urine output and trial of void once [place] removed  - DVT and GI Prophylaxis  - Monitor wound and dressing for changes, redress as needed.      Date/Time: 05-06-21 @ 19:05

## 2021-05-06 NOTE — PROGRESS NOTE ADULT - SUBJECTIVE AND OBJECTIVE BOX
Patient is a 80y old  Male who presents with a chief complaint of Bloody stools (02 May 2021 05:48)      Subjective: Pt seen and examined , lying on bed . Had mediport placement today by surgery.   Afebrile .       Vital Signs Last 24 Hrs  T(C): 36.1 (06 May 2021 16:12), Max: 37.2 (06 May 2021 00:48)  T(F): 96.9 (06 May 2021 16:12), Max: 99 (06 May 2021 00:48)  HR: 88 (06 May 2021 16:12) (65 - 90)  BP: 153/83 (06 May 2021 16:12) (114/57 - 172/92)  BP(mean): --  RR: 20 (06 May 2021 16:12) (16 - 20)  SpO2: 99% (06 May 2021 14:25) (98% - 99%)    PHYSICAL EXAM  GENERAL: NAD, Resting in bed  HEENT: NCAT  CHEST/LUNG: Clear to auscultation bilaterally; No wheezing or rubs. Left chest chemo port   HEART: Regular rate and rhythm; No murmurs, rubs, or gallops  ABDOMEN: Bowel sounds present; Soft, Nontender, Nondistended.   EXTREMITIES:  No clubbing, cyanosis, or edema  NERVOUS SYSTEM:  Alert & Oriented X3      MEDICATIONS  (STANDING):  bisacodyl 5 milliGRAM(s) Oral daily  chlorhexidine 4% Liquid 1 Application(s) Topical <User Schedule>  dextrose 5% + sodium chloride 0.45%. 1000 milliLiter(s) (75 mL/Hr) IV Continuous <Continuous>  magnesium oxide 400 milliGRAM(s) Oral three times a day with meals  magnesium sulfate  IVPB 2 Gram(s) IV Intermittent once  pantoprazole    Tablet 40 milliGRAM(s) Oral before breakfast  polyethylene glycol 3350 17 Gram(s) Oral two times a day  senna 2 Tablet(s) Oral two times a day  sodium chloride 0.9%. 1000 milliLiter(s) (100 mL/Hr) IV Continuous <Continuous>    MEDICATIONS  (PRN):      LABS:                          9.5    7.98  )-----------( 185      ( 06 May 2021 08:30 )             33.3         Mean Cell Volume : 76.7 fL  Mean Cell Hemoglobin : 21.9 pg  Mean Cell Hemoglobin Concentration : 28.5 g/dL  Auto Neutrophil # : 6.23 K/uL  Auto Lymphocyte # : 0.96 K/uL  Auto Monocyte # : 0.55 K/uL  Auto Eosinophil # : 0.13 K/uL  Auto Basophil # : 0.05 K/uL  Auto Neutrophil % : 78.1 %  Auto Lymphocyte % : 12.0 %  Auto Monocyte % : 6.9 %  Auto Eosinophil % : 1.6 %  Auto Basophil % : 0.6 %      Serial CBC's  05-06 @ 08:30  Hct-33.3 / Hgb-9.5 / Plat-185 / RBC-4.34 / WBC-7.98  Serial CBC's  05-05 @ 16:00  Hct-35.8 / Hgb-10.0 / Plat-229 / RBC-4.64 / WBC-9.46  Serial CBC's  05-05 @ 06:57  Hct-34.0 / Hgb-9.5 / Plat-212 / RBC-4.42 / WBC-6.22  Serial CBC's  05-04 @ 05:20  Hct-33.9 / Hgb-9.6 / Plat-206 / RBC-4.42 / WBC-6.28  Serial CBC's  05-03 @ 06:35  Hct-31.8 / Hgb-9.2 / Plat-217 / RBC-4.26 / WBC-5.30      05-06    137  |  102  |  12  ----------------------------<  105<H>  5.2<H>   |  26  |  0.6<L>    Ca    8.0<L>      06 May 2021 08:30  Mg     1.6     05-06    TPro  6.0  /  Alb  2.7<L>  /  TBili  0.5  /  DBili  x   /  AST  22  /  ALT  <5  /  AlkPhos  46  05-06      PT/INR - ( 05 May 2021 16:00 )   PT: 14.50 sec;   INR: 1.26 ratio         PTT - ( 05 May 2021 16:00 )  PTT:35.2 sec                            BLOOD SMEAR INTERPRETATION:       RADIOLOGY & ADDITIONAL STUDIES:

## 2021-05-06 NOTE — PRE-ANESTHESIA EVALUATION ADULT - HEIGHT IN FEET
[Home] : at home, [unfilled] , at the time of the visit.
[Medical Office: ([facility])___] : at the medical office located in 
[Verbal consent obtained from patient] : the patient, [unfilled]
[Time Spent: ___ minutes] : I have spent [unfilled] minutes with the patient on the telephone
[FreeTextEntry1] : This is our first follow up following admission to the [facility] ER on June 9th after near syncope and bout of palpitations while at work.  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  Repeat inflammatory markers (CRP, ferritin and D dimer) were all in ascend again as she remains on Eliquis and Mg OTC. She was D/C'd home after the extensive workup te same evening. 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 Palpitations occur both at rest or activity, and sometimes even with rapid position changes and seem to be provoking considerable amount of  anxiety. Her Rt sided chest pain is still present but may be slightly less intense, and definitively worsening with walking or activity. Over the past week she notes increased dis-balance and feels as she is walking sideways. She recalls having tinnitus on the Rt some months ago which was transient, but was never dizzy. Her legs feel extraordinarily heavy and weak after 1-2 blocks of walking and if going out for a short walk she practically becomes disabled for the reminder of the day feeling complete prostration. There is no subjective nystagmus. \par Cough had nearly resolved but persists in AM only and is dry. Her appetite is better, and she has gained some of the weight back. 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 MALAISE: Remain at home and rest until stamina improves sufficiently so that she can resume her work to the reasonable extent.\par 2. DIZZINESS/DIS-BALANCE:  Follow up with neurology as planned this Wednesday.\par COVID PNEUMONIA/POST-VIAL SYNDROME: Following visit with neurology she should obtain labs for interval trend of D dimer, Ferritin, CRP and EBV toters to r/o mono reactivation. Continue Eliquis 5mg BID until d dimer normalizes. She is s/p two -ve PCR nasopharyngeal swabs (the last on June 9th).\par 3. PALPITATIONS: Start Propranolol 20mg BID to curb inappropriate sinus tach and send for 24 H Holter with 5 day heart monitor to her home to assess the heart rhythm better. \par 4. Televisit F/U in a week to go over symptom progress, blood work and neuro visit impressions.
6

## 2021-05-06 NOTE — PROGRESS NOTE ADULT - ASSESSMENT
80M w/ no past medical history presented to the ED due to loose bowel movements. Patient has had decreased PO intake, nausea, chronic diarrhea for about a year and hematochezia so the daughter decided to bring him to the hospital. Heme/Onc is consulted for Rectal mass.     # Rectal adenocarcinoma Locally advanced   MRI showing : 18 cm long polypoidal rectal mass with invasion of the right meso rectal fascia and possibly the prostate gland. Additional involvement of the analsphincter complex--T4(a or b)N0M0    - MRI results were discussed with pt in detail .   He was told that he has locally advanced disease and we recommend total neoadjuvant therapy . Consisting of oxaliplatin based chemotherapy followed by chemo RT and then evaluation for surgical resection .   -s/p mediport palcement by IR .   -will start with treatment while pt is admitted .   -Will start first cycle of FOLFOX on 5/7/21 . Pt will be getting 4 cycles of FOLFOX every 2 wks , followed by evaluation for chemo RT and then surgical eval.   -pt is agreeable to start treatment .   -Side effect profile was discussed with pt and he is agreeable .  -Transfer pt to  .     # Acute blood loss anemia 2/2 LGIB  # HUSEYIN  - Hgb 5.8 on admission, s/p PRBC x3  - Iron Saturation: 12%, Ferritin 34, MCV 68.9 - consistent with microcytic Iron Deficiency Anemia  - s/p iv venofer     Transfer Pt to  for treatment in morning .   Plan discussed with medical resident .

## 2021-05-06 NOTE — PROGRESS NOTE ADULT - ASSESSMENT
80y Male patient admitted with T4 invasive rectal adenocarcinoma, will need chemotherapy and mediport placement , with the above physical exam, labs, and imaging findings. Patient seen and examined at bedside. NAD.       Plan:  - OR for mediport placement at 3PM  - NPO  - will f/u preop lab work and replete as necessary  -Hemodynamic monitoring as per routine  -GI and DVT prophylaxis    Date/Time: 05-06-21 @ 02:07

## 2021-05-06 NOTE — CHART NOTE - NSCHARTNOTEFT_GEN_A_CORE
Registered Dietitian Follow-Up     Patient Profile Reviewed                           Yes [x]   No []     Nutrition History Previously Obtained        Yes []  No [x]  -- pt poor historian, off unit. Tried to contact pt's daughter (Sally), no answer after multiple attempts.      Pertinent Subjective Information: Pt off unit for chemo port placement. PO intake documented =/<50% yesterday. Oral nutrition supplements ordered. Was NPO this morning. for procedure.      Pertinent Medical Interventions:  Presented for loose BMs  Rectal cancer s/p colonoscopy 4/27  chemo medication port placement today   Chemo/RT afterwards     Diet order: low fiber  prosource gelatin q24hrs   ensure enlive q24hrs      Anthropometrics:  - Ht. 72"  - Wt.  175.9lbs (4/28); dosing  184.9lbs (4/27)  - %wt change  - BMI 23.9 -- based on dosing   - IBW 178lbs     Pertinent Lab Data: (5/6) H/H 9.5/33.3, K 5.2, Cr 0.6, , mg 1.6, A1c 5.2% (4/22)     Pertinent Meds: dextrose 5%+sodium chloride 0.45%, sodium chloride 0.9%, mag-ox, dulcolax, senna, protonix, miralrax     Physical Findings:  - Appearance: off unit. +1 R foot edema noted per RN flow sheets  - GI function: LBM 5/6, no c/o N+V, constipation or diarrhea per unit staff  - Tubes: N/A  - Oral/Mouth cavity: no issues chewing/swallowing per unit staff/pt   - Skin: WDL      Nutrition Requirements:  Weight Used: CBW 83.4 kg (continue from initial assessment on 4/27)     Estimated Energy Needs    Continue [x]  1910 - 2069 kcals (MSJ x 1.2 - 1.3 AF)     Estimated Protein Needs    Continue [x]  83 - 100 gms (1.0 - 1.2 gm/kg)     Estimated Fluid Needs        Continue [x] per team     Nutrient Intake: diet advanced from clears, per RN flow sheets eating </=50% of his meals         [x] Previous Nutrition Diagnosis: Inadequate oral intake            [x] Ongoing          [] Resolved     Nutrition Intervention: meals and snacks, medical food supplements     Goal/Expected Outcome: Pt to meet greater than 75% of estimated needs within 4 days      Indicator/Monitoring: energy intake, body composition, NFPF    Recommendations:  Once back on unit ok to re-start low fiber  Continue with ensure enlive and prosource gelatin sugar-free q24hrs Registered Dietitian Follow-Up     Patient Profile Reviewed                           Yes [x]   No []     Nutrition History Previously Obtained        Yes []  No [x]  -- pt poor historian, off unit. Tried to contact pt's daughter (Sally), no answer after multiple attempts.      Pertinent Subjective Information: Pt off unit for chemo port placement. PO intake documented =/<50% yesterday. Oral nutrition supplements ordered. Was NPO this morning. for procedure.      Pertinent Medical Interventions:  Presented for loose BMs  Rectal cancer s/p colonoscopy 4/27  chemo medication port placement today   Chemo/RT afterwards     Diet order: low fiber  prosource gelatin q24hrs   ensure enlive q24hrs      Anthropometrics:  - Ht. 72"  - Wt.  175.9lbs (4/28); dosing  184.9lbs (4/27)  - %wt change  - BMI 23.9 -- based on dosing   - IBW 178lbs     Pertinent Lab Data: (5/6) H/H 9.5/33.3, K 5.2, Cr 0.6, , mg 1.6, A1c 5.2% (4/22)     Pertinent Meds: dextrose 5%+sodium chloride 0.45%, sodium chloride 0.9%, mag-ox, dulcolax, senna, protonix, miralrax     Physical Findings:  - Appearance: off unit. +1 R foot edema noted per RN flow sheets  - GI function: LBM 5/6, no c/o N+V, constipation or diarrhea per unit staff  - Tubes: N/A  - Oral/Mouth cavity: no issues chewing/swallowing per unit staff/pt   - Skin: WDL      Nutrition Requirements:  Weight Used: CBW 83.4 kg (continue from initial assessment on 4/27)     Estimated Energy Needs    Continue [x]  1910 - 2069 kcals (MSJ x 1.2 - 1.3 AF)     Estimated Protein Needs    Continue [x]  83 - 100 gms (1.0 - 1.2 gm/kg)     Estimated Fluid Needs        Continue [x] per team     Nutrient Intake: diet advanced from clears, per RN flow sheets eating </=50% of his meals         [x] Previous Nutrition Diagnosis: Inadequate oral intake            [x] Ongoing          [] Resolved     Nutrition Intervention: meals and snacks, medical food supplements     Goal/Expected Outcome: Pt to meet greater than 75% of estimated needs within 4 days      Indicator/Monitoring: energy intake, body composition, NFPF    Recommendations:  Continue low fiber diet order as medically feasible   Continue with ensure enlive and prosource gelatin sugar-free q24hrs

## 2021-05-06 NOTE — PROGRESS NOTE ADULT - SUBJECTIVE AND OBJECTIVE BOX
Progress Note: General Surgery  Patient: MISAEL HUFFMAN , 80y (1940)Male   MRN: [id number]  Location: [address]  Visit: 04-22-21 Inpatient  Date: 05-06-21 @ 02:07    Admit Diagnosis/Chief Complaint:     Procedure/Diagnosis:  S/P     Events/ 24h: Patient seen and examined at bedside. No acute events overnight. Afebrile, VSS. consented for mediport    Vitals: T(F): 99 (05-06-21 @ 00:48), Max: 99 (05-06-21 @ 00:48)  HR: 86 (05-06-21 @ 00:48)  BP: 122/58 (05-06-21 @ 00:48) (122/58 - 136/64)  RR: 19 (05-06-21 @ 00:48)  SpO2: --    In:   05-04-21 @ 07:01  -  05-05-21 @ 07:00  --------------------------------------------------------  IN: 210 mL    05-05-21 @ 07:01  -  05-06-21 @ 02:07  --------------------------------------------------------  IN: 720 mL      Out:   05-04-21 @ 07:01  -  05-05-21 @ 07:00  --------------------------------------------------------  OUT:    Voided (mL): 900 mL  Total OUT: 900 mL      05-05-21 @ 07:01  -  05-06-21 @ 02:07  --------------------------------------------------------  OUT:    Voided (mL): 800 mL  Total OUT: 800 mL        Net:   05-04-21 @ 07:01  -  05-05-21 @ 07:00  --------------------------------------------------------  NET: -690 mL    05-05-21 @ 07:01  -  05-06-21 @ 02:07  --------------------------------------------------------  NET: -80 mL        Diet: Diet, NPO after Midnight:      NPO Start Date: 05-May-2021,   NPO Start Time: 23:59 (05-05-21 @ 07:18)  Diet, Low Fiber:   Prosource Gelatein 20 Sugar Free     Qty per Day:  1  Supplement Feeding Modality:  Oral  Ensure Enlive Cans or Servings Per Day:  1       Frequency:  Daily (05-04-21 @ 14:59)    IV Fluids: dextrose 5% + sodium chloride 0.45%. 1000 milliLiter(s) (75 mL/Hr) IV Continuous <Continuous>  magnesium oxide 400 milliGRAM(s) Oral once  magnesium oxide 400 milliGRAM(s) Oral three times a day with meals  sodium chloride 0.9%. 1000 milliLiter(s) (100 mL/Hr) IV Continuous <Continuous>      Physical Examination:  General Appearance: NAD   HEENT: EOMI, sclera anicteric.  Heart: RRR   Lungs: Symmetric chest wall expansion, equal rise and fall.  Abdomen:  Soft, nontender, nondistended.   MSK/Extremities: Warm & well-perfused.   Skin: Warm, dry. No jaundice.       Medications: [Standing]  bisacodyl 5 milliGRAM(s) Oral daily  chlorhexidine 4% Liquid 1 Application(s) Topical <User Schedule>  dextrose 5% + sodium chloride 0.45%. 1000 milliLiter(s) (75 mL/Hr) IV Continuous <Continuous>  magnesium oxide 400 milliGRAM(s) Oral once  magnesium oxide 400 milliGRAM(s) Oral three times a day with meals  pantoprazole    Tablet 40 milliGRAM(s) Oral before breakfast  polyethylene glycol 3350 17 Gram(s) Oral two times a day  senna 2 Tablet(s) Oral two times a day  sodium chloride 0.9%. 1000 milliLiter(s) (100 mL/Hr) IV Continuous <Continuous>    DVT Prophylaxis:   GI Prophylaxis: pantoprazole    Tablet 40 milliGRAM(s) Oral before breakfast    Antibiotics:   Anticoagulation:   Medications:[PRN]      Labs:                        10.0   9.46  )-----------( 229      ( 05 May 2021 16:00 )             35.8     05-06    137  |  102  |  13  ----------------------------<  134<H>  4.3   |  25  |  0.7    Ca    8.3<L>      06 May 2021 00:31  Mg     1.7     05-06    TPro  6.0  /  Alb  2.7<L>  /  TBili  0.5  /  DBili  x   /  AST  22  /  ALT  <5  /  AlkPhos  46  05-06    LIVER FUNCTIONS - ( 06 May 2021 00:31 )  Alb: 2.7 g/dL / Pro: 6.0 g/dL / ALK PHOS: 46 U/L / ALT: <5 U/L / AST: 22 U/L / GGT: x           PT/INR - ( 05 May 2021 16:00 )   PT: 14.50 sec;   INR: 1.26 ratio         PTT - ( 05 May 2021 16:00 )  PTT:35.2 sec          Urine/Micro:        Imaging:

## 2021-05-07 LAB
ANION GAP SERPL CALC-SCNC: 9 MMOL/L — SIGNIFICANT CHANGE UP (ref 7–14)
BASOPHILS # BLD AUTO: 0.06 K/UL — SIGNIFICANT CHANGE UP (ref 0–0.2)
BASOPHILS NFR BLD AUTO: 0.6 % — SIGNIFICANT CHANGE UP (ref 0–1)
BUN SERPL-MCNC: 12 MG/DL — SIGNIFICANT CHANGE UP (ref 10–20)
CALCIUM SERPL-MCNC: 7.8 MG/DL — LOW (ref 8.5–10.1)
CHLORIDE SERPL-SCNC: 101 MMOL/L — SIGNIFICANT CHANGE UP (ref 98–110)
CO2 SERPL-SCNC: 24 MMOL/L — SIGNIFICANT CHANGE UP (ref 17–32)
CREAT SERPL-MCNC: 0.5 MG/DL — LOW (ref 0.7–1.5)
EOSINOPHIL # BLD AUTO: 0.1 K/UL — SIGNIFICANT CHANGE UP (ref 0–0.7)
EOSINOPHIL NFR BLD AUTO: 1.1 % — SIGNIFICANT CHANGE UP (ref 0–8)
GLUCOSE SERPL-MCNC: 151 MG/DL — HIGH (ref 70–99)
HCT VFR BLD CALC: 30.2 % — LOW (ref 42–52)
HGB BLD-MCNC: 8.5 G/DL — LOW (ref 14–18)
IMM GRANULOCYTES NFR BLD AUTO: 0.6 % — HIGH (ref 0.1–0.3)
LYMPHOCYTES # BLD AUTO: 1.08 K/UL — LOW (ref 1.2–3.4)
LYMPHOCYTES # BLD AUTO: 11.5 % — LOW (ref 20.5–51.1)
MAGNESIUM SERPL-MCNC: 1.9 MG/DL — SIGNIFICANT CHANGE UP (ref 1.8–2.4)
MCHC RBC-ENTMCNC: 21.7 PG — LOW (ref 27–31)
MCHC RBC-ENTMCNC: 28.1 G/DL — LOW (ref 32–37)
MCV RBC AUTO: 77.2 FL — LOW (ref 80–94)
MONOCYTES # BLD AUTO: 0.75 K/UL — HIGH (ref 0.1–0.6)
MONOCYTES NFR BLD AUTO: 8 % — SIGNIFICANT CHANGE UP (ref 1.7–9.3)
NEUTROPHILS # BLD AUTO: 7.34 K/UL — HIGH (ref 1.4–6.5)
NEUTROPHILS NFR BLD AUTO: 78.2 % — HIGH (ref 42.2–75.2)
NRBC # BLD: 0 /100 WBCS — SIGNIFICANT CHANGE UP (ref 0–0)
PLATELET # BLD AUTO: 152 K/UL — SIGNIFICANT CHANGE UP (ref 130–400)
POTASSIUM SERPL-MCNC: 3.8 MMOL/L — SIGNIFICANT CHANGE UP (ref 3.5–5)
POTASSIUM SERPL-SCNC: 3.8 MMOL/L — SIGNIFICANT CHANGE UP (ref 3.5–5)
RBC # BLD: 3.91 M/UL — LOW (ref 4.7–6.1)
RBC # FLD: 25.9 % — HIGH (ref 11.5–14.5)
SODIUM SERPL-SCNC: 134 MMOL/L — LOW (ref 135–146)
WBC # BLD: 9.39 K/UL — SIGNIFICANT CHANGE UP (ref 4.8–10.8)
WBC # FLD AUTO: 9.39 K/UL — SIGNIFICANT CHANGE UP (ref 4.8–10.8)

## 2021-05-07 PROCEDURE — 99232 SBSQ HOSP IP/OBS MODERATE 35: CPT

## 2021-05-07 PROCEDURE — 99233 SBSQ HOSP IP/OBS HIGH 50: CPT

## 2021-05-07 RX ORDER — DEXAMETHASONE 0.5 MG/5ML
12 ELIXIR ORAL ONCE
Refills: 0 | Status: COMPLETED | OUTPATIENT
Start: 2021-05-07 | End: 2021-05-07

## 2021-05-07 RX ORDER — FLUOROURACIL 50 MG/ML
2425 INJECTION, SOLUTION INTRAVENOUS DAILY
Refills: 0 | Status: COMPLETED | OUTPATIENT
Start: 2021-05-07 | End: 2021-05-08

## 2021-05-07 RX ORDER — FLUOROURACIL 50 MG/ML
800 INJECTION, SOLUTION INTRAVENOUS ONCE
Refills: 0 | Status: COMPLETED | OUTPATIENT
Start: 2021-05-07 | End: 2021-05-07

## 2021-05-07 RX ORDER — ONDANSETRON 8 MG/1
16 TABLET, FILM COATED ORAL ONCE
Refills: 0 | Status: COMPLETED | OUTPATIENT
Start: 2021-05-07 | End: 2021-05-07

## 2021-05-07 RX ORDER — OXALIPLATIN 5 MG/ML
170 INJECTION, SOLUTION INTRAVENOUS ONCE
Refills: 0 | Status: COMPLETED | OUTPATIENT
Start: 2021-05-07 | End: 2021-05-07

## 2021-05-07 RX ORDER — FOSAPREPITANT DIMEGLUMINE 150 MG/5ML
150 INJECTION, POWDER, LYOPHILIZED, FOR SOLUTION INTRAVENOUS ONCE
Refills: 0 | Status: COMPLETED | OUTPATIENT
Start: 2021-05-07 | End: 2021-05-07

## 2021-05-07 RX ORDER — LEVOLEUCOVORIN 250 MG/25ML
800 INJECTION, SOLUTION INTRAVENOUS ONCE
Refills: 0 | Status: DISCONTINUED | OUTPATIENT
Start: 2021-05-07 | End: 2021-05-07

## 2021-05-07 RX ORDER — MAGNESIUM SULFATE 500 MG/ML
1 VIAL (ML) INJECTION ONCE
Refills: 0 | Status: COMPLETED | OUTPATIENT
Start: 2021-05-07 | End: 2021-05-07

## 2021-05-07 RX ORDER — LEUCOVORIN CALCIUM 5 MG
800 TABLET ORAL ONCE
Refills: 0 | Status: COMPLETED | OUTPATIENT
Start: 2021-05-07 | End: 2021-05-07

## 2021-05-07 RX ADMIN — FLUOROURACIL 198 MILLIGRAM(S): 50 INJECTION, SOLUTION INTRAVENOUS at 17:38

## 2021-05-07 RX ADMIN — FOSAPREPITANT DIMEGLUMINE 300 MILLIGRAM(S): 150 INJECTION, POWDER, LYOPHILIZED, FOR SOLUTION INTRAVENOUS at 14:26

## 2021-05-07 RX ADMIN — Medication 50 GRAM(S): at 11:09

## 2021-05-07 RX ADMIN — OXALIPLATIN 142 MILLIGRAM(S): 5 INJECTION, SOLUTION INTRAVENOUS at 15:11

## 2021-05-07 RX ADMIN — FLUOROURACIL 43.69 MILLIGRAM(S): 50 INJECTION, SOLUTION INTRAVENOUS at 18:15

## 2021-05-07 RX ADMIN — POLYETHYLENE GLYCOL 3350 17 GRAM(S): 17 POWDER, FOR SOLUTION ORAL at 06:42

## 2021-05-07 RX ADMIN — SENNA PLUS 2 TABLET(S): 8.6 TABLET ORAL at 06:42

## 2021-05-07 RX ADMIN — MAGNESIUM OXIDE 400 MG ORAL TABLET 400 MILLIGRAM(S): 241.3 TABLET ORAL at 08:50

## 2021-05-07 RX ADMIN — CHLORHEXIDINE GLUCONATE 1 APPLICATION(S): 213 SOLUTION TOPICAL at 20:20

## 2021-05-07 RX ADMIN — Medication 106 MILLIGRAM(S): at 14:26

## 2021-05-07 RX ADMIN — Medication 145 MILLIGRAM(S): at 17:28

## 2021-05-07 RX ADMIN — ENOXAPARIN SODIUM 40 MILLIGRAM(S): 100 INJECTION SUBCUTANEOUS at 11:10

## 2021-05-07 RX ADMIN — ONDANSETRON 116 MILLIGRAM(S): 8 TABLET, FILM COATED ORAL at 14:26

## 2021-05-07 RX ADMIN — PANTOPRAZOLE SODIUM 40 MILLIGRAM(S): 20 TABLET, DELAYED RELEASE ORAL at 06:42

## 2021-05-07 NOTE — PROGRESS NOTE ADULT - ASSESSMENT
Assessment:  80y Male patient admitted POD 1 s/p Mediport placement, with the above physical exam, labs, and imaging findings.    Plan:  - recall surgery as needed  -Pain control as needed  -Hemodynamic monitoring as per routine  -Encourage ambulation and incentive spirometer use (10x/hr when awake)  -GI and DVT prophylaxis  -Check and replete CBC and BMP q daily  -Strict input and output monitoring  -Continue current management    Date/Time: 05-07-21 @ 08:55

## 2021-05-07 NOTE — PROGRESS NOTE ADULT - SUBJECTIVE AND OBJECTIVE BOX
Patient is a 80y old  Male who presents with a chief complaint of Bloody stools (02 May 2021 05:48)      Subjective: Pt seen and examined , lying on bed . Feels fine and no acute events reported over night .       Vital Signs Last 24 Hrs  T(C): 36.8 (07 May 2021 05:25), Max: 37.4 (06 May 2021 22:45)  T(F): 98.2 (07 May 2021 05:25), Max: 99.3 (06 May 2021 22:45)  HR: 84 (07 May 2021 05:25) (65 - 95)  BP: 123/60 (07 May 2021 05:25) (114/57 - 153/83)  BP(mean): --  RR: 18 (07 May 2021 05:25) (16 - 20)  SpO2: 100% (07 May 2021 07:52) (98% - 100%)    PHYSICAL EXAM  GENERAL: NAD, Resting in bed  HEENT: NCAT  CHEST/LUNG: Clear to auscultation bilaterally; No wheezing or rubs. Left chest chemo port   HEART: Regular rate and rhythm; No murmurs, rubs, or gallops  ABDOMEN: Bowel sounds present; Soft, Nontender, Nondistended.   EXTREMITIES:  No clubbing, cyanosis, or edema  NERVOUS SYSTEM:  Alert & Oriented X3      MEDICATIONS  (STANDING):  bisacodyl 5 milliGRAM(s) Oral daily  chlorhexidine 4% Liquid 1 Application(s) Topical <User Schedule>  dextrose 5% + sodium chloride 0.45%. 1000 milliLiter(s) (75 mL/Hr) IV Continuous <Continuous>  enoxaparin Injectable 40 milliGRAM(s) SubCutaneous daily  magnesium oxide 400 milliGRAM(s) Oral three times a day with meals  pantoprazole    Tablet 40 milliGRAM(s) Oral before breakfast  polyethylene glycol 3350 17 Gram(s) Oral two times a day  senna 2 Tablet(s) Oral two times a day  sodium chloride 0.9%. 1000 milliLiter(s) (100 mL/Hr) IV Continuous <Continuous>    MEDICATIONS  (PRN):      LABS:                          9.5    7.98  )-----------( 185      ( 06 May 2021 08:30 )             33.3         Mean Cell Volume : 76.7 fL  Mean Cell Hemoglobin : 21.9 pg  Mean Cell Hemoglobin Concentration : 28.5 g/dL  Auto Neutrophil # : 6.23 K/uL  Auto Lymphocyte # : 0.96 K/uL  Auto Monocyte # : 0.55 K/uL  Auto Eosinophil # : 0.13 K/uL  Auto Basophil # : 0.05 K/uL  Auto Neutrophil % : 78.1 %  Auto Lymphocyte % : 12.0 %  Auto Monocyte % : 6.9 %  Auto Eosinophil % : 1.6 %  Auto Basophil % : 0.6 %      Serial CBC's  05-06 @ 08:30  Hct-33.3 / Hgb-9.5 / Plat-185 / RBC-4.34 / WBC-7.98  Serial CBC's  05-05 @ 16:00  Hct-35.8 / Hgb-10.0 / Plat-229 / RBC-4.64 / WBC-9.46  Serial CBC's  05-05 @ 06:57  Hct-34.0 / Hgb-9.5 / Plat-212 / RBC-4.42 / WBC-6.22  Serial CBC's  05-04 @ 05:20  Hct-33.9 / Hgb-9.6 / Plat-206 / RBC-4.42 / WBC-6.28      05-06    137  |  102  |  12  ----------------------------<  105<H>  5.2<H>   |  26  |  0.6<L>    Ca    8.0<L>      06 May 2021 08:30  Mg     1.6     05-06    TPro  6.0  /  Alb  2.7<L>  /  TBili  0.5  /  DBili  x   /  AST  22  /  ALT  <5  /  AlkPhos  46  05-06      PT/INR - ( 05 May 2021 16:00 )   PT: 14.50 sec;   INR: 1.26 ratio         PTT - ( 05 May 2021 16:00 )  PTT:35.2 sec                            BLOOD SMEAR INTERPRETATION:       RADIOLOGY & ADDITIONAL STUDIES:

## 2021-05-07 NOTE — PROGRESS NOTE ADULT - SUBJECTIVE AND OBJECTIVE BOX
Progress Note: Surgery  Patient: MISAEL HUFFMAN , 80y (1940)Male   MRN: [id number]  Location: [address]  Visit: 04-22-21 Inpatient  Date: 05-07-21 @ 08:55    Events over 24h: No acute event overnight. No new complaint. Pt is hemodynamically stable. Surgical site clean    Vitals: T(F): 98.2 (05-07-21 @ 05:25), Max: 99.3 (05-06-21 @ 22:45)  HR: 84 (05-07-21 @ 05:25)  BP: 123/60 (05-07-21 @ 05:25) (114/57 - 153/83)  RR: 18 (05-07-21 @ 05:25)  SpO2: 100% (05-07-21 @ 07:52)      Diet: Diet, Low Fiber:   Prosource Gelatein 20 Sugar Free     Qty per Day:  1  Supplement Feeding Modality:  Oral  Ensure Enlive Cans or Servings Per Day:  1       Frequency:  Daily (05-06-21 @ 12:51)    IV Fluid: dextrose 5% + sodium chloride 0.45%. 1000 milliLiter(s) (75 mL/Hr) IV Continuous <Continuous>  magnesium oxide 400 milliGRAM(s) Oral three times a day with meals  sodium chloride 0.9%. 1000 milliLiter(s) (100 mL/Hr) IV Continuous <Continuous>      In:   05-06-21 @ 07:01  -  05-07-21 @ 07:00  --------------------------------------------------------  IN: 240 mL      Out:   05-06-21 @ 07:01  -  05-07-21 @ 07:00  --------------------------------------------------------  OUT:    Voided (mL): 625 mL  Total OUT: 625 mL        Net:   05-06-21 @ 07:01  -  05-07-21 @ 07:00  --------------------------------------------------------  NET: -385 mL        Physical Examination:  General Appearance: NAD, alert and cooperative  Heart: S1 and S2. No murmurs. Rhythm is regular.  Lungs: Clear to auscultation BL without rales, rhonchi, wheezing, crackles or diminished breath sounds.  Abdomen: Soft, nondistended, nontender. No rigidity, guarding, or rebound tenderness.     Medications: [Standing]  bisacodyl 5 milliGRAM(s) Oral daily  chlorhexidine 4% Liquid 1 Application(s) Topical <User Schedule>  dextrose 5% + sodium chloride 0.45%. 1000 milliLiter(s) (75 mL/Hr) IV Continuous <Continuous>  enoxaparin Injectable 40 milliGRAM(s) SubCutaneous daily  magnesium oxide 400 milliGRAM(s) Oral three times a day with meals  pantoprazole    Tablet 40 milliGRAM(s) Oral before breakfast  polyethylene glycol 3350 17 Gram(s) Oral two times a day  senna 2 Tablet(s) Oral two times a day  sodium chloride 0.9%. 1000 milliLiter(s) (100 mL/Hr) IV Continuous <Continuous>    Medications:[PRN]    Labs:                        9.5    7.98  )-----------( 185      ( 06 May 2021 08:30 )             33.3   05-06    137  |  102  |  12  ----------------------------<  105<H>  5.2<H>   |  26  |  0.6<L>    Ca    8.0<L>      06 May 2021 08:30  Mg     1.6     05-06    TPro  6.0  /  Alb  2.7<L>  /  TBili  0.5  /  DBili  x   /  AST  22  /  ALT  <5  /  AlkPhos  46  05-06  LIVER FUNCTIONS - ( 06 May 2021 00:31 )  Alb: 2.7 g/dL / Pro: 6.0 g/dL / ALK PHOS: 46 U/L / ALT: <5 U/L / AST: 22 U/L / GGT: x         PT/INR - ( 05 May 2021 16:00 )   PT: 14.50 sec;   INR: 1.26 ratio         PTT - ( 05 May 2021 16:00 )  PTT:35.2 sec    Micro/Urine:    Imaging:  None/24h

## 2021-05-07 NOTE — PROGRESS NOTE ADULT - ASSESSMENT
80 year old male with no past medical history (has not seen a doctor in many years) presented to the ED due to loose bowel movements.    # Rectal adenocarcinoma moderate to poorly differentiated  # Bloody Bowel Movements  - CTAP: Circumferential rectal wall thickening spanning at least 14 cm in length with a partially exophytic mass extending into the right mesorectal fat measuring approximately 5.7 x 3.0 x 6.8 cm. The exophytic mass abuts the right posterior lateral prostate gland. Primary consideration is rectal cancer. Limited ability to exclude superimposed proctitis. 9 mm right lower lobe pulmonary nodule. Small wedge-shaped hypodensities at the superior aspect of the spleen may reflect splenic infarcts.  - s/p colonoscopy 4/27: rectal mass extending from the anus up to 17 cm proximally, multiple polyps s/p polypectomy, a 5 cm transverse colon polyp  that was not removed  - Pan CT completed for staging: Well-defined right lower lobe nodule measuring 6 mm, 3 mm lingular nodule  - MRI pelvis done showed: 18 cm long polypoidal rectal mass with invasion of the right meso rectal fascia and possibly the prostate gland.   - Hem/Onc: Will start first cycle of FOLFOX on 5/7/21 . Pt will be getting 4 cycles of FOLFOX every 2 wks , followed by evaluation for chemo RT and then surgical eval.  - Seen by colorectal surgeon, requested colonoscopy with EMR of transverse polyp and EUS on 4/30 cancelled due to poor prep.  - CEA 24, CA 19-9 31  - GI : no intervention  - For constipation: increased meds: senna, bisacodyl, miralax.    - Possible SNF    # encephalopathy, resolved  - Appears to be at baseline  - CT head- no acute changes   - TSH, B12, folate, RPR - wnl     # Pancreatic hypodensity  - Possible cyst/IPMN  - MRI pancreatic protocol (w/wo contrast) as an outpatient    # Lung nodule   - CT:  Right lower lobe solid nodule measuring 9 mm.   - f/u OP    # Subacute Rib Fracture   - CT:  Subacute fracture of the left 10th lateral rib.   - Pain control prn     # Echo tricuspid echodensity cannot rule out vegetation  Ddx includes infective endocarditis vs metastasis  - Cardiology consulted for MYA : negative  - BCx was negative 4/21, repeated 4/30  - Afebrile    A1C 5.2, lipid panel normal    DVT PPx: SCDs  GI PPX: Protonix 40 mg PO  Diet: Regular  Activity: Increase as tolerated  Dispo: home with HHS or SNF  Code Status: full code

## 2021-05-07 NOTE — PROGRESS NOTE ADULT - ASSESSMENT
80M w/ no past medical history presented to the ED due to loose bowel movements. Patient has had decreased PO intake, nausea, chronic diarrhea for about a year and hematochezia so the daughter decided to bring him to the hospital. Heme/Onc is consulted for Rectal mass.     # Rectal adenocarcinoma Locally advanced   MRI showing : 18 cm long polypoidal rectal mass with invasion of the right meso rectal fascia and possibly the prostate gland. Additional involvement of the analsphincter complex--T4(a or b)N0M0    - MRI results were discussed with pt in detail .   He was told that he has locally advanced disease and we recommend total neoadjuvant therapy . Consisting of oxaliplatin based chemotherapy followed by chemo RT and then evaluation for surgical resection .   -s/p mediport palcement by IR .   -will start with treatment today   -Will start first cycle of FOLFOX on 5/7/21 . Pt will be getting 4 cycles of FOLFOX every 2 wks , followed by evaluation for chemo RT and then surgical eval.   -Side effect profile was discussed with pt and he is agreeable .    # Acute blood loss anemia 2/2 LGIB  # HUSEYIN  - Hgb 5.8 on admission, s/p PRBC x3  - Iron Saturation: 12%, Ferritin 34, MCV 68.9 - consistent with microcytic Iron Deficiency Anemia  - s/p iv venofer     Plan : Treatment today . Can be d/c to facility after first cycle from heme onc stand point if medically stable to f/u as out pt .

## 2021-05-08 LAB
ANION GAP SERPL CALC-SCNC: 8 MMOL/L — SIGNIFICANT CHANGE UP (ref 7–14)
BASOPHILS # BLD AUTO: 0.01 K/UL — SIGNIFICANT CHANGE UP (ref 0–0.2)
BASOPHILS NFR BLD AUTO: 0.1 % — SIGNIFICANT CHANGE UP (ref 0–1)
BLD GP AB SCN SERPL QL: SIGNIFICANT CHANGE UP
BUN SERPL-MCNC: 16 MG/DL — SIGNIFICANT CHANGE UP (ref 10–20)
CALCIUM SERPL-MCNC: 8.1 MG/DL — LOW (ref 8.5–10.1)
CHLORIDE SERPL-SCNC: 102 MMOL/L — SIGNIFICANT CHANGE UP (ref 98–110)
CO2 SERPL-SCNC: 25 MMOL/L — SIGNIFICANT CHANGE UP (ref 17–32)
CREAT SERPL-MCNC: 0.6 MG/DL — LOW (ref 0.7–1.5)
EOSINOPHIL # BLD AUTO: 0 K/UL — SIGNIFICANT CHANGE UP (ref 0–0.7)
EOSINOPHIL NFR BLD AUTO: 0 % — SIGNIFICANT CHANGE UP (ref 0–8)
GLUCOSE SERPL-MCNC: 169 MG/DL — HIGH (ref 70–99)
HCT VFR BLD CALC: 29.2 % — LOW (ref 42–52)
HCT VFR BLD CALC: 30 % — LOW (ref 42–52)
HGB BLD-MCNC: 8.5 G/DL — LOW (ref 14–18)
HGB BLD-MCNC: 8.6 G/DL — LOW (ref 14–18)
IMM GRANULOCYTES NFR BLD AUTO: 0.8 % — HIGH (ref 0.1–0.3)
LYMPHOCYTES # BLD AUTO: 0.71 K/UL — LOW (ref 1.2–3.4)
LYMPHOCYTES # BLD AUTO: 8.1 % — LOW (ref 20.5–51.1)
MAGNESIUM SERPL-MCNC: 2 MG/DL — SIGNIFICANT CHANGE UP (ref 1.8–2.4)
MCHC RBC-ENTMCNC: 22.1 PG — LOW (ref 27–31)
MCHC RBC-ENTMCNC: 22.5 PG — LOW (ref 27–31)
MCHC RBC-ENTMCNC: 28.7 G/DL — LOW (ref 32–37)
MCHC RBC-ENTMCNC: 29.1 G/DL — LOW (ref 32–37)
MCV RBC AUTO: 76.9 FL — LOW (ref 80–94)
MCV RBC AUTO: 77.2 FL — LOW (ref 80–94)
MONOCYTES # BLD AUTO: 0.36 K/UL — SIGNIFICANT CHANGE UP (ref 0.1–0.6)
MONOCYTES NFR BLD AUTO: 4.1 % — SIGNIFICANT CHANGE UP (ref 1.7–9.3)
NEUTROPHILS # BLD AUTO: 7.66 K/UL — HIGH (ref 1.4–6.5)
NEUTROPHILS NFR BLD AUTO: 86.9 % — HIGH (ref 42.2–75.2)
NRBC # BLD: 0 /100 WBCS — SIGNIFICANT CHANGE UP (ref 0–0)
NRBC # BLD: 0 /100 WBCS — SIGNIFICANT CHANGE UP (ref 0–0)
PLATELET # BLD AUTO: 151 K/UL — SIGNIFICANT CHANGE UP (ref 130–400)
PLATELET # BLD AUTO: 167 K/UL — SIGNIFICANT CHANGE UP (ref 130–400)
POTASSIUM SERPL-MCNC: 4.6 MMOL/L — SIGNIFICANT CHANGE UP (ref 3.5–5)
POTASSIUM SERPL-SCNC: 4.6 MMOL/L — SIGNIFICANT CHANGE UP (ref 3.5–5)
RBC # BLD: 3.78 M/UL — LOW (ref 4.7–6.1)
RBC # BLD: 3.9 M/UL — LOW (ref 4.7–6.1)
RBC # FLD: 25.4 % — HIGH (ref 11.5–14.5)
RBC # FLD: 25.8 % — HIGH (ref 11.5–14.5)
SODIUM SERPL-SCNC: 135 MMOL/L — SIGNIFICANT CHANGE UP (ref 135–146)
WBC # BLD: 8.8 K/UL — SIGNIFICANT CHANGE UP (ref 4.8–10.8)
WBC # BLD: 8.81 K/UL — SIGNIFICANT CHANGE UP (ref 4.8–10.8)
WBC # FLD AUTO: 8.8 K/UL — SIGNIFICANT CHANGE UP (ref 4.8–10.8)
WBC # FLD AUTO: 8.81 K/UL — SIGNIFICANT CHANGE UP (ref 4.8–10.8)

## 2021-05-08 PROCEDURE — 99233 SBSQ HOSP IP/OBS HIGH 50: CPT

## 2021-05-08 PROCEDURE — 99232 SBSQ HOSP IP/OBS MODERATE 35: CPT

## 2021-05-08 RX ADMIN — FLUOROURACIL 43.69 MILLIGRAM(S): 50 INJECTION, SOLUTION INTRAVENOUS at 20:53

## 2021-05-08 RX ADMIN — ENOXAPARIN SODIUM 40 MILLIGRAM(S): 100 INJECTION SUBCUTANEOUS at 11:59

## 2021-05-08 RX ADMIN — PANTOPRAZOLE SODIUM 40 MILLIGRAM(S): 20 TABLET, DELAYED RELEASE ORAL at 06:20

## 2021-05-08 NOTE — PROGRESS NOTE ADULT - SUBJECTIVE AND OBJECTIVE BOX
Hien Stanton MD  Hospitalist   Spectra: [phone]    Patient is a 80y old  Male who presents with a chief complaint of Bloody stools (02 May 2021 05:48)      INTERVAL HPI/OVERNIGHT EVENTS: no acute overnight events noted  started on chemo yesterday - tolerating well     REVIEW OF SYSTEMS: negative  Vital Signs Last 24 Hrs  T(C): 35.6 (08 May 2021 05:06), Max: 37.4 (07 May 2021 15:02)  T(F): 96.1 (08 May 2021 05:06), Max: 99.3 (07 May 2021 15:02)  HR: 68 (08 May 2021 05:06) (68 - 91)  BP: 107/54 (08 May 2021 05:06) (107/54 - 114/58)  BP(mean): --  RR: 20 (08 May 2021 05:06) (18 - 20)  SpO2: --    PHYSICAL EXAM:   NAD; Normocephalic;   LUNGS - no wheezing  HEART: S1 S2+   ABDOMEN: Soft, Nontender, non distended  EXTREMITIES: no cyanosis; no edema  NERVOUS SYSTEM:  Awake and alert; no focal neuro deficits appreciated  left chest chemoport in place     LABS:                        8.6    8.81  )-----------( 167      ( 08 May 2021 06:53 )             30.0     05-08    135  |  102  |  16  ----------------------------<  169<H>  4.6   |  25  |  0.6<L>    Ca    8.1<L>      08 May 2021 06:53  Mg     2.0     05-08

## 2021-05-08 NOTE — PROGRESS NOTE ADULT - SUBJECTIVE AND OBJECTIVE BOX
Patient is a 80y old  Male who presents with a chief complaint of diarrhea (08 May 2021 09:06)      Subjective: He is tolerating chemo well. No issues  Has good appetite       Vital Signs Last 24 Hrs  T(C): 35.9 (08 May 2021 14:08), Max: 35.9 (08 May 2021 14:08)  T(F): 96.7 (08 May 2021 14:08), Max: 96.7 (08 May 2021 14:08)  HR: 78 (08 May 2021 14:08) (68 - 78)  BP: 108/58 (08 May 2021 14:08) (107/54 - 114/56)  BP(mean): --  RR: 18 (08 May 2021 14:08) (18 - 20)  SpO2: --    PHYSICAL EXAM  GENERAL: NAD, Resting in bed  CHEST/LUNG: Clear to auscultation bilaterally; No wheezing or rubs. Left chest chemo port   HEART: Regular rate and rhythm; No murmurs, rubs, or gallops  ABDOMEN: Bowel sounds present; Soft, Nontender, Nondistended.   EXTREMITIES:  No clubbing, cyanosis, or edema  NERVOUS SYSTEM:  Alert & Oriented X3    MEDICATIONS  (STANDING):  chlorhexidine 4% Liquid 1 Application(s) Topical <User Schedule>  enoxaparin Injectable 40 milliGRAM(s) SubCutaneous daily  fluorouracil IVPB (eMAR) 2425 milliGRAM(s) IV Intermittent daily  pantoprazole    Tablet 40 milliGRAM(s) Oral before breakfast  senna 2 Tablet(s) Oral two times a day    MEDICATIONS  (PRN):      LABS:                          8.6    8.81  )-----------( 167      ( 08 May 2021 06:53 )             30.0         Mean Cell Volume : 76.9 fL  Mean Cell Hemoglobin : 22.1 pg  Mean Cell Hemoglobin Concentration : 28.7 g/dL  Auto Neutrophil # : 7.66 K/uL  Auto Lymphocyte # : 0.71 K/uL  Auto Monocyte # : 0.36 K/uL  Auto Eosinophil # : 0.00 K/uL  Auto Basophil # : 0.01 K/uL  Auto Neutrophil % : 86.9 %  Auto Lymphocyte % : 8.1 %  Auto Monocyte % : 4.1 %  Auto Eosinophil % : 0.0 %  Auto Basophil % : 0.1 %      Serial CBC's  05-08 @ 06:53  Hct-30.0 / Hgb-8.6 / Plat-167 / RBC-3.90 / WBC-8.81  Serial CBC's  05-07 @ 23:16  Hct-29.2 / Hgb-8.5 / Plat-151 / RBC-3.78 / WBC-8.80  Serial CBC's  05-07 @ 08:08  Hct-30.2 / Hgb-8.5 / Plat-152 / RBC-3.91 / WBC-9.39  Serial CBC's  05-06 @ 08:30  Hct-33.3 / Hgb-9.5 / Plat-185 / RBC-4.34 / WBC-7.98  Serial CBC's  05-05 @ 16:00  Hct-35.8 / Hgb-10.0 / Plat-229 / RBC-4.64 / WBC-9.46  Serial CBC's  05-05 @ 06:57  Hct-34.0 / Hgb-9.5 / Plat-212 / RBC-4.42 / WBC-6.22      05-08    135  |  102  |  16  ----------------------------<  169<H>  4.6   |  25  |  0.6<L>    Ca    8.1<L>      08 May 2021 06:53  Mg     2.0     05-08                                    BLOOD SMEAR INTERPRETATION:       RADIOLOGY & ADDITIONAL STUDIES:

## 2021-05-08 NOTE — PROGRESS NOTE ADULT - ASSESSMENT
Plan:    1. Rectal adenocarcinoma Locally advanced   2. chronic diarrhea - resolved   3. Encephalopathy - resolved   4. Pancreatic hypodensity   5. lung nodule   6. acute blood loss anemia 2/2 LGIB + HUSEYIN     - rectal mass extending from the anus up to 17 cm proximally, multiple polyps s/p polypectomy, a 5 cm transverse colon polyp  that was not removed  - MRI pelvis - 18 cm long polypoidal rectal mass with invasion of the right meso rectal fascia and possibly the prostate gland. Additional involvement of the anal sphincter complex--T4(a or b)N0Mx  -  hem/onc on board - plan to start first cycle of FOLFOX on 5/7/21 . followed by 4 cycles of FOLFOX every 2 wks , followed by evaluation for chemo RT and then surgical eval.   - s/p chemoport placement 5/6  - currently getting chemo regimen - dose ending on 5/9  - outpatient follow up for pancreatic hypodensity and lung nodule   - Hb of 5.8 on admission from lower GI bleed   - s/p transfusion - now stable   - dvt proph - will start on lovenox - now that hb stable - will keep close eye    Dispo: snf on discharge - cm on board   pending: on chemo - plan to send him to snf if tolerates meds . - likely on monday    Plan:    1. Rectal adenocarcinoma Locally advanced   2. chronic diarrhea - resolved   3. Encephalopathy - resolved   4. Pancreatic hypodensity   5. lung nodule   6. acute blood loss anemia 2/2 LGIB + HUSEYIN     - rectal mass extending from the anus up to 17 cm proximally, multiple polyps s/p polypectomy, a 5 cm transverse colon polyp  that was not removed  - MRI pelvis - 18 cm long polypoidal rectal mass with invasion of the right meso rectal fascia and possibly the prostate gland. Additional involvement of the anal sphincter complex--T4(a or b)N0Mx  -  hem/onc on board - plan to start first cycle of FOLFOX on 5/7/21 . followed by 4 cycles of FOLFOX every 2 wks , followed by evaluation for chemo RT and then surgical eval.   - s/p chemoport placement 5/6  - currently getting chemo regimen - dose ending on 5/9  - outpatient follow up for pancreatic hypodensity and lung nodule   - Hb of 5.8 on admission from lower GI bleed   - s/p transfusion - now stable   - dvt proph - will start on lovenox - now that hb stable - will keep close eye    Dispo: snf on discharge - cm on board   pending: on chemo - plan to send him to snf if tolerates meds . - likely on monday     Patient is agreeable for J&J vaccine - will administer after completion of chemo session. Likely on monday

## 2021-05-08 NOTE — PROGRESS NOTE ADULT - ASSESSMENT
80M w/ no past medical history presented to the ED due to loose bowel movements. Patient has had decreased PO intake, nausea, chronic diarrhea for about a year and hematochezia so the daughter decided to bring him to the hospital. Heme/Onc is consulted for Rectal mass.     # Rectal adenocarcinoma Locally advanced   MRI showing : 18 cm long polypoidal rectal mass with invasion of the right meso rectal fascia and possibly the prostate gland. Additional involvement of the analsphincter complex--T4(a or b)N0M0    - MRI results were discussed with pt in detail .   He was told that he has locally advanced disease and we recommend total neoadjuvant therapy . Consisting of oxaliplatin based chemotherapy followed by chemo RT and then evaluation for surgical resection .   -s/p mediport palcement by IR .     He started FOLFOX cycle 1 chemo on 5/7/21- he will complete infusion on 5/9/21 . This chemo regimen is every 2 weeks.   Pt will be getting 4 cycles of FOLFOX every 2 wks , followed by evaluation for chemo RT and then surgical eval.       # Acute blood loss anemia 2/2 LGIB  # HUSEYIN  - Hgb 5.8 on admission, s/p PRBC x3  - Iron Saturation: 12%, Ferritin 34, MCV 68.9 - consistent with microcytic Iron Deficiency Anemia  - s/p iv venofer     Plan : Can be d/c to facility after completion of  first cycle chemo from heme onc stand point if medically stable to f/u as out pt .

## 2021-05-08 NOTE — PROGRESS NOTE ADULT - SUBJECTIVE AND OBJECTIVE BOX
SUBJECTIVE:  HPI:  80 year old male with no past medical history (has not seen a doctor in many years, not on any meds) presented to the ED due to loose bowel movements. Patient is alert and orient, but appears to be confused and is a poor historian. History obtained from patient and the charts. Patient has had chronic diarrhea for the past year which is foul smelling, then over the past week it has gotten worse with amount and frequency. The patients daughter believed that their was blood in here stools so she brought him to the ED. The patient endorses decreased PO intake and nausea, but denies abdominal pain. He is not on AC, but will take a baby aspirin from time to time. He denies SOB, CP, changes in urination, headache. He lives alone and ambulates with a walker.   In the ED Vitals: Temp 97.6F, , / 73, RR 18, SpO2 98% on RA. Hb was found to be 5.8. Rectal performed by ED team negative. Given 2 units of PRBC. Lactate of 3. A CT of the abdomen:  Marked wall thickening of the distal sigmoid colon and rectum. Differential includes proctocolitis although underlying neoplastic process cannot be excluded. Direct visualization with colonoscopy is recommended. Subacute fracture of the left 10th lateral rib. Right lower lobe solid nodule measuring 9 mm. He was given Cipro flagyl in ED. Patient admitted for bloody bowel movements.        (22 Apr 2021 04:35)      PAST MEDICAL & SURGICAL HISTORY  PAST MEDICAL & SURGICAL HISTORY:  No pertinent past medical history    Amputation of digit of left hand      SOCIAL HISTORY:    ALLERGIES:  No Known Allergies    MEDICATIONS:  STANDING MEDICATIONS  chlorhexidine 4% Liquid 1 Application(s) Topical <User Schedule>  dextrose 5% + sodium chloride 0.45%. 1000 milliLiter(s) IV Continuous <Continuous>  enoxaparin Injectable 40 milliGRAM(s) SubCutaneous daily  fluorouracil IVPB (eMAR) 2425 milliGRAM(s) IV Intermittent daily  pantoprazole    Tablet 40 milliGRAM(s) Oral before breakfast  senna 2 Tablet(s) Oral two times a day  sodium chloride 0.9%. 1000 milliLiter(s) IV Continuous <Continuous>    PRN MEDICATIONS    VITALS:   T(F): 96.1  HR: 68  BP: 107/54  RR: 20  SpO2: --    LABS:                        8.6    8.81  )-----------( 167      ( 08 May 2021 06:53 )             30.0     05-08    135  |  102  |  16  ----------------------------<  169<H>  4.6   |  25  |  0.6<L>    Ca    8.1<L>      08 May 2021 06:53  Mg     2.0     05-08                    RADIOLOGY:    PHYSICAL EXAM:  GEN: No acute distress  HEENT: AT/NC PEERLA, EOMI  LUNGS: Clear to auscultation bilaterally  HEART: S1/S2 present  ABD: Soft, non-tender, non-distended  EXT: No edema, no rashes, no cyanosis  NEURO: AAOX3

## 2021-05-09 LAB
ANION GAP SERPL CALC-SCNC: 7 MMOL/L — SIGNIFICANT CHANGE UP (ref 7–14)
BASOPHILS # BLD AUTO: 0.01 K/UL — SIGNIFICANT CHANGE UP (ref 0–0.2)
BASOPHILS NFR BLD AUTO: 0.1 % — SIGNIFICANT CHANGE UP (ref 0–1)
BUN SERPL-MCNC: 18 MG/DL — SIGNIFICANT CHANGE UP (ref 10–20)
CALCIUM SERPL-MCNC: 8.2 MG/DL — LOW (ref 8.5–10.1)
CHLORIDE SERPL-SCNC: 104 MMOL/L — SIGNIFICANT CHANGE UP (ref 98–110)
CO2 SERPL-SCNC: 25 MMOL/L — SIGNIFICANT CHANGE UP (ref 17–32)
CREAT SERPL-MCNC: 0.5 MG/DL — LOW (ref 0.7–1.5)
EOSINOPHIL # BLD AUTO: 0 K/UL — SIGNIFICANT CHANGE UP (ref 0–0.7)
EOSINOPHIL NFR BLD AUTO: 0 % — SIGNIFICANT CHANGE UP (ref 0–8)
GLUCOSE SERPL-MCNC: 130 MG/DL — HIGH (ref 70–99)
HCT VFR BLD CALC: 28.4 % — LOW (ref 42–52)
HGB BLD-MCNC: 8.3 G/DL — LOW (ref 14–18)
IMM GRANULOCYTES NFR BLD AUTO: 0.7 % — HIGH (ref 0.1–0.3)
LYMPHOCYTES # BLD AUTO: 0.72 K/UL — LOW (ref 1.2–3.4)
LYMPHOCYTES # BLD AUTO: 7.1 % — LOW (ref 20.5–51.1)
MAGNESIUM SERPL-MCNC: 1.9 MG/DL — SIGNIFICANT CHANGE UP (ref 1.8–2.4)
MCHC RBC-ENTMCNC: 22.7 PG — LOW (ref 27–31)
MCHC RBC-ENTMCNC: 29.2 G/DL — LOW (ref 32–37)
MCV RBC AUTO: 77.8 FL — LOW (ref 80–94)
MONOCYTES # BLD AUTO: 0.35 K/UL — SIGNIFICANT CHANGE UP (ref 0.1–0.6)
MONOCYTES NFR BLD AUTO: 3.5 % — SIGNIFICANT CHANGE UP (ref 1.7–9.3)
NEUTROPHILS # BLD AUTO: 8.98 K/UL — HIGH (ref 1.4–6.5)
NEUTROPHILS NFR BLD AUTO: 88.6 % — HIGH (ref 42.2–75.2)
NRBC # BLD: 0 /100 WBCS — SIGNIFICANT CHANGE UP (ref 0–0)
PLATELET # BLD AUTO: 196 K/UL — SIGNIFICANT CHANGE UP (ref 130–400)
POTASSIUM SERPL-MCNC: 4.5 MMOL/L — SIGNIFICANT CHANGE UP (ref 3.5–5)
POTASSIUM SERPL-SCNC: 4.5 MMOL/L — SIGNIFICANT CHANGE UP (ref 3.5–5)
RBC # BLD: 3.65 M/UL — LOW (ref 4.7–6.1)
RBC # FLD: 25.5 % — HIGH (ref 11.5–14.5)
SODIUM SERPL-SCNC: 136 MMOL/L — SIGNIFICANT CHANGE UP (ref 135–146)
WBC # BLD: 10.13 K/UL — SIGNIFICANT CHANGE UP (ref 4.8–10.8)
WBC # FLD AUTO: 10.13 K/UL — SIGNIFICANT CHANGE UP (ref 4.8–10.8)

## 2021-05-09 PROCEDURE — 99232 SBSQ HOSP IP/OBS MODERATE 35: CPT

## 2021-05-09 PROCEDURE — 99233 SBSQ HOSP IP/OBS HIGH 50: CPT

## 2021-05-09 RX ORDER — ZOLPIDEM TARTRATE 10 MG/1
5 TABLET ORAL ONCE
Refills: 0 | Status: DISCONTINUED | OUTPATIENT
Start: 2021-05-09 | End: 2021-05-09

## 2021-05-09 RX ADMIN — ENOXAPARIN SODIUM 40 MILLIGRAM(S): 100 INJECTION SUBCUTANEOUS at 12:40

## 2021-05-09 RX ADMIN — SENNA PLUS 2 TABLET(S): 8.6 TABLET ORAL at 05:54

## 2021-05-09 RX ADMIN — CHLORHEXIDINE GLUCONATE 1 APPLICATION(S): 213 SOLUTION TOPICAL at 05:53

## 2021-05-09 RX ADMIN — PANTOPRAZOLE SODIUM 40 MILLIGRAM(S): 20 TABLET, DELAYED RELEASE ORAL at 05:54

## 2021-05-09 RX ADMIN — ZOLPIDEM TARTRATE 5 MILLIGRAM(S): 10 TABLET ORAL at 02:54

## 2021-05-09 NOTE — PROGRESS NOTE ADULT - SUBJECTIVE AND OBJECTIVE BOX
Patient is a 80y old  Male who presents with a chief complaint of diarrhea (09 May 2021 10:11)      Subjective: No new events. Tolerating chemo well.       Vital Signs Last 24 Hrs  T(C): 35.6 (09 May 2021 05:21), Max: 36.4 (08 May 2021 20:06)  T(F): 96 (09 May 2021 05:21), Max: 97.5 (08 May 2021 20:06)  HR: 74 (09 May 2021 05:21) (74 - 78)  BP: 136/65 (09 May 2021 05:21) (108/58 - 136/65)  BP(mean): --  RR: 18 (09 May 2021 05:21) (18 - 18)  SpO2: --    PHYSICAL EXAM  General: adult in NAD  CV: normal S1/S2 with no murmur rubs or gallops  Lungs: positive air movement b/l ant lungs  Abdomen: soft non-tender   Ext: no clubbing cyanosis or edema  Neuro: alert and oriented X 4, no focal deficits    MEDICATIONS  (STANDING):  chlorhexidine 4% Liquid 1 Application(s) Topical <User Schedule>  enoxaparin Injectable 40 milliGRAM(s) SubCutaneous daily  pantoprazole    Tablet 40 milliGRAM(s) Oral before breakfast  senna 2 Tablet(s) Oral two times a day    MEDICATIONS  (PRN):      LABS:                          8.3    10.13 )-----------( 196      ( 09 May 2021 07:23 )             28.4         Mean Cell Volume : 77.8 fL  Mean Cell Hemoglobin : 22.7 pg  Mean Cell Hemoglobin Concentration : 29.2 g/dL  Auto Neutrophil # : 8.98 K/uL  Auto Lymphocyte # : 0.72 K/uL  Auto Monocyte # : 0.35 K/uL  Auto Eosinophil # : 0.00 K/uL  Auto Basophil # : 0.01 K/uL  Auto Neutrophil % : 88.6 %  Auto Lymphocyte % : 7.1 %  Auto Monocyte % : 3.5 %  Auto Eosinophil % : 0.0 %  Auto Basophil % : 0.1 %      Serial CBC's  05-09 @ 07:23  Hct-28.4 / Hgb-8.3 / Plat-196 / RBC-3.65 / WBC-10.13  Serial CBC's  05-08 @ 06:53  Hct-30.0 / Hgb-8.6 / Plat-167 / RBC-3.90 / WBC-8.81  Serial CBC's  05-07 @ 23:16  Hct-29.2 / Hgb-8.5 / Plat-151 / RBC-3.78 / WBC-8.80  Serial CBC's  05-07 @ 08:08  Hct-30.2 / Hgb-8.5 / Plat-152 / RBC-3.91 / WBC-9.39  Serial CBC's  05-06 @ 08:30  Hct-33.3 / Hgb-9.5 / Plat-185 / RBC-4.34 / WBC-7.98  Serial CBC's  05-05 @ 16:00  Hct-35.8 / Hgb-10.0 / Plat-229 / RBC-4.64 / WBC-9.46      05-09    136  |  104  |  18  ----------------------------<  130<H>  4.5   |  25  |  0.5<L>    Ca    8.2<L>      09 May 2021 07:23  Mg     1.9     05-09                                    BLOOD SMEAR INTERPRETATION:       RADIOLOGY & ADDITIONAL STUDIES:

## 2021-05-09 NOTE — PROGRESS NOTE ADULT - ASSESSMENT
80M w/ no past medical history presented to the ED due to loose bowel movements. Patient has had decreased PO intake, nausea, chronic diarrhea for about a year and hematochezia so the daughter decided to bring him to the hospital. Heme/Onc is consulted for Rectal mass.     # Rectal adenocarcinoma Locally advanced   MRI showing : 18 cm long polypoidal rectal mass with invasion of the right meso rectal fascia and possibly the prostate gland. Additional involvement of the analsphincter complex--T4(a or b)N0M0    - MRI results were discussed with pt in detail .   He was told that he has locally advanced disease and we recommend total neoadjuvant therapy . Consisting of oxaliplatin based chemotherapy followed by chemo RT and then evaluation for surgical resection .   -s/p mediport palcement by IR .     He started FOLFOX cycle 1 chemo on 5/7/21- he will complete infusion on 5/9/21 . This chemo regimen is every 2 weeks.   Pt will be getting 4 cycles of FOLFOX every 2 wks , followed by evaluation for chemo RT and then surgical eval.       # Acute blood loss anemia 2/2 LGIB  # HUSEYIN  - Hgb 5.8 on admission, s/p PRBC x3  - Iron Saturation: 12%, Ferritin 34, MCV 68.9 - consistent with microcytic Iron Deficiency Anemia  - s/p iv venofer   Monitor CBC     Plan : Can be d/c to facility after completion of  first cycle chemo from heme onc stand point if medically stable to f/u as out pt .

## 2021-05-09 NOTE — OCCUPATIONAL THERAPY INITIAL EVALUATION ADULT - SPECIFY REASON(S)
Attempted to see pt for OT bedside assessment. Pt refuses participation at this time stating " leave me alone I don't want to move, Just leave me be." OT to f/u when pt agreeable. CINDI santana

## 2021-05-09 NOTE — PROGRESS NOTE ADULT - SUBJECTIVE AND OBJECTIVE BOX
Hien Stanton MD  Hospitalist   Spectra: [phone]    Patient is a 80y old  Male who presents with a chief complaint of diarrhea (08 May 2021 09:06)      INTERVAL HPI/OVERNIGHT EVENTS: no acute overnight events noted   completed first cycle of chemo   tolerated well     REVIEW OF SYSTEMS: negative  Vital Signs Last 24 Hrs  T(C): 35.6 (09 May 2021 05:21), Max: 36.4 (08 May 2021 20:06)  T(F): 96 (09 May 2021 05:21), Max: 97.5 (08 May 2021 20:06)  HR: 74 (09 May 2021 05:21) (74 - 78)  BP: 136/65 (09 May 2021 05:21) (108/58 - 136/65)  BP(mean): --  RR: 18 (09 May 2021 05:21) (18 - 18)  SpO2: --    PHYSICAL EXAM:   NAD; Normocephalic;   LUNGS - no wheezing  HEART: S1 S2+   ABDOMEN: Soft, Nontender, non distended  EXTREMITIES: no cyanosis; no edema  NERVOUS SYSTEM:  Awake and alert; no focal neuro deficits appreciated    LABS:                        8.3    10.13 )-----------( 196      ( 09 May 2021 07:23 )             28.4     05-09    136  |  104  |  18  ----------------------------<  130<H>  4.5   |  25  |  0.5<L>    Ca    8.2<L>      09 May 2021 07:23  Mg     1.9     05-09         Hien Stanton MD  Hospitalist   Spectra: [phone]    Patient is a 80y old  Male who presents with a chief complaint of diarrhea (08 May 2021 09:06)      INTERVAL HPI/OVERNIGHT EVENTS: no acute overnight events noted   in last phase of current chemo  tolerating well    REVIEW OF SYSTEMS: negative  Vital Signs Last 24 Hrs  T(C): 35.6 (09 May 2021 05:21), Max: 36.4 (08 May 2021 20:06)  T(F): 96 (09 May 2021 05:21), Max: 97.5 (08 May 2021 20:06)  HR: 74 (09 May 2021 05:21) (74 - 78)  BP: 136/65 (09 May 2021 05:21) (108/58 - 136/65)  BP(mean): --  RR: 18 (09 May 2021 05:21) (18 - 18)  SpO2: --    PHYSICAL EXAM:   NAD; Normocephalic;   LUNGS - no wheezing  HEART: S1 S2+   ABDOMEN: Soft, Nontender, non distended  EXTREMITIES: no cyanosis; no edema  NERVOUS SYSTEM:  Awake and alert; no focal neuro deficits appreciated  left chest chemo port in place     LABS:                        8.3    10.13 )-----------( 196      ( 09 May 2021 07:23 )             28.4     05-09    136  |  104  |  18  ----------------------------<  130<H>  4.5   |  25  |  0.5<L>    Ca    8.2<L>      09 May 2021 07:23  Mg     1.9     05-09

## 2021-05-09 NOTE — PROGRESS NOTE ADULT - ASSESSMENT
Plan:    1. Rectal adenocarcinoma Locally advanced   2. chronic diarrhea - resolved   3. Encephalopathy - resolved   4. Pancreatic hypodensity   5. lung nodule   6. acute blood loss anemia 2/2 LGIB + HUSEYIN     - rectal mass extending from the anus up to 17 cm proximally, multiple polyps s/p polypectomy, a 5 cm transverse colon polyp  that was not removed  - MRI pelvis - 18 cm long polypoidal rectal mass with invasion of the right meso rectal fascia and possibly the prostate gland. Additional involvement of the anal sphincter complex--T4(a or b)N0Mx  -  hem/onc on board - plan to start first cycle of FOLFOX on 5/7/21 . followed by 4 cycles of FOLFOX every 2 wks , followed by evaluation for chemo RT and then surgical eval.   - s/p chemoport placement 5/6  - completed first cycle of chemo   - outpatient follow up for pancreatic hypodensity and lung nodule   - Hb of 5.8 on admission from lower GI bleed   - s/p transfusion - now stable  - mild drop noted after restarting lovenox but overall stable at this time    - dvt proph - lovenox - IMPROVE score of 5    Dispo: snf on discharge - cm on board   pending: on chemo - plan to send him to snf if tolerates meds . - likely on monday     Patient is agreeable for J&J vaccine - will administer after completion of chemo session. Likely on monday    Plan:    1. Rectal adenocarcinoma Locally advanced   2. chronic diarrhea - resolved   3. Encephalopathy - resolved   4. Pancreatic hypodensity   5. lung nodule   6. acute blood loss anemia 2/2 LGIB + HUSEYIN     - rectal mass extending from the anus up to 17 cm proximally, multiple polyps s/p polypectomy, a 5 cm transverse colon polyp  that was not removed  - MRI pelvis - 18 cm long polypoidal rectal mass with invasion of the right meso rectal fascia and possibly the prostate gland. Additional involvement of the anal sphincter complex--T4(a or b)N0Mx  -  hem/onc on board - plan to start first cycle of FOLFOX on 5/7/21 . followed by 4 cycles of FOLFOX every 2 wks , followed by evaluation for chemo RT and then surgical eval.   - s/p chemoport placement 5/6  - in last phase of chemo   - outpatient follow up for pancreatic hypodensity and lung nodule   - Hb of 5.8 on admission from lower GI bleed   - s/p transfusion - now stable  - mild drop noted after restarting lovenox but overall stable at this time    - dvt proph - lovenox - IMPROVE score of 5    Dispo: snf on discharge - cm on board   pending: on chemo - plan to send him to snf if tolerates meds . - likely on monday     Patient is agreeable for J&J vaccine - will administer after completion of chemo session. Likely on monday

## 2021-05-10 LAB
ANION GAP SERPL CALC-SCNC: 10 MMOL/L — SIGNIFICANT CHANGE UP (ref 7–14)
BASOPHILS # BLD AUTO: 0.01 K/UL — SIGNIFICANT CHANGE UP (ref 0–0.2)
BASOPHILS NFR BLD AUTO: 0.2 % — SIGNIFICANT CHANGE UP (ref 0–1)
BUN SERPL-MCNC: 15 MG/DL — SIGNIFICANT CHANGE UP (ref 10–20)
CALCIUM SERPL-MCNC: 8.5 MG/DL — SIGNIFICANT CHANGE UP (ref 8.5–10.1)
CHLORIDE SERPL-SCNC: 106 MMOL/L — SIGNIFICANT CHANGE UP (ref 98–110)
CO2 SERPL-SCNC: 25 MMOL/L — SIGNIFICANT CHANGE UP (ref 17–32)
CREAT SERPL-MCNC: 0.5 MG/DL — LOW (ref 0.7–1.5)
EOSINOPHIL # BLD AUTO: 0.02 K/UL — SIGNIFICANT CHANGE UP (ref 0–0.7)
EOSINOPHIL NFR BLD AUTO: 0.3 % — SIGNIFICANT CHANGE UP (ref 0–8)
GLUCOSE SERPL-MCNC: 92 MG/DL — SIGNIFICANT CHANGE UP (ref 70–99)
HCT VFR BLD CALC: 30.6 % — LOW (ref 42–52)
HGB BLD-MCNC: 8.9 G/DL — LOW (ref 14–18)
IMM GRANULOCYTES NFR BLD AUTO: 0.5 % — HIGH (ref 0.1–0.3)
LYMPHOCYTES # BLD AUTO: 0.99 K/UL — LOW (ref 1.2–3.4)
LYMPHOCYTES # BLD AUTO: 15.3 % — LOW (ref 20.5–51.1)
MAGNESIUM SERPL-MCNC: 1.9 MG/DL — SIGNIFICANT CHANGE UP (ref 1.8–2.4)
MCHC RBC-ENTMCNC: 22.7 PG — LOW (ref 27–31)
MCHC RBC-ENTMCNC: 29.1 G/DL — LOW (ref 32–37)
MCV RBC AUTO: 78.1 FL — LOW (ref 80–94)
MONOCYTES # BLD AUTO: 0.06 K/UL — LOW (ref 0.1–0.6)
MONOCYTES NFR BLD AUTO: 0.9 % — LOW (ref 1.7–9.3)
NEUTROPHILS # BLD AUTO: 5.35 K/UL — SIGNIFICANT CHANGE UP (ref 1.4–6.5)
NEUTROPHILS NFR BLD AUTO: 82.8 % — HIGH (ref 42.2–75.2)
NRBC # BLD: 0 /100 WBCS — SIGNIFICANT CHANGE UP (ref 0–0)
PLATELET # BLD AUTO: 216 K/UL — SIGNIFICANT CHANGE UP (ref 130–400)
POTASSIUM SERPL-MCNC: 5.1 MMOL/L — HIGH (ref 3.5–5)
POTASSIUM SERPL-SCNC: 5.1 MMOL/L — HIGH (ref 3.5–5)
RBC # BLD: 3.92 M/UL — LOW (ref 4.7–6.1)
RBC # FLD: 26.5 % — HIGH (ref 11.5–14.5)
SODIUM SERPL-SCNC: 141 MMOL/L — SIGNIFICANT CHANGE UP (ref 135–146)
WBC # BLD: 6.46 K/UL — SIGNIFICANT CHANGE UP (ref 4.8–10.8)
WBC # FLD AUTO: 6.46 K/UL — SIGNIFICANT CHANGE UP (ref 4.8–10.8)

## 2021-05-10 PROCEDURE — 99232 SBSQ HOSP IP/OBS MODERATE 35: CPT

## 2021-05-10 RX ORDER — PANTOPRAZOLE SODIUM 20 MG/1
1 TABLET, DELAYED RELEASE ORAL
Qty: 0 | Refills: 0 | DISCHARGE
Start: 2021-05-10

## 2021-05-10 RX ORDER — JNJ-78436735 50000000000 [PFU]/.5ML
0.5 SUSPENSION INTRAMUSCULAR ONCE
Refills: 0 | Status: COMPLETED | OUTPATIENT
Start: 2021-05-10 | End: 2021-05-10

## 2021-05-10 RX ORDER — LANOLIN ALCOHOL/MO/W.PET/CERES
3 CREAM (GRAM) TOPICAL AT BEDTIME
Refills: 0 | Status: DISCONTINUED | OUTPATIENT
Start: 2021-05-10 | End: 2021-05-17

## 2021-05-10 RX ADMIN — PANTOPRAZOLE SODIUM 40 MILLIGRAM(S): 20 TABLET, DELAYED RELEASE ORAL at 05:45

## 2021-05-10 RX ADMIN — Medication 3 MILLIGRAM(S): at 21:25

## 2021-05-10 RX ADMIN — CHLORHEXIDINE GLUCONATE 1 APPLICATION(S): 213 SOLUTION TOPICAL at 05:45

## 2021-05-10 RX ADMIN — ENOXAPARIN SODIUM 40 MILLIGRAM(S): 100 INJECTION SUBCUTANEOUS at 11:20

## 2021-05-10 RX ADMIN — SENNA PLUS 2 TABLET(S): 8.6 TABLET ORAL at 05:45

## 2021-05-10 RX ADMIN — JNJ-78436735 0.5 MILLILITER(S): 50000000000 SUSPENSION INTRAMUSCULAR at 14:11

## 2021-05-10 NOTE — OCCUPATIONAL THERAPY INITIAL EVALUATION ADULT - PERTINENT HX OF CURRENT PROBLEM, REHAB EVAL
80 year old male with no past medical history (has not seen a doctor in many years, not on any meds) presented to the ED due to loose bowel movements. Patient is alert and orient, but appears to be confused and is a poor historian. History obtained from patient and the charts. Patient has had chronic diarrhea for the past year which is foul smelling, then over the past week it has gotten worse with amount and frequency

## 2021-05-10 NOTE — CHART NOTE - NSCHARTNOTEFT_GEN_A_CORE
Registered Dietitian Follow-Up     Patient Profile Reviewed                           Yes [x]   No []     Nutrition History Previously Obtained        Yes []  No [x]       Pertinent Subjective Information: Pt reports appetite and intake adequate at this time. Improving to % of meals on average. As per pt & staff consumed ~75% breakfast this morning. No GI s/s intolerance to meals. Drinks Ensure 100%, consumes Prosource Gelatin intermittently.      Pertinent Medical Interventions:  1. [facility] 1st cycle FOLFOX 5/9, plan for 4 cycles q2wks; eval for chemo RT & surgical resection after chemo.   2. Chronic diarrhea--improved   3. Encephalopathy--resolved.   4. Pancreatic hypodensity   5. Lung nodule  6. Acute blood loss anemia 2/2 LGIB & HUSEYIN s/p 3u PRBC      Diet order: Low Fiber + Prosource Gelatin Sugar Free q24H, Ensure Enlive q24H      Anthropometrics:  - Ht. 182.9cm   - Wt.   834kg (4/27)   79.8kg (4/28)(5/3)(5/5)(5/6) dosing wt   - BMI 23.9   - IBW     Pertinent Lab Data: (3/10/21) RBC 3.92, H/H 8.9/30.6, K+ 5.1, Cr 0.5     Pertinent Meds: lovenox, protonix, senna      Physical Findings:  - Appearance: AAO, forgetful at times   - GI function: diarrhea remains, likely a/w cancer. LBM 5/10   - Tubes: n/a   - Oral/Mouth cavity: none reported   - Skin: intact; no edema      Nutrition Requirements  Weight Used: 83.4kg (continued from RD initial assessment) increased intake will be of benefit to pt undergoing chemotherapy w. plans for RT      Estimated Energy Needs    Continue [x]  1910 - 2069 kcals (MSJ x 1.2 - 1.3 AF)  Estimated Protein Needs    Continue [x]  83 - 100 gms (1.0 - 1.2 gm/kg)  Estimated Fluid Needs        Continue [x] per team     Nutrient Intake: meeting needs w/ % meals & 100% Ensure intake      [] Previous Nutrition Diagnosis: inadequate oral intake            [] Ongoing          [x] Resolved    [x] No active nutrition diagnosis identified at this time     Nutrition Intervention: meals & snacks, medical food supplements  Recommend: 1. Continue Low Fiber diet + Prosource Gelatin Sugar Free q24H, Ensure Enlive q24H      Goal/Expected Outcome: Pt to continue to consume/tolerate >50% meals & supplements throughout LOS. RD to reassess in 7 days or per c/s PRN.      Indicator/Monitoring: diet order, energy intake, nutrition related labs, body composition, NFPF

## 2021-05-10 NOTE — OCCUPATIONAL THERAPY INITIAL EVALUATION ADULT - ASSISTIVE DEVICE:SUPINE/SIT, REHAB EVAL

## 2021-05-10 NOTE — PROGRESS NOTE ADULT - SUBJECTIVE AND OBJECTIVE BOX
Hien Stanton MD  Hospitalist   Spectra: [phone]    Patient is a 80y old  Male who presents with a chief complaint of diarrhea (09 May 2021 10:11)      INTERVAL HPI/OVERNIGHT EVENTS: no acute overnight events noted   completed 1st cycle of chemo yesterday     REVIEW OF SYSTEMS: negative  Vital Signs Last 24 Hrs  T(C): 36.4 (10 May 2021 05:09), Max: 36.5 (09 May 2021 19:35)  T(F): 97.5 (10 May 2021 05:09), Max: 97.7 (09 May 2021 19:35)  HR: 68 (10 May 2021 05:09) (68 - 86)  BP: 114/61 (10 May 2021 05:09) (114/60 - 120/58)  BP(mean): --  RR: 19 (10 May 2021 05:09) (18 - 19)  SpO2: 98% (10 May 2021 07:47) (98% - 98%)    PHYSICAL EXAM:   NAD; Normocephalic;   LUNGS - no wheezing  HEART: S1 S2+   ABDOMEN: Soft, Nontender, non distended  EXTREMITIES: no cyanosis; no edema  NERVOUS SYSTEM:  Awake and alert; no focal neuro deficits appreciated    LABS:                        8.9    6.46  )-----------( 216      ( 10 May 2021 07:41 )             30.6     05-10    141  |  106  |  15  ----------------------------<  92  5.1<H>   |  25  |  0.5<L>    Ca    8.5      10 May 2021 07:41  Mg     1.9     05-10

## 2021-05-10 NOTE — PROGRESS NOTE ADULT - ASSESSMENT
Plan:    1. Rectal adenocarcinoma Locally advanced   2. chronic diarrhea - resolved   3. Encephalopathy - resolved   4. Pancreatic hypodensity   5. lung nodule   6. acute blood loss anemia 2/2 LGIB + HUSEYIN     - rectal mass extending from the anus up to 17 cm proximally, multiple polyps s/p polypectomy, a 5 cm transverse colon polyp  that was not removed  - MRI pelvis - 18 cm long polypoidal rectal mass with invasion of the right meso rectal fascia and possibly the prostate gland. Additional involvement of the anal sphincter complex--T4(a or b)N0Mx  -  hem/onc on board - plan to start first cycle of FOLFOX on 5/7/21 . followed by 4 cycles of FOLFOX every 2 wks , followed by evaluation for chemo RT and then surgical eval.   - s/p chemoport placement 5/6  - completed chemo yesterday   - outpatient follow up for pancreatic hypodensity and lung nodule   - Hb of 5.8 on admission from lower GI bleed   - s/p transfusion - now stable  - mild drop noted after restarting lovenox but overall stable at this time    - dvt proph - lovenox - IMPROVE score of 5    Dispo: snf on discharge - cm on board   pending: on chemo - plan to send him to snf if tolerates meds . - likely on monday     J&J Vaccine today

## 2021-05-10 NOTE — OCCUPATIONAL THERAPY INITIAL EVALUATION ADULT - PHYSICAL ASSIST/NONPHYSICAL ASSIST: EATING, OT EVAL
After Unna Boot Application Text: Coban was used to wrap the outside of Unna Boot and we ensured the Unna Boot wasn't too tight prior to the patient leaving the office. Other Medication Occluded Under Unnaboot: Fluocinonide Location Applied: both legs Was A Previous Unna Boot Removed?: Yes Indication: stasis ulcer Detail Level: Simple Removal Of Previous Unna Boot (No) Text: The site was cleaned in preparation for an Unna Boot application. Medication Occluded Under Unnaboot: Bactroban Removal Of Previous Unna Boot (Yes) Text: The previous Unna Boot and then the site was cleaned in preparation for another Unna Boot application. set-up required

## 2021-05-11 LAB
ANION GAP SERPL CALC-SCNC: 8 MMOL/L — SIGNIFICANT CHANGE UP (ref 7–14)
APPEARANCE UR: CLEAR — SIGNIFICANT CHANGE UP
BACTERIA # UR AUTO: ABNORMAL
BASOPHILS # BLD AUTO: 0.02 K/UL — SIGNIFICANT CHANGE UP (ref 0–0.2)
BASOPHILS NFR BLD AUTO: 0.4 % — SIGNIFICANT CHANGE UP (ref 0–1)
BILIRUB UR-MCNC: NEGATIVE — SIGNIFICANT CHANGE UP
BUN SERPL-MCNC: 20 MG/DL — SIGNIFICANT CHANGE UP (ref 10–20)
CALCIUM SERPL-MCNC: 7.9 MG/DL — LOW (ref 8.5–10.1)
CHLORIDE SERPL-SCNC: 106 MMOL/L — SIGNIFICANT CHANGE UP (ref 98–110)
CO2 SERPL-SCNC: 25 MMOL/L — SIGNIFICANT CHANGE UP (ref 17–32)
COLOR SPEC: YELLOW — SIGNIFICANT CHANGE UP
CREAT SERPL-MCNC: 0.6 MG/DL — LOW (ref 0.7–1.5)
DIFF PNL FLD: NEGATIVE — SIGNIFICANT CHANGE UP
EOSINOPHIL # BLD AUTO: 0.06 K/UL — SIGNIFICANT CHANGE UP (ref 0–0.7)
EOSINOPHIL NFR BLD AUTO: 1.1 % — SIGNIFICANT CHANGE UP (ref 0–8)
EPI CELLS # UR: 0 /HPF — SIGNIFICANT CHANGE UP (ref 0–5)
GLUCOSE SERPL-MCNC: 94 MG/DL — SIGNIFICANT CHANGE UP (ref 70–99)
GLUCOSE UR QL: NEGATIVE — SIGNIFICANT CHANGE UP
HCT VFR BLD CALC: 28 % — LOW (ref 42–52)
HCT VFR BLD CALC: 28.2 % — LOW (ref 42–52)
HCT VFR BLD CALC: 28.8 % — LOW (ref 42–52)
HGB BLD-MCNC: 8.1 G/DL — LOW (ref 14–18)
HGB BLD-MCNC: 8.1 G/DL — LOW (ref 14–18)
HGB BLD-MCNC: 8.4 G/DL — LOW (ref 14–18)
HYALINE CASTS # UR AUTO: 0 /LPF — SIGNIFICANT CHANGE UP (ref 0–7)
IMM GRANULOCYTES NFR BLD AUTO: 0.7 % — HIGH (ref 0.1–0.3)
KETONES UR-MCNC: NEGATIVE — SIGNIFICANT CHANGE UP
LEUKOCYTE ESTERASE UR-ACNC: ABNORMAL
LYMPHOCYTES # BLD AUTO: 1.16 K/UL — LOW (ref 1.2–3.4)
LYMPHOCYTES # BLD AUTO: 20.7 % — SIGNIFICANT CHANGE UP (ref 20.5–51.1)
MAGNESIUM SERPL-MCNC: 1.8 MG/DL — SIGNIFICANT CHANGE UP (ref 1.8–2.4)
MCHC RBC-ENTMCNC: 22.5 PG — LOW (ref 27–31)
MCHC RBC-ENTMCNC: 22.8 PG — LOW (ref 27–31)
MCHC RBC-ENTMCNC: 23.1 PG — LOW (ref 27–31)
MCHC RBC-ENTMCNC: 28.7 G/DL — LOW (ref 32–37)
MCHC RBC-ENTMCNC: 28.9 G/DL — LOW (ref 32–37)
MCHC RBC-ENTMCNC: 29.2 G/DL — LOW (ref 32–37)
MCV RBC AUTO: 78.3 FL — LOW (ref 80–94)
MCV RBC AUTO: 78.9 FL — LOW (ref 80–94)
MCV RBC AUTO: 79.3 FL — LOW (ref 80–94)
MONOCYTES # BLD AUTO: 0.06 K/UL — LOW (ref 0.1–0.6)
MONOCYTES NFR BLD AUTO: 1.1 % — LOW (ref 1.7–9.3)
NEUTROPHILS # BLD AUTO: 4.26 K/UL — SIGNIFICANT CHANGE UP (ref 1.4–6.5)
NEUTROPHILS NFR BLD AUTO: 76 % — HIGH (ref 42.2–75.2)
NITRITE UR-MCNC: POSITIVE
NRBC # BLD: 0 /100 WBCS — SIGNIFICANT CHANGE UP (ref 0–0)
PH UR: 6.5 — SIGNIFICANT CHANGE UP (ref 5–8)
PLATELET # BLD AUTO: 161 K/UL — SIGNIFICANT CHANGE UP (ref 130–400)
PLATELET # BLD AUTO: 162 K/UL — SIGNIFICANT CHANGE UP (ref 130–400)
PLATELET # BLD AUTO: 169 K/UL — SIGNIFICANT CHANGE UP (ref 130–400)
POTASSIUM SERPL-MCNC: 4.4 MMOL/L — SIGNIFICANT CHANGE UP (ref 3.5–5)
POTASSIUM SERPL-SCNC: 4.4 MMOL/L — SIGNIFICANT CHANGE UP (ref 3.5–5)
PROT UR-MCNC: SIGNIFICANT CHANGE UP
RBC # BLD: 3.55 M/UL — LOW (ref 4.7–6.1)
RBC # BLD: 3.6 M/UL — LOW (ref 4.7–6.1)
RBC # BLD: 3.63 M/UL — LOW (ref 4.7–6.1)
RBC # FLD: 26.1 % — HIGH (ref 11.5–14.5)
RBC # FLD: 26.3 % — HIGH (ref 11.5–14.5)
RBC # FLD: 26.5 % — HIGH (ref 11.5–14.5)
RBC CASTS # UR COMP ASSIST: 3 /HPF — SIGNIFICANT CHANGE UP (ref 0–4)
SODIUM SERPL-SCNC: 139 MMOL/L — SIGNIFICANT CHANGE UP (ref 135–146)
SP GR SPEC: 1.02 — SIGNIFICANT CHANGE UP (ref 1.01–1.03)
UROBILINOGEN FLD QL: SIGNIFICANT CHANGE UP
WBC # BLD: 5.57 K/UL — SIGNIFICANT CHANGE UP (ref 4.8–10.8)
WBC # BLD: 5.6 K/UL — SIGNIFICANT CHANGE UP (ref 4.8–10.8)
WBC # BLD: 6.3 K/UL — SIGNIFICANT CHANGE UP (ref 4.8–10.8)
WBC # FLD AUTO: 5.57 K/UL — SIGNIFICANT CHANGE UP (ref 4.8–10.8)
WBC # FLD AUTO: 5.6 K/UL — SIGNIFICANT CHANGE UP (ref 4.8–10.8)
WBC # FLD AUTO: 6.3 K/UL — SIGNIFICANT CHANGE UP (ref 4.8–10.8)
WBC UR QL: 80 /HPF — HIGH (ref 0–5)

## 2021-05-11 PROCEDURE — 99232 SBSQ HOSP IP/OBS MODERATE 35: CPT

## 2021-05-11 RX ORDER — MORPHINE SULFATE 50 MG/1
2 CAPSULE, EXTENDED RELEASE ORAL EVERY 4 HOURS
Refills: 0 | Status: DISCONTINUED | OUTPATIENT
Start: 2021-05-11 | End: 2021-05-17

## 2021-05-11 RX ORDER — OXYCODONE AND ACETAMINOPHEN 5; 325 MG/1; MG/1
1 TABLET ORAL EVERY 6 HOURS
Refills: 0 | Status: DISCONTINUED | OUTPATIENT
Start: 2021-05-11 | End: 2021-05-17

## 2021-05-11 RX ORDER — ACETAMINOPHEN 500 MG
650 TABLET ORAL EVERY 6 HOURS
Refills: 0 | Status: DISCONTINUED | OUTPATIENT
Start: 2021-05-11 | End: 2021-05-17

## 2021-05-11 RX ADMIN — Medication 3 MILLIGRAM(S): at 21:51

## 2021-05-11 RX ADMIN — OXYCODONE AND ACETAMINOPHEN 1 TABLET(S): 5; 325 TABLET ORAL at 21:51

## 2021-05-11 RX ADMIN — CHLORHEXIDINE GLUCONATE 1 APPLICATION(S): 213 SOLUTION TOPICAL at 05:00

## 2021-05-11 RX ADMIN — OXYCODONE AND ACETAMINOPHEN 1 TABLET(S): 5; 325 TABLET ORAL at 22:52

## 2021-05-11 RX ADMIN — PANTOPRAZOLE SODIUM 40 MILLIGRAM(S): 20 TABLET, DELAYED RELEASE ORAL at 05:56

## 2021-05-11 RX ADMIN — ENOXAPARIN SODIUM 40 MILLIGRAM(S): 100 INJECTION SUBCUTANEOUS at 11:31

## 2021-05-11 NOTE — PROGRESS NOTE ADULT - ASSESSMENT
80 year old male with no past medical history (has not seen a doctor in many years) presented to the ED due to loose bowel movements.    # Rectal adenocarcinoma moderate to poorly differentiated  # Bloody Bowel Movements  - CTAP: Circumferential rectal wall thickening spanning at least 14 cm in length with a partially exophytic mass extending into the right mesorectal fat measuring approximately 5.7 x 3.0 x 6.8 cm. The exophytic mass abuts the right posterior lateral prostate gland. Primary consideration is rectal cancer. Limited ability to exclude superimposed proctitis. 9 mm right lower lobe pulmonary nodule. Small wedge-shaped hypodensities at the superior aspect of the spleen may reflect splenic infarcts.  - s/p colonoscopy 4/27: rectal mass extending from the anus up to 17 cm proximally, multiple polyps s/p polypectomy, a 5 cm transverse colon polyp  that was not removed  - Pan CT completed for staging: Well-defined right lower lobe nodule measuring 6 mm, 3 mm lingular nodule  - MRI pelvis done showed: 18 cm long polypoidal rectal mass with invasion of the right meso rectal fascia and possibly the prostate gland.   - Hem/Onc: Will start first cycle of FOLFOX on 5/7/21 . Pt will be getting 4 cycles of FOLFOX every 2 wks , followed by evaluation for chemo RT and then surgical eval.  - Seen by colorectal surgeon, requested colonoscopy with EMR of transverse polyp and EUS on 4/30 cancelled due to poor prep.  - CEA 24, CA 19-9 31  - GI : no intervention  - For constipation: increased meds: senna, bisacodyl, miralax.    - trend Hb. Bloody BM on 5/10.   - Possible SNF    # encephalopathy, resolved  - Appears to be at baseline  - CT head- no acute changes   - TSH, B12, folate, RPR - wnl     # Pancreatic hypodensity  - Possible cyst/IPMN  - MRI pancreatic protocol (w/wo contrast) as an outpatient    # Lung nodule   - CT:  Right lower lobe solid nodule measuring 9 mm.   - f/u OP    # Subacute Rib Fracture   - CT:  Subacute fracture of the left 10th lateral rib.   - Pain control prn     # Echo tricuspid echodensity cannot rule out vegetation  Ddx includes infective endocarditis vs metastasis  - Cardiology consulted for MYA : negative  - BCx was negative 4/21, repeated 4/30  - Afebrile    A1C 5.2, lipid panel normal    DVT PPx: SCDs  GI PPX: Protonix 40 mg PO  Diet: Regular  Activity: Increase as tolerated  Dispo: SNF  Code Status: full code  80 year old male with no past medical history (has not seen a doctor in many years) presented to the ED due to loose bowel movements.    # Rectal adenocarcinoma moderate to poorly differentiated  # Bloody Bowel Movements  - CTAP: Circumferential rectal wall thickening spanning at least 14 cm in length with a partially exophytic mass extending into the right mesorectal fat measuring approximately 5.7 x 3.0 x 6.8 cm. The exophytic mass abuts the right posterior lateral prostate gland. Primary consideration is rectal cancer. Limited ability to exclude superimposed proctitis. 9 mm right lower lobe pulmonary nodule. Small wedge-shaped hypodensities at the superior aspect of the spleen may reflect splenic infarcts.  - s/p colonoscopy 4/27: rectal mass extending from the anus up to 17 cm proximally, multiple polyps s/p polypectomy, a 5 cm transverse colon polyp  that was not removed  - Pan CT completed for staging: Well-defined right lower lobe nodule measuring 6 mm, 3 mm lingular nodule  - MRI pelvis done showed: 18 cm long polypoidal rectal mass with invasion of the right meso rectal fascia and possibly the prostate gland.   - Hem/Onc: Will start first cycle of FOLFOX on 5/7/21 . Pt will be getting 4 cycles of FOLFOX every 2 wks , followed by evaluation for chemo RT and then surgical eval.  - Seen by colorectal surgeon, requested colonoscopy with EMR of transverse polyp and EUS on 4/30 cancelled due to poor prep.  - CEA 24, CA 19-9 31  - GI : no intervention  - For constipation: increased meds: senna, bisacodyl, miralax.    - trend Hb. Bloody BMs on 5/10. Hb stable. No acute intervention from GI.   - Possible SNF    # encephalopathy, resolved  - Appears to be at baseline  - CT head- no acute changes   - TSH, B12, folate, RPR - wnl     # Pancreatic hypodensity  - Possible cyst/IPMN  - MRI pancreatic protocol (w/wo contrast) as an outpatient    # Lung nodule   - CT:  Right lower lobe solid nodule measuring 9 mm.   - f/u OP    # Subacute Rib Fracture   - CT:  Subacute fracture of the left 10th lateral rib.   - Pain control prn     # Echo tricuspid echodensity cannot rule out vegetation  Ddx includes infective endocarditis vs metastasis  - Cardiology consulted for MYA : negative  - BCx was negative 4/21, repeated 4/30  - Afebrile    A1C 5.2, lipid panel normal    DVT PPx: SCDs  GI PPX: Protonix 40 mg PO  Diet: Regular  Activity: Increase as tolerated  Dispo: SNF  Code Status: full code

## 2021-05-11 NOTE — PROGRESS NOTE ADULT - SUBJECTIVE AND OBJECTIVE BOX
Hospital Day:  19d    Chief Complaint: Patient is a 80y old  Male who presents with a chief complaint of diarrhea (09 May 2021 10:11)    24 hour events: No acute overnight events.     Past Medical Hx:   No pertinent past medical history      Past Sx:  Amputation of digit of left hand      Allergies:  No Known Allergies    Current Meds:   Standing Meds:  chlorhexidine 4% Liquid 1 Application(s) Topical <User Schedule>  enoxaparin Injectable 40 milliGRAM(s) SubCutaneous daily  pantoprazole    Tablet 40 milliGRAM(s) Oral before breakfast  senna 2 Tablet(s) Oral two times a day    PRN Meds:  melatonin 3 milliGRAM(s) Oral at bedtime PRN Insomnia    HOME MEDICATIONS:  ferrous sulfate 325 mg (65 mg elemental iron) oral tablet: 1 tab(s) orally 3 times a day  pantoprazole 40 mg oral delayed release tablet: 1 tab(s) orally once a day (before a meal)      Vital Signs:   T(F): 97 (05-11-21 @ 05:32), Max: 97.7 (05-10-21 @ 20:54)  HR: 79 (05-11-21 @ 05:32) (79 - 83)  BP: 110/55 (05-11-21 @ 05:32) (109/59 - 113/56)  RR: 20 (05-11-21 @ 05:32) (18 - 20)  SpO2: 97% (05-10-21 @ 19:58) (97% - 98%)      05-10-21 @ 07:01  -  05-11-21 @ 06:47  --------------------------------------------------------  IN: 0 mL / OUT: 1001 mL / NET: -1001 mL    Physical Exam:   GENERAL: NAD  HEENT: NCAT  CHEST/LUNG: audible breath sounds bilaterally, +L chemoport  HEART: Regular rate and rhythm; s1 s2 appreciated  ABDOMEN: Soft, Nontender, Nondistended; Bowel sounds present  EXTREMITIES: No LE edema b/l  SKIN: no rashes, no new lesions  NERVOUS SYSTEM:  Alert & Oriented X3    Labs:                         8.1    5.57  )-----------( 162      ( 11 May 2021 00:41 )             28.2     Neutophil% 82.8, Lymphocyte% 15.3, Monocyte% 0.9, Bands% 0.5 05-10-21 @ 07:41    10 May 2021 07:41    141    |  106    |  15     ----------------------------<  92     5.1     |  25     |  0.5      Ca    8.5        10 May 2021 07:41  Mg     1.9       10 May 2021 07:41    Radiology:

## 2021-05-12 LAB
ALBUMIN SERPL ELPH-MCNC: 2.5 G/DL — LOW (ref 3.5–5.2)
ALBUMIN SERPL ELPH-MCNC: 2.8 G/DL — LOW (ref 3.5–5.2)
ALP SERPL-CCNC: 39 U/L — SIGNIFICANT CHANGE UP (ref 30–115)
ALP SERPL-CCNC: 45 U/L — SIGNIFICANT CHANGE UP (ref 30–115)
ALT FLD-CCNC: 11 U/L — SIGNIFICANT CHANGE UP (ref 0–41)
ALT FLD-CCNC: 11 U/L — SIGNIFICANT CHANGE UP (ref 0–41)
ANION GAP SERPL CALC-SCNC: 11 MMOL/L — SIGNIFICANT CHANGE UP (ref 7–14)
ANION GAP SERPL CALC-SCNC: 7 MMOL/L — SIGNIFICANT CHANGE UP (ref 7–14)
APPEARANCE UR: ABNORMAL
AST SERPL-CCNC: 15 U/L — SIGNIFICANT CHANGE UP (ref 0–41)
AST SERPL-CCNC: 16 U/L — SIGNIFICANT CHANGE UP (ref 0–41)
BACTERIA # UR AUTO: ABNORMAL
BASOPHILS # BLD AUTO: 0.04 K/UL — SIGNIFICANT CHANGE UP (ref 0–0.2)
BASOPHILS NFR BLD AUTO: 0.8 % — SIGNIFICANT CHANGE UP (ref 0–1)
BILIRUB SERPL-MCNC: 0.3 MG/DL — SIGNIFICANT CHANGE UP (ref 0.2–1.2)
BILIRUB SERPL-MCNC: 0.5 MG/DL — SIGNIFICANT CHANGE UP (ref 0.2–1.2)
BILIRUB UR-MCNC: NEGATIVE — SIGNIFICANT CHANGE UP
BUN SERPL-MCNC: 14 MG/DL — SIGNIFICANT CHANGE UP (ref 10–20)
BUN SERPL-MCNC: 17 MG/DL — SIGNIFICANT CHANGE UP (ref 10–20)
CALCIUM SERPL-MCNC: 7.6 MG/DL — LOW (ref 8.5–10.1)
CALCIUM SERPL-MCNC: 7.8 MG/DL — LOW (ref 8.5–10.1)
CHLORIDE SERPL-SCNC: 102 MMOL/L — SIGNIFICANT CHANGE UP (ref 98–110)
CHLORIDE SERPL-SCNC: 98 MMOL/L — SIGNIFICANT CHANGE UP (ref 98–110)
CO2 SERPL-SCNC: 24 MMOL/L — SIGNIFICANT CHANGE UP (ref 17–32)
CO2 SERPL-SCNC: 26 MMOL/L — SIGNIFICANT CHANGE UP (ref 17–32)
COLOR SPEC: YELLOW — SIGNIFICANT CHANGE UP
CREAT SERPL-MCNC: 0.5 MG/DL — LOW (ref 0.7–1.5)
CREAT SERPL-MCNC: 0.5 MG/DL — LOW (ref 0.7–1.5)
DIFF PNL FLD: NEGATIVE — SIGNIFICANT CHANGE UP
EOSINOPHIL # BLD AUTO: 0.11 K/UL — SIGNIFICANT CHANGE UP (ref 0–0.7)
EOSINOPHIL NFR BLD AUTO: 2.1 % — SIGNIFICANT CHANGE UP (ref 0–8)
EPI CELLS # UR: 0 /HPF — SIGNIFICANT CHANGE UP (ref 0–5)
GLUCOSE SERPL-MCNC: 117 MG/DL — HIGH (ref 70–99)
GLUCOSE SERPL-MCNC: 84 MG/DL — SIGNIFICANT CHANGE UP (ref 70–99)
GLUCOSE UR QL: NEGATIVE — SIGNIFICANT CHANGE UP
HCT VFR BLD CALC: 27.1 % — LOW (ref 42–52)
HCT VFR BLD CALC: 28.3 % — LOW (ref 42–52)
HGB BLD-MCNC: 7.9 G/DL — LOW (ref 14–18)
HGB BLD-MCNC: 8.2 G/DL — LOW (ref 14–18)
HYALINE CASTS # UR AUTO: 3 /LPF — SIGNIFICANT CHANGE UP (ref 0–7)
IMM GRANULOCYTES NFR BLD AUTO: 0.8 % — HIGH (ref 0.1–0.3)
KETONES UR-MCNC: NEGATIVE — SIGNIFICANT CHANGE UP
LEUKOCYTE ESTERASE UR-ACNC: ABNORMAL
LYMPHOCYTES # BLD AUTO: 0.92 K/UL — LOW (ref 1.2–3.4)
LYMPHOCYTES # BLD AUTO: 18 % — LOW (ref 20.5–51.1)
MAGNESIUM SERPL-MCNC: 1.7 MG/DL — LOW (ref 1.8–2.4)
MCHC RBC-ENTMCNC: 22.4 PG — LOW (ref 27–31)
MCHC RBC-ENTMCNC: 22.8 PG — LOW (ref 27–31)
MCHC RBC-ENTMCNC: 29 G/DL — LOW (ref 32–37)
MCHC RBC-ENTMCNC: 29.2 G/DL — LOW (ref 32–37)
MCV RBC AUTO: 76.8 FL — LOW (ref 80–94)
MCV RBC AUTO: 78.8 FL — LOW (ref 80–94)
MONOCYTES # BLD AUTO: 0.08 K/UL — LOW (ref 0.1–0.6)
MONOCYTES NFR BLD AUTO: 1.6 % — LOW (ref 1.7–9.3)
NEUTROPHILS # BLD AUTO: 3.93 K/UL — SIGNIFICANT CHANGE UP (ref 1.4–6.5)
NEUTROPHILS NFR BLD AUTO: 76.7 % — HIGH (ref 42.2–75.2)
NITRITE UR-MCNC: POSITIVE
NRBC # BLD: 0 /100 WBCS — SIGNIFICANT CHANGE UP (ref 0–0)
NRBC # BLD: 0 /100 WBCS — SIGNIFICANT CHANGE UP (ref 0–0)
PH UR: 6 — SIGNIFICANT CHANGE UP (ref 5–8)
PLATELET # BLD AUTO: 129 K/UL — LOW (ref 130–400)
PLATELET # BLD AUTO: 149 K/UL — SIGNIFICANT CHANGE UP (ref 130–400)
POST UNIT NUMBER: SIGNIFICANT CHANGE UP
POTASSIUM SERPL-MCNC: 4.1 MMOL/L — SIGNIFICANT CHANGE UP (ref 3.5–5)
POTASSIUM SERPL-MCNC: 4.3 MMOL/L — SIGNIFICANT CHANGE UP (ref 3.5–5)
POTASSIUM SERPL-SCNC: 4.1 MMOL/L — SIGNIFICANT CHANGE UP (ref 3.5–5)
POTASSIUM SERPL-SCNC: 4.3 MMOL/L — SIGNIFICANT CHANGE UP (ref 3.5–5)
PROT SERPL-MCNC: 5.4 G/DL — LOW (ref 6–8)
PROT SERPL-MCNC: 5.4 G/DL — LOW (ref 6–8)
PROT UR-MCNC: NEGATIVE — SIGNIFICANT CHANGE UP
RBC # BLD: 3.53 M/UL — LOW (ref 4.7–6.1)
RBC # BLD: 3.59 M/UL — LOW (ref 4.7–6.1)
RBC # FLD: 25.9 % — HIGH (ref 11.5–14.5)
RBC # FLD: 25.9 % — HIGH (ref 11.5–14.5)
RBC CASTS # UR COMP ASSIST: 8 /HPF — HIGH (ref 0–4)
SODIUM SERPL-SCNC: 133 MMOL/L — LOW (ref 135–146)
SODIUM SERPL-SCNC: 135 MMOL/L — SIGNIFICANT CHANGE UP (ref 135–146)
SP GR SPEC: 1.02 — SIGNIFICANT CHANGE UP (ref 1.01–1.03)
UROBILINOGEN FLD QL: SIGNIFICANT CHANGE UP
WBC # BLD: 4.13 K/UL — LOW (ref 4.8–10.8)
WBC # BLD: 5.12 K/UL — SIGNIFICANT CHANGE UP (ref 4.8–10.8)
WBC # FLD AUTO: 4.13 K/UL — LOW (ref 4.8–10.8)
WBC # FLD AUTO: 5.12 K/UL — SIGNIFICANT CHANGE UP (ref 4.8–10.8)
WBC UR QL: 122 /HPF — HIGH (ref 0–5)

## 2021-05-12 PROCEDURE — 99233 SBSQ HOSP IP/OBS HIGH 50: CPT

## 2021-05-12 PROCEDURE — 71045 X-RAY EXAM CHEST 1 VIEW: CPT | Mod: 26

## 2021-05-12 RX ORDER — CEFTRIAXONE 500 MG/1
1000 INJECTION, POWDER, FOR SOLUTION INTRAMUSCULAR; INTRAVENOUS EVERY 24 HOURS
Refills: 0 | Status: COMPLETED | OUTPATIENT
Start: 2021-05-12 | End: 2021-05-14

## 2021-05-12 RX ORDER — MAGNESIUM SULFATE 500 MG/ML
2 VIAL (ML) INJECTION ONCE
Refills: 0 | Status: COMPLETED | OUTPATIENT
Start: 2021-05-12 | End: 2021-05-12

## 2021-05-12 RX ADMIN — CEFTRIAXONE 100 MILLIGRAM(S): 500 INJECTION, POWDER, FOR SOLUTION INTRAMUSCULAR; INTRAVENOUS at 18:14

## 2021-05-12 RX ADMIN — PANTOPRAZOLE SODIUM 40 MILLIGRAM(S): 20 TABLET, DELAYED RELEASE ORAL at 05:38

## 2021-05-12 RX ADMIN — Medication 650 MILLIGRAM(S): at 17:10

## 2021-05-12 RX ADMIN — Medication 25 GRAM(S): at 11:23

## 2021-05-12 RX ADMIN — Medication 650 MILLIGRAM(S): at 14:47

## 2021-05-12 RX ADMIN — ENOXAPARIN SODIUM 40 MILLIGRAM(S): 100 INJECTION SUBCUTANEOUS at 11:24

## 2021-05-12 RX ADMIN — CHLORHEXIDINE GLUCONATE 1 APPLICATION(S): 213 SOLUTION TOPICAL at 05:38

## 2021-05-12 NOTE — PROGRESS NOTE ADULT - ASSESSMENT
80 year old male with no past medical history (has not seen a doctor in many years) presented to the ED due to loose bowel movements.    # Rectal adenocarcinoma moderate to poorly differentiated  # Bloody Bowel Movements  - CTAP: Circumferential rectal wall thickening spanning at least 14 cm in length with a partially exophytic mass extending into the right mesorectal fat measuring approximately 5.7 x 3.0 x 6.8 cm. The exophytic mass abuts the right posterior lateral prostate gland. Primary consideration is rectal cancer. Limited ability to exclude superimposed proctitis. 9 mm right lower lobe pulmonary nodule. Small wedge-shaped hypodensities at the superior aspect of the spleen may reflect splenic infarcts.  - s/p colonoscopy 4/27: rectal mass extending from the anus up to 17 cm proximally, multiple polyps s/p polypectomy, a 5 cm transverse colon polyp  that was not removed  - Pan CT completed for staging: Well-defined right lower lobe nodule measuring 6 mm, 3 mm lingular nodule  - MRI pelvis done showed: 18 cm long polypoidal rectal mass with invasion of the right meso rectal fascia and possibly the prostate gland.   - Hem/Onc: Will start first cycle of FOLFOX on 5/7/21 . Pt will be getting 4 cycles of FOLFOX every 2 wks , followed by evaluation for chemo RT and then surgical eval.  - Seen by colorectal surgeon, requested colonoscopy with EMR of transverse polyp and EUS on 4/30 cancelled due to poor prep.  - CEA 24, CA 19-9 31  - GI : no intervention  - For constipation: increased meds: senna, bisacodyl, miralax.    - trend Hb. Bloody BMs on 5/10. Hb stable. No acute intervention from GI.   - Possible SNF    # Febrile during Hb transfusion   - spiked temp 102F during Hb transfusion. Blood bank contacted. 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   - f/u OP    # Subacute Rib Fracture   - CT:  Subacute fracture of the left 10th lateral rib.   - Pain control prn     # Echo tricuspid echodensity cannot rule out vegetation  Ddx includes infective endocarditis vs metastasis  - Cardiology consulted for MYA : negative  - BCx was negative 4/21, repeated 4/30  - Afebrile    A1C 5.2, lipid panel normal    # Family contact: Updated Sally Bundy on the patient's condition and plan on 5/12.     DVT PPx: SCDs  GI PPX: Protonix 40 mg PO  Diet: Regular  Activity: Increase as tolerated  Dispo: SNF  Code Status: full code

## 2021-05-12 NOTE — PROGRESS NOTE ADULT - SUBJECTIVE AND OBJECTIVE BOX
Hospital Day:  20d     Chief Complaint: Patient is a 80y old  Male who presents with a chief complaint of diarrhea (09 May 2021 10:11)      24 hour events:    Subjective:    Past Medical Hx:   No pertinent past medical history      Past Sx:  Amputation of digit of left hand      Allergies:  No Known Allergies    Current Meds:   Standing Meds:  chlorhexidine 4% Liquid 1 Application(s) Topical <User Schedule>  enoxaparin Injectable 40 milliGRAM(s) SubCutaneous daily  pantoprazole    Tablet 40 milliGRAM(s) Oral before breakfast  senna 2 Tablet(s) Oral two times a day    PRN Meds:  acetaminophen   Tablet .. 650 milliGRAM(s) Oral every 6 hours PRN Temp greater or equal to 38C (100.4F), Mild Pain (1 - 3)  melatonin 3 milliGRAM(s) Oral at bedtime PRN Insomnia  morphine  - Injectable 2 milliGRAM(s) IV Push every 4 hours PRN Severe Pain (7 - 10)  oxycodone    5 mG/acetaminophen 325 mG 1 Tablet(s) Oral every 6 hours PRN Moderate Pain (4 - 6)    HOME MEDICATIONS:  ferrous sulfate 325 mg (65 mg elemental iron) oral tablet: 1 tab(s) orally 3 times a day  pantoprazole 40 mg oral delayed release tablet: 1 tab(s) orally once a day (before a meal)      Vital Signs:   T(F): 101.8 (21 @ 14:30), Max: 101.8 (21 @ 14:30)  HR: 85 (21 @ 14:30) (84 - 89)  BP: 123/60 (21 @ 14:30) (116/55 - 139/64)  RR: 18 (21 @ 14:30) (18 - 18)  SpO2: 98% (21 @ 20:39) (98% - 98%)      21 @ 07:01  -  21 @ 07:00  --------------------------------------------------------  IN: 0 mL / OUT: 650 mL / NET: -650 mL        Physical Exam:   GENERAL: NAD  HEENT: NCAT  CHEST/LUNG: CTAB  HEART: Regular rate and rhythm; s1 s2 appreciated, No murmurs, rubs, or gallops  ABDOMEN: Soft, Nontender, Nondistended; Bowel sounds present  EXTREMITIES: No LE edema b/l  SKIN: no rashes, no new lesions  NERVOUS SYSTEM:  Alert & Oriented X3  LINES/CATHETERS:        Labs:                         7.9    4.13  )-----------( 149      ( 12 May 2021 15:30 )             27.1     Neutophil% 76.7, Lymphocyte% 18.0, Monocyte% 1.6, Bands% 0.8 21 @ 07:15    12 May 2021 15:30    133    |  98     |  14     ----------------------------<  117    4.1     |  24     |  0.5      Ca    7.6        12 May 2021 15:30  Mg     1.7       12 May 2021 07:15    TPro  5.4    /  Alb  2.8    /  TBili  0.5    /  DBili  x      /  AST  15     /  ALT  11     /  AlkPhos  45     12 May 2021 15:30      Urinalysis Basic - ( 12 May 2021 15:13 )    Color: Yellow / Appearance: Slightly Turbid / S.021 / pH: x  Gluc: x / Ketone: Negative  / Bili: Negative / Urobili: <2 mg/dL   Blood: x / Protein: Negative / Nitrite: Positive   Leuk Esterase: Large / RBC: 8 /HPF /  /HPF   Sq Epi: x / Non Sq Epi: 0 /HPF / Bacteria: Moderate    Radiology:

## 2021-05-12 NOTE — PROGRESS NOTE ADULT - SUBJECTIVE AND OBJECTIVE BOX
Hien Stanton MD  Hospitalist   Spectra: [phone]    Patient is a 80y old  Male who presents with a chief complaint of diarrhea (09 May 2021 10:11)      INTERVAL HPI/OVERNIGHT EVENTS: patient continues to have blood bowel movements   Hb level 8.1 this morning     REVIEW OF SYSTEMS: negative  Vital Signs Last 24 Hrs  T(C): 38.8 (12 May 2021 14:30), Max: 38.8 (12 May 2021 14:30)  T(F): 101.8 (12 May 2021 14:30), Max: 101.8 (12 May 2021 14:30)  HR: 85 (12 May 2021 14:30) (84 - 89)  BP: 123/60 (12 May 2021 14:30) (116/55 - 139/64)  BP(mean): --  RR: 18 (12 May 2021 14:30) (18 - 18)  SpO2: 98% (11 May 2021 20:39) (98% - 98%)    PHYSICAL EXAM:   NAD; Normocephalic;   LUNGS - no wheezing  HEART: S1 S2+   ABDOMEN: Soft, Nontender, non distended  EXTREMITIES: no cyanosis; no edema  NERVOUS SYSTEM:  Awake and alert; no focal neuro deficits appreciated    LABS:                        8.2    5.12  )-----------( 129      ( 12 May 2021 07:15 )             28.3     05-12    135  |  102  |  17  ----------------------------<  84  4.3   |  26  |  0.5<L>    Ca    7.8<L>      12 May 2021 07:15  Mg     1.7     12    TPro  5.4<L>  /  Alb  2.5<L>  /  TBili  0.3  /  DBili  x   /  AST  16  /  ALT  11  /  AlkPhos  39  05-12      Urinalysis Basic - ( 11 May 2021 21:05 )    Color: Yellow / Appearance: Clear / S.024 / pH: x  Gluc: x / Ketone: Negative  / Bili: Negative / Urobili: <2 mg/dL   Blood: x / Protein: Trace / Nitrite: Positive   Leuk Esterase: Large / RBC: 3 /HPF / WBC 80 /HPF   Sq Epi: x / Non Sq Epi: 0 /HPF / Bacteria: Few

## 2021-05-12 NOTE — PROGRESS NOTE ADULT - ASSESSMENT
Plan:    1. fever during transfusion   2. Rectal adenocarcinoma Locally advanced   3. chronic diarrhea - resolved   4. Encephalopathy - resolved   5. Pancreatic hypodensity and lung nodule   6. acute blood loss anemia 2/2 LGIB + HUSEYIN     - patient had fever of 102 during blood transfusion   - might be covid vaccine related vs transfusion reaction   - hemolysis labs sent as per protocol   - will hold transfusion at this time   - rectal mass extending from the anus up to 17 cm proximally, multiple polyps s/p polypectomy, a 5 cm transverse colon polyp  that was not removed  - MRI pelvis - 18 cm long polypoidal rectal mass with invasion of the right meso rectal fascia and possibly the prostate gland. Additional involvement of the anal sphincter complex--T4(a or b)N0Mx  -  hem/onc on board - plan to start first cycle of FOLFOX on 5/7/21 . followed by 4 cycles of FOLFOX every 2 wks , followed by evaluation for chemo RT and then surgical eval.   - s/p chemoport placement 5/6  - completed chemo yesterday   - outpatient follow up for pancreatic hypodensity and lung nodule   - Hb of 5.8 on admission from lower GI bleed   - s/p transfusion - now stable  - mild drop noted after restarting lovenox but overall stable at this time    - dvt proph - lovenox - IMPROVE score of 5    Provider Handoff:  pending: fever of 102 during transfusion today, work up   Dispo: snf when ready

## 2021-05-13 LAB
ALBUMIN SERPL ELPH-MCNC: 2.7 G/DL — LOW (ref 3.5–5.2)
ALP SERPL-CCNC: 44 U/L — SIGNIFICANT CHANGE UP (ref 30–115)
ALT FLD-CCNC: 10 U/L — SIGNIFICANT CHANGE UP (ref 0–41)
ANION GAP SERPL CALC-SCNC: 10 MMOL/L — SIGNIFICANT CHANGE UP (ref 7–14)
AST SERPL-CCNC: 15 U/L — SIGNIFICANT CHANGE UP (ref 0–41)
BASOPHILS # BLD AUTO: 0.03 K/UL — SIGNIFICANT CHANGE UP (ref 0–0.2)
BASOPHILS NFR BLD AUTO: 0.8 % — SIGNIFICANT CHANGE UP (ref 0–1)
BILIRUB SERPL-MCNC: 0.4 MG/DL — SIGNIFICANT CHANGE UP (ref 0.2–1.2)
BLD GP AB SCN SERPL QL: SIGNIFICANT CHANGE UP
BUN SERPL-MCNC: 16 MG/DL — SIGNIFICANT CHANGE UP (ref 10–20)
CALCIUM SERPL-MCNC: 7.9 MG/DL — LOW (ref 8.5–10.1)
CHLORIDE SERPL-SCNC: 99 MMOL/L — SIGNIFICANT CHANGE UP (ref 98–110)
CO2 SERPL-SCNC: 23 MMOL/L — SIGNIFICANT CHANGE UP (ref 17–32)
CREAT SERPL-MCNC: 0.5 MG/DL — LOW (ref 0.7–1.5)
EOSINOPHIL # BLD AUTO: 0.05 K/UL — SIGNIFICANT CHANGE UP (ref 0–0.7)
EOSINOPHIL NFR BLD AUTO: 1.3 % — SIGNIFICANT CHANGE UP (ref 0–8)
GLUCOSE SERPL-MCNC: 89 MG/DL — SIGNIFICANT CHANGE UP (ref 70–99)
HCT VFR BLD CALC: 24.6 % — LOW (ref 42–52)
HCT VFR BLD CALC: 26.5 % — LOW (ref 42–52)
HGB BLD-MCNC: 7.3 G/DL — LOW (ref 14–18)
HGB BLD-MCNC: 7.8 G/DL — LOW (ref 14–18)
IMM GRANULOCYTES NFR BLD AUTO: 1 % — HIGH (ref 0.1–0.3)
LYMPHOCYTES # BLD AUTO: 0.69 K/UL — LOW (ref 1.2–3.4)
LYMPHOCYTES # BLD AUTO: 18.1 % — LOW (ref 20.5–51.1)
MAGNESIUM SERPL-MCNC: 1.9 MG/DL — SIGNIFICANT CHANGE UP (ref 1.8–2.4)
MCHC RBC-ENTMCNC: 22.8 PG — LOW (ref 27–31)
MCHC RBC-ENTMCNC: 22.9 PG — LOW (ref 27–31)
MCHC RBC-ENTMCNC: 29.4 G/DL — LOW (ref 32–37)
MCHC RBC-ENTMCNC: 29.7 G/DL — LOW (ref 32–37)
MCV RBC AUTO: 76.9 FL — LOW (ref 80–94)
MCV RBC AUTO: 77.9 FL — LOW (ref 80–94)
MONOCYTES # BLD AUTO: 0.09 K/UL — LOW (ref 0.1–0.6)
MONOCYTES NFR BLD AUTO: 2.4 % — SIGNIFICANT CHANGE UP (ref 1.7–9.3)
NEUTROPHILS # BLD AUTO: 2.92 K/UL — SIGNIFICANT CHANGE UP (ref 1.4–6.5)
NEUTROPHILS NFR BLD AUTO: 76.4 % — HIGH (ref 42.2–75.2)
NRBC # BLD: 0 /100 WBCS — SIGNIFICANT CHANGE UP (ref 0–0)
NRBC # BLD: 0 /100 WBCS — SIGNIFICANT CHANGE UP (ref 0–0)
PLATELET # BLD AUTO: 108 K/UL — LOW (ref 130–400)
PLATELET # BLD AUTO: 115 K/UL — LOW (ref 130–400)
POTASSIUM SERPL-MCNC: 3.6 MMOL/L — SIGNIFICANT CHANGE UP (ref 3.5–5)
POTASSIUM SERPL-SCNC: 3.6 MMOL/L — SIGNIFICANT CHANGE UP (ref 3.5–5)
PROT SERPL-MCNC: 5.7 G/DL — LOW (ref 6–8)
RBC # BLD: 3.2 M/UL — LOW (ref 4.7–6.1)
RBC # BLD: 3.4 M/UL — LOW (ref 4.7–6.1)
RBC # FLD: 25.6 % — HIGH (ref 11.5–14.5)
RBC # FLD: 25.9 % — HIGH (ref 11.5–14.5)
SODIUM SERPL-SCNC: 132 MMOL/L — LOW (ref 135–146)
WBC # BLD: 3.82 K/UL — LOW (ref 4.8–10.8)
WBC # BLD: 4.32 K/UL — LOW (ref 4.8–10.8)
WBC # FLD AUTO: 3.82 K/UL — LOW (ref 4.8–10.8)
WBC # FLD AUTO: 4.32 K/UL — LOW (ref 4.8–10.8)

## 2021-05-13 PROCEDURE — 76770 US EXAM ABDO BACK WALL COMP: CPT | Mod: 26

## 2021-05-13 PROCEDURE — 99233 SBSQ HOSP IP/OBS HIGH 50: CPT

## 2021-05-13 PROCEDURE — 71045 X-RAY EXAM CHEST 1 VIEW: CPT | Mod: 26

## 2021-05-13 RX ORDER — POTASSIUM CHLORIDE 20 MEQ
40 PACKET (EA) ORAL ONCE
Refills: 0 | Status: COMPLETED | OUTPATIENT
Start: 2021-05-13 | End: 2021-05-13

## 2021-05-13 RX ORDER — PHENAZOPYRIDINE HCL 100 MG
100 TABLET ORAL THREE TIMES A DAY
Refills: 0 | Status: COMPLETED | OUTPATIENT
Start: 2021-05-13 | End: 2021-05-15

## 2021-05-13 RX ORDER — PREGABALIN 225 MG/1
1000 CAPSULE ORAL ONCE
Refills: 0 | Status: COMPLETED | OUTPATIENT
Start: 2021-05-13 | End: 2021-05-13

## 2021-05-13 RX ADMIN — SENNA PLUS 2 TABLET(S): 8.6 TABLET ORAL at 06:17

## 2021-05-13 RX ADMIN — PREGABALIN 1000 MICROGRAM(S): 225 CAPSULE ORAL at 17:15

## 2021-05-13 RX ADMIN — Medication 100 MILLIGRAM(S): at 21:17

## 2021-05-13 RX ADMIN — MORPHINE SULFATE 2 MILLIGRAM(S): 50 CAPSULE, EXTENDED RELEASE ORAL at 21:18

## 2021-05-13 RX ADMIN — ENOXAPARIN SODIUM 40 MILLIGRAM(S): 100 INJECTION SUBCUTANEOUS at 11:04

## 2021-05-13 RX ADMIN — CEFTRIAXONE 100 MILLIGRAM(S): 500 INJECTION, POWDER, FOR SOLUTION INTRAMUSCULAR; INTRAVENOUS at 17:11

## 2021-05-13 RX ADMIN — CHLORHEXIDINE GLUCONATE 1 APPLICATION(S): 213 SOLUTION TOPICAL at 06:17

## 2021-05-13 RX ADMIN — Medication 100 MILLIGRAM(S): at 11:05

## 2021-05-13 RX ADMIN — Medication 3 MILLIGRAM(S): at 21:18

## 2021-05-13 RX ADMIN — PANTOPRAZOLE SODIUM 40 MILLIGRAM(S): 20 TABLET, DELAYED RELEASE ORAL at 06:17

## 2021-05-13 RX ADMIN — Medication 40 MILLIEQUIVALENT(S): at 17:15

## 2021-05-13 NOTE — PROGRESS NOTE ADULT - ASSESSMENT
80 year old male with no past medical history (has not seen a doctor in many years) presented to the ED due to loose bowel movements.    # Rectal adenocarcinoma moderate to poorly differentiated  # Bloody Bowel Movements  - CTAP: Circumferential rectal wall thickening spanning at least 14 cm in length with a partially exophytic mass extending into the right mesorectal fat measuring approximately 5.7 x 3.0 x 6.8 cm. The exophytic mass abuts the right posterior lateral prostate gland. Primary consideration is rectal cancer. Limited ability to exclude superimposed proctitis. 9 mm right lower lobe pulmonary nodule. Small wedge-shaped hypodensities at the superior aspect of the spleen may reflect splenic infarcts.  - s/p colonoscopy 4/27: rectal mass extending from the anus up to 17 cm proximally, multiple polyps s/p polypectomy, a 5 cm transverse colon polyp  that was not removed  - Pan CT completed for staging: Well-defined right lower lobe nodule measuring 6 mm, 3 mm lingular nodule  - MRI pelvis done showed: 18 cm long polypoidal rectal mass with invasion of the right meso rectal fascia and possibly the prostate gland.   - Hem/Onc: Will start first cycle of FOLFOX on 5/7/21 . Pt will be getting 4 cycles of FOLFOX every 2 wks , followed by evaluation for chemo RT and then surgical eval.  - Seen by colorectal surgeon, requested colonoscopy with EMR of transverse polyp and EUS on 4/30 cancelled due to poor prep.  - CEA 24, CA 19-9 31  - GI : no intervention  - For constipation: increased meds: senna, bisacodyl, miralax.    - trend Hb. Bloody BMs on 5/10. Hb stable. No acute intervention from GI.   - Possible SNF    # Febrile during Hb transfusion   # Fevers possibly due cystitis   - spiked temp 102F during Hb transfusion. Blood bank contacted. Transfusion work up sent   - of note patient noted to have cystitis and JJ vaccine given within 48h prior to first fever  - UA positive for LE and nitrites   - continue with rocephin 1g q24h - day 2  - Follow up BCx     # encephalopathy, resolved  - Appears to be at baseline  - CT head- no acute changes   - TSH, B12, folate, RPR - wnl     # Pancreatic hypodensity  - Possible cyst/IPMN  - MRI pancreatic protocol (w/wo contrast) as an outpatient    # Lung nodule   - CT:  Right lower lobe solid nodule measuring 9 mm.   - f/u OP    # Subacute Rib Fracture   - CT:  Subacute fracture of the left 10th lateral rib.   - Pain control prn     # Echo tricuspid echodensity cannot rule out vegetation  Ddx includes infective endocarditis vs metastasis  - Cardiology consulted for MYA : negative  - BCx was negative 4/21, repeated 4/30  - Afebrile    A1C 5.2, lipid panel normal    DVT PPx: SCDs  GI PPX: Protonix 40 mg PO  Diet: Regular  Activity: Increase as tolerated  Dispo: SNF  Code Status: full code

## 2021-05-13 NOTE — PROGRESS NOTE ADULT - SUBJECTIVE AND OBJECTIVE BOX
MISAEL HUFFMAN    Patient is a 80y old  Male who presents with a chief complaint of diarrhea (09 May 2021 10:11)    INTERVAL HPI/OVERNIGHT EVENTS: Pt was noted to be febrile yesterday.      CONSTITUTIONAL: +generalized weakness,   EYES/ENT: No visual changes;  No vertigo or throat pain   NECK: No pain or stiffness  RESPIRATORY: No cough, wheezing, hemoptysis; No shortness of breath  CARDIOVASCULAR: No chest pain or palpitations  GASTROINTESTINAL: No abdominal or epigastric pain. No nausea, vomiting, or hematemesis; No diarrhea or constipation. No melena or hematochezia.  GENITOURINARY: + suprapubic pain  NEUROLOGICAL: No numbness or weakness  SKIN: No itching, rashes     PHYSICAL EXAM:  T(C): 37.2, Max: 38.4 (21 @ 19:51)  HR: 82 (82 - 94)  BP: 132/68 (114/56 - 162/70)  RR: 18 (18 - 18)  SpO2: --    GENERAL: NAD, pale  PULMONARY/CHEST: No rales, rhonchi, or wheezing  CARDIOVASC: Regular rate and rhythm; No murmurs. + left side chemo-port, no erythema, no tenderness over port  GI/ABDOMEN: Soft, +suprapubic tenderness, abd nondistended; Bowel sounds present  EXTREMITIES:  2+ Peripheral Pulses, No clubbing, cyanosis, or edema, no deformity. No calf tenderness b/l.  SKIN: pale, no rash  NERVOUS SYSTEM:  Alert & Oriented X3, no focal deficit     Consultant(s) Notes Reviewed by me.       LABS:                        7.8    3.82  )-----------( 115      ( 13 May 2021 07:01 )             26.5     05-    132<L>  |  99  |  16  ----------------------------<  89  3.6   |  23  |  0.5<L>    Ca    7.9<L>      13 May 2021 07:01  Mg     1.9     -    TPro  5.7<L>  /  Alb  2.7<L>  /  TBili  0.4  /  DBili  x   /  AST  15  /  ALT  10  /  AlkPhos  44  05-      Urinalysis Basic - ( 12 May 2021 15:13 )    Color: Yellow / Appearance: Slightly Turbid / S.021 / pH: x  Gluc: x / Ketone: Negative  / Bili: Negative / Urobili: <2 mg/dL   Blood: x / Protein: Negative / Nitrite: Positive   Leuk Esterase: Large / RBC: 8 /HPF /  /HPF   Sq Epi: x / Non Sq Epi: 0 /HPF / Bacteria: Moderate      RADIOLOGY & ADDITIONAL TESTS:  < from: US Kidney and Bladder (21 @ 14:59) >  IMPRESSION:    1.  No hydronephrosis or renal calculi bilaterally.  2.  Heterogeneous enlarged prostate gland appears inseparable from known rectal mass.  3.  Thickened, trabeculated urinary bladder wall with multiple bladder diverticula.    < end of copied text >  < from: Xray Chest 1 View- PORTABLE-Routine (Xray Chest 1 View- PORTABLE-Routine in AM.) (21 @ 06:34) >  Impression:    Stable reticular density left lung base. No Pleural effusion or pneumothorax.    < end of copied text >        MEDICATIONS  (STANDING):  cefTRIAXone   IVPB 1000 milliGRAM(s) IV Intermittent every 24 hours  chlorhexidine 4% Liquid 1 Application(s) Topical <User Schedule>  enoxaparin Injectable 40 milliGRAM(s) SubCutaneous daily  pantoprazole    Tablet 40 milliGRAM(s) Oral before breakfast  phenazopyridine 100 milliGRAM(s) Oral three times a day  senna 2 Tablet(s) Oral two times a day    MEDICATIONS  (PRN):  acetaminophen   Tablet .. 650 milliGRAM(s) Oral every 6 hours PRN Temp greater or equal to 38C (100.4F), Mild Pain (1 - 3)  melatonin 3 milliGRAM(s) Oral at bedtime PRN Insomnia  morphine  - Injectable 2 milliGRAM(s) IV Push every 4 hours PRN Severe Pain (7 - 10)  oxycodone    5 mG/acetaminophen 325 mG 1 Tablet(s) Oral every 6 hours PRN Moderate Pain (4 - 6)

## 2021-05-13 NOTE — PROGRESS NOTE ADULT - ASSESSMENT
80 year old male with no past medical history (has not seen a doctor in many years) presented to the ED due to loose bowel movements. Pt was diagnosed with rectal cancer. Completed first cycle of chemo inpatient. hospital course got complicated by fever.     # Rectal adenocarcinoma moderate to poorly differentiated, locally advanced  - s/p colonoscopy 4/27: rectal mass extending from the anus up to 17 cm proximally, multiple polyps s/p polypectomy, a 5 cm transverse colon polyp  that was not removed  - MRI pelvis: 18 cm long polypoidal rectal mass with invasion of the right meso rectal fascia and possibly the prostate gland. Additional involvement of the analsphincter complex--T4(a or b)N0Mx  - heme/onc offered oxaliplatin based chemotherapy followed by chemo RT and then evaluation for surgical resection .   - s/p first cycle of FOLFOX on 5/7/21 . Pt will be getting 4 cycles of FOLFOX every 2 wks , followed by evaluation for chemo RT and then surgical eval.     # Fever, suspected UTI  - started Rocephin  - send BCx  - no signs of PNA or phlebitis  - f/u UCx    # Acute blood loss anemia due to rectal cancer, Iron deficiency  - Hb with slow trend down  - attempted transfusion on 5/12, but pt spiked fever, transfusion aborted  - will f/o tomorrow AM Hb and possibly will transfuse.  - keep T&S active   - s/p IV Venofer    # 1.8 cm hypodensity in the posterior pancreatic body:   appears to measure fluid attenuation,  possible cyst/ IPMN. No main duct dilatation  - MRI pancreatic protocol as OP    # Subacute Rib Fracture   - CT:  Subacute fracture of the left 10th lateral rib.   - Pain control prn     DVT PPX: Lovenox  activity as tolerated  Pt asked to get up from bed and walk as tolerated and OOBTC.    #Progress Note Handoff  Pending fever work up.   Once stable for discharge pt will have to go to SNF.

## 2021-05-13 NOTE — PROGRESS NOTE ADULT - SUBJECTIVE AND OBJECTIVE BOX
Hospital Day:      Chief Complaint: Patient is a 80y old  Male who presents with a chief complaint of diarrhea (09 May 2021 10:11)    24 hour events: No acute overnight events. Patient febrile.     Past Medical Hx:   No pertinent past medical history      Past Sx:  Amputation of digit of left hand      Allergies:  No Known Allergies    Current Meds:   Standing Meds:  cefTRIAXone   IVPB 1000 milliGRAM(s) IV Intermittent every 24 hours  chlorhexidine 4% Liquid 1 Application(s) Topical <User Schedule>  enoxaparin Injectable 40 milliGRAM(s) SubCutaneous daily  pantoprazole    Tablet 40 milliGRAM(s) Oral before breakfast  phenazopyridine 100 milliGRAM(s) Oral three times a day  senna 2 Tablet(s) Oral two times a day    PRN Meds:  acetaminophen   Tablet .. 650 milliGRAM(s) Oral every 6 hours PRN Temp greater or equal to 38C (100.4F), Mild Pain (1 - 3)  melatonin 3 milliGRAM(s) Oral at bedtime PRN Insomnia  morphine  - Injectable 2 milliGRAM(s) IV Push every 4 hours PRN Severe Pain (7 - 10)  oxycodone    5 mG/acetaminophen 325 mG 1 Tablet(s) Oral every 6 hours PRN Moderate Pain (4 - 6)    HOME MEDICATIONS:  ferrous sulfate 325 mg (65 mg elemental iron) oral tablet: 1 tab(s) orally 3 times a day  pantoprazole 40 mg oral delayed release tablet: 1 tab(s) orally once a day (before a meal)      Vital Signs:   T(F): 97.5 (21 @ 04:58), Max: 98.9 (21 @ 13:53)  HR: 79 (21 @ 04:58) (78 - 82)  BP: 101/52 (21 @ 04:58) (101/52 - 132/68)  RR: 18 (21 @ 04:58) (18 - 18)  SpO2: --      21 @ 07:01  -  21 @ 06:32  --------------------------------------------------------  IN: 0 mL / OUT: 250 mL / NET: -250 mL        Physical Exam:   GENERAL: NAD  HEENT: NCAT  CHEST/LUNG: audible bs bilaterally  HEART: Regular rate and rhythm; s1 s2 appreciated  ABDOMEN: Soft, Nontender, Nondistended; Bowel sounds present  EXTREMITIES: No LE edema b/l  NERVOUS SYSTEM:  Alert & Oriented X3      Labs:                         7.3    4.32  )-----------( 108      ( 13 May 2021 20:42 )             24.6     Neutophil% 76.4, Lymphocyte% 18.1, Monocyte% 2.4, Bands% 1.0 21 @ 07:01    13 May 2021 07:01    132    |  99     |  16     ----------------------------<  89     3.6     |  23     |  0.5      Ca    7.9        13 May 2021 07:01  Mg     1.9       13 May 2021 07:01    TPro  5.7    /  Alb  2.7    /  TBili  0.4    /  DBili  x      /  AST  15     /  ALT  10     /  AlkPhos  44     13 May 2021 07:01    Urinalysis Basic - ( 12 May 2021 15:13 )    Color: Yellow / Appearance: Slightly Turbid / S.021 / pH: x  Gluc: x / Ketone: Negative  / Bili: Negative / Urobili: <2 mg/dL   Blood: x / Protein: Negative / Nitrite: Positive   Leuk Esterase: Large / RBC: 8 /HPF /  /HPF   Sq Epi: x / Non Sq Epi: 0 /HPF / Bacteria: Moderate            Radiology:     Assessment and Plan:     _____________________________________________  DVT ppx:  GI ppx:   Code Status:     DISPO:

## 2021-05-14 LAB
-  AMIKACIN: SIGNIFICANT CHANGE UP
-  AZTREONAM: SIGNIFICANT CHANGE UP
-  CEFEPIME: SIGNIFICANT CHANGE UP
-  CEFTAZIDIME: SIGNIFICANT CHANGE UP
-  CIPROFLOXACIN: SIGNIFICANT CHANGE UP
-  GENTAMICIN: SIGNIFICANT CHANGE UP
-  IMIPENEM: SIGNIFICANT CHANGE UP
-  LEVOFLOXACIN: SIGNIFICANT CHANGE UP
-  MEROPENEM: SIGNIFICANT CHANGE UP
-  PIPERACILLIN/TAZOBACTAM: SIGNIFICANT CHANGE UP
-  TOBRAMYCIN: SIGNIFICANT CHANGE UP
ALBUMIN SERPL ELPH-MCNC: 2.6 G/DL — LOW (ref 3.5–5.2)
ALP SERPL-CCNC: 44 U/L — SIGNIFICANT CHANGE UP (ref 30–115)
ALT FLD-CCNC: 11 U/L — SIGNIFICANT CHANGE UP (ref 0–41)
ANION GAP SERPL CALC-SCNC: 10 MMOL/L — SIGNIFICANT CHANGE UP (ref 7–14)
AST SERPL-CCNC: 19 U/L — SIGNIFICANT CHANGE UP (ref 0–41)
BASOPHILS # BLD AUTO: 0.02 K/UL — SIGNIFICANT CHANGE UP (ref 0–0.2)
BASOPHILS NFR BLD AUTO: 0.4 % — SIGNIFICANT CHANGE UP (ref 0–1)
BILIRUB SERPL-MCNC: 0.3 MG/DL — SIGNIFICANT CHANGE UP (ref 0.2–1.2)
BUN SERPL-MCNC: 14 MG/DL — SIGNIFICANT CHANGE UP (ref 10–20)
CALCIUM SERPL-MCNC: 7.6 MG/DL — LOW (ref 8.5–10.1)
CHLORIDE SERPL-SCNC: 101 MMOL/L — SIGNIFICANT CHANGE UP (ref 98–110)
CO2 SERPL-SCNC: 22 MMOL/L — SIGNIFICANT CHANGE UP (ref 17–32)
CREAT SERPL-MCNC: <0.5 MG/DL — LOW (ref 0.7–1.5)
CULTURE RESULTS: SIGNIFICANT CHANGE UP
CULTURE RESULTS: SIGNIFICANT CHANGE UP
EOSINOPHIL # BLD AUTO: 0.16 K/UL — SIGNIFICANT CHANGE UP (ref 0–0.7)
EOSINOPHIL NFR BLD AUTO: 3.4 % — SIGNIFICANT CHANGE UP (ref 0–8)
GLUCOSE SERPL-MCNC: 100 MG/DL — HIGH (ref 70–99)
HCT VFR BLD CALC: 27.6 % — LOW (ref 42–52)
HGB BLD-MCNC: 8.4 G/DL — LOW (ref 14–18)
IMM GRANULOCYTES NFR BLD AUTO: 1.3 % — HIGH (ref 0.1–0.3)
LYMPHOCYTES # BLD AUTO: 0.68 K/UL — LOW (ref 1.2–3.4)
LYMPHOCYTES # BLD AUTO: 14.6 % — LOW (ref 20.5–51.1)
MAGNESIUM SERPL-MCNC: 1.8 MG/DL — SIGNIFICANT CHANGE UP (ref 1.8–2.4)
MCHC RBC-ENTMCNC: 23.9 PG — LOW (ref 27–31)
MCHC RBC-ENTMCNC: 30.4 G/DL — LOW (ref 32–37)
MCV RBC AUTO: 78.4 FL — LOW (ref 80–94)
METHOD TYPE: SIGNIFICANT CHANGE UP
MONOCYTES # BLD AUTO: 0.1 K/UL — SIGNIFICANT CHANGE UP (ref 0.1–0.6)
MONOCYTES NFR BLD AUTO: 2.1 % — SIGNIFICANT CHANGE UP (ref 1.7–9.3)
NEUTROPHILS # BLD AUTO: 3.64 K/UL — SIGNIFICANT CHANGE UP (ref 1.4–6.5)
NEUTROPHILS NFR BLD AUTO: 78.2 % — HIGH (ref 42.2–75.2)
NRBC # BLD: 0 /100 WBCS — SIGNIFICANT CHANGE UP (ref 0–0)
ORGANISM # SPEC MICROSCOPIC CNT: SIGNIFICANT CHANGE UP
ORGANISM # SPEC MICROSCOPIC CNT: SIGNIFICANT CHANGE UP
PLATELET # BLD AUTO: 112 K/UL — LOW (ref 130–400)
POTASSIUM SERPL-MCNC: 4.2 MMOL/L — SIGNIFICANT CHANGE UP (ref 3.5–5)
POTASSIUM SERPL-SCNC: 4.2 MMOL/L — SIGNIFICANT CHANGE UP (ref 3.5–5)
PROT SERPL-MCNC: 5.3 G/DL — LOW (ref 6–8)
RBC # BLD: 3.52 M/UL — LOW (ref 4.7–6.1)
RBC # FLD: 25.2 % — HIGH (ref 11.5–14.5)
SARS-COV-2 RNA SPEC QL NAA+PROBE: SIGNIFICANT CHANGE UP
SODIUM SERPL-SCNC: 133 MMOL/L — LOW (ref 135–146)
SPECIMEN SOURCE: SIGNIFICANT CHANGE UP
SPECIMEN SOURCE: SIGNIFICANT CHANGE UP
WBC # BLD: 4.66 K/UL — LOW (ref 4.8–10.8)
WBC # FLD AUTO: 4.66 K/UL — LOW (ref 4.8–10.8)

## 2021-05-14 PROCEDURE — 99233 SBSQ HOSP IP/OBS HIGH 50: CPT

## 2021-05-14 RX ADMIN — Medication 100 MILLIGRAM(S): at 13:18

## 2021-05-14 RX ADMIN — CHLORHEXIDINE GLUCONATE 1 APPLICATION(S): 213 SOLUTION TOPICAL at 05:45

## 2021-05-14 RX ADMIN — MORPHINE SULFATE 2 MILLIGRAM(S): 50 CAPSULE, EXTENDED RELEASE ORAL at 22:27

## 2021-05-14 RX ADMIN — CEFTRIAXONE 100 MILLIGRAM(S): 500 INJECTION, POWDER, FOR SOLUTION INTRAMUSCULAR; INTRAVENOUS at 17:46

## 2021-05-14 RX ADMIN — MORPHINE SULFATE 2 MILLIGRAM(S): 50 CAPSULE, EXTENDED RELEASE ORAL at 21:14

## 2021-05-14 RX ADMIN — Medication 100 MILLIGRAM(S): at 05:46

## 2021-05-14 RX ADMIN — Medication 3 MILLIGRAM(S): at 21:14

## 2021-05-14 RX ADMIN — Medication 100 MILLIGRAM(S): at 21:08

## 2021-05-14 RX ADMIN — PANTOPRAZOLE SODIUM 40 MILLIGRAM(S): 20 TABLET, DELAYED RELEASE ORAL at 05:46

## 2021-05-14 RX ADMIN — ENOXAPARIN SODIUM 40 MILLIGRAM(S): 100 INJECTION SUBCUTANEOUS at 11:36

## 2021-05-14 NOTE — PROGRESS NOTE ADULT - SUBJECTIVE AND OBJECTIVE BOX
SUBJECTIVE:    Patient is a 80y old Male who presents with a chief complaint of diarrhea (09 May 2021 10:11)    Currently admitted to medicine with the primary diagnosis of Rectal adenocarcinoma     Today is hospital day 23d. Patient seen and examined at bedside this AM. No acute overnight events.    PAST MEDICAL & SURGICAL HISTORY  No pertinent past medical history    Amputation of digit of left hand      SOCIAL HISTORY:  Negative for smoking/alcohol/drug use.     ALLERGIES:  No Known Allergies    MEDICATIONS:  STANDING MEDICATIONS  cefTRIAXone   IVPB 1000 milliGRAM(s) IV Intermittent every 24 hours  chlorhexidine 4% Liquid 1 Application(s) Topical <User Schedule>  enoxaparin Injectable 40 milliGRAM(s) SubCutaneous daily  pantoprazole    Tablet 40 milliGRAM(s) Oral before breakfast  phenazopyridine 100 milliGRAM(s) Oral three times a day    PRN MEDICATIONS  acetaminophen   Tablet .. 650 milliGRAM(s) Oral every 6 hours PRN  aluminum hydroxide/magnesium hydroxide/simethicone Suspension 30 milliLiter(s) Oral every 6 hours PRN  melatonin 3 milliGRAM(s) Oral at bedtime PRN  morphine  - Injectable 2 milliGRAM(s) IV Push every 4 hours PRN  oxycodone    5 mG/acetaminophen 325 mG 1 Tablet(s) Oral every 6 hours PRN    VITALS:   T(F): 97  HR: 85  BP: 119/56  RR: 18  SpO2: --    LABS:                        8.4    4.66  )-----------( 112      ( 14 May 2021 06:34 )             27.6     05-14    133<L>  |  101  |  14  ----------------------------<  100<H>  4.2   |  22  |  <0.5<L>    Ca    7.6<L>      14 May 2021 06:34  Mg     1.8     -    TPro  5.3<L>  /  Alb  2.6<L>  /  TBili  0.3  /  DBili  x   /  AST  19  /  ALT  11  /  AlkPhos  44  05-14      Urinalysis Basic - ( 12 May 2021 15:13 )    Color: Yellow / Appearance: Slightly Turbid / S.021 / pH: x  Gluc: x / Ketone: Negative  / Bili: Negative / Urobili: <2 mg/dL   Blood: x / Protein: Negative / Nitrite: Positive   Leuk Esterase: Large / RBC: 8 /HPF /  /HPF   Sq Epi: x / Non Sq Epi: 0 /HPF / Bacteria: Moderate            Culture - Urine (collected 12 May 2021 17:02)  Source: .Urine Clean Catch (Midstream)  Preliminary Report (14 May 2021 03:02):    >100,000 CFU/ml Gram Negative Rods    PHYSICAL EXAM:  GEN: No acute distress, chest port noted  LUNGS: b/l breath sounds, no respiratory distress  HEART: S1/S2 present, not tachycardic  ABD: Soft, not distended, mild tenderness in suprapubic area  EXT: no LE edema  NEURO: AAOX3    Intravenous access:   NG tube:   [place]illed[place] Catheter:

## 2021-05-14 NOTE — PROGRESS NOTE ADULT - ASSESSMENT
81 yo M with no PMH presented for loose BM and admitted for rectal adenocarcinoma. S/p first cycle of chemo while inpatient. Being treated for UTI.    #Rectal adenocarcinoma  - Moderate to poorly differentiated, locally advanced  - S/p 1st cycle of FOLFOX on 5/7/21  - S/p colonoscopy 4/27: rectal mass extending from the anus up to 17 cm proximally, multiple polyps s/p polypectomy, a 5 cm transverse colon polyp that was not removed  - MRI pelvis: 18 cm long polypoidal rectal mass with invasion of the right meso rectal fascia and possibly the prostate gland. Additional involvement of the anal sphincter complex--T4(a or b)N0Mx  - Will be getting 4 cycles of FOLFOX every 2 weeks, followed by evaluation for chemo RT and then surgical eval    #UTI  - Urine culture growing GNR  - Blood cultures negative x3 (final)  - Repeat blood culture sent yesterday  - C/w Rocephin 1g IV QD  - F/u urine culture speciation and sensitivities    #Acute blood loss  - Secondary to rectal cancer  - Improved s/p 1U pRBCs on 5/13  - S/p IV Venofer  - Monitor CBC  - Maintain active type and screen  - Transfuse if Hb <7    #Persistent diarrhea  - F/u GI PCR panel  - C. Diff PCR negative on admission  - If GI PCR panel negative, can start Imodium     #1.8 cm hypodensity in the posterior pancreatic body  - Appears to measure fluid attenuation  - No main duct dilatation  - MRI pancreatic protocol as OP    #Subacute rib fracture   - CT: Subacute fracture of the left 10th lateral rib.   - Pain control PRN    #Misc  Diet: low fiber  DVT PPx: Lovenox  Dispo: pending GI PCR and urine culture speciation/sensitivities, D/C planning to SNF

## 2021-05-14 NOTE — PROGRESS NOTE ADULT - ASSESSMENT
80 year old male with no past medical history (has not seen a doctor in many years) presented to the ED due to loose bowel movements. Pt was diagnosed with rectal cancer. Completed first cycle of chemo inpatient. hospital course got complicated by fever.     # Rectal adenocarcinoma moderate to poorly differentiated, locally advanced  - s/p colonoscopy 4/27: rectal mass extending from the anus up to 17 cm proximally, multiple polyps s/p polypectomy, a 5 cm transverse colon polyp  that was not removed  - MRI pelvis: 18 cm long polypoidal rectal mass with invasion of the right meso rectal fascia and possibly the prostate gland. Additional involvement of the analsphincter complex--T4(a or b)N0Mx  - heme/onc offered oxaliplatin based chemotherapy followed by chemo RT and then evaluation for surgical resection .   - s/p first cycle of FOLFOX on 5/7/21 . Pt will be getting 4 cycles of FOLFOX every 2 wks , followed by evaluation for chemo RT and then surgical eval.     # UTI, UCx E. Coli  - cont Rocephin  - f/u sensitivity  - f/u BCx  - no signs of PNA or phlebitis    # Acute blood loss anemia due to rectal cancer, Iron deficiency  - Hb with slow trend down, s/p transfusion on 5/13 with appropriate response.  - attempted transfusion on 5/12, but pt spiked fever, transfusion aborted  - keep T&S active   - s/p IV Venofer    # Persistent diarrhea - C. Diff negative on admission, will send GI PCR  - if negative can do Imodium   - senna DC'ed    # 1.8 cm hypodensity in the posterior pancreatic body:   appears to measure fluid attenuation,  possible cyst/ IPMN. No main duct dilatation  - MRI pancreatic protocol as OP    # Subacute Rib Fracture   - CT:  Subacute fracture of the left 10th lateral rib.   - Pain control prn     DVT PPX: Lovenox  activity as tolerated  Pt asked to get up from bed and walk as tolerated and OOBTC.    # Progress Note Handoff  Pending GI PCR, UCx sens.   DC planning to SNF. Anticipated dc in 24-48 hrs.

## 2021-05-14 NOTE — PROGRESS NOTE ADULT - SUBJECTIVE AND OBJECTIVE BOX
MISAEL HUFFMAN    Patient is a 80y old  Male who presents with a chief complaint of diarrhea (09 May 2021 10:11)    INTERVAL HPI/OVERNIGHT EVENTS: Pt received transfusion last night. Pt is not febrile. Pt states he still has loose stool, not watery, about 2-3 times a day.    CONSTITUTIONAL: +generalized weakness, no fevers or chills  EYES/ENT: No visual changes;  No vertigo or throat pain   NECK: No pain or stiffness  RESPIRATORY: No cough, wheezing, hemoptysis; No shortness of breath  CARDIOVASCULAR: No chest pain or palpitations  GASTROINTESTINAL: +diarrhea.  GENITOURINARY: +burning urination.   NEUROLOGICAL: No numbness or weakness  SKIN: No itching, rashes     PHYSICAL EXAM:  T(C): 36.6, Max: 37.2 (- @ 13:53)  HR: 81 (76 - 82)  BP: 108/54 (101/52 - 132/68)  RR: 18 (18 - 18)  SpO2: --    GENERAL: NAD, frail  PULMONARY/CHEST: No rales, rhonchi, or wheezing  CARDIOVASC: Regular rate and rhythm; No murmurs  GI/ABDOMEN: Soft, mild suprapubic tenderness, nondistended; Bowel sounds present  EXTREMITIES:  2+ Peripheral Pulses, No clubbing, cyanosis, or edema, no deformity. No calf tenderness b/l.  NERVOUS SYSTEM:  Alert & Oriented X3, no focal deficit     LABS:                        8.4    4.66  )-----------( 112      ( 14 May 2021 06:34 )             27.6     Hemoglobin: 8.4 ()  Hemoglobin: 7.3 (-)  Hemoglobin: 7.8 ()        133<L>  |  101  |  14  ----------------------------<  100<H>  4.2   |  22  |  <0.5<L>    Ca    7.6<L>      14 May 2021 06:34  Mg     1.8         TPro  5.3<L>  /  Alb  2.6<L>  /  TBili  0.3  /  DBili  x   /  AST  19  /  ALT  11  /  AlkPhos  44        Urinalysis Basic - ( 12 May 2021 15:13 )    Color: Yellow / Appearance: Slightly Turbid / S.021 / pH: x  Gluc: x / Ketone: Negative  / Bili: Negative / Urobili: <2 mg/dL   Blood: x / Protein: Negative / Nitrite: Positive   Leuk Esterase: Large / RBC: 8 /HPF /  /HPF   Sq Epi: x / Non Sq Epi: 0 /HPF / Bacteria: Moderate      Culture - Urine (collected 12 May 2021 17:02)  Source: .Urine Clean Catch (Midstream)  Preliminary Report (14 May 2021 03:02):    >100,000 CFU/ml Gram Negative Rods      RADIOLOGY & ADDITIONAL TESTS:  no new tests      MEDICATIONS  (STANDING):  cefTRIAXone   IVPB 1000 milliGRAM(s) IV Intermittent every 24 hours  chlorhexidine 4% Liquid 1 Application(s) Topical <User Schedule>  enoxaparin Injectable 40 milliGRAM(s) SubCutaneous daily  pantoprazole    Tablet 40 milliGRAM(s) Oral before breakfast  phenazopyridine 100 milliGRAM(s) Oral three times a day  senna 2 Tablet(s) Oral two times a day    MEDICATIONS  (PRN):  acetaminophen   Tablet .. 650 milliGRAM(s) Oral every 6 hours PRN Temp greater or equal to 38C (100.4F), Mild Pain (1 - 3)  melatonin 3 milliGRAM(s) Oral at bedtime PRN Insomnia  morphine  - Injectable 2 milliGRAM(s) IV Push every 4 hours PRN Severe Pain (7 - 10)  oxycodone    5 mG/acetaminophen 325 mG 1 Tablet(s) Oral every 6 hours PRN Moderate Pain (4 - 6)

## 2021-05-15 LAB
ALBUMIN SERPL ELPH-MCNC: 2.5 G/DL — LOW (ref 3.5–5.2)
ALP SERPL-CCNC: 45 U/L — SIGNIFICANT CHANGE UP (ref 30–115)
ALT FLD-CCNC: 14 U/L — SIGNIFICANT CHANGE UP (ref 0–41)
ANION GAP SERPL CALC-SCNC: 9 MMOL/L — SIGNIFICANT CHANGE UP (ref 7–14)
AST SERPL-CCNC: 22 U/L — SIGNIFICANT CHANGE UP (ref 0–41)
BASOPHILS # BLD AUTO: 0.02 K/UL — SIGNIFICANT CHANGE UP (ref 0–0.2)
BASOPHILS NFR BLD AUTO: 0.6 % — SIGNIFICANT CHANGE UP (ref 0–1)
BILIRUB SERPL-MCNC: 0.2 MG/DL — SIGNIFICANT CHANGE UP (ref 0.2–1.2)
BUN SERPL-MCNC: 15 MG/DL — SIGNIFICANT CHANGE UP (ref 10–20)
CALCIUM SERPL-MCNC: 7.6 MG/DL — LOW (ref 8.5–10.1)
CHLORIDE SERPL-SCNC: 103 MMOL/L — SIGNIFICANT CHANGE UP (ref 98–110)
CO2 SERPL-SCNC: 22 MMOL/L — SIGNIFICANT CHANGE UP (ref 17–32)
CREAT SERPL-MCNC: <0.5 MG/DL — LOW (ref 0.7–1.5)
EOSINOPHIL # BLD AUTO: 0.13 K/UL — SIGNIFICANT CHANGE UP (ref 0–0.7)
EOSINOPHIL NFR BLD AUTO: 3.8 % — SIGNIFICANT CHANGE UP (ref 0–8)
GLUCOSE SERPL-MCNC: 107 MG/DL — HIGH (ref 70–99)
HCT VFR BLD CALC: 26.5 % — LOW (ref 42–52)
HCT VFR BLD CALC: 26.8 % — LOW (ref 42–52)
HGB BLD-MCNC: 7.9 G/DL — LOW (ref 14–18)
HGB BLD-MCNC: 8.1 G/DL — LOW (ref 14–18)
IMM GRANULOCYTES NFR BLD AUTO: 0.6 % — HIGH (ref 0.1–0.3)
LYMPHOCYTES # BLD AUTO: 0.61 K/UL — LOW (ref 1.2–3.4)
LYMPHOCYTES # BLD AUTO: 17.6 % — LOW (ref 20.5–51.1)
MAGNESIUM SERPL-MCNC: 1.6 MG/DL — LOW (ref 1.8–2.4)
MCHC RBC-ENTMCNC: 23.8 PG — LOW (ref 27–31)
MCHC RBC-ENTMCNC: 23.9 PG — LOW (ref 27–31)
MCHC RBC-ENTMCNC: 29.8 G/DL — LOW (ref 32–37)
MCHC RBC-ENTMCNC: 30.2 G/DL — LOW (ref 32–37)
MCV RBC AUTO: 78.8 FL — LOW (ref 80–94)
MCV RBC AUTO: 80.1 FL — SIGNIFICANT CHANGE UP (ref 80–94)
MONOCYTES # BLD AUTO: 0.1 K/UL — SIGNIFICANT CHANGE UP (ref 0.1–0.6)
MONOCYTES NFR BLD AUTO: 2.9 % — SIGNIFICANT CHANGE UP (ref 1.7–9.3)
NEUTROPHILS # BLD AUTO: 2.58 K/UL — SIGNIFICANT CHANGE UP (ref 1.4–6.5)
NEUTROPHILS NFR BLD AUTO: 74.5 % — SIGNIFICANT CHANGE UP (ref 42.2–75.2)
NRBC # BLD: 0 /100 WBCS — SIGNIFICANT CHANGE UP (ref 0–0)
NRBC # BLD: 0 /100 WBCS — SIGNIFICANT CHANGE UP (ref 0–0)
PLATELET # BLD AUTO: 110 K/UL — LOW (ref 130–400)
PLATELET # BLD AUTO: 123 K/UL — LOW (ref 130–400)
POTASSIUM SERPL-MCNC: 3.8 MMOL/L — SIGNIFICANT CHANGE UP (ref 3.5–5)
POTASSIUM SERPL-SCNC: 3.8 MMOL/L — SIGNIFICANT CHANGE UP (ref 3.5–5)
PROT SERPL-MCNC: 5 G/DL — LOW (ref 6–8)
RBC # BLD: 3.31 M/UL — LOW (ref 4.7–6.1)
RBC # BLD: 3.4 M/UL — LOW (ref 4.7–6.1)
RBC # FLD: 24.8 % — HIGH (ref 11.5–14.5)
RBC # FLD: 24.9 % — HIGH (ref 11.5–14.5)
SODIUM SERPL-SCNC: 134 MMOL/L — LOW (ref 135–146)
WBC # BLD: 3.46 K/UL — LOW (ref 4.8–10.8)
WBC # BLD: 4.7 K/UL — LOW (ref 4.8–10.8)
WBC # FLD AUTO: 3.46 K/UL — LOW (ref 4.8–10.8)
WBC # FLD AUTO: 4.7 K/UL — LOW (ref 4.8–10.8)

## 2021-05-15 PROCEDURE — 99233 SBSQ HOSP IP/OBS HIGH 50: CPT

## 2021-05-15 RX ORDER — MAGNESIUM SULFATE 500 MG/ML
2 VIAL (ML) INJECTION ONCE
Refills: 0 | Status: COMPLETED | OUTPATIENT
Start: 2021-05-15 | End: 2021-05-15

## 2021-05-15 RX ORDER — SIMETHICONE 80 MG/1
80 TABLET, CHEWABLE ORAL
Refills: 0 | Status: DISCONTINUED | OUTPATIENT
Start: 2021-05-15 | End: 2021-05-17

## 2021-05-15 RX ADMIN — Medication 30 MILLILITER(S): at 21:19

## 2021-05-15 RX ADMIN — Medication 100 MILLIGRAM(S): at 06:31

## 2021-05-15 RX ADMIN — ENOXAPARIN SODIUM 40 MILLIGRAM(S): 100 INJECTION SUBCUTANEOUS at 11:33

## 2021-05-15 RX ADMIN — Medication 25 GRAM(S): at 13:03

## 2021-05-15 RX ADMIN — PANTOPRAZOLE SODIUM 40 MILLIGRAM(S): 20 TABLET, DELAYED RELEASE ORAL at 06:31

## 2021-05-15 RX ADMIN — Medication 3 MILLIGRAM(S): at 21:19

## 2021-05-15 RX ADMIN — CHLORHEXIDINE GLUCONATE 1 APPLICATION(S): 213 SOLUTION TOPICAL at 06:31

## 2021-05-15 NOTE — PROGRESS NOTE ADULT - REASON FOR ADMISSION
diarrhea
Diarrhea, bloody stools
diarrhea
diarrhea with bloody stools
diarrhea

## 2021-05-15 NOTE — PROGRESS NOTE ADULT - ASSESSMENT
79 yo M with no PMH presented for loose BM and admitted for rectal adenocarcinoma. S/p first cycle of chemo while inpatient. Being treated for UTI. Urine culture growing Pseudomonas. Started on Levaquin. Pending discharge to [facility].    #Rectal adenocarcinoma, moderately to poorly differentiated, locally advanced  - S/p 1st cycle of FOLFOX on 5/7/21  - S/p colonoscopy 4/27: rectal mass extending from the anus up to 17 cm proximally, multiple polyps s/p polypectomy, a 5 cm transverse colon polyp that was not removed  - MRI pelvis: 18 cm long polypoidal rectal mass with invasion of the right meso rectal fascia and possibly the prostate gland. Additional involvement of the anal sphincter complex--T4(a or b)N0Mx  - Will be getting 4 cycles of FOLFOX every 2 weeks, followed by evaluation for chemo RT and then surgical eval    #Pseudomonas UTI  - Started on Levaquin 750mg QD  - Blood cultures negative to date    #Acute blood loss anemia, improved  #Iron deficiency anemia  - Secondary to rectal cancer  - Improved s/p 1U pRBCs on 5/13  - S/p IV Venofer  - Monitor CBC  - Maintain active type and screen  - Transfuse if Hb <7.5    #Diarrhea  - Could be secondary to malignancy  - GI PCR panel negative   - C. Diff PCR negative on admission  - Low fiber diet  - Imodium PRN     #1.8 cm hypodensity in the posterior pancreatic body  - Appears to measure fluid attenuation  - No main duct dilatation  - MRI pancreatic protocol as OP    #Subacute rib fracture   - CT: Subacute fracture of the left 10th lateral rib  - Pain control PRN    #Misc  Diet: low fiber  DVT PPx: Lovenox  Dispo: D/C planning to [facility]

## 2021-05-15 NOTE — PROGRESS NOTE ADULT - TIME BILLING
complexity
complexity of care
plan of care
tests review with pt, plan of care
coordination of care
complexity

## 2021-05-15 NOTE — PROGRESS NOTE ADULT - SUBJECTIVE AND OBJECTIVE BOX
SUBJECTIVE:    Patient is a 80y old Male who presents with a chief complaint of diarrhea    Currently admitted to medicine with the primary diagnosis of Rectal adenocarcinoma     Today is hospital day 24d. Patient seen and examined at bedside this AM. No acute overnight events.    PAST MEDICAL & SURGICAL HISTORY  No pertinent past medical history    Amputation of digit of left hand      SOCIAL HISTORY:  Negative for smoking/alcohol/drug use.     ALLERGIES:  No Known Allergies    MEDICATIONS:  STANDING MEDICATIONS  chlorhexidine 4% Liquid 1 Application(s) Topical <User Schedule>  enoxaparin Injectable 40 milliGRAM(s) SubCutaneous daily  pantoprazole    Tablet 40 milliGRAM(s) Oral before breakfast    PRN MEDICATIONS  acetaminophen   Tablet .. 650 milliGRAM(s) Oral every 6 hours PRN  aluminum hydroxide/magnesium hydroxide/simethicone Suspension 30 milliLiter(s) Oral every 6 hours PRN  melatonin 3 milliGRAM(s) Oral at bedtime PRN  morphine  - Injectable 2 milliGRAM(s) IV Push every 4 hours PRN  oxycodone    5 mG/acetaminophen 325 mG 1 Tablet(s) Oral every 6 hours PRN    VITALS:   T(F): 98.4  HR: 81  BP: 111/59  RR: 18  SpO2: 97%    LABS:                        8.1    3.46  )-----------( 110      ( 15 May 2021 06:33 )             26.8     05-15    134<L>  |  103  |  15  ----------------------------<  107<H>  3.8   |  22  |  <0.5<L>    Ca    7.6<L>      15 May 2021 06:33  Mg     1.6     05-15    TPro  5.0<L>  /  Alb  2.5<L>  /  TBili  0.2  /  DBili  x   /  AST  22  /  ALT  14  /  AlkPhos  45  05-15              GI PCR Panel, Stool (collected 14 May 2021 13:43)  Source: .Stool Feces  Final Report (14 May 2021 23:16):    GI PCR Results: NOT detected    *******Please Note:*******    GI panel PCR evaluates for:    Campylobacter, Plesiomonas shigelloides, Salmonella,    Vibrio, Yersinia enterocolitica, Enteroaggregative    Escherichia coli (EAEC), Enteropathogenic E.coli (EPEC),    Enterotoxigenic E. coli (ETEC) lt/st, Shiga-like    toxin-producing E. coli (STEC) stx1/stx2,    Shigella/ Enteroinvasive E. coli (EIEC), Cryptosporidium,    Cyclospora cayetanensis, Entamoeba histolytica,    Giardia lamblia, Adenovirus F 40/41, Astrovirus,    Norovirus GI/GII, Rotavirus A, Sapovirus    Culture - Blood (collected 13 May 2021 20:42)  Source: .Blood None  Preliminary Report (15 May 2021 03:01):    No growth to date.    Culture - Urine (collected 12 May 2021 17:02)  Source: .Urine Clean Catch (Midstream)  Final Report (14 May 2021 23:07):    >100,000 CFU/ml Pseudomonas aeruginosa  Organism: Pseudomonas aeruginosa (14 May 2021 23:07)  Organism: Pseudomonas aeruginosa (14 May 2021 23:07)    PHYSICAL EXAM:  GEN: No acute distress, chest port noted  LUNGS: b/l breath sounds, no respiratory distress   HEART: S1/S2 present, not tachycardic  ABD: Soft, non-tender, non-distended  EXT: no LE edema  NEURO: AAOX3    Intravenous access:   NG tube:   Villeda Catheter:        SUBJECTIVE:    Patient is a 80y old Male who presents with a chief complaint of diarrhea    Currently admitted to medicine with the primary diagnosis of Rectal adenocarcinoma     Today is hospital day 24d. Patient seen and examined at bedside this AM. No acute overnight events.    PAST MEDICAL & SURGICAL HISTORY  No pertinent past medical history    Amputation of digit of left hand      SOCIAL HISTORY:  Negative for smoking/alcohol/drug use.     ALLERGIES:  No Known Allergies    MEDICATIONS:  STANDING MEDICATIONS  chlorhexidine 4% Liquid 1 Application(s) Topical <User Schedule>  enoxaparin Injectable 40 milliGRAM(s) SubCutaneous daily  pantoprazole    Tablet 40 milliGRAM(s) Oral before breakfast    PRN MEDICATIONS  acetaminophen   Tablet .. 650 milliGRAM(s) Oral every 6 hours PRN  aluminum hydroxide/magnesium hydroxide/simethicone Suspension 30 milliLiter(s) Oral every 6 hours PRN  melatonin 3 milliGRAM(s) Oral at bedtime PRN  morphine  - Injectable 2 milliGRAM(s) IV Push every 4 hours PRN  oxycodone    5 mG/acetaminophen 325 mG 1 Tablet(s) Oral every 6 hours PRN    VITALS:   T(F): 98.4  HR: 81  BP: 111/59  RR: 18  SpO2: 97%    LABS:                        8.1    3.46  )-----------( 110      ( 15 May 2021 06:33 )             26.8     05-15    134<L>  |  103  |  15  ----------------------------<  107<H>  3.8   |  22  |  <0.5<L>    Ca    7.6<L>      15 May 2021 06:33  Mg     1.6     05-15    TPro  5.0<L>  /  Alb  2.5<L>  /  TBili  0.2  /  DBili  x   /  AST  22  /  ALT  14  /  AlkPhos  45  05-15              GI PCR Panel, Stool (collected 14 May 2021 13:43)  Source: .Stool Feces  Final Report (14 May 2021 23:16):    GI PCR Results: NOT detected    *******Please Note:*******    GI panel PCR evaluates for:    Campylobacter, Plesiomonas shigelloides, Salmonella,    Vibrio, Yersinia enterocolitica, Enteroaggregative    Escherichia coli (EAEC), Enteropathogenic E.coli (EPEC),    Enterotoxigenic E. coli (ETEC) lt/st, Shiga-like    toxin-producing E. coli (STEC) stx1/stx2,    Shigella/ Enteroinvasive E. coli (EIEC), Cryptosporidium,    Cyclospora cayetanensis, Entamoeba histolytica,    Giardia lamblia, Adenovirus F 40/41, Astrovirus,    Norovirus GI/GII, Rotavirus A, Sapovirus    Culture - Blood (collected 13 May 2021 20:42)  Source: .Blood None  Preliminary Report (15 May 2021 03:01):    No growth to date.    Culture - Urine (collected 12 May 2021 17:02)  Source: .Urine Clean Catch (Midstream)  Final Report (14 May 2021 23:07):    >100,000 CFU/ml Pseudomonas aeruginosa  Organism: Pseudomonas aeruginosa (14 May 2021 23:07)  Organism: Pseudomonas aeruginosa (14 May 2021 23:07)    PHYSICAL EXAM:  GEN: No acute distress  LUNGS: b/l breath sounds, no respiratory distress   HEART: S1/S2 present, not tachycardic  ABD: Soft, non-tender, non-distended  EXT: no LE edema  NEURO: AAOX3    Intravenous access:   NG tube:   Villeda Catheter:

## 2021-05-16 LAB
ALBUMIN SERPL ELPH-MCNC: 2.6 G/DL — LOW (ref 3.5–5.2)
ALP SERPL-CCNC: 44 U/L — SIGNIFICANT CHANGE UP (ref 30–115)
ALT FLD-CCNC: 13 U/L — SIGNIFICANT CHANGE UP (ref 0–41)
ANION GAP SERPL CALC-SCNC: 10 MMOL/L — SIGNIFICANT CHANGE UP (ref 7–14)
ANISOCYTOSIS BLD QL: SIGNIFICANT CHANGE UP
AST SERPL-CCNC: 15 U/L — SIGNIFICANT CHANGE UP (ref 0–41)
BASOPHILS # BLD AUTO: 0 K/UL — SIGNIFICANT CHANGE UP (ref 0–0.2)
BASOPHILS NFR BLD AUTO: 0 % — SIGNIFICANT CHANGE UP (ref 0–1)
BILIRUB SERPL-MCNC: 0.3 MG/DL — SIGNIFICANT CHANGE UP (ref 0.2–1.2)
BLD GP AB SCN SERPL QL: SIGNIFICANT CHANGE UP
BUN SERPL-MCNC: 13 MG/DL — SIGNIFICANT CHANGE UP (ref 10–20)
BURR CELLS BLD QL SMEAR: PRESENT — SIGNIFICANT CHANGE UP
CALCIUM SERPL-MCNC: 8 MG/DL — LOW (ref 8.5–10.1)
CHLORIDE SERPL-SCNC: 108 MMOL/L — SIGNIFICANT CHANGE UP (ref 98–110)
CO2 SERPL-SCNC: 22 MMOL/L — SIGNIFICANT CHANGE UP (ref 17–32)
CREAT SERPL-MCNC: <0.5 MG/DL — LOW (ref 0.7–1.5)
ELLIPTOCYTES BLD QL SMEAR: SIGNIFICANT CHANGE UP
EOSINOPHIL # BLD AUTO: 0.13 K/UL — SIGNIFICANT CHANGE UP (ref 0–0.7)
EOSINOPHIL NFR BLD AUTO: 2.7 % — SIGNIFICANT CHANGE UP (ref 0–8)
GIANT PLATELETS BLD QL SMEAR: PRESENT — SIGNIFICANT CHANGE UP
GLUCOSE SERPL-MCNC: 98 MG/DL — SIGNIFICANT CHANGE UP (ref 70–99)
HCT VFR BLD CALC: 26.7 % — LOW (ref 42–52)
HGB BLD-MCNC: 7.9 G/DL — LOW (ref 14–18)
HYPOCHROMIA BLD QL: SIGNIFICANT CHANGE UP
LYMPHOCYTES # BLD AUTO: 0.73 K/UL — LOW (ref 1.2–3.4)
LYMPHOCYTES # BLD AUTO: 15 % — LOW (ref 20.5–51.1)
MAGNESIUM SERPL-MCNC: 1.8 MG/DL — SIGNIFICANT CHANGE UP (ref 1.8–2.4)
MANUAL SMEAR VERIFICATION: SIGNIFICANT CHANGE UP
MCHC RBC-ENTMCNC: 23.5 PG — LOW (ref 27–31)
MCHC RBC-ENTMCNC: 29.6 G/DL — LOW (ref 32–37)
MCV RBC AUTO: 79.5 FL — LOW (ref 80–94)
MICROCYTES BLD QL: SIGNIFICANT CHANGE UP
MONOCYTES # BLD AUTO: 0.09 K/UL — LOW (ref 0.1–0.6)
MONOCYTES NFR BLD AUTO: 1.8 % — SIGNIFICANT CHANGE UP (ref 1.7–9.3)
NEUTROPHILS # BLD AUTO: 3.88 K/UL — SIGNIFICANT CHANGE UP (ref 1.4–6.5)
NEUTROPHILS NFR BLD AUTO: 79.6 % — HIGH (ref 42.2–75.2)
NEUTS HYPERSEG # BLD: PRESENT — SIGNIFICANT CHANGE UP
PLAT MORPH BLD: NORMAL — SIGNIFICANT CHANGE UP
PLATELET # BLD AUTO: 123 K/UL — LOW (ref 130–400)
POIKILOCYTOSIS BLD QL AUTO: SIGNIFICANT CHANGE UP
POLYCHROMASIA BLD QL SMEAR: SLIGHT — SIGNIFICANT CHANGE UP
POTASSIUM SERPL-MCNC: 4.1 MMOL/L — SIGNIFICANT CHANGE UP (ref 3.5–5)
POTASSIUM SERPL-SCNC: 4.1 MMOL/L — SIGNIFICANT CHANGE UP (ref 3.5–5)
PROT SERPL-MCNC: 5.2 G/DL — LOW (ref 6–8)
RBC # BLD: 3.36 M/UL — LOW (ref 4.7–6.1)
RBC # FLD: 24.9 % — HIGH (ref 11.5–14.5)
RBC BLD AUTO: ABNORMAL
SODIUM SERPL-SCNC: 140 MMOL/L — SIGNIFICANT CHANGE UP (ref 135–146)
TOXIC GRANULES BLD QL SMEAR: PRESENT — SIGNIFICANT CHANGE UP
VARIANT LYMPHS # BLD: 0.9 % — SIGNIFICANT CHANGE UP (ref 0–5)
WBC # BLD: 4.88 K/UL — SIGNIFICANT CHANGE UP (ref 4.8–10.8)
WBC # FLD AUTO: 4.88 K/UL — SIGNIFICANT CHANGE UP (ref 4.8–10.8)

## 2021-05-16 PROCEDURE — 99232 SBSQ HOSP IP/OBS MODERATE 35: CPT

## 2021-05-16 RX ORDER — LOPERAMIDE HCL 2 MG
2 TABLET ORAL
Refills: 0 | Status: DISCONTINUED | OUTPATIENT
Start: 2021-05-16 | End: 2021-05-17

## 2021-05-16 RX ADMIN — ENOXAPARIN SODIUM 40 MILLIGRAM(S): 100 INJECTION SUBCUTANEOUS at 11:31

## 2021-05-16 RX ADMIN — Medication 3 MILLIGRAM(S): at 22:35

## 2021-05-16 RX ADMIN — Medication 30 MILLILITER(S): at 22:35

## 2021-05-16 RX ADMIN — CHLORHEXIDINE GLUCONATE 1 APPLICATION(S): 213 SOLUTION TOPICAL at 06:14

## 2021-05-16 RX ADMIN — SIMETHICONE 80 MILLIGRAM(S): 80 TABLET, CHEWABLE ORAL at 23:13

## 2021-05-16 RX ADMIN — PANTOPRAZOLE SODIUM 40 MILLIGRAM(S): 20 TABLET, DELAYED RELEASE ORAL at 06:14

## 2021-05-16 NOTE — PROGRESS NOTE ADULT - SUBJECTIVE AND OBJECTIVE BOX
MISAEL HUFFMAN    Patient is a 80y old  Male who presents with a chief complaint of diarrhea (15 May 2021 11:56)    INTERVAL HPI/OVERNIGHT EVENTS: no events overnight, still has liquid stool.     ROS: All ROS negative except as documented above    PHYSICAL EXAM:  T(C): 37, Max: 37 (05-15-21 @ 20:54)  HR: 83 (83 - 85)  BP: 113/58 (113/58 - 119/58)  RR: 18 (18 - 18)  SpO2: --    GENERAL: NAD, frail  PULMONARY/CHEST: No rales, rhonchi, or wheezing  CARDIOVASC: Regular rate and rhythm; No murmurs  GI/ABDOMEN: Soft, mild suprapubic tenderness, nondistended; Bowel sounds present  EXTREMITIES:  2+ Peripheral Pulses, No clubbing, cyanosis, or edema, no deformity. No calf tenderness b/l.  NERVOUS SYSTEM:  Alert & Oriented X3, no focal deficit     LABS:                        7.9    4.88  )-----------( 123      ( 16 May 2021 08:56 )             26.7     05-16    140  |  108  |  13  ----------------------------<  98  4.1   |  22  |  <0.5<L>    Ca    8.0<L>      16 May 2021 08:56  Mg     1.8     05-16    TPro  5.2<L>  /  Alb  2.6<L>  /  TBili  0.3  /  DBili  x   /  AST  15  /  ALT  13  /  AlkPhos  44  05-16        GI PCR Panel, Stool (collected 14 May 2021 13:43)  Source: .Stool Feces  Final Report (14 May 2021 23:16):    GI PCR Results: NOT detected    *******Please Note:*******    GI panel PCR evaluates for:    Campylobacter, Plesiomonas shigelloides, Salmonella,    Vibrio, Yersinia enterocolitica, Enteroaggregative    Escherichia coli (EAEC), Enteropathogenic E.coli (EPEC),    Enterotoxigenic E. coli (ETEC) lt/st, Shiga-like    toxin-producing E. coli (STEC) stx1/stx2,    Shigella/ Enteroinvasive E. coli (EIEC), Cryptosporidium,    Cyclospora cayetanensis, Entamoeba histolytica,    Giardia lamblia, Adenovirus F 40/41, Astrovirus,    Norovirus GI/GII, Rotavirus A, Sapovirus    Culture - Blood (collected 13 May 2021 20:42)  Source: .Blood None  Preliminary Report (15 May 2021 03:01):    No growth to date.      MEDICATIONS  (STANDING):  chlorhexidine 4% Liquid 1 Application(s) Topical <User Schedule>  enoxaparin Injectable 40 milliGRAM(s) SubCutaneous daily  levoFLOXacin  Tablet 750 milliGRAM(s) Oral every 24 hours  pantoprazole    Tablet 40 milliGRAM(s) Oral before breakfast    MEDICATIONS  (PRN):  acetaminophen   Tablet .. 650 milliGRAM(s) Oral every 6 hours PRN Temp greater or equal to 38C (100.4F), Mild Pain (1 - 3)  aluminum hydroxide/magnesium hydroxide/simethicone Suspension 30 milliLiter(s) Oral every 6 hours PRN Dyspepsia  melatonin 3 milliGRAM(s) Oral at bedtime PRN Insomnia  morphine  - Injectable 2 milliGRAM(s) IV Push every 4 hours PRN Severe Pain (7 - 10)  oxycodone    5 mG/acetaminophen 325 mG 1 Tablet(s) Oral every 6 hours PRN Moderate Pain (4 - 6)  simethicone 80 milliGRAM(s) Chew two times a day PRN Gas

## 2021-05-16 NOTE — PROGRESS NOTE ADULT - ASSESSMENT
80 year old male with no past medical history (has not seen a doctor in many years) presented to the ED due to loose bowel movements. Pt was diagnosed with rectal cancer. Completed first cycle of chemo inpatient. hospital course got complicated by fever.     # Rectal adenocarcinoma moderate to poorly differentiated, locally advanced  - s/p colonoscopy 4/27: rectal mass extending from the anus up to 17 cm proximally, multiple polyps s/p polypectomy, a 5 cm transverse colon polyp  that was not removed  - MRI pelvis: 18 cm long polypoidal rectal mass with invasion of the right meso rectal fascia and possibly the prostate gland. Additional involvement of the analsphincter complex--T4(a or b)N0Mx  - heme/onc offered oxaliplatin based chemotherapy followed by chemo RT and then evaluation for surgical resection .   - s/p first cycle of FOLFOX on 5/7/21 . Pt will be getting 4 cycles of FOLFOX every 2 wks , followed by evaluation for chemo RT and then surgical eval.     # UTI, UCx Pseudomonas (not E. Coli, correction)  - cont Levaquin, 7 days course  - BCx NGTD    # Acute blood loss anemia due to rectal cancer, Iron deficiency  - Hb with slow trend down, s/p transfusion on 5/13 with appropriate response.  - attempted transfusion on 5/12, but pt spiked fever, transfusion aborted  - keep T&S active   - s/p IV Venofer    # Persistent diarrhea - C. Diff and GI PCR negative  - Imodium PRN  - senna DC'ed    # 1.8 cm hypodensity in the posterior pancreatic body:   appears to measure fluid attenuation,  possible cyst/ IPMN. No main duct dilatation  - MRI pancreatic protocol as OP    # Subacute Rib Fracture   - CT:  Subacute fracture of the left 10th lateral rib.   - Pain control prn     DVT PPX: Lovenox  activity as tolerated  Pt asked to get up from bed and walk as tolerated and OOBTC.    DC planning to SNF. Anticipated dc in 24-48 hrs.

## 2021-05-17 ENCOUNTER — TRANSCRIPTION ENCOUNTER (OUTPATIENT)
Age: 81
End: 2021-05-17

## 2021-05-17 VITALS
SYSTOLIC BLOOD PRESSURE: 121 MMHG | DIASTOLIC BLOOD PRESSURE: 58 MMHG | HEART RATE: 84 BPM | TEMPERATURE: 97 F | RESPIRATION RATE: 18 BRPM

## 2021-05-17 LAB
ALBUMIN SERPL ELPH-MCNC: 2.5 G/DL — LOW (ref 3.5–5.2)
ALP SERPL-CCNC: 45 U/L — SIGNIFICANT CHANGE UP (ref 30–115)
ALT FLD-CCNC: 12 U/L — SIGNIFICANT CHANGE UP (ref 0–41)
ANION GAP SERPL CALC-SCNC: 9 MMOL/L — SIGNIFICANT CHANGE UP (ref 7–14)
AST SERPL-CCNC: 15 U/L — SIGNIFICANT CHANGE UP (ref 0–41)
BASOPHILS # BLD AUTO: 0.02 K/UL — SIGNIFICANT CHANGE UP (ref 0–0.2)
BASOPHILS NFR BLD AUTO: 0.4 % — SIGNIFICANT CHANGE UP (ref 0–1)
BILIRUB SERPL-MCNC: 0.2 MG/DL — SIGNIFICANT CHANGE UP (ref 0.2–1.2)
BUN SERPL-MCNC: 11 MG/DL — SIGNIFICANT CHANGE UP (ref 10–20)
CALCIUM SERPL-MCNC: 8 MG/DL — LOW (ref 8.5–10.1)
CHLORIDE SERPL-SCNC: 102 MMOL/L — SIGNIFICANT CHANGE UP (ref 98–110)
CO2 SERPL-SCNC: 26 MMOL/L — SIGNIFICANT CHANGE UP (ref 17–32)
CREAT SERPL-MCNC: <0.5 MG/DL — LOW (ref 0.7–1.5)
EOSINOPHIL # BLD AUTO: 0.11 K/UL — SIGNIFICANT CHANGE UP (ref 0–0.7)
EOSINOPHIL NFR BLD AUTO: 2.5 % — SIGNIFICANT CHANGE UP (ref 0–8)
GLUCOSE SERPL-MCNC: 92 MG/DL — SIGNIFICANT CHANGE UP (ref 70–99)
HCT VFR BLD CALC: 26.1 % — LOW (ref 42–52)
HGB BLD-MCNC: 7.7 G/DL — LOW (ref 14–18)
IMM GRANULOCYTES NFR BLD AUTO: 0.4 % — HIGH (ref 0.1–0.3)
LYMPHOCYTES # BLD AUTO: 1.17 K/UL — LOW (ref 1.2–3.4)
LYMPHOCYTES # BLD AUTO: 26.2 % — SIGNIFICANT CHANGE UP (ref 20.5–51.1)
MAGNESIUM SERPL-MCNC: 1.8 MG/DL — SIGNIFICANT CHANGE UP (ref 1.8–2.4)
MCHC RBC-ENTMCNC: 23.8 PG — LOW (ref 27–31)
MCHC RBC-ENTMCNC: 29.5 G/DL — LOW (ref 32–37)
MCV RBC AUTO: 80.8 FL — SIGNIFICANT CHANGE UP (ref 80–94)
MONOCYTES # BLD AUTO: 0.16 K/UL — SIGNIFICANT CHANGE UP (ref 0.1–0.6)
MONOCYTES NFR BLD AUTO: 3.6 % — SIGNIFICANT CHANGE UP (ref 1.7–9.3)
NEUTROPHILS # BLD AUTO: 2.99 K/UL — SIGNIFICANT CHANGE UP (ref 1.4–6.5)
NEUTROPHILS NFR BLD AUTO: 66.9 % — SIGNIFICANT CHANGE UP (ref 42.2–75.2)
NRBC # BLD: 0 /100 WBCS — SIGNIFICANT CHANGE UP (ref 0–0)
PLATELET # BLD AUTO: 133 K/UL — SIGNIFICANT CHANGE UP (ref 130–400)
POTASSIUM SERPL-MCNC: 4 MMOL/L — SIGNIFICANT CHANGE UP (ref 3.5–5)
POTASSIUM SERPL-SCNC: 4 MMOL/L — SIGNIFICANT CHANGE UP (ref 3.5–5)
PROT SERPL-MCNC: 5.1 G/DL — LOW (ref 6–8)
RBC # BLD: 3.23 M/UL — LOW (ref 4.7–6.1)
RBC # FLD: 25.2 % — HIGH (ref 11.5–14.5)
SARS-COV-2 RNA SPEC QL NAA+PROBE: SIGNIFICANT CHANGE UP
SODIUM SERPL-SCNC: 137 MMOL/L — SIGNIFICANT CHANGE UP (ref 135–146)
WBC # BLD: 4.47 K/UL — LOW (ref 4.8–10.8)
WBC # FLD AUTO: 4.47 K/UL — LOW (ref 4.8–10.8)

## 2021-05-17 PROCEDURE — 99239 HOSP IP/OBS DSCHRG MGMT >30: CPT

## 2021-05-17 RX ORDER — SIMETHICONE 80 MG/1
1 TABLET, CHEWABLE ORAL
Qty: 0 | Refills: 0 | DISCHARGE
Start: 2021-05-17

## 2021-05-17 RX ORDER — CIPROFLOXACIN LACTATE 400MG/40ML
1 VIAL (ML) INTRAVENOUS
Qty: 0 | Refills: 0 | DISCHARGE
Start: 2021-05-17

## 2021-05-17 RX ORDER — LOPERAMIDE HCL 2 MG
1 TABLET ORAL
Qty: 0 | Refills: 0 | DISCHARGE
Start: 2021-05-17

## 2021-05-17 RX ADMIN — CHLORHEXIDINE GLUCONATE 1 APPLICATION(S): 213 SOLUTION TOPICAL at 06:16

## 2021-05-17 RX ADMIN — PANTOPRAZOLE SODIUM 40 MILLIGRAM(S): 20 TABLET, DELAYED RELEASE ORAL at 06:15

## 2021-05-17 RX ADMIN — ENOXAPARIN SODIUM 40 MILLIGRAM(S): 100 INJECTION SUBCUTANEOUS at 11:16

## 2021-05-17 NOTE — PROGRESS NOTE ADULT - PROVIDER SPECIALTY LIST ADULT
Heme/Onc
Heme/Onc
Hospitalist
Hospitalist
Internal Medicine
Surgery
Surgery
Colorectal Surgery
Gastroenterology
Gastroenterology
Heme/Onc
Heme/Onc
Hospitalist
Internal Medicine
Surgery
Colorectal Surgery
Hospitalist
Hospitalist
Internal Medicine
Surgery
Gastroenterology
Heme/Onc
Hospitalist
Internal Medicine
Internal Medicine
Surgery
Surgery
Hospitalist
Internal Medicine
Hospitalist

## 2021-05-17 NOTE — PROGRESS NOTE ADULT - NSICDXPILOT_GEN_ALL_CORE
[place]

## 2021-05-17 NOTE — PROGRESS NOTE ADULT - SUBJECTIVE AND OBJECTIVE BOX
SUBJECTIVE:    Patient is a 80y old Male who presents with a chief complaint of diarrhea (15 May 2021 11:56)    Currently admitted to medicine with the primary diagnosis of Rectal adenocarcinoma       Today is hospital day 25d. This morning he is resting comfortably in bed and reports no new issues or overnight events.     PAST MEDICAL & SURGICAL HISTORY  No pertinent past medical history    Amputation of digit of left hand      ALLERGIES:  No Known Allergies    MEDICATIONS:  STANDING MEDICATIONS  chlorhexidine 4% Liquid 1 Application(s) Topical <User Schedule>  enoxaparin Injectable 40 milliGRAM(s) SubCutaneous daily  levoFLOXacin  Tablet 750 milliGRAM(s) Oral every 24 hours  pantoprazole    Tablet 40 milliGRAM(s) Oral before breakfast    PRN MEDICATIONS  acetaminophen   Tablet .. 650 milliGRAM(s) Oral every 6 hours PRN  aluminum hydroxide/magnesium hydroxide/simethicone Suspension 30 milliLiter(s) Oral every 6 hours PRN  loperamide 2 milliGRAM(s) Oral two times a day PRN  melatonin 3 milliGRAM(s) Oral at bedtime PRN  morphine  - Injectable 2 milliGRAM(s) IV Push every 4 hours PRN  oxycodone    5 mG/acetaminophen 325 mG 1 Tablet(s) Oral every 6 hours PRN  simethicone 80 milliGRAM(s) Chew two times a day PRN    VITALS:   T(F): 96.6  HR: 84  BP: 121/58  RR: 18  SpO2: --    LABS:                        7.7    4.47  )-----------( 133      ( 17 May 2021 07:02 )             26.1     05-17    137  |  102  |  11  ----------------------------<  92  4.0   |  26  |  <0.5<L>    Ca    8.0<L>      17 May 2021 07:02  Mg     1.8     05-17    TPro  5.1<L>  /  Alb  2.5<L>  /  TBili  0.2  /  DBili  x   /  AST  15  /  ALT  12  /  AlkPhos  45  05-17                  RADIOLOGY:  < from: CT Abdomen and Pelvis w/ IV Cont (04.22.21 @ 01:12) >    Circumferential rectal wall thickening spanning at least 14 cm in length with a partially exophytic mass extending into the right mesorectal fat measuring approximately 5.7 x 3.0 x 6.8 cm. The exophytic mass abuts the right posterior lateral prostate gland. Primary consideration is rectal cancer. Limited ability to exclude superimposed proctitis.    9 mm right lower lobe pulmonary nodule.    Small wedge-shaped hypodensities at the superior aspect of the spleen may reflect splenic infarcts.      < end of copied text >    < from: CT Head No Cont (04.22.21 @ 06:46) >  No acute intracranial pathology.    Mild chronic microvascular ischemic changes.    < end of copied text >    < from: CT Chest w/ IV Cont (04.28.21 @ 13:05) >  Small bilateral pleural effusions with compressive atelectasis.    Nonspecific mediastinal lymph nodes.    < end of copied text >      PHYSICAL EXAM:  GEN: No acute distress  LUNGS: Clear to auscultation bilaterally   HEART: Regular  ABD: Soft, non-tender, non-distended.  EXT: No edema  NEURO: AAOX3

## 2021-05-17 NOTE — DISCHARGE NOTE NURSING/CASE MANAGEMENT/SOCIAL WORK - REASON VACCINE NOT RECEIVED
pt states he doesn't want it. pt is confused to situation. family called, didn't answer don't know there wishes. resident aware. 
18-Oct-2018 20:00

## 2021-05-17 NOTE — PROGRESS NOTE ADULT - ATTENDING COMMENTS
Patient seen and examined at bedside   no acute events noted overnight   s/p chemoport placement yesterday - tolerated procedure well.     Plan:    1. Rectal adenocarcinoma Locally advanced   2. chronic diarrhea - resolved   3. Encephalopathy - resolved   4. Pancreatic hypodensity   5. lung nodule   6. acute blood loss anemia 2/2 LGIB + HUSEYIN     - rectal mass extending from the anus up to 17 cm proximally, multiple polyps s/p polypectomy, a 5 cm transverse colon polyp  that was not removed  - MRI pelvis - 18 cm long polypoidal rectal mass with invasion of the right meso rectal fascia and possibly the prostate gland. Additional involvement of the anal sphincter complex--T4(a or b)N0Mx  -  hem/onc on board - plan to start first cycle of FOLFOX on 5/7/21 . followed by 4 cycles of FOLFOX every 2 wks , followed by evaluation for chemo RT and then surgical eval.   - s/p chemoport placement 5/6  - plan to start inpatient chemo from tomorrow - patient to be transferred to   - outpatient follow up for pancreatic hypodensity and lung nodule   - Hb of 5.8 on admission from lower GI bleed   - s/p transfusion - now stable   - dvt proph - will start on lovenox - now that hb stable - will keep close eye    Dispo: snf on discharge - cm on board   pending: on chemo - plan to send him to snf if tolerates meds
#Progress Note Handoff  Pending (specify):  follow up colonoscopy  Family discussion: house staff updated family today   Disposition: ed3  Pt seen during COVID 19 Pandemic.
80 year old male with no known past medical history (has not seen a doctor in many years) presented to the ED due to loose bowel movements. Noted to have rectal mass - adenocarcinoma with concern for metastatic. Plan for chemoport tomorrow.   currently having constipation - on laxative.  MRI noted as per colorectal surgery recs.
Agree with above A/P.  Radiology, labs and chart reviewed,  Pt with locally advanced rectal cancer.  Recommended total neoadjuvant treatment consisting of Chemo with FOLFOX followed by chemo/RT.  Pt will need a port acath.  Plan d/w Rad/Onc also.  I called his daughter and explained the treatment plan.  She is unable to provide social support.  Pt will need assistance with transportation, home health aide etc.  Other option to consider is SNF.
Patient seen and examined as above
Patient seen and examined at bedside   no acute events noted overnight   s/p chemoport placement yesterday - tolerated procedure well.     Plan:    1. Rectal adenocarcinoma Locally advanced   2. chronic diarrhea - resolved   3. Encephalopathy - resolved   4. Pancreatic hypodensity   5. lung nodule   6. acute blood loss anemia 2/2 LGIB + HUSEYIN     - rectal mass extending from the anus up to 17 cm proximally, multiple polyps s/p polypectomy, a 5 cm transverse colon polyp  that was not removed  - MRI pelvis - 18 cm long polypoidal rectal mass with invasion of the right meso rectal fascia and possibly the prostate gland. Additional involvement of the anal sphincter complex--T4(a or b)N0Mx  -  hem/onc on board - plan to start first cycle of FOLFOX on 5/7/21 . followed by 4 cycles of FOLFOX every 2 wks , followed by evaluation for chemo RT and then surgical eval.   - s/p chemoport placement 5/6  - plan to start inpatient chemo from tomorrow - patient to be transferred to   - outpatient follow up for pancreatic hypodensity and lung nodule   - Hb of 5.8 on admission from lower GI bleed   - s/p transfusion - now stable   - dvt proph - will start on lovenox - now that hb stable - will keep close eye    Dispo: snf on discharge - cm on board   pending: on chemo - plan to send him to snf if tolerates meds
Patient was seen this afternoon after chemoport placement.   no acute overnight events noted   patient tolerated procedure well     Plan:    1. Rectal adenocarcinoma Locally advanced   2. chronic diarrhea - resolved   3. Encephalopathy - resolved   4. Pancreatic hypodensity   5. lung nodule   6. acute blood loss anemia 2/2 LGIB + HUSEYIN     - rectal mass extending from the anus up to 17 cm proximally, multiple polyps s/p polypectomy, a 5 cm transverse colon polyp  that was not removed  - MRI pelvis - 18 cm long polypoidal rectal mass with invasion of the right meso rectal fascia and possibly the prostate gland. Additional involvement of the anal sphincter complex--T4(a or b)N0Mx  -  hem/onc on board - plan to start first cycle of FOLFOX on 5/7/21 . followed by 4 cycles of FOLFOX every 2 wks , followed by evaluation for chemo RT and then surgical eval.   - s/p chemoport placement today   - plan to start inpatient chemo from tomorrow - patient to be transferred to   - outpatient follow up for pancreatic hypodensity and lung nodule   - Hb of 5.8 on admission from lower GI bleed   - s/p transfusion - now stable   - dvt proph - will start on lovenox - now that hb stable - will keep close eye    Dispo: snf on discharge - cm on board   pending: initiation of chemo
Proceed with chemo as noted above. Pt agreeable
Pt seen and examined .  Agree with above A/P.  Will proceed with total neoadjuvant therapy where mFOLFOX6 will be administered every 2 weeks for 4-6 cycles. CT scans will be repeated after 4 cycles. This will be followed by RT and chemo given concurrently and then hopefully pt will proceed with surgery.  SE of treatment discussed with pt.  Chemo orders written. Transfer pt to 3 B
rectal cancer  pt examined 5/6  will schedule for mediport
seen/examined w/ hemonc fellow; note reviewed; plan discussed
#Progress Note Handoff  Pending staging work up, rad onc eval, mri pelvis, echo
Patient seen and examined , plan above
Patient seen and examined at bedside   patient had episode of BRBPR last night and small episode this morning.    Plan:    1. Rectal adenocarcinoma Locally advanced   2. chronic diarrhea - resolved   3. Encephalopathy - resolved   4. Pancreatic hypodensity   5. lung nodule   6. acute blood loss anemia 2/2 LGIB + HUSEYIN     - rectal mass extending from the anus up to 17 cm proximally, multiple polyps s/p polypectomy, a 5 cm transverse colon polyp  that was not removed  - MRI pelvis - 18 cm long polypoidal rectal mass with invasion of the right meso rectal fascia and possibly the prostate gland. Additional involvement of the anal sphincter complex--T4(a or b)N0Mx  -  hem/onc on board - plan to start first cycle of FOLFOX on 5/7/21 . followed by 4 cycles of FOLFOX every 2 wks , followed by evaluation for chemo RT and then surgical eval.   - s/p chemoport placement 5/6  - completed chemo yesterday   - outpatient follow up for pancreatic hypodensity and lung nodule   - Hb of 5.8 on admission from lower GI bleed   - s/p transfusion - now stable  - mild drop noted after restarting lovenox but overall stable at this time    - dvt proph - lovenox - IMPROVE score of 5    Dispo: snf on discharge - cm on board   pending: episode of BRBPR last night and this morning - HB stable at this time - will monitor for 1 more day
Patient with Rectal mass lesion awaiting pathology To Consult advanced regarding removal of flat polyp and possible EUS
rectal cancer  pt examined 5/5  will schedule for mediport
seen/examined w/ hemonc fellow  note reviewed; plan discussed    completes 1st cycle of mFOLFOX6 today 5/9/21  f/u ONCOLOGY clinic   please arrange a f/u w/ PMD and ophthalmology
#Progress Note Handoff  Pending (specify):  colonoscopy today, PT   Family discussion: house staff updated family  Disposition: snf  Pt seen during COVID 19 Pandemic.
#Progress Note Handoff  Pending (specify):  scope in am  Family discussion: house staff updated family   Disposition: ed3  Pt seen during COVID 19 Pandemic.
Pt is clinically stable for discharge to [facility].  Continue OP chemotherapy and Op follow up with dr. Gates.
No new complaints. Pt did not complete bowel prep.     80 year old male with no past medical history (has not seen a doctor in many years) presented to the ED due to loose bowel movements.    # Rectal cancer  - s/p colonoscopy 4/27  - rectal mass extending from the anus up to 17 cm proximally, multiple polyps s/p polypectomy, a 5 cm transverse colon polyp  that was not removed  - pathology pending   - Seen by colorectal surgeon   - colonoscopy with EMR of transverse polyp and EUS on 4/30 cancelled due to poor prep.  - CEA 24, CA 19-9 31  - ECHO: EF 67%, Mobile echodensity associated with the lateral leflet of the tricuspid valve. Cannot rule out vegetation  - Pan CT completed for staging: Well-defined right lower lobe nodule measuring 6 mm, 3 mm lingular nodule  - heme/onc recommended Rad onc eval, consult placed  - MRI pelvis as OP    # 1.8 cm hypodensity in the posterior pancreatic body:   appears to measure fluid attenuation ,  possible cyst/ IPMN. No main duct dilatation  - MRI pancreatic protocol as OP    # mobile echodensity on Tricuspid valve   - BCx negative  - no fever  - cardio eval    # Hypokalemia - supplemented today, repeat tomorrow    # hypomagnesemia - supplemented today, repeat tomorrow    # Iron deficiency anemia  - Hb 5.8> 7.8, s/p 3 units  - cont Venofer   - keep hgb >7, transfuse as needed , active TS, hgb stable today     # Subacute Rib Fracture   - CT:  Subacute fracture of the left 10th lateral rib.   - Pain control prn     DVT PPX: SCD  GI PPX: Protonix   activity as tolerated    Pending MYA on Monday
Change ABx to Levaquin, can be given PO.   Diarrhea better, can use Simethicone and Imodium PRN.  DC planning to SNF.

## 2021-05-17 NOTE — PROGRESS NOTE ADULT - ATTENDING SUPERVISION STATEMENT
Resident
Resident
ACP
Fellow
Resident
ACP
Fellow
Resident
Fellow
Resident

## 2021-05-17 NOTE — DISCHARGE NOTE NURSING/CASE MANAGEMENT/SOCIAL WORK - NSDCVIVACCINE_GEN_ALL_CORE_FT
Severe acute respiratory syndrome coronavirus 2 (SARS-CoV-2) (Coronavirus disease [COVID-19]) vaccine , 2021/5/10 14:11 , India Lal (CINDI)

## 2021-05-17 NOTE — PROGRESS NOTE ADULT - ASSESSMENT
79 yo M with no PMH presented for loose BM. Diagnosed with rectal adenocarcinoma this admission. S/p first cycle of chemo while inpatient. Being treated for UTI. Urine culture growing Pseudomonas. Started on Levaquin. Pending discharge to [facility].    # Rectal adenocarcinoma, moderately to poorly differentiated, locally advanced  - S/p colonoscopy 4/27: rectal mass extending from the anus up to 17 cm proximally, multiple polyps s/p polypectomy, a 5 cm transverse colon polyp that was not removed  - MRI pelvis: 18 cm long polypoidal rectal mass with invasion of the right meso rectal fascia and possibly the prostate gland. Additional involvement of the anal sphincter complex--T4(a or b)N0M  - S/p 1st cycle of FOLFOX on 5/7/21  - Will be getting 4 cycles of FOLFOX every 2 weeks, followed by evaluation for chemo RT and then surgical eval  - heme/onc following    # Pseudomonas UTI  - c/w Levaquin 750mg QD (start date 5/15)  - Bcx NGTD    # Acute blood loss anemia, improved  # Iron deficiency anemia  - Secondary to rectal cancer  - Improved s/p 1U pRBCs on 5/13  - S/p IV Venofer  - Monitor CBC  - Maintain active type and screen  - Transfuse if Hb <7.5    # Diarrhea  - Could be secondary to malignancy  - GI PCR panel negative   - C. Diff PCR negative on admission  - Low fiber diet  - Imodium PRN     # 1.8 cm hypodensity in the posterior pancreatic body  - Appears to measure fluid attenuation  - No main duct dilatation  - MRI pancreatic protocol as OP    # Subacute rib fracture   - CT: Subacute fracture of the left 10th lateral rib  - Pain control PRN    # Misc  DVT ppx: lovenox  GI ppx: protonix  Diet: low fiber  Activity: IAT  Code status: Full  Dispo: medically cleared for d/c to SNF pending auth   81 yo M with no PMH presented for loose BM. Diagnosed with rectal adenocarcinoma this admission. S/p first cycle of chemo while inpatient. Being treated for UTI. Urine culture growing Pseudomonas. Started on Levaquin. Pending discharge to [facility].    # Rectal adenocarcinoma, moderately to poorly differentiated, locally advanced  - S/p colonoscopy 4/27: rectal mass extending from the anus up to 17 cm proximally, multiple polyps s/p polypectomy, a 5 cm transverse colon polyp that was not removed  - MRI pelvis: 18 cm long polypoidal rectal mass with invasion of the right meso rectal fascia and possibly the prostate gland. Additional involvement of the anal sphincter complex--T4(a or b)N0M  - S/p 1st cycle of FOLFOX on 5/7/21  - Will be getting 4 cycles of FOLFOX every 2 weeks, followed by evaluation for chemo RT and then surgical eval  - heme/onc following    # Pseudomonas UTI  - c/w Levaquin 750mg QD (start date 5/15)  - Bcx NGTD    # Acute blood loss anemia, improved  # Iron deficiency anemia  - Secondary to rectal cancer  - Improved s/p 1U pRBCs on 5/13  - S/p IV Venofer  - Monitor CBC  - Maintain active type and screen  - Transfuse if Hb <7.5    # Diarrhea  - Could be secondary to malignancy  - GI PCR panel negative   - C. Diff PCR negative on admission  - Low fiber diet  - Imodium PRN     # 1.8 cm hypodensity in the posterior pancreatic body  - Appears to measure fluid attenuation  - No main duct dilatation  - MRI pancreatic protocol as OP    # Subacute rib fracture   - CT: Subacute fracture of the left 10th lateral rib  - Pain control PRN    # Misc  DVT ppx: lovenox  GI ppx: protonix  Diet: low fiber  Activity: IAT  Code status: Full  Dispo: medically cleared for d/c to SNF pending auth, med rec reviewed with Dr. Gannon

## 2021-05-17 NOTE — DISCHARGE NOTE NURSING/CASE MANAGEMENT/SOCIAL WORK - PATIENT PORTAL LINK FT
You can access the FollowMyHealth Patient Portal offered by [facility] by registering at the following website: [email or web address]. By joining [facility]s FollowMyHealth portal, you will also be able to view your health information using other applications (apps) compatible with our system.

## 2021-05-18 RX ORDER — CIPROFLOXACIN LACTATE 400MG/40ML
1 VIAL (ML) INTRAVENOUS
Qty: 0 | Refills: 0 | DISCHARGE
Start: 2021-05-18

## 2021-05-19 LAB
CULTURE RESULTS: SIGNIFICANT CHANGE UP
SPECIMEN SOURCE: SIGNIFICANT CHANGE UP

## 2021-05-21 DIAGNOSIS — R59.0 LOCALIZED ENLARGED LYMPH NODES: ICD-10-CM

## 2021-05-21 DIAGNOSIS — S22.32XA FRACTURE OF ONE RIB, LEFT SIDE, INITIAL ENCOUNTER FOR CLOSED FRACTURE: ICD-10-CM

## 2021-05-21 DIAGNOSIS — R50.9 FEVER, UNSPECIFIED: ICD-10-CM

## 2021-05-21 DIAGNOSIS — F03.90 UNSPECIFIED DEMENTIA WITHOUT BEHAVIORAL DISTURBANCE: ICD-10-CM
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
## 2021-05-21 DIAGNOSIS — K86.89 OTHER SPECIFIED DISEASES OF PANCREAS: ICD-10-CM

## 2021-05-21 DIAGNOSIS — J90 PLEURAL EFFUSION, NOT ELSEWHERE CLASSIFIED: ICD-10-CM

## 2021-05-21 DIAGNOSIS — N39.0 URINARY TRACT INFECTION, SITE NOT SPECIFIED: ICD-10-CM

## 2021-05-21 DIAGNOSIS — D62 ACUTE POSTHEMORRHAGIC ANEMIA: ICD-10-CM

## 2021-05-21 DIAGNOSIS — E87.2 ACIDOSIS: ICD-10-CM

## 2021-05-21 DIAGNOSIS — R91.1 SOLITARY PULMONARY NODULE: ICD-10-CM

## 2021-05-21 DIAGNOSIS — D50.9 IRON DEFICIENCY ANEMIA, UNSPECIFIED: ICD-10-CM

## 2021-05-21 DIAGNOSIS — G93.41 METABOLIC ENCEPHALOPATHY: ICD-10-CM

## 2021-05-21 DIAGNOSIS — C21.8 MALIGNANT NEOPLASM OF OVERLAPPING SITES OF RECTUM, ANUS AND ANAL CANAL: ICD-10-CM

## 2021-05-21 DIAGNOSIS — K51.311 ULCERATIVE (CHRONIC) RECTOSIGMOIDITIS WITH RECTAL BLEEDING: ICD-10-CM

## 2021-05-21 DIAGNOSIS — K59.00 CONSTIPATION, UNSPECIFIED: ICD-10-CM

## 2021-05-21 DIAGNOSIS — E83.42 HYPOMAGNESEMIA: ICD-10-CM

## 2021-05-21 DIAGNOSIS — B96.5 PSEUDOMONAS (AERUGINOSA) (MALLEI) (PSEUDOMALLEI) AS THE CAUSE OF DISEASES CLASSIFIED ELSEWHERE: ICD-10-CM

## 2021-05-21 DIAGNOSIS — C79.82 SECONDARY MALIGNANT NEOPLASM OF GENITAL ORGANS: ICD-10-CM

## 2021-05-21 DIAGNOSIS — Z89.022 ACQUIRED ABSENCE OF LEFT FINGER(S): ICD-10-CM

## 2021-05-21 DIAGNOSIS — E87.6 HYPOKALEMIA: ICD-10-CM

## 2021-05-21 DIAGNOSIS — R19.7 DIARRHEA, UNSPECIFIED: ICD-10-CM

## 2021-05-24 ENCOUNTER — LABORATORY RESULT (OUTPATIENT)
Age: 81
End: 2021-05-24

## 2021-05-24 ENCOUNTER — APPOINTMENT (OUTPATIENT)
Dept: INFUSION THERAPY | Facility: CLINIC | Age: 81
End: 2021-05-24
Payer: MEDICARE

## 2021-05-24 ENCOUNTER — APPOINTMENT (OUTPATIENT)
Dept: HEMATOLOGY ONCOLOGY | Facility: CLINIC | Age: 81
End: 2021-05-24
Payer: MEDICARE

## 2021-05-24 VITALS
BODY MASS INDEX: 22.21 KG/M2 | SYSTOLIC BLOOD PRESSURE: 113 MMHG | HEART RATE: 121 BPM | WEIGHT: 164 LBS | TEMPERATURE: 98.2 F | DIASTOLIC BLOOD PRESSURE: 61 MMHG | HEIGHT: 72 IN

## 2021-05-24 DIAGNOSIS — Z78.9 OTHER SPECIFIED HEALTH STATUS: ICD-10-CM

## 2021-05-24 DIAGNOSIS — Z00.00 ENCOUNTER FOR GENERAL ADULT MEDICAL EXAMINATION W/OUT ABNORMAL FINDINGS: ICD-10-CM

## 2021-05-24 LAB
ALBUMIN SERPL ELPH-MCNC: 2.7 G/DL
ALP BLD-CCNC: 68 U/L
ALT SERPL-CCNC: 18 U/L
ANION GAP SERPL CALC-SCNC: 14 MMOL/L
AST SERPL-CCNC: 27 U/L
BILIRUB SERPL-MCNC: 0.2 MG/DL
BUN SERPL-MCNC: 8 MG/DL
CALCIUM SERPL-MCNC: 8.5 MG/DL
CHLORIDE SERPL-SCNC: 101 MMOL/L
CO2 SERPL-SCNC: 21 MMOL/L
CREAT SERPL-MCNC: 0.5 MG/DL
GLUCOSE SERPL-MCNC: 135 MG/DL
HCT VFR BLD CALC: 30.6 %
HGB BLD-MCNC: 9.2 G/DL
MCHC RBC-ENTMCNC: 24.8 PG
MCHC RBC-ENTMCNC: 30.1 G/DL
MCV RBC AUTO: 82.5 FL
PLATELET # BLD AUTO: 371 K/UL
PMV BLD: 8.4 FL
POTASSIUM SERPL-SCNC: 4.1 MMOL/L
PROT SERPL-MCNC: 6.7 G/DL
RBC # BLD: 3.71 M/UL
RBC # FLD: 24.6 %
SODIUM SERPL-SCNC: 136 MMOL/L
WBC # FLD AUTO: 6.65 K/UL

## 2021-05-24 PROCEDURE — 99215 OFFICE O/P EST HI 40 MIN: CPT

## 2021-05-24 RX ORDER — FAMOTIDINE 10 MG/ML
20 INJECTION INTRAVENOUS ONCE
Refills: 0 | Status: DISCONTINUED | OUTPATIENT
Start: 2021-05-24 | End: 2021-05-24

## 2021-05-24 RX ORDER — FLUOROURACIL 50 MG/ML
800 INJECTION, SOLUTION INTRAVENOUS ONCE
Refills: 0 | Status: DISCONTINUED | OUTPATIENT
Start: 2021-05-24 | End: 2021-05-24

## 2021-05-24 RX ORDER — NYSTATIN 100000 1/G
POWDER TOPICAL
Refills: 0 | Status: ACTIVE | COMMUNITY

## 2021-05-24 RX ORDER — LEUCOVORIN CALCIUM 5 MG
800 TABLET ORAL ONCE
Refills: 0 | Status: DISCONTINUED | OUTPATIENT
Start: 2021-05-24 | End: 2021-05-24

## 2021-05-24 RX ORDER — CHLORHEXIDINE GLUCONATE 4 %
325 (65 FE) LIQUID (ML) TOPICAL
Refills: 0 | Status: ACTIVE | COMMUNITY

## 2021-05-24 RX ORDER — TUBERCULIN PURIFIED PROTEIN DERIVATIVE 5 [IU]/.1ML
INJECTION, SOLUTION INTRADERMAL
Refills: 0 | Status: ACTIVE | COMMUNITY

## 2021-05-24 RX ORDER — DEXAMETHASONE 0.5 MG/5ML
12 ELIXIR ORAL ONCE
Refills: 0 | Status: DISCONTINUED | OUTPATIENT
Start: 2021-05-24 | End: 2021-05-24

## 2021-05-24 RX ORDER — PANTOPRAZOLE 40 MG/1
40 TABLET, DELAYED RELEASE ORAL
Refills: 0 | Status: ACTIVE | COMMUNITY

## 2021-05-24 RX ORDER — FLUOROURACIL 50 MG/ML
4800 INJECTION, SOLUTION INTRAVENOUS ONCE
Refills: 0 | Status: DISCONTINUED | OUTPATIENT
Start: 2021-05-24 | End: 2021-10-31

## 2021-05-24 RX ORDER — OXALIPLATIN 5 MG/ML
170 INJECTION, SOLUTION INTRAVENOUS ONCE
Refills: 0 | Status: DISCONTINUED | OUTPATIENT
Start: 2021-05-24 | End: 2021-05-24

## 2021-05-24 RX ORDER — SIMETHICONE 80 MG/1
80 TABLET, CHEWABLE ORAL
Refills: 0 | Status: ACTIVE | COMMUNITY

## 2021-05-24 NOTE — HISTORY OF PRESENT ILLNESS
[de-identified] : 80 year old male with no past medical history (has not seen a doctor in many\par years, not on any meds) presented to the ED due to loose bowel movements.\par Patient is alert and orient, but appears to be confused and is a poor\par historian. History obtained from patient and the charts. Patient has had\par chronic diarrhea for the past year which is foul smelling, then over the past\par week it has gotten worse with amount and frequency. The patients daughter\par believed that their was blood in here stools so she brought him to the ED. The\par patient endorses decreased PO intake and nausea, but denies abdominal pain. He\par is not on AC, but will take a baby aspirin from time to time. He denies SOB,\par CP, changes in urination, headache. He lives alone and ambulates with a walker.\par \par COLONOSCOPY  4/27/21\par  Circumferential, ulcerated, irregular and friable infiltrating thickening \par rectal thickening (mass) starting from anal verge up to 17 cm proximally and \par suggestive of adenocarcinoma. (Biopsy).  \par  Hard rectal mass felt on digital exam.  \par  Diverticulosis of the whole colon.  \par \par  Polyp (7 mm) in the ascending colon. (Polypectomy).  \par  Polyp (5 mm) in the proximal transverse colon. (Polypectomy).  \par  Polyp (8 mm) in the distal transverse colon. (Polypectomy).  \par  Polyp (1 cm) in the mid-transverse colon. (Polypectomy).  \par  Polyp (5 cm) in the distal transverse colon.  \par  Polyp (3 cm) in the descending colon. (Polypectomy).  \par  \par PATHOLOGY\par \par MMR proficient\par Rectal mass, \par - Invasive adenocarcinoma, moderately to poorly differentiated,\par involving and undermining squamous mucosa (see Comment).\par \par CT BRAIN\par IMPRESSION:\par \par No acute intracranial pathology.\par \par Mild chronic microvascular ischemic changes.\par \par \par MRI PELVIS\par IMPRESSION:\par \par 18 cm long polypoidal rectal mass with invasion of the right meso rectal fascia and possibly the prostate gland. Additional involvement of the anal sphincter complex--T4(a or b)N0Mx\par \par CT CHEST 4/28/21\par Impression:\par \par Small bilateral pleural effusions with compressive atelectasis.\par \par Nonspecific mediastinal lymph nodes.\par \par CT ABDOMEN AND PELVIS IC\par IMPRESSION:\par \par Circumferential rectal wall thickening spanning at least 14 cm in length with a partially exophytic mass extending into the right mesorectal fat measuring approximately 5.7 x 3.0 x 6.8 cm. The exophytic mass abuts the right posterior lateral prostate gland. Primary consideration is rectal cancer. Limited ability to exclude superimposed proctitis.\par \par 9 mm right lower lobe pulmonary nodule.\par \par Small wedge-shaped hypodensities at the superior aspect of the spleen may reflect splenic infarcts.\par \par \par \par -s/p mediport palcement by IR .\par \par \par He started FOLFOX cycle 1 chemo on 5/7/21-  5/9/21\par  [de-identified] : 5/24/21\par Pt tolerated 1st cycle of FOLFOX with some side effects. Mild nausea. Still has mild bleeding ID. No fever.\par

## 2021-05-24 NOTE — PHYSICAL EXAM
[Ambulatory and capable of all self care but unable to carry out any work activities] : Status 2- Ambulatory and capable of all self care but unable to carry out any work activities. Up and about more than 50% of waking hours [Normal] : affect appropriate [de-identified] : Pt seen in a wheelchair

## 2021-05-24 NOTE — ASSESSMENT
[FreeTextEntry1] : Rectal adenocarcinoma Locally advanced\par MRI showing : 18 cm long polypoidal rectal mass with invasion of the right meso\par rectal fascia and possibly the prostate gland. Additional involvement of the\par anal sphincter complex--  T4(a or b)N0M0\par \par He was told that he has locally advanced disease and we recommended total\par neoadjuvant therapy . Consisting of oxaliplatin based chemotherapy followed by\par chemo RT and then evaluation for surgical resection .\par \par PT has started FOLFOX s/p Cycle #1 .\par CBC adequate today, proceed with Cycle #2, with Neulasta ( pt at higher risk for febrile neutropenia given his age and frail status)\par \par Discussed side effects in detail including nausea, vomiting, diarrhea, HFS, myelosuppression, allergic reactions risk of sepsis and neuropathy\par \par The chemotherapy dose was adjusted according to pt's height, weight, labs and anticipated tolerance.\par The high risk of complications and complexity associated with antineoplastic therapy administration has been explained to the pt and family. Chemotherapy will be given with adequate precautions including premedications, hydration and close monitoring during treatment.\par Chemotherapy will be administered under my direct supervision\par \par SUPPORTIVE MEASURES\par Zofran 8mg Q 8 hrs prn for emesis and nausea.\par Compazine  Q 6hrs prn\par Encourage adequate fluid untake,\par GI prophylaxis with PPI\par \par LABS \par CMP, CEA,  CBC\par \par Due to COVID 19, pre-visit patient instructions were explained to the patient and their symptoms were checked upon arrival. \par Masks were used by the health care providers and staff and the examination room was cleaned before and after the patient visit was completed.\par PT IS VACCINATED\par \par RTC in 2 weeks

## 2021-05-26 ENCOUNTER — NON-APPOINTMENT (OUTPATIENT)
Age: 81
End: 2021-05-26

## 2021-05-26 ENCOUNTER — APPOINTMENT (OUTPATIENT)
Dept: INFUSION THERAPY | Facility: CLINIC | Age: 81
End: 2021-05-26

## 2021-05-26 ENCOUNTER — INPATIENT (INPATIENT)
Facility: HOSPITAL | Age: 81
LOS: 11 days | Discharge: NURSING, REHAB OR LONG-TERM CARE | End: 2021-06-07
Attending: INTERNAL MEDICINE | Admitting: INTERNAL MEDICINE
Payer: MEDICARE

## 2021-05-26 VITALS
HEIGHT: 71 IN | SYSTOLIC BLOOD PRESSURE: 117 MMHG | TEMPERATURE: 99 F | DIASTOLIC BLOOD PRESSURE: 56 MMHG | WEIGHT: 181 LBS | RESPIRATION RATE: 18 BRPM | HEART RATE: 98 BPM

## 2021-05-26 DIAGNOSIS — S68.119A COMPLETE TRAUMATIC METACARPOPHALANGEAL AMPUTATION OF UNSPECIFIED FINGER, INITIAL ENCOUNTER: Chronic | ICD-10-CM

## 2021-05-26 LAB
ALBUMIN SERPL ELPH-MCNC: 2.2 G/DL — LOW (ref 3.5–5.2)
ALP SERPL-CCNC: 66 U/L — SIGNIFICANT CHANGE UP (ref 30–115)
ALT FLD-CCNC: 14 U/L — SIGNIFICANT CHANGE UP (ref 0–41)
ANION GAP SERPL CALC-SCNC: 9 MMOL/L — SIGNIFICANT CHANGE UP (ref 7–14)
AST SERPL-CCNC: 19 U/L — SIGNIFICANT CHANGE UP (ref 0–41)
BASOPHILS # BLD AUTO: 0.12 K/UL — SIGNIFICANT CHANGE UP (ref 0–0.2)
BASOPHILS NFR BLD AUTO: 1.1 % — HIGH (ref 0–1)
BILIRUB SERPL-MCNC: 0.3 MG/DL — SIGNIFICANT CHANGE UP (ref 0.2–1.2)
BUN SERPL-MCNC: 8 MG/DL — LOW (ref 10–20)
CALCIUM SERPL-MCNC: 7.6 MG/DL — LOW (ref 8.5–10.1)
CHLORIDE SERPL-SCNC: 101 MMOL/L — SIGNIFICANT CHANGE UP (ref 98–110)
CO2 SERPL-SCNC: 22 MMOL/L — SIGNIFICANT CHANGE UP (ref 17–32)
CREAT SERPL-MCNC: 0.5 MG/DL — LOW (ref 0.7–1.5)
EOSINOPHIL # BLD AUTO: 0.11 K/UL — SIGNIFICANT CHANGE UP (ref 0–0.7)
EOSINOPHIL NFR BLD AUTO: 1 % — SIGNIFICANT CHANGE UP (ref 0–8)
GLUCOSE SERPL-MCNC: 141 MG/DL — HIGH (ref 70–99)
HCT VFR BLD CALC: 27.2 % — LOW (ref 42–52)
HGB BLD-MCNC: 8 G/DL — LOW (ref 14–18)
IMM GRANULOCYTES NFR BLD AUTO: 11.8 % — HIGH (ref 0.1–0.3)
LYMPHOCYTES # BLD AUTO: 1.68 K/UL — SIGNIFICANT CHANGE UP (ref 1.2–3.4)
LYMPHOCYTES # BLD AUTO: 15.5 % — LOW (ref 20.5–51.1)
MCHC RBC-ENTMCNC: 24.4 PG — LOW (ref 27–31)
MCHC RBC-ENTMCNC: 29.4 G/DL — LOW (ref 32–37)
MCV RBC AUTO: 82.9 FL — SIGNIFICANT CHANGE UP (ref 80–94)
MONOCYTES # BLD AUTO: 0.81 K/UL — HIGH (ref 0.1–0.6)
MONOCYTES NFR BLD AUTO: 7.5 % — SIGNIFICANT CHANGE UP (ref 1.7–9.3)
NEUTROPHILS # BLD AUTO: 6.81 K/UL — HIGH (ref 1.4–6.5)
NEUTROPHILS NFR BLD AUTO: 63.1 % — SIGNIFICANT CHANGE UP (ref 42.2–75.2)
NRBC # BLD: 0 /100 WBCS — SIGNIFICANT CHANGE UP (ref 0–0)
PLATELET # BLD AUTO: 389 K/UL — SIGNIFICANT CHANGE UP (ref 130–400)
POTASSIUM SERPL-MCNC: 4.4 MMOL/L — SIGNIFICANT CHANGE UP (ref 3.5–5)
POTASSIUM SERPL-SCNC: 4.4 MMOL/L — SIGNIFICANT CHANGE UP (ref 3.5–5)
PROT SERPL-MCNC: 5.6 G/DL — LOW (ref 6–8)
RBC # BLD: 3.28 M/UL — LOW (ref 4.7–6.1)
RBC # FLD: 25.3 % — HIGH (ref 11.5–14.5)
SODIUM SERPL-SCNC: 132 MMOL/L — LOW (ref 135–146)
WBC # BLD: 10.81 K/UL — HIGH (ref 4.8–10.8)
WBC # FLD AUTO: 10.81 K/UL — HIGH (ref 4.8–10.8)

## 2021-05-26 PROCEDURE — 70450 CT HEAD/BRAIN W/O DYE: CPT | Mod: 26

## 2021-05-26 RX ORDER — PANTOPRAZOLE SODIUM 20 MG/1
40 TABLET, DELAYED RELEASE ORAL
Refills: 0 | Status: DISCONTINUED | OUTPATIENT
Start: 2021-05-26 | End: 2021-06-07

## 2021-05-26 RX ORDER — IRON SUCROSE 20 MG/ML
200 INJECTION, SOLUTION INTRAVENOUS ONCE
Refills: 0 | Status: COMPLETED | OUTPATIENT
Start: 2021-05-26 | End: 2021-05-26

## 2021-05-26 RX ORDER — LOPERAMIDE HCL 2 MG
2 TABLET ORAL
Refills: 0 | Status: DISCONTINUED | OUTPATIENT
Start: 2021-05-26 | End: 2021-06-07

## 2021-05-26 RX ORDER — CHLORHEXIDINE GLUCONATE 213 G/1000ML
1 SOLUTION TOPICAL
Refills: 0 | Status: DISCONTINUED | OUTPATIENT
Start: 2021-05-26 | End: 2021-06-07

## 2021-05-26 RX ORDER — OXYCODONE AND ACETAMINOPHEN 5; 325 MG/1; MG/1
1 TABLET ORAL EVERY 6 HOURS
Refills: 0 | Status: DISCONTINUED | OUTPATIENT
Start: 2021-05-26 | End: 2021-05-26

## 2021-05-26 RX ORDER — FERROUS SULFATE 325(65) MG
325 TABLET ORAL THREE TIMES A DAY
Refills: 0 | Status: DISCONTINUED | OUTPATIENT
Start: 2021-05-26 | End: 2021-05-27

## 2021-05-26 RX ORDER — SIMETHICONE 80 MG/1
80 TABLET, CHEWABLE ORAL
Refills: 0 | Status: DISCONTINUED | OUTPATIENT
Start: 2021-05-26 | End: 2021-06-07

## 2021-05-26 RX ORDER — HEPARIN SODIUM 5000 [USP'U]/ML
5000 INJECTION INTRAVENOUS; SUBCUTANEOUS EVERY 8 HOURS
Refills: 0 | Status: DISCONTINUED | OUTPATIENT
Start: 2021-05-26 | End: 2021-06-07

## 2021-05-26 RX ADMIN — Medication 325 MILLIGRAM(S): at 21:18

## 2021-05-26 RX ADMIN — IRON SUCROSE 110 MILLIGRAM(S): 20 INJECTION, SOLUTION INTRAVENOUS at 18:34

## 2021-05-26 RX ADMIN — HEPARIN SODIUM 5000 UNIT(S): 5000 INJECTION INTRAVENOUS; SUBCUTANEOUS at 21:18

## 2021-05-26 NOTE — H&P ADULT - HISTORY OF PRESENT ILLNESS
Mr. Orellana is being admitted from NH for chemotherapy. He is getting neoadjuvant FOLFOX for his rectal cancer. This requires a 48 hour infusion; usually patients go home with a pump that infuses it into their port at home, however this patient is currently in a NH and they won't accept the pump, so he is being admitted. He received his first cycle inpatient about 2 weeks ago, this will be cycle 2.    81 y/o M with recent diagnosis of rectal adenocarcinoma being admitted from NH for chemotherapy. He has no complaints at this time; he denies SOB, chest pain, abdominal pain, fever, chills, headaches, dysuria, urinary frequency    Per oncology: He is getting neoadjuvant FOLFOX for his rectal cancer. This requires a 48 hour infusion; usually patients go home with a pump that infuses it into their port at home, however this patient is currently in a NH and they won't accept the pump, so he is being admitted. He received his first cycle inpatient about 2 weeks ago, this will be cycle 2.    81 y/o M with recent diagnosis of rectal adenocarcinoma being admitted from NH for chemotherapy. He is now alert and oriented to name, and is otherwise very confused and poor historian (during previous admission last month he was A&Ox3 but otherwise very confused and poor historian). History obtained from chart and daughter. He has no complaints at this time; he denies SOB, chest pain, abdominal pain, fever, chills, headaches, dysuria, urinary frequency or any other symptoms.    Per oncology: He is getting neoadjuvant FOLFOX for his rectal cancer. This requires a 48 hour infusion; usually patients go home with a pump that infuses it into their port at home, however this patient is currently in a NH and they won't accept the pump, so he is being admitted. He received his first cycle inpatient about 2 weeks ago, this will be cycle 2.

## 2021-05-26 NOTE — H&P ADULT - NSHPPHYSICALEXAM_GEN_ALL_CORE
Vital Signs Last 24 Hrs  T(C): 37.3 (26 May 2021 13:11), Max: 37.3 (26 May 2021 13:11)  T(F): 99.2 (26 May 2021 13:11), Max: 99.2 (26 May 2021 13:11)  HR: 98 (26 May 2021 13:11) (98 - 98)  BP: 117/56 (26 May 2021 13:11) (117/56 - 117/56)  BP(mean): --  RR: 18 (26 May 2021 13:11) (18 - 18)  SpO2: --        GENERAL: NAD, lying in bed comfortably  HEAD:  Atraumatic, Normocephalic  EYES: EOMI, PERRLA, conjunctiva and sclera clear  ENT: Moist mucous membranes  NECK: Supple, No JVD  CHEST/LUNG: Clear to auscultation bilaterally; No rales, rhonchi, wheezing, or rubs. Unlabored respirations  HEART: Regular rate and rhythm; No murmurs, rubs, or gallops  ABDOMEN: Bowel sounds present; Soft, Nontender, Nondistended  EXTREMITIES: No clubbing, cyanosis, or edema  NERVOUS SYSTEM:  Alert & Oriented X1, speech clear. No focal deficits

## 2021-05-26 NOTE — H&P ADULT - ASSESSMENT
Mr. Orellana is a 80M w/ no known PMHx (doesn't follow with doctors) with recently diagnosed locally advanced rectal adenocarcinoma.     # Locally advanced rectal adenocarcinoma  Iron deficiency anemia likely from LGIB from tumor    Plan as per heme/onc:  - Hx begins in late April 2021 when he presented for loose stools, found to have rectal mass  - s/p biopsy showing adenocarcinoma  - MRI: 18 cm long polypoidal rectal mass with invasion of the right meso rectal fascia and possibly the prostate gland. Additional involvement of the analsphincter complex--T4(a or b)N0M0  - Planned for total neoadjuvant chemotherapy followed by chemoRT followed by surgery  - s/p port placement  - He received cycle 1 5/7/21  - He was supposed to start cycle 2 on 5/21, however his nursing home would not accept the 5FU infusional pump  - Plan to start C2D1 tomorrow (5/27) of FOLFOX  - He will need filgastrim 24 hrs after completing chemo    - On prior admission, Iron Saturation: 12%, Ferritin 34, MCV 68.9   - Only received 800mg on prior admission, will order venofer 200mg x 1    DVT ppx  Dispo: NH once chemo finishes, likely Saturday evening   Mr. Orellana is a 80M w/ no known PMHx (doesn't follow with doctors) with recently diagnosed locally advanced rectal adenocarcinoma.     # Locally advanced rectal adenocarcinoma  Iron deficiency anemia likely from LGIB from tumor    Plan as per heme/onc:  - Hx begins in late April 2021 when he presented for loose stools, found to have rectal mass  - s/p biopsy showing adenocarcinoma  - MRI: 18 cm long polypoidal rectal mass with invasion of the right meso rectal fascia and possibly the prostate gland. Additional involvement of the analsphincter complex--T4(a or b)N0M0  - Planned for total neoadjuvant chemotherapy followed by chemoRT followed by surgery  - s/p port placement  - He received cycle 1 5/7/21  - He was supposed to start cycle 2 on 5/21, however his nursing home would not accept the 5FU infusional pump  - Plan to start C2D1 tomorrow (5/27) of FOLFOX  - He will need filgastrim 24 hrs after completing chemo    - On prior admission, Iron Saturation: 12%, Ferritin 34, MCV 68.9   - Only received 800mg on prior admission, will order venofer 200mg x 1    DVT ppx  Dispo: NH once chemo finishes, likely Saturday evening      79 yo M with no PMH presented for loose BM. Diagnosed with rectal adenocarcinoma this admission. S/p first cycle of chemo while inpatient. Being treated for UTI. Urine culture growing Pseudomonas. Started on Levaquin. Pending discharge to [facility].    # Rectal adenocarcinoma, moderately to poorly differentiated, locally advanced  - S/p colonoscopy 4/27: rectal mass extending from the anus up to 17 cm proximally, multiple polyps s/p polypectomy, a 5 cm transverse colon polyp that was not removed  - MRI pelvis: 18 cm long polypoidal rectal mass with invasion of the right meso rectal fascia and possibly the prostate gland. Additional involvement of the anal sphincter complex--T4(a or b)N0M  - S/p 1st cycle of FOLFOX on 5/7/21  - Will be getting 4 cycles of FOLFOX every 2 weeks, followed by evaluation for chemo RT and then surgical eval  - heme/onc following    # Acute blood loss anemia, improved  # Iron deficiency anemia  - Secondary to rectal cancer  - Improved s/p 1U pRBCs on 5/13  - S/p IV Venofer  - Monitor CBC  - Maintain active type and screen  - Transfuse if Hb <7.5     # 1.8 cm hypodensity in the posterior pancreatic body  - Appears to measure fluid attenuation  - No main duct dilatation  - MRI pancreatic protocol as OP    # Subacute rib fracture   - CT: Subacute fracture of the left 10th lateral rib  - Pain control PRN    # Misc  DVT ppx: lovenox  GI ppx: protonix  Diet: low fiber (was having diarrhea last admission)  Dispo: NH once chemo finishes, likely Saturday evening   Mr. Orellana is a 80M with recently diagnosed locally advanced rectal adenocarcinoma presents for cycle 2 of chemo.    # Altered mental status, likely delirium  - Patient is oriented x1 (which is new per daughter and NH) and very confused (which has been present for many months)  - Also was hallucinating during my exam  - Vitals and exam, including complete neuro exam, were completely normal; and patient denies any symptoms  - Will order routine bloodwork, UA, urine culture (had recent UTI)  - Discussed with heme/onc fellow; obtain CT head    # Locally advanced rectal adenocarcinoma  - MRI: 18 cm long polypoidal rectal mass with invasion of the right meso rectal fascia and possibly the prostate gland. Additional involvement of the analsphincter complex--T4(a or b)N0M0  - Oncology plans for for total neoadjuvant chemotherapy followed by chemoRT followed by surgery  - Plan to start Cycle 2 tomorrow (5/27) of FOLFOX  - He will need filgastrim 24 hrs after completing chemo    # Iron deficiency anemia likely from LGIB from tumor  - On prior admission, Iron Saturation: 12%, Ferritin 34, MCV 68.9   - Venofer 200mg x 1 per heme/onc    # Subacute rib fracture noted last admission  - CT: Subacute fracture of the left 10th lateral rib  - Pain control PRN    # Misc  DVT ppx: lovenox  GI ppx: protonix  Diet: low fiber (was having diarrhea last admission)  Dispo: NH once chemo finishes, likely Saturday evening

## 2021-05-26 NOTE — PROGRESS NOTE ADULT - SUBJECTIVE AND OBJECTIVE BOX
24H events:  Mr. Orellana is being admitted from NH for chemotherapy. He is getting neoadjuvant FOLFOX for his rectal cancer. This requires a 48 hour infusion; usually patients go home with a pump that infuses it into their port at home, however this patient is currently in a NH and they won't accept the pump, so he is being admitted. He received his first cycle inpatient about 2 weeks ago, this will be cycle 2.     PAST MEDICAL & SURGICAL HISTORY  No pertinent past medical history    Amputation of digit of left hand      SOCIAL HISTORY:  Negative for smoking/alcohol/drug use.     ALLERGIES:  No Known Allergies    MEDICATIONS:  STANDING MEDICATIONS    PRN MEDICATIONS    VITALS:   T(F): 99.2  HR: 98  BP: 117/56  RR: 18  SpO2: --    LABS:                        RADIOLOGY:    PHYSICAL EXAM:  GEN: No acute distress  HENT: NCAT, EOMI  LYMPH: No appreciable adenopathy  LUNGS: No respiratory distress, clear to auscultation bilaterally   HEART: regular rate and rhythm  ABD: Soft, non-tender, non-distended  SKIN: No rash  EXT: No edema  NEURO: AAOX3

## 2021-05-26 NOTE — PROGRESS NOTE ADULT - ASSESSMENT
Mr. Orellana is a 80M w/ no known PMHx (doesn't follow with doctors) with recently diagnosed locally advanced rectal adenocarcinoma.     # Locally advanced rectal adenocarcinoma  - Hx begins in late April 2021 when he presented for loose stools, found to have rectal mass  - s/p biopsy showing adenocarcinoma  - MRI: 18 cm long polypoidal rectal mass with invasion of the right meso rectal fascia and possibly the prostate gland. Additional involvement of the analsphincter complex--T4(a or b)N0M0  - Planned for total neoadjuvant chemotherapy followed by chemoRT followed by surgery  - s/p port placement  - He received cycle 1 5/7/21  - He was supposed to start cycle 2 on 5/21, however his nursing home would not accept the 5FU infusional pump  - Plan to start C2D1 tomorrow (5/27) of FOLFOX    Iron deficiency anemia likely from LGIB from tumor  - On prior admission, Iron Saturation: 12%, Ferritin 34, MCV 68.9   - Only received 800mg on prior admission, will order venofer 200mg x 1    DVT ppx  Dispo: NH once chemo finishes, likely Saturday evening       Mr. Orellana is a 80M w/ no known PMHx (doesn't follow with doctors) with recently diagnosed locally advanced rectal adenocarcinoma.     # Locally advanced rectal adenocarcinoma  - Hx begins in late April 2021 when he presented for loose stools, found to have rectal mass  - s/p biopsy showing adenocarcinoma  - MRI: 18 cm long polypoidal rectal mass with invasion of the right meso rectal fascia and possibly the prostate gland. Additional involvement of the analsphincter complex--T4(a or b)N0M0  - Planned for total neoadjuvant chemotherapy followed by chemoRT followed by surgery  - s/p port placement  - He received cycle 1 5/7/21  - He was supposed to start cycle 2 on 5/21, however his nursing home would not accept the 5FU infusional pump  - Plan to start C2D1 tomorrow (5/27) of FOLFOX  - He will need filgastrim 24 hrs after completing chemo    Iron deficiency anemia likely from LGIB from tumor  - On prior admission, Iron Saturation: 12%, Ferritin 34, MCV 68.9   - Only received 800mg on prior admission, will order venofer 200mg x 1    DVT ppx  Dispo: NH once chemo finishes, likely Saturday evening

## 2021-05-27 ENCOUNTER — NON-APPOINTMENT (OUTPATIENT)
Age: 81
End: 2021-05-27

## 2021-05-27 LAB
ALBUMIN SERPL ELPH-MCNC: 2.2 G/DL — LOW (ref 3.5–5.2)
ALP SERPL-CCNC: 67 U/L — SIGNIFICANT CHANGE UP (ref 30–115)
ALT FLD-CCNC: 13 U/L — SIGNIFICANT CHANGE UP (ref 0–41)
ANION GAP SERPL CALC-SCNC: 7 MMOL/L — SIGNIFICANT CHANGE UP (ref 7–14)
ANISOCYTOSIS BLD QL: SIGNIFICANT CHANGE UP
APTT BLD: 35.6 SEC — SIGNIFICANT CHANGE UP (ref 27–39.2)
AST SERPL-CCNC: 16 U/L — SIGNIFICANT CHANGE UP (ref 0–41)
BASOPHILS # BLD AUTO: 0.17 K/UL — SIGNIFICANT CHANGE UP (ref 0–0.2)
BASOPHILS NFR BLD AUTO: 1.7 % — HIGH (ref 0–1)
BILIRUB SERPL-MCNC: 0.2 MG/DL — SIGNIFICANT CHANGE UP (ref 0.2–1.2)
BUN SERPL-MCNC: 7 MG/DL — LOW (ref 10–20)
CALCIUM SERPL-MCNC: 7.9 MG/DL — LOW (ref 8.5–10.1)
CHLORIDE SERPL-SCNC: 104 MMOL/L — SIGNIFICANT CHANGE UP (ref 98–110)
CO2 SERPL-SCNC: 25 MMOL/L — SIGNIFICANT CHANGE UP (ref 17–32)
CREAT SERPL-MCNC: <0.5 MG/DL — LOW (ref 0.7–1.5)
EOSINOPHIL # BLD AUTO: 0.09 K/UL — SIGNIFICANT CHANGE UP (ref 0–0.7)
EOSINOPHIL NFR BLD AUTO: 0.9 % — SIGNIFICANT CHANGE UP (ref 0–8)
GIANT PLATELETS BLD QL SMEAR: PRESENT — SIGNIFICANT CHANGE UP
GLUCOSE SERPL-MCNC: 87 MG/DL — SIGNIFICANT CHANGE UP (ref 70–99)
HCT VFR BLD CALC: 27.3 % — LOW (ref 42–52)
HGB BLD-MCNC: 7.9 G/DL — LOW (ref 14–18)
INR BLD: 1.26 RATIO — SIGNIFICANT CHANGE UP (ref 0.65–1.3)
LYMPHOCYTES # BLD AUTO: 1.21 K/UL — SIGNIFICANT CHANGE UP (ref 1.2–3.4)
LYMPHOCYTES # BLD AUTO: 12.3 % — LOW (ref 20.5–51.1)
MANUAL SMEAR VERIFICATION: SIGNIFICANT CHANGE UP
MCHC RBC-ENTMCNC: 24 PG — LOW (ref 27–31)
MCHC RBC-ENTMCNC: 28.9 G/DL — LOW (ref 32–37)
MCV RBC AUTO: 83 FL — SIGNIFICANT CHANGE UP (ref 80–94)
METAMYELOCYTES # FLD: 1.8 % — HIGH (ref 0–0)
MICROCYTES BLD QL: SIGNIFICANT CHANGE UP
MONOCYTES # BLD AUTO: 1.3 K/UL — HIGH (ref 0.1–0.6)
MONOCYTES NFR BLD AUTO: 13.2 % — HIGH (ref 1.7–9.3)
NEUTROPHILS # BLD AUTO: 6.55 K/UL — HIGH (ref 1.4–6.5)
NEUTROPHILS NFR BLD AUTO: 64.9 % — SIGNIFICANT CHANGE UP (ref 42.2–75.2)
NEUTS BAND # BLD: 1.7 % — SIGNIFICANT CHANGE UP (ref 0–6)
PLAT MORPH BLD: ABNORMAL
PLATELET # BLD AUTO: 347 K/UL — SIGNIFICANT CHANGE UP (ref 130–400)
POIKILOCYTOSIS BLD QL AUTO: SLIGHT — SIGNIFICANT CHANGE UP
POLYCHROMASIA BLD QL SMEAR: SIGNIFICANT CHANGE UP
POTASSIUM SERPL-MCNC: 4.2 MMOL/L — SIGNIFICANT CHANGE UP (ref 3.5–5)
POTASSIUM SERPL-SCNC: 4.2 MMOL/L — SIGNIFICANT CHANGE UP (ref 3.5–5)
PROMYELOCYTES # FLD: 1.8 % — HIGH (ref 0–0)
PROT SERPL-MCNC: 5.5 G/DL — LOW (ref 6–8)
PROTHROM AB SERPL-ACNC: 14.5 SEC — HIGH (ref 9.95–12.87)
RBC # BLD: 3.29 M/UL — LOW (ref 4.7–6.1)
RBC # FLD: 25.1 % — HIGH (ref 11.5–14.5)
RBC BLD AUTO: ABNORMAL
SODIUM SERPL-SCNC: 136 MMOL/L — SIGNIFICANT CHANGE UP (ref 135–146)
TOXIC GRANULES BLD QL SMEAR: PRESENT — SIGNIFICANT CHANGE UP
VARIANT LYMPHS # BLD: 1.7 % — SIGNIFICANT CHANGE UP (ref 0–5)
WBC # BLD: 9.83 K/UL — SIGNIFICANT CHANGE UP (ref 4.8–10.8)
WBC # FLD AUTO: 9.83 K/UL — SIGNIFICANT CHANGE UP (ref 4.8–10.8)

## 2021-05-27 PROCEDURE — 99232 SBSQ HOSP IP/OBS MODERATE 35: CPT

## 2021-05-27 PROCEDURE — 99221 1ST HOSP IP/OBS SF/LOW 40: CPT

## 2021-05-27 RX ORDER — LIDOCAINE 4 G/100G
1 CREAM TOPICAL DAILY
Refills: 0 | Status: DISCONTINUED | OUTPATIENT
Start: 2021-05-27 | End: 2021-05-30

## 2021-05-27 RX ORDER — DEXAMETHASONE 0.5 MG/5ML
12 ELIXIR ORAL ONCE
Refills: 0 | Status: COMPLETED | OUTPATIENT
Start: 2021-05-27 | End: 2021-05-27

## 2021-05-27 RX ORDER — LEUCOVORIN CALCIUM 5 MG
810 TABLET ORAL ONCE
Refills: 0 | Status: COMPLETED | OUTPATIENT
Start: 2021-05-27 | End: 2021-05-27

## 2021-05-27 RX ORDER — FLUOROURACIL 50 MG/ML
810 INJECTION, SOLUTION INTRAVENOUS ONCE
Refills: 0 | Status: COMPLETED | OUTPATIENT
Start: 2021-05-27 | End: 2021-05-27

## 2021-05-27 RX ORDER — FLUOROURACIL 50 MG/ML
2420 INJECTION, SOLUTION INTRAVENOUS EVERY 24 HOURS
Refills: 0 | Status: COMPLETED | OUTPATIENT
Start: 2021-05-27 | End: 2021-05-27

## 2021-05-27 RX ORDER — OXALIPLATIN 5 MG/ML
170 INJECTION, SOLUTION INTRAVENOUS ONCE
Refills: 0 | Status: COMPLETED | OUTPATIENT
Start: 2021-05-27 | End: 2021-05-27

## 2021-05-27 RX ORDER — FOSAPREPITANT DIMEGLUMINE 150 MG/5ML
150 INJECTION, POWDER, LYOPHILIZED, FOR SOLUTION INTRAVENOUS ONCE
Refills: 0 | Status: COMPLETED | OUTPATIENT
Start: 2021-05-27 | End: 2021-05-27

## 2021-05-27 RX ORDER — ONDANSETRON 8 MG/1
16 TABLET, FILM COATED ORAL ONCE
Refills: 0 | Status: COMPLETED | OUTPATIENT
Start: 2021-05-27 | End: 2021-05-27

## 2021-05-27 RX ADMIN — Medication 145.25 MILLIGRAM(S): at 19:47

## 2021-05-27 RX ADMIN — ONDANSETRON 116 MILLIGRAM(S): 8 TABLET, FILM COATED ORAL at 16:43

## 2021-05-27 RX ADMIN — FOSAPREPITANT DIMEGLUMINE 300 MILLIGRAM(S): 150 INJECTION, POWDER, LYOPHILIZED, FOR SOLUTION INTRAVENOUS at 16:44

## 2021-05-27 RX ADMIN — HEPARIN SODIUM 5000 UNIT(S): 5000 INJECTION INTRAVENOUS; SUBCUTANEOUS at 22:25

## 2021-05-27 RX ADMIN — Medication 325 MILLIGRAM(S): at 05:28

## 2021-05-27 RX ADMIN — LIDOCAINE 1 PATCH: 4 CREAM TOPICAL at 15:35

## 2021-05-27 RX ADMIN — FLUOROURACIL 43.68 MILLIGRAM(S): 50 INJECTION, SOLUTION INTRAVENOUS at 22:34

## 2021-05-27 RX ADMIN — OXALIPLATIN 142 MILLIGRAM(S): 5 INJECTION, SOLUTION INTRAVENOUS at 17:34

## 2021-05-27 RX ADMIN — HEPARIN SODIUM 5000 UNIT(S): 5000 INJECTION INTRAVENOUS; SUBCUTANEOUS at 05:28

## 2021-05-27 RX ADMIN — CHLORHEXIDINE GLUCONATE 1 APPLICATION(S): 213 SOLUTION TOPICAL at 05:28

## 2021-05-27 RX ADMIN — HEPARIN SODIUM 5000 UNIT(S): 5000 INJECTION INTRAVENOUS; SUBCUTANEOUS at 14:17

## 2021-05-27 RX ADMIN — PANTOPRAZOLE SODIUM 40 MILLIGRAM(S): 20 TABLET, DELAYED RELEASE ORAL at 05:28

## 2021-05-27 RX ADMIN — LIDOCAINE 1 PATCH: 4 CREAM TOPICAL at 23:45

## 2021-05-27 RX ADMIN — Medication 106 MILLIGRAM(S): at 16:44

## 2021-05-27 RX ADMIN — FLUOROURACIL 198.6 MILLIGRAM(S): 50 INJECTION, SOLUTION INTRAVENOUS at 21:57

## 2021-05-27 NOTE — CONSULT NOTE ADULT - SUBJECTIVE AND OBJECTIVE BOX
Podiatry Consult Note    Subjective:    MISAEL HUFFMAN is a 80y year old Male seen at bedside with a chief complaint of painful thickened, dystrophic, ingrowing and long toenails digits 1-5 bilaterally and preventative foot examination. Patient is medically managed by Medicine/Hospitalists. Patient denies any history of trauma to both feet. Patient has no other pedal complaints. Patient is experiencing pain while standing, walking and in shoe gear.     PMH: No pertinent past medical history    PSH:Amputation of digit of left hand    Allergies:No Known Allergies    Labs:                        7.9    9.83  )-----------( 347      ( 27 May 2021 06:07 )             27.3     WBC Trend  9.83 Date (05-27 @ 06:07)  10.81<H> Date (05-26 @ 18:39)  4.47<L> Date (05-17 @ 07:02)  4.88 Date (05-16 @ 08:56)  4.70<L> Date (05-15 @ 20:58)  3.46<L> Date (05-15 @ 06:33)  4.66<L> Date (05-14 @ 06:34)  4.32<L> Date (05-13 @ 20:42)  3.82<L> Date (05-13 @ 07:01)  4.13<L> Date (05-12 @ 15:30)  5.12 Date (05-12 @ 07:15)  6.30 Date (05-11 @ 16:55)  5.60 Date (05-11 @ 06:04)  5.57 Date (05-11 @ 00:41)  6.46 Date (05-10 @ 07:41)  10.13 Date (05-09 @ 07:23)  8.81 Date (05-08 @ 06:53)  8.80 Date (05-07 @ 23:16)  9.39 Date (05-07 @ 08:08)  7.98 Date (05-06 @ 08:30)  9.46 Date (05-05 @ 16:00)  6.22 Date (05-05 @ 06:57)  6.28 Date (05-04 @ 05:20)    Chem  05-27    136  |  104  |  7<L>  ----------------------------<  87  4.2   |  25  |  <0.5<L>    Ca    7.9<L>      27 May 2021 06:07    TPro  5.5<L>  /  Alb  2.2<L>  /  TBili  0.2  /  DBili  x   /  AST  16  /  ALT  13  /  AlkPhos  67  05-27    T(F): 98 (05-27-21 @ 05:53), Max: 99.2 (05-26-21 @ 13:11)  HR: 75 (05-27-21 @ 05:53) (75 - 98)  BP: 112/54 (05-27-21 @ 05:53) (112/54 - 140/63)  RR: 18 (05-27-21 @ 05:53) (18 - 18)    Objective:   DERM: Skin warm, dry and supple bilateral. No open lesions or inter-digital macerations noted bilateral.   Toenails 1-5 Right and Left feet thickened, elongated, discolored, and dystrophic with subungual debris. There is pain upon palpation of all fungal and ingrowing nails 1-5 bilaterally.   VASC: Dorsalis Pedis and Posterior Tibial pulses palpable;  NEURO: Protective sensation intact to the level of the digits bilateral.    Assessment:   Bilateral dystrophic toenails consistent with Onychomycosis.   Pain from Elongated nails, ingrowing, incurvated, and dystrophic nails upon palpation of nails.    Plan:   Discussed diagnosis and treatment with patient  Aseptic debridement and curettage of all fungal and ingrowing nails 1-5 bilateral with sterile nail nipper.  Discussed importance of daily foot examinations,drying of feet after bathing and proper shoe gear.  Patient Would Benefit From Periodic Debridements; Can Follow Up as Outpatient w/ Dr. Siddiqui Post Discharge   Evaluated and Discussed Plan w/ Attending;     Spectra;

## 2021-05-27 NOTE — PROGRESS NOTE ADULT - SUBJECTIVE AND OBJECTIVE BOX
24H events:  Patient is not delirious today, he is oriented x3  He is complaining of chronic knee pain   His plateles and WBC are wnl, Cr stable.  PAST MEDICAL & SURGICAL HISTORY  No pertinent past medical history    Amputation of digit of left hand      SOCIAL HISTORY:  Negative for smoking/alcohol/drug use.     ALLERGIES:  No Known Allergies    MEDICATIONS:  STANDING MEDICATIONS  chlorhexidine 4% Liquid 1 Application(s) Topical <User Schedule>  heparin   Injectable 5000 Unit(s) SubCutaneous every 8 hours  pantoprazole    Tablet 40 milliGRAM(s) Oral before breakfast    PRN MEDICATIONS  loperamide 2 milliGRAM(s) Oral two times a day PRN  oxycodone    5 mG/acetaminophen 325 mG 1 Tablet(s) Oral every 6 hours PRN  simethicone 80 milliGRAM(s) Chew two times a day PRN    VITALS:   T(F): 98  HR: 75  BP: 112/54  RR: 18  SpO2: --    LABS:                        7.9    9.83  )-----------( 347      ( 27 May 2021 06:07 )             27.3     05-27    136  |  104  |  7<L>  ----------------------------<  87  4.2   |  25  |  <0.5<L>    Ca    7.9<L>      27 May 2021 06:07    TPro  5.5<L>  /  Alb  2.2<L>  /  TBili  0.2  /  DBili  x   /  AST  16  /  ALT  13  /  AlkPhos  67  05-27    PT/INR - ( 27 May 2021 06:07 )   PT: 14.50 sec;   INR: 1.26 ratio         PTT - ( 27 May 2021 06:07 )  PTT:35.6 sec              RADIOLOGY:    PHYSICAL EXAM:  GEN: No acute distress  HENT: NCAT, EOMI  LYMPH: No appreciable adenopathy  LUNGS: No respiratory distress, clear to auscultation bilaterally   HEART: regular rate and rhythm  ABD: Soft, non-tender, non-distended  SKIN: No rash  EXT: Mycotic toes, missing fingertips  NEURO: AAOX3

## 2021-05-27 NOTE — CONSULT NOTE ADULT - ATTENDING COMMENTS
mycotic nails debrided to patients tolerance  pt can follow up as outpatient  Thank you for allowing me to participate in your patient care.   Louis Siddiqui DPM

## 2021-05-27 NOTE — PROGRESS NOTE ADULT - ASSESSMENT
Mr. Orellana is a 80M w/ no known PMHx (doesn't follow with doctors) with recently diagnosed locally advanced rectal adenocarcinoma.     # Locally advanced rectal adenocarcinoma  - Hx begins in late April 2021 when he presented for loose stools, found to have rectal mass  - s/p biopsy showing adenocarcinoma  - MRI: 18 cm long polypoidal rectal mass with invasion of the right meso rectal fascia and possibly the prostate gland. Additional involvement of the analsphincter complex--T4(a or b)N0M0  - Planned for total neoadjuvant chemotherapy followed by chemoRT followed by surgery  - s/p port placement  - He received cycle 1 5/7/21  - He was supposed to start cycle 2 on 5/21, however his nursing home would not accept the 5FU infusional pump  - Plan to start C2D1 tomorrow (5/27) of FOLFOX  - He will need filgastrim 24 hrs after completing chemo    Delirium: Patient is AAOx3 today, per family he has intermittent confusion    Iron deficiency anemia likely from LGIB from tumor  - On prior admission, Iron Saturation: 12%, Ferritin 34, MCV 68.9   - Only received 800mg on prior admission, will order venofer 200mg x 1  - Stop PO iron    Mycotic toenails:  - Debrided by podiatry, followup outpatient    Knee pain, likely OA:  - Lidocaine patch    DVT ppx  Dispo: NH once chemo finishes, likely Saturday evening

## 2021-05-27 NOTE — PROGRESS NOTE ADULT - SUBJECTIVE AND OBJECTIVE BOX
MISAEL ORELLANA 80y Male  MRN#: [id number]   CODE STATUS:_____full___      SUBJECTIVE  Patient is a 80y old Male who presents with a chief complaint of Chemotherapy (27 May 2021 10:27)  Currently admitted to medicine with the primary diagnosis of Hospital course has been complicated by _______.   Today is hospital day 1d, and this morning he is feeling well and reports no fevers, chills, sob, is AAOx3. Notes bilateral knee pain (chronic).      OBJECTIVE  PAST MEDICAL & SURGICAL HISTORY  No pertinent past medical history    Amputation of digit of left hand      ALLERGIES:  No Known Allergies    MEDICATIONS:  STANDING MEDICATIONS  chlorhexidine 4% Liquid 1 Application(s) Topical <User Schedule>  dexAMETHasone  IVPB 12 milliGRAM(s) IV Intermittent once  fluorouracil IVPB (eMAR) 2420 milliGRAM(s) IV Intermittent every 24 hours  fluorouracil IVPB (eMAR) 810 milliGRAM(s) IV Intermittent once  fosaprepitant IVPB 150 milliGRAM(s) IV Intermittent once  heparin   Injectable 5000 Unit(s) SubCutaneous every 8 hours  leucovorin IVPB (eMAR) 810 milliGRAM(s) IV Intermittent once  lidocaine   Patch 1 Patch Transdermal daily  ondansetron  IVPB 16 milliGRAM(s) IV Intermittent once  oxaliplatin IVPB (eMAR) 170 milliGRAM(s) IV Intermittent once  pantoprazole    Tablet 40 milliGRAM(s) Oral before breakfast    PRN MEDICATIONS  loperamide 2 milliGRAM(s) Oral two times a day PRN  oxycodone    5 mG/acetaminophen 325 mG 1 Tablet(s) Oral every 6 hours PRN  simethicone 80 milliGRAM(s) Chew two times a day PRN      VITAL SIGNS: Last 24 Hours  T(C): 36.8 (27 May 2021 11:46), Max: 37 (26 May 2021 21:08)  T(F): 98.2 (27 May 2021 11:46), Max: 98.6 (26 May 2021 21:08)  HR: 84 (27 May 2021 11:46) (75 - 89)  BP: 112/57 (27 May 2021 11:46) (112/54 - 140/63)  BP(mean): --  RR: 18 (27 May 2021 11:46) (18 - 18)  SpO2: --    LABS:                        7.9    9.83  )-----------( 347      ( 27 May 2021 06:07 )             27.3     05-27    136  |  104  |  7<L>  ----------------------------<  87  4.2   |  25  |  <0.5<L>    Ca    7.9<L>      27 May 2021 06:07    TPro  5.5<L>  /  Alb  2.2<L>  /  TBili  0.2  /  DBili  x   /  AST  16  /  ALT  13  /  AlkPhos  67  05-27    PT/INR - ( 27 May 2021 06:07 )   PT: 14.50 sec;   INR: 1.26 ratio         PTT - ( 27 May 2021 06:07 )  PTT:35.6 sec              RADIOLOGY:      PHYSICAL EXAM:    GENERAL: NAD, well-developed, AAOx3  HEENT:  Atraumatic, Normocephalic. EOMI, PERRLA, conjunctiva and sclera clear, No JVD  PULMONARY: Clear to auscultation bilaterally; No wheeze  CARDIOVASCULAR: Regular rate and rhythm; No murmurs, rubs, or gallops  GASTROINTESTINAL: Soft, Nontender, Nondistended; Bowel sounds present  MUSCULOSKELETAL:  2+ Peripheral Pulses, No clubbing, cyanosis, or edema  NEUROLOGY: non-focal  SKIN: No rashes or lesions      ADMISSION SUMMARY  Patient is a 80y old Male who presents with a chief complaint of Chemotherapy (27 May 2021 10:27)  Currently admitted to medicine with the primary diagnosis of Hospital course has been complicated by _______.       ASSESSMENT & PLAN  Mr. Orellana is a 80M with recently diagnosed locally advanced rectal adenocarcinoma presents for cycle 2 of chemo.    # Altered mental status, - RESOLVED  - CT head no acute pathology   - AAOx3 on my exam  - patient was likely sundowning. patient gets confused intermittently at baseline per family    # Locally advanced rectal adenocarcinoma  - MRI: 18 cm long polypoidal rectal mass with invasion of the right meso rectal fascia and possibly the prostate gland. Additional involvement of the analsphincter complex--T4(a or b)N0M0  - Oncology plans for for total neoadjuvant chemotherapy followed by chemoRT followed by surgery  - Plan to start Cycle 2 today (5/27) of FOLFOX  - He will need filgastrim 24 hrs after completing chemo    # Iron deficiency anemia likely from LGIB from tumor  - On prior admission, Iron Saturation: 12%, Ferritin 34, MCV 68.9   - Venofer 200mg x 1 per heme/onc    # Subacute rib fracture noted last admission  - CT: Subacute fracture of the left 10th lateral rib  - Pain control PRN    #onychomycosis  -f/u podiatry outpatient     #Bilateral knee pain  -likely 2/2 OA  -requesting steroid injection - f/u outpatient for further management of OA   - lidocaine patch trial    # Misc  DVT ppx: lovenox  GI ppx: protonix  Diet: low fiber (was having diarrhea last admission)  Dispo: NH once chemo finishes       MISAEL ORELLANA 80y Male  MRN#: [id number]   CODE STATUS:_____full___      SUBJECTIVE  Patient is a 80y old Male who presents with a chief complaint of Chemotherapy (27 May 2021 10:27)  Currently admitted to medicine with the primary diagnosis of Hospital course has been complicated by _______.   Today is hospital day 1d, and this morning he is feeling well and reports no fevers, chills, sob, is AAOx3. Notes bilateral knee pain (chronic).      OBJECTIVE  PAST MEDICAL & SURGICAL HISTORY  No pertinent past medical history    Amputation of digit of left hand      ALLERGIES:  No Known Allergies    MEDICATIONS:  STANDING MEDICATIONS  chlorhexidine 4% Liquid 1 Application(s) Topical <User Schedule>  dexAMETHasone  IVPB 12 milliGRAM(s) IV Intermittent once  fluorouracil IVPB (eMAR) 2420 milliGRAM(s) IV Intermittent every 24 hours  fluorouracil IVPB (eMAR) 810 milliGRAM(s) IV Intermittent once  fosaprepitant IVPB 150 milliGRAM(s) IV Intermittent once  heparin   Injectable 5000 Unit(s) SubCutaneous every 8 hours  leucovorin IVPB (eMAR) 810 milliGRAM(s) IV Intermittent once  lidocaine   Patch 1 Patch Transdermal daily  ondansetron  IVPB 16 milliGRAM(s) IV Intermittent once  oxaliplatin IVPB (eMAR) 170 milliGRAM(s) IV Intermittent once  pantoprazole    Tablet 40 milliGRAM(s) Oral before breakfast    PRN MEDICATIONS  loperamide 2 milliGRAM(s) Oral two times a day PRN  oxycodone    5 mG/acetaminophen 325 mG 1 Tablet(s) Oral every 6 hours PRN  simethicone 80 milliGRAM(s) Chew two times a day PRN      VITAL SIGNS: Last 24 Hours  T(C): 36.8 (27 May 2021 11:46), Max: 37 (26 May 2021 21:08)  T(F): 98.2 (27 May 2021 11:46), Max: 98.6 (26 May 2021 21:08)  HR: 84 (27 May 2021 11:46) (75 - 89)  BP: 112/57 (27 May 2021 11:46) (112/54 - 140/63)  BP(mean): --  RR: 18 (27 May 2021 11:46) (18 - 18)  SpO2: --    LABS:                        7.9    9.83  )-----------( 347      ( 27 May 2021 06:07 )             27.3     05-27    136  |  104  |  7<L>  ----------------------------<  87  4.2   |  25  |  <0.5<L>    Ca    7.9<L>      27 May 2021 06:07    TPro  5.5<L>  /  Alb  2.2<L>  /  TBili  0.2  /  DBili  x   /  AST  16  /  ALT  13  /  AlkPhos  67  05-27    PT/INR - ( 27 May 2021 06:07 )   PT: 14.50 sec;   INR: 1.26 ratio         PTT - ( 27 May 2021 06:07 )  PTT:35.6 sec              RADIOLOGY:      PHYSICAL EXAM:    GENERAL: NAD, well-developed, AAOx3  HEENT:  Atraumatic, Normocephalic. EOMI, PERRLA, conjunctiva and sclera clear, No JVD  PULMONARY: Clear to auscultation bilaterally; No wheeze  CARDIOVASCULAR: Regular rate and rhythm; No murmurs, rubs, or gallops  GASTROINTESTINAL: Soft, Nontender, Nondistended; Bowel sounds present  MUSCULOSKELETAL:  2+ Peripheral Pulses, No clubbing, cyanosis, or edema  NEUROLOGY: non-focal  SKIN: No rashes or lesions      ADMISSION SUMMARY  Patient is a 80y old Male who presents with a chief complaint of Chemotherapy (27 May 2021 10:27)  Currently admitted to medicine with the primary diagnosis of Hospital course has been complicated by _______.       ASSESSMENT & PLAN  Mr. Orellana is a 80M with recently diagnosed locally advanced rectal adenocarcinoma presents for cycle 2 of chemo.    # Altered mental status, - RESOLVED  - CT head no acute pathology   - AAOx3 on my exam  - patient was likely sundowning. patient gets confused intermittently at baseline per family    # Locally advanced rectal adenocarcinoma  - MRI: 18 cm long polypoidal rectal mass with invasion of the right meso rectal fascia and possibly the prostate gland. Additional involvement of the analsphincter complex--T4(a or b)N0M0  - Oncology plans for for total neoadjuvant chemotherapy followed by chemoRT followed by surgery  - Plan to start Cycle 2 today (5/27) of FOLFOX  - He will need filgastrim 24 hrs after completing chemo  - will order tumor lysis labs (uric acid, ldh, potassium, phosphorus) AM    # Iron deficiency anemia likely from LGIB from tumor  - On prior admission, Iron Saturation: 12%, Ferritin 34, MCV 68.9   - Venofer 200mg x 1 per heme/onc    # Subacute rib fracture noted last admission  - CT: Subacute fracture of the left 10th lateral rib  - Pain control PRN    #onychomycosis  -f/u podiatry outpatient     #Bilateral knee pain  -likely 2/2 OA  -requesting steroid injection - f/u outpatient for further management of OA   - lidocaine patch trial    # Misc  DVT ppx: lovenox  GI ppx: protonix  Diet: low fiber (was having diarrhea last admission)  Dispo: NH once chemo finishes

## 2021-05-28 LAB
ALBUMIN SERPL ELPH-MCNC: 2.4 G/DL — LOW (ref 3.5–5.2)
ALP SERPL-CCNC: 67 U/L — SIGNIFICANT CHANGE UP (ref 30–115)
ALT FLD-CCNC: 13 U/L — SIGNIFICANT CHANGE UP (ref 0–41)
ANION GAP SERPL CALC-SCNC: 12 MMOL/L — SIGNIFICANT CHANGE UP (ref 7–14)
AST SERPL-CCNC: 13 U/L — SIGNIFICANT CHANGE UP (ref 0–41)
BASOPHILS # BLD AUTO: 0.05 K/UL — SIGNIFICANT CHANGE UP (ref 0–0.2)
BASOPHILS NFR BLD AUTO: 0.5 % — SIGNIFICANT CHANGE UP (ref 0–1)
BILIRUB SERPL-MCNC: 0.2 MG/DL — SIGNIFICANT CHANGE UP (ref 0.2–1.2)
BUN SERPL-MCNC: 12 MG/DL — SIGNIFICANT CHANGE UP (ref 10–20)
CALCIUM SERPL-MCNC: 8.4 MG/DL — LOW (ref 8.5–10.1)
CHLORIDE SERPL-SCNC: 103 MMOL/L — SIGNIFICANT CHANGE UP (ref 98–110)
CO2 SERPL-SCNC: 24 MMOL/L — SIGNIFICANT CHANGE UP (ref 17–32)
CREAT SERPL-MCNC: 0.5 MG/DL — LOW (ref 0.7–1.5)
EOSINOPHIL # BLD AUTO: 0 K/UL — SIGNIFICANT CHANGE UP (ref 0–0.7)
EOSINOPHIL NFR BLD AUTO: 0 % — SIGNIFICANT CHANGE UP (ref 0–8)
GLUCOSE SERPL-MCNC: 190 MG/DL — HIGH (ref 70–99)
HCT VFR BLD CALC: 30.8 % — LOW (ref 42–52)
HGB BLD-MCNC: 9 G/DL — LOW (ref 14–18)
IMM GRANULOCYTES NFR BLD AUTO: 8.7 % — HIGH (ref 0.1–0.3)
LDH SERPL L TO P-CCNC: 232 U/L — SIGNIFICANT CHANGE UP (ref 50–242)
LYMPHOCYTES # BLD AUTO: 0.95 K/UL — LOW (ref 1.2–3.4)
LYMPHOCYTES # BLD AUTO: 9.1 % — LOW (ref 20.5–51.1)
MAGNESIUM SERPL-MCNC: 1.9 MG/DL — SIGNIFICANT CHANGE UP (ref 1.8–2.4)
MCHC RBC-ENTMCNC: 24.7 PG — LOW (ref 27–31)
MCHC RBC-ENTMCNC: 29.2 G/DL — LOW (ref 32–37)
MCV RBC AUTO: 84.6 FL — SIGNIFICANT CHANGE UP (ref 80–94)
MONOCYTES # BLD AUTO: 0.42 K/UL — SIGNIFICANT CHANGE UP (ref 0.1–0.6)
MONOCYTES NFR BLD AUTO: 4 % — SIGNIFICANT CHANGE UP (ref 1.7–9.3)
NEUTROPHILS # BLD AUTO: 8.15 K/UL — HIGH (ref 1.4–6.5)
NEUTROPHILS NFR BLD AUTO: 77.7 % — HIGH (ref 42.2–75.2)
NRBC # BLD: 0 /100 WBCS — SIGNIFICANT CHANGE UP (ref 0–0)
PHOSPHATE SERPL-MCNC: 3.6 MG/DL — SIGNIFICANT CHANGE UP (ref 2.1–4.9)
PLATELET # BLD AUTO: 359 K/UL — SIGNIFICANT CHANGE UP (ref 130–400)
POTASSIUM SERPL-MCNC: 5 MMOL/L — SIGNIFICANT CHANGE UP (ref 3.5–5)
POTASSIUM SERPL-SCNC: 5 MMOL/L — SIGNIFICANT CHANGE UP (ref 3.5–5)
PROT SERPL-MCNC: 6.3 G/DL — SIGNIFICANT CHANGE UP (ref 6–8)
RBC # BLD: 3.64 M/UL — LOW (ref 4.7–6.1)
RBC # FLD: 25 % — HIGH (ref 11.5–14.5)
SODIUM SERPL-SCNC: 139 MMOL/L — SIGNIFICANT CHANGE UP (ref 135–146)
URATE SERPL-MCNC: 4 MG/DL — SIGNIFICANT CHANGE UP (ref 3.4–8.8)
WBC # BLD: 10.48 K/UL — SIGNIFICANT CHANGE UP (ref 4.8–10.8)
WBC # FLD AUTO: 10.48 K/UL — SIGNIFICANT CHANGE UP (ref 4.8–10.8)

## 2021-05-28 PROCEDURE — 99223 1ST HOSP IP/OBS HIGH 75: CPT

## 2021-05-28 RX ADMIN — CHLORHEXIDINE GLUCONATE 1 APPLICATION(S): 213 SOLUTION TOPICAL at 06:35

## 2021-05-28 RX ADMIN — HEPARIN SODIUM 5000 UNIT(S): 5000 INJECTION INTRAVENOUS; SUBCUTANEOUS at 22:04

## 2021-05-28 RX ADMIN — HEPARIN SODIUM 5000 UNIT(S): 5000 INJECTION INTRAVENOUS; SUBCUTANEOUS at 14:53

## 2021-05-28 RX ADMIN — LIDOCAINE 1 PATCH: 4 CREAM TOPICAL at 20:10

## 2021-05-28 RX ADMIN — HEPARIN SODIUM 5000 UNIT(S): 5000 INJECTION INTRAVENOUS; SUBCUTANEOUS at 05:16

## 2021-05-28 RX ADMIN — LIDOCAINE 1 PATCH: 4 CREAM TOPICAL at 11:19

## 2021-05-28 RX ADMIN — FLUOROURACIL 43.68 MILLIGRAM(S): 50 INJECTION, SOLUTION INTRAVENOUS at 23:44

## 2021-05-28 RX ADMIN — PANTOPRAZOLE SODIUM 40 MILLIGRAM(S): 20 TABLET, DELAYED RELEASE ORAL at 06:35

## 2021-05-28 NOTE — PROGRESS NOTE ADULT - ASSESSMENT
Mr. Orellana is a 80M w/ no known PMHx (doesn't follow with doctors) with recently diagnosed locally advanced rectal adenocarcinoma.     # Locally advanced rectal adenocarcinoma  - Hx begins in late April 2021 when he presented for loose stools, found to have rectal mass  - s/p biopsy showing adenocarcinoma  - MRI: 18 cm long polypoidal rectal mass with invasion of the right meso rectal fascia and possibly the prostate gland. Additional involvement of the analsphincter complex--T4(a or b)N0M0  - Planned for total neoadjuvant chemotherapy followed by chemoRT followed by surgery  - s/p port placement  - He received cycle 1 5/7/21  - He was supposed to start cycle 2 on 5/21, however his nursing home would not accept the 5FU infusional pump  - C2D1 was 5/27, continue with FOLFOX , likely to complete 5/29  - He will need filgastrim 24 hrs after completing chemo    Sound downing/delirium: Patient is AAOx3 today, per family he has intermittent confusion  - He is alert today  - SW to discuss long term planning with daughter    Iron deficiency anemia likely from LGIB from tumor  - On prior admission, Iron Saturation: 12%, Ferritin 34, MCV 68.9   - Completed IV iron 1g  - Stop PO iron    Mycotic toenails:  - Debrided by podiatry, followup outpatient    Knee pain, likely OA:  - Lidocaine patch    DVT ppx  Dispo: NH once chemo finishes, likely Saturday evening

## 2021-05-28 NOTE — PROGRESS NOTE ADULT - SUBJECTIVE AND OBJECTIVE BOX
24H events:    No acute events overnight  Tolerating chemo well  Spoke with daughter, very concerned about his dispo plan; he lives alone with no support, unable to care for himself      PAST MEDICAL & SURGICAL HISTORY  No pertinent past medical history    Amputation of digit of left hand      SOCIAL HISTORY:  Negative for smoking/alcohol/drug use.     ALLERGIES:  No Known Allergies    MEDICATIONS:  STANDING MEDICATIONS  chlorhexidine 4% Liquid 1 Application(s) Topical <User Schedule>  fluorouracil IVPB (eMAR) 2420 milliGRAM(s) IV Intermittent every 24 hours  heparin   Injectable 5000 Unit(s) SubCutaneous every 8 hours  lidocaine   Patch 1 Patch Transdermal daily  pantoprazole    Tablet 40 milliGRAM(s) Oral before breakfast    PRN MEDICATIONS  loperamide 2 milliGRAM(s) Oral two times a day PRN  oxycodone    5 mG/acetaminophen 325 mG 1 Tablet(s) Oral every 6 hours PRN  simethicone 80 milliGRAM(s) Chew two times a day PRN    VITALS:   T(F): 96  HR: 67  BP: 117/58  RR: 18  SpO2: --    LABS:                        7.9    9.83  )-----------( 347      ( 27 May 2021 06:07 )             27.3     05-27    136  |  104  |  7<L>  ----------------------------<  87  4.2   |  25  |  <0.5<L>    Ca    7.9<L>      27 May 2021 06:07    TPro  5.5<L>  /  Alb  2.2<L>  /  TBili  0.2  /  DBili  x   /  AST  16  /  ALT  13  /  AlkPhos  67  05-27    PT/INR - ( 27 May 2021 06:07 )   PT: 14.50 sec;   INR: 1.26 ratio         PTT - ( 27 May 2021 06:07 )  PTT:35.6 sec              RADIOLOGY:    PHYSICAL EXAM:  GEN: No acute distress  HENT: NCAT, EOMI  LYMPH: No appreciable adenopathy  LUNGS: No respiratory distress, clear to auscultation bilaterally   HEART: regular rate and rhythm  ABD: Soft, non-tender, non-distended  SKIN: No rash  EXT: Fingertips missing  NEURO: Non focal, oriented x3

## 2021-05-29 ENCOUNTER — TRANSCRIPTION ENCOUNTER (OUTPATIENT)
Age: 81
End: 2021-05-29

## 2021-05-29 LAB
ALBUMIN SERPL ELPH-MCNC: 2.6 G/DL — LOW (ref 3.5–5.2)
ALP SERPL-CCNC: 70 U/L — SIGNIFICANT CHANGE UP (ref 30–115)
ALT FLD-CCNC: 14 U/L — SIGNIFICANT CHANGE UP (ref 0–41)
ANION GAP SERPL CALC-SCNC: 7 MMOL/L — SIGNIFICANT CHANGE UP (ref 7–14)
AST SERPL-CCNC: 19 U/L — SIGNIFICANT CHANGE UP (ref 0–41)
BASOPHILS # BLD AUTO: 0.03 K/UL — SIGNIFICANT CHANGE UP (ref 0–0.2)
BASOPHILS NFR BLD AUTO: 0.2 % — SIGNIFICANT CHANGE UP (ref 0–1)
BILIRUB SERPL-MCNC: 0.5 MG/DL — SIGNIFICANT CHANGE UP (ref 0.2–1.2)
BUN SERPL-MCNC: 14 MG/DL — SIGNIFICANT CHANGE UP (ref 10–20)
CALCIUM SERPL-MCNC: 8.6 MG/DL — SIGNIFICANT CHANGE UP (ref 8.5–10.1)
CHLORIDE SERPL-SCNC: 105 MMOL/L — SIGNIFICANT CHANGE UP (ref 98–110)
CO2 SERPL-SCNC: 29 MMOL/L — SIGNIFICANT CHANGE UP (ref 17–32)
CREAT SERPL-MCNC: 0.5 MG/DL — LOW (ref 0.7–1.5)
EOSINOPHIL # BLD AUTO: 0 K/UL — SIGNIFICANT CHANGE UP (ref 0–0.7)
EOSINOPHIL NFR BLD AUTO: 0 % — SIGNIFICANT CHANGE UP (ref 0–8)
GLUCOSE SERPL-MCNC: 121 MG/DL — HIGH (ref 70–99)
HCT VFR BLD CALC: 32.5 % — LOW (ref 42–52)
HGB BLD-MCNC: 9.4 G/DL — LOW (ref 14–18)
IMM GRANULOCYTES NFR BLD AUTO: 5.5 % — HIGH (ref 0.1–0.3)
LDH SERPL L TO P-CCNC: 249 U/L — HIGH (ref 50–242)
LYMPHOCYTES # BLD AUTO: 1.44 K/UL — SIGNIFICANT CHANGE UP (ref 1.2–3.4)
LYMPHOCYTES # BLD AUTO: 9.9 % — LOW (ref 20.5–51.1)
MAGNESIUM SERPL-MCNC: 2.1 MG/DL — SIGNIFICANT CHANGE UP (ref 1.8–2.4)
MCHC RBC-ENTMCNC: 24.5 PG — LOW (ref 27–31)
MCHC RBC-ENTMCNC: 28.9 G/DL — LOW (ref 32–37)
MCV RBC AUTO: 84.6 FL — SIGNIFICANT CHANGE UP (ref 80–94)
MONOCYTES # BLD AUTO: 0.7 K/UL — HIGH (ref 0.1–0.6)
MONOCYTES NFR BLD AUTO: 4.8 % — SIGNIFICANT CHANGE UP (ref 1.7–9.3)
NEUTROPHILS # BLD AUTO: 11.57 K/UL — HIGH (ref 1.4–6.5)
NEUTROPHILS NFR BLD AUTO: 79.6 % — HIGH (ref 42.2–75.2)
NRBC # BLD: 0 /100 WBCS — SIGNIFICANT CHANGE UP (ref 0–0)
PHOSPHATE SERPL-MCNC: 3.4 MG/DL — SIGNIFICANT CHANGE UP (ref 2.1–4.9)
PLATELET # BLD AUTO: 391 K/UL — SIGNIFICANT CHANGE UP (ref 130–400)
POTASSIUM SERPL-MCNC: 4.9 MMOL/L — SIGNIFICANT CHANGE UP (ref 3.5–5)
POTASSIUM SERPL-SCNC: 4.9 MMOL/L — SIGNIFICANT CHANGE UP (ref 3.5–5)
PROT SERPL-MCNC: 6.5 G/DL — SIGNIFICANT CHANGE UP (ref 6–8)
RBC # BLD: 3.84 M/UL — LOW (ref 4.7–6.1)
RBC # FLD: 25.2 % — HIGH (ref 11.5–14.5)
SODIUM SERPL-SCNC: 141 MMOL/L — SIGNIFICANT CHANGE UP (ref 135–146)
URATE SERPL-MCNC: 4.4 MG/DL — SIGNIFICANT CHANGE UP (ref 3.4–8.8)
WBC # BLD: 14.54 K/UL — HIGH (ref 4.8–10.8)
WBC # FLD AUTO: 14.54 K/UL — HIGH (ref 4.8–10.8)

## 2021-05-29 PROCEDURE — 99232 SBSQ HOSP IP/OBS MODERATE 35: CPT

## 2021-05-29 RX ORDER — LIDOCAINE 4 G/100G
1 CREAM TOPICAL
Qty: 0 | Refills: 0 | DISCHARGE
Start: 2021-05-29

## 2021-05-29 RX ADMIN — HEPARIN SODIUM 5000 UNIT(S): 5000 INJECTION INTRAVENOUS; SUBCUTANEOUS at 06:36

## 2021-05-29 RX ADMIN — LIDOCAINE 1 PATCH: 4 CREAM TOPICAL at 20:37

## 2021-05-29 RX ADMIN — HEPARIN SODIUM 5000 UNIT(S): 5000 INJECTION INTRAVENOUS; SUBCUTANEOUS at 15:02

## 2021-05-29 RX ADMIN — HEPARIN SODIUM 5000 UNIT(S): 5000 INJECTION INTRAVENOUS; SUBCUTANEOUS at 21:32

## 2021-05-29 RX ADMIN — LIDOCAINE 1 PATCH: 4 CREAM TOPICAL at 00:03

## 2021-05-29 RX ADMIN — PANTOPRAZOLE SODIUM 40 MILLIGRAM(S): 20 TABLET, DELAYED RELEASE ORAL at 06:36

## 2021-05-29 RX ADMIN — CHLORHEXIDINE GLUCONATE 1 APPLICATION(S): 213 SOLUTION TOPICAL at 02:00

## 2021-05-29 RX ADMIN — LIDOCAINE 1 PATCH: 4 CREAM TOPICAL at 12:05

## 2021-05-29 NOTE — PROGRESS NOTE ADULT - SUBJECTIVE AND OBJECTIVE BOX
24H events:    No acute events overnight  He is planned to complete FOLFOX tonight  Currently his NH won't accept him with Neupogen/Zarxio so will need to stay for Zarxio until Tuesday unless we can get acceptance     PAST MEDICAL & SURGICAL HISTORY  No pertinent past medical history    Amputation of digit of left hand      SOCIAL HISTORY:  Negative for smoking/alcohol/drug use.     ALLERGIES:  No Known Allergies    MEDICATIONS:  STANDING MEDICATIONS  chlorhexidine 4% Liquid 1 Application(s) Topical <User Schedule>  heparin   Injectable 5000 Unit(s) SubCutaneous every 8 hours  lidocaine   Patch 1 Patch Transdermal daily  pantoprazole    Tablet 40 milliGRAM(s) Oral before breakfast    PRN MEDICATIONS  loperamide 2 milliGRAM(s) Oral two times a day PRN  oxycodone    5 mG/acetaminophen 325 mG 1 Tablet(s) Oral every 6 hours PRN  simethicone 80 milliGRAM(s) Chew two times a day PRN    VITALS:   T(F): 96.2  HR: 71  BP: 123/58  RR: 18  SpO2: --    LABS:                        9.4    14.54 )-----------( 391      ( 29 May 2021 08:27 )             32.5     05-29    141  |  105  |  14  ----------------------------<  121<H>  4.9   |  29  |  0.5<L>    Ca    8.6      29 May 2021 08:27  Phos  3.4     05-29  Mg     2.1     05-29    TPro  6.5  /  Alb  2.6<L>  /  TBili  0.5  /  DBili  x   /  AST  19  /  ALT  14  /  AlkPhos  70  05-29                  RADIOLOGY:    PHYSICAL EXAM:  GEN: No acute distress  HENT: NCAT, EOMI  LYMPH: No appreciable adenopathy  LUNGS: No respiratory distress, clear to auscultation bilaterally   HEART: regular rate and rhythm  ABD: Soft, non-tender, non-distended  SKIN: No rash  EXT: No edema  NEURO: AAOX3

## 2021-05-29 NOTE — DISCHARGE NOTE PROVIDER - HOSPITAL COURSE
79 y/o M with recent diagnosis of rectal adenocarcinoma being admitted from NH for chemotherapy. He is now alert and oriented to name, and is otherwise very confused and poor historian (during previous admission last month he was A&Ox3 but otherwise very confused and poor historian). History obtained from chart and daughter. He has no complaints at this time; he denies SOB, chest pain, abdominal pain, fever, chills, headaches, dysuria, urinary frequency or any other symptoms.  He completed his second cycle of FOLFOX on this admission after having received his first cycle 2 weeks ago. He tolerated the infusion without incident, to be followed by filgrastim. He was stable for discharge to nursing home.     79 y/o M with recent diagnosis of rectal adenocarcinoma being admitted from NH for chemotherapy. He is now alert and oriented to name, and is otherwise very confused and poor historian (during previous admission last month he was A&Ox3 but otherwise very confused and poor historian). History obtained from chart and daughter. He has no complaints at this time; he denies SOB, chest pain, abdominal pain, fever, chills, headaches, dysuria, urinary frequency or any other symptoms.  He completed his second cycle of FOLFOX on this admission after having received his first cycle 2 weeks ago. He tolerated the infusion without incident,  followed by zarxio injection with resultant leukocytosis which was expected. He was stable for discharge to nursing home.    Plan for discharge and meds discussed with hem/onc team.     81 y/o M with recent diagnosis of rectal adenocarcinoma being admitted from NH for chemotherapy. He is now alert and oriented to name, and is otherwise very confused and poor historian (during previous admission last month he was A&Ox3 but otherwise very confused and poor historian). History obtained from chart and daughter. He has no complaints at this time; he denies SOB, chest pain, abdominal pain, fever, chills, headaches, dysuria, urinary frequency or any other symptoms.  He completed his second cycle of FOLFOX on this admission after having received his first cycle 2 weeks ago. He tolerated the infusion without incident,  followed by zarxio injection with resultant leukocytosis which was expected. He was stable for discharge to nursing home however was refusing. Psych consulted and deemed patient to not have capacity. Decision was deferred to daughter who expressed interest in NH. Patient will require OP Neuro f/u to assess for dementia. Patient stable and safe for d/c

## 2021-05-29 NOTE — DISCHARGE NOTE PROVIDER - NSDCCPCAREPLAN_GEN_ALL_CORE_FT
PRINCIPAL DISCHARGE DIAGNOSIS  Diagnosis: Rectal adenocarcinoma  Assessment and Plan of Treatment: You completed your second cycle of FOLFOX for the rectal adenocarcinoma this admission without incident. If you notice any bright red blood in the stool, dizziness or lightheadedness, chest pain or pressure, changes in your vision, fever, chills, or any other symptoms or signs that alarm you, please seek immediate medical attention.

## 2021-05-29 NOTE — DISCHARGE NOTE PROVIDER - NSDCMRMEDTOKEN_GEN_ALL_CORE_FT
ferrous sulfate 325 mg (65 mg elemental iron) oral tablet: 1 tab(s) orally 3 times a day  lidocaine 5% topical film: Apply topically to affected area once  loperamide 2 mg oral capsule: 1 cap(s) orally 2 times a day, As needed, Diarrhea  oxycodone-acetaminophen 5 mg-325 mg oral tablet: 1 tab(s) orally every 6 hours, As needed, Moderate Pain (4 - 6)  pantoprazole 40 mg oral delayed release tablet: 1 tab(s) orally once a day (before a meal)  simethicone 80 mg oral tablet, chewable: 1 tab(s) orally 2 times a day, As needed, Gas   lidocaine 5% topical film: Apply topically to affected area once  loperamide 2 mg oral capsule: 1 cap(s) orally 2 times a day, As needed, Diarrhea  oxycodone-acetaminophen 5 mg-325 mg oral tablet: 1 tab(s) orally every 6 hours, As needed, Moderate Pain (4 - 6)  pantoprazole 40 mg oral delayed release tablet: 1 tab(s) orally once a day (before a meal)  simethicone 80 mg oral tablet, chewable: 1 tab(s) orally 2 times a day, As needed, Gas

## 2021-05-29 NOTE — DISCHARGE NOTE PROVIDER - NSDCFUADDAPPT_GEN_ALL_CORE_FT
Please make an Appointment to see Dr. Mims the Neurologist and Dr. Tubbs the Hematologist/Oncologist outpatient

## 2021-05-29 NOTE — DISCHARGE NOTE PROVIDER - CARE PROVIDERS DIRECT ADDRESSES
[email or web address]

## 2021-05-29 NOTE — DISCHARGE NOTE PROVIDER - CARE PROVIDER_API CALL
Yamel Tubbs)  Hematology; Internal Medicine; Medical Oncology  [address] [place] [address]  Phone: [phone]  Fax: [phone]  Established Patient  Follow Up Time: 1-3 days   Yamel Tubbs)  Hematology; Internal Medicine; Medical Oncology  [address] [place] [address]  Phone: [phone]  Fax: [phone]  Established Patient  Follow Up Time: 1-3 days    Reg Morelos)  EEGEpilepsy; Neurology  [address] [place] [address], [address]  [place] [address]  Phone: [phone]  Fax: [phone]  Follow Up Time: 1 week    Louis Siddiqui)  Podiatric Medicine  [address] [place] [address], [address], [address] [place] [address]  Phone: [phone]  Fax: [phone]  Follow Up Time: 1 month

## 2021-05-29 NOTE — PROGRESS NOTE ADULT - ASSESSMENT
Mr. Orellana is a 80M w/ no known PMHx (doesn't follow with doctors) with recently diagnosed locally advanced rectal adenocarcinoma.     # Locally advanced rectal adenocarcinoma  - Hx begins in late April 2021 when he presented for loose stools, found to have rectal mass  - s/p biopsy showing adenocarcinoma  - MRI: 18 cm long polypoidal rectal mass with invasion of the right meso rectal fascia and possibly the prostate gland. Additional involvement of the analsphincter complex--T4(a or b)N0M0  - Planned for total neoadjuvant chemotherapy followed by chemoRT followed by surgery  - s/p port placement  - He received cycle 1 5/7/21  - He was supposed to start cycle 2 on 5/21, however his nursing home would not accept the 5FU infusional pump  - C2D1 was 5/27, continue with FOLFOX , planned to complete tonight 5/29  - He will need Zarxio/Neupogen x 5 doses; if the NH won't accept him with Zarxio or Neupogen then he will need to stay for those injections    Sound downing/delirium: Patient is AAOx3 today, per family he has intermittent confusion  - He is alert today  - SW to discuss long term planning with daughter    Iron deficiency anemia likely from LGIB from tumor  - On prior admission, Iron Saturation: 12%, Ferritin 34, MCV 68.9   - Completed IV iron 1g  - Stop PO iron    Mycotic toenails:  - Debrided by podiatry, followup outpatient    Knee pain, likely OA:  - Lidocaine patch  - WIll see if ortho will do cortison injection inpatient as patient is perseverating over it    PT consult    DVT ppx  Dispo: He will need Zarxio (or Neupogen) x 5 doses starting Sunday evening, if the nursing home won't accept him with this then he will need to stay for the inejctions

## 2021-05-30 LAB
ALBUMIN SERPL ELPH-MCNC: 2.6 G/DL — LOW (ref 3.5–5.2)
ALP SERPL-CCNC: 68 U/L — SIGNIFICANT CHANGE UP (ref 30–115)
ALT FLD-CCNC: 14 U/L — SIGNIFICANT CHANGE UP (ref 0–41)
ANION GAP SERPL CALC-SCNC: 6 MMOL/L — LOW (ref 7–14)
AST SERPL-CCNC: 19 U/L — SIGNIFICANT CHANGE UP (ref 0–41)
BASOPHILS # BLD AUTO: 0.03 K/UL — SIGNIFICANT CHANGE UP (ref 0–0.2)
BASOPHILS NFR BLD AUTO: 0.3 % — SIGNIFICANT CHANGE UP (ref 0–1)
BILIRUB SERPL-MCNC: 0.5 MG/DL — SIGNIFICANT CHANGE UP (ref 0.2–1.2)
BUN SERPL-MCNC: 12 MG/DL — SIGNIFICANT CHANGE UP (ref 10–20)
CALCIUM SERPL-MCNC: 8.3 MG/DL — LOW (ref 8.5–10.1)
CHLORIDE SERPL-SCNC: 104 MMOL/L — SIGNIFICANT CHANGE UP (ref 98–110)
CO2 SERPL-SCNC: 29 MMOL/L — SIGNIFICANT CHANGE UP (ref 17–32)
COVID-19 SPIKE DOMAIN AB INTERP: POSITIVE
COVID-19 SPIKE DOMAIN ANTIBODY RESULT: 1.39 U/ML — HIGH
CREAT SERPL-MCNC: 0.5 MG/DL — LOW (ref 0.7–1.5)
EOSINOPHIL # BLD AUTO: 0.02 K/UL — SIGNIFICANT CHANGE UP (ref 0–0.7)
EOSINOPHIL NFR BLD AUTO: 0.2 % — SIGNIFICANT CHANGE UP (ref 0–8)
GLUCOSE SERPL-MCNC: 75 MG/DL — SIGNIFICANT CHANGE UP (ref 70–99)
HCT VFR BLD CALC: 30.3 % — LOW (ref 42–52)
HGB BLD-MCNC: 8.9 G/DL — LOW (ref 14–18)
IMM GRANULOCYTES NFR BLD AUTO: 1.9 % — HIGH (ref 0.1–0.3)
LDH SERPL L TO P-CCNC: 237 U/L — SIGNIFICANT CHANGE UP (ref 50–242)
LYMPHOCYTES # BLD AUTO: 1.41 K/UL — SIGNIFICANT CHANGE UP (ref 1.2–3.4)
LYMPHOCYTES # BLD AUTO: 13.4 % — LOW (ref 20.5–51.1)
MAGNESIUM SERPL-MCNC: 2 MG/DL — SIGNIFICANT CHANGE UP (ref 1.8–2.4)
MCHC RBC-ENTMCNC: 24.9 PG — LOW (ref 27–31)
MCHC RBC-ENTMCNC: 29.4 G/DL — LOW (ref 32–37)
MCV RBC AUTO: 84.6 FL — SIGNIFICANT CHANGE UP (ref 80–94)
MONOCYTES # BLD AUTO: 0.09 K/UL — LOW (ref 0.1–0.6)
MONOCYTES NFR BLD AUTO: 0.9 % — LOW (ref 1.7–9.3)
NEUTROPHILS # BLD AUTO: 8.74 K/UL — HIGH (ref 1.4–6.5)
NEUTROPHILS NFR BLD AUTO: 83.3 % — HIGH (ref 42.2–75.2)
NRBC # BLD: 0 /100 WBCS — SIGNIFICANT CHANGE UP (ref 0–0)
PHOSPHATE SERPL-MCNC: 3.5 MG/DL — SIGNIFICANT CHANGE UP (ref 2.1–4.9)
PLATELET # BLD AUTO: 338 K/UL — SIGNIFICANT CHANGE UP (ref 130–400)
POTASSIUM SERPL-MCNC: 4.4 MMOL/L — SIGNIFICANT CHANGE UP (ref 3.5–5)
POTASSIUM SERPL-SCNC: 4.4 MMOL/L — SIGNIFICANT CHANGE UP (ref 3.5–5)
PROT SERPL-MCNC: 6.3 G/DL — SIGNIFICANT CHANGE UP (ref 6–8)
RBC # BLD: 3.58 M/UL — LOW (ref 4.7–6.1)
RBC # FLD: 25.2 % — HIGH (ref 11.5–14.5)
SARS-COV-2 IGG+IGM SERPL QL IA: 1.39 U/ML — HIGH
SARS-COV-2 IGG+IGM SERPL QL IA: POSITIVE
SODIUM SERPL-SCNC: 139 MMOL/L — SIGNIFICANT CHANGE UP (ref 135–146)
URATE SERPL-MCNC: 3.9 MG/DL — SIGNIFICANT CHANGE UP (ref 3.4–8.8)
WBC # BLD: 10.49 K/UL — SIGNIFICANT CHANGE UP (ref 4.8–10.8)
WBC # FLD AUTO: 10.49 K/UL — SIGNIFICANT CHANGE UP (ref 4.8–10.8)

## 2021-05-30 PROCEDURE — 99232 SBSQ HOSP IP/OBS MODERATE 35: CPT

## 2021-05-30 RX ORDER — FILGRASTIM 480MCG/1.6
480 VIAL (ML) INJECTION DAILY
Refills: 0 | Status: DISCONTINUED | OUTPATIENT
Start: 2021-05-30 | End: 2021-05-31

## 2021-05-30 RX ORDER — LIDOCAINE 4 G/100G
2 CREAM TOPICAL DAILY
Refills: 0 | Status: DISCONTINUED | OUTPATIENT
Start: 2021-05-30 | End: 2021-06-07

## 2021-05-30 RX ADMIN — CHLORHEXIDINE GLUCONATE 1 APPLICATION(S): 213 SOLUTION TOPICAL at 06:28

## 2021-05-30 RX ADMIN — LIDOCAINE 2 PATCH: 4 CREAM TOPICAL at 22:26

## 2021-05-30 RX ADMIN — HEPARIN SODIUM 5000 UNIT(S): 5000 INJECTION INTRAVENOUS; SUBCUTANEOUS at 15:20

## 2021-05-30 RX ADMIN — Medication 480 MICROGRAM(S): at 22:03

## 2021-05-30 RX ADMIN — LIDOCAINE 1 PATCH: 4 CREAM TOPICAL at 00:00

## 2021-05-30 RX ADMIN — HEPARIN SODIUM 5000 UNIT(S): 5000 INJECTION INTRAVENOUS; SUBCUTANEOUS at 06:29

## 2021-05-30 RX ADMIN — LIDOCAINE 2 PATCH: 4 CREAM TOPICAL at 20:23

## 2021-05-30 RX ADMIN — HEPARIN SODIUM 5000 UNIT(S): 5000 INJECTION INTRAVENOUS; SUBCUTANEOUS at 22:03

## 2021-05-30 RX ADMIN — PANTOPRAZOLE SODIUM 40 MILLIGRAM(S): 20 TABLET, DELAYED RELEASE ORAL at 06:29

## 2021-05-30 RX ADMIN — LIDOCAINE 2 PATCH: 4 CREAM TOPICAL at 11:46

## 2021-05-30 NOTE — PHYSICAL THERAPY INITIAL EVALUATION ADULT - GENERAL OBSERVATIONS, REHAB EVAL
patient refusing PT evaluation. Given max encouragement and explained the need for PT eval for safe discharge planning, patient began getting agitated and started cursing out PT. ****SECOND ATTEMPT: patient refusing PT evaluation. Given max encouragement and explained the need for PT eval for safe discharge planning, patient began getting agitated and started cursing out PT.

## 2021-05-30 NOTE — PROGRESS NOTE ADULT - ASSESSMENT
Mr. Orellana is a 80M w/ no known PMHx (doesn't follow with doctors) with recently diagnosed locally advanced rectal adenocarcinoma.     # Locally advanced rectal adenocarcinoma  - Hx begins in late April 2021 when he presented for loose stools, found to have rectal mass  - s/p biopsy showing adenocarcinoma  - MRI: 18 cm long polypoidal rectal mass with invasion of the right meso rectal fascia and possibly the prostate gland. Additional involvement of the analsphincter complex--T4(a or b)N0M0  - Planned for total neoadjuvant chemotherapy followed by chemoRT followed by surgery  - s/p port placement  - He received cycle 1 5/7/21  - He was supposed to start cycle 2 on 5/21, however his nursing home would not accept the 5FU infusional pump  - C2D1 was 5/27, completed FOLFOX late 5/29  - He will start SC Zarxio/Neupogen 480 qd x5 doses at 10pm tonight, can be completed as outpatient if accepted by NH, if not he will have to stay till Tuesday Sundowning/delirium: Patient is AAOx3 today, per family he has intermittent confusion  - He is alert today  - SW to discuss long term planning with daughter    Iron deficiency anemia likely from LGIB from tumor  - On prior admission, Iron Saturation: 12%, Ferritin 34, MCV 68.9   - Completed IV iron 1g  - Stop PO iron    Mycotic toenails:  - Debrided by podiatry, followup outpatient    Knee pain, likely OA:  - Lidocaine patch  - Outpatient followup  - PO pain meds if patient agreeable    PT consult    DVT ppx  Dispo: He will need Zarxio (or Neupogen) x 5 doses starting Sunday evening, if the nursing home won't accept him with this then he will need to stay for the injections       Mr. Orellana is a 80M w/ no known PMHx (doesn't follow with doctors) with recently diagnosed locally advanced rectal adenocarcinoma.     # Locally advanced rectal adenocarcinoma  - Hx begins in late April 2021 when he presented for loose stools, found to have rectal mass  - s/p biopsy showing adenocarcinoma  - MRI: 18 cm long polypoidal rectal mass with invasion of the right meso rectal fascia and possibly the prostate gland. Additional involvement of the analsphincter complex--T4(a or b)N0M0  - Planned for total neoadjuvant chemotherapy followed by chemoRT followed by surgery  - s/p port placement  - He received cycle 1 5/7/21  - He was supposed to start cycle 2 on 5/21, however his nursing home would not accept the 5FU infusional pump  - C2D1 was 5/27, completed FOLFOX late 5/29  - He will start SC Zarxio/Neupogen 480 qd x5 doses at 10pm tonight, can be completed as outpatient if accepted by NH, if not he will have to stay till Tuesday Sundowning/delirium: Patient is AAOx3 today, per family he has intermittent confusion  - He is alert today  - SW to discuss long term planning with daughter    Iron deficiency anemia likely from LGIB from tumor  - On prior admission, Iron Saturation: 12%, Ferritin 34, MCV 68.9   - Completed IV iron 1g  - Stop PO iron    Mycotic toenails:  - Debrided by podiatry, followup outpatient    Knee pain, likely OA:  - Lidocaine patch  - Outpatient followup  - PO pain meds if patient agreeable    PT consult    DVT ppx  Dispo: He will need Zarxio (or Neupogen) x 5 doses starting Sunday evening, if the nursing home won't accept him with this then he will need to stay for the injections until Tuesday

## 2021-05-30 NOTE — PROGRESS NOTE ADULT - SUBJECTIVE AND OBJECTIVE BOX
24H events:  He completed 5FU at 11pm-midnight last night  No acute events, tolerated it well        PAST MEDICAL & SURGICAL HISTORY  No pertinent past medical history    Amputation of digit of left hand      SOCIAL HISTORY:  Negative for smoking/alcohol/drug use.     ALLERGIES:  No Known Allergies    MEDICATIONS:  STANDING MEDICATIONS  chlorhexidine 4% Liquid 1 Application(s) Topical <User Schedule>  heparin   Injectable 5000 Unit(s) SubCutaneous every 8 hours  lidocaine   Patch 1 Patch Transdermal daily  pantoprazole    Tablet 40 milliGRAM(s) Oral before breakfast    PRN MEDICATIONS  loperamide 2 milliGRAM(s) Oral two times a day PRN  oxycodone    5 mG/acetaminophen 325 mG 1 Tablet(s) Oral every 6 hours PRN  simethicone 80 milliGRAM(s) Chew two times a day PRN    VITALS:   T(F): 97.2  HR: 73  BP: 114/56  RR: 18  SpO2: --    LABS:                        8.9    10.49 )-----------( 338      ( 30 May 2021 08:01 )             30.3     05-29    141  |  105  |  14  ----------------------------<  121<H>  4.9   |  29  |  0.5<L>    Ca    8.6      29 May 2021 08:27  Phos  3.4     05-29  Mg     2.1     05-29    TPro  6.5  /  Alb  2.6<L>  /  TBili  0.5  /  DBili  x   /  AST  19  /  ALT  14  /  AlkPhos  70  05-29                  RADIOLOGY:    PHYSICAL EXAM:  GEN: No acute distress  HENT: NCAT, EOMI  LYMPH: No appreciable adenopathy  LUNGS: No respiratory distress, clear to auscultation bilaterally   HEART: regular rate and rhythm  ABD: Soft, non-tender, non-distended  SKIN: No rash  EXT: Missing fingertips  NEURO: non focal, oriented

## 2021-05-31 LAB
ACANTHOCYTES BLD QL SMEAR: SLIGHT — SIGNIFICANT CHANGE UP
ALBUMIN SERPL ELPH-MCNC: 2.5 G/DL — LOW (ref 3.5–5.2)
ALP SERPL-CCNC: 79 U/L — SIGNIFICANT CHANGE UP (ref 30–115)
ALT FLD-CCNC: 11 U/L — SIGNIFICANT CHANGE UP (ref 0–41)
ANION GAP SERPL CALC-SCNC: 10 MMOL/L — SIGNIFICANT CHANGE UP (ref 7–14)
ANISOCYTOSIS BLD QL: SIGNIFICANT CHANGE UP
AST SERPL-CCNC: 14 U/L — SIGNIFICANT CHANGE UP (ref 0–41)
BASOPHILS # BLD AUTO: 0.48 K/UL — HIGH (ref 0–0.2)
BASOPHILS NFR BLD AUTO: 0.8 % — SIGNIFICANT CHANGE UP (ref 0–1)
BILIRUB SERPL-MCNC: 0.6 MG/DL — SIGNIFICANT CHANGE UP (ref 0.2–1.2)
BUN SERPL-MCNC: 12 MG/DL — SIGNIFICANT CHANGE UP (ref 10–20)
CALCIUM SERPL-MCNC: 8.2 MG/DL — LOW (ref 8.5–10.1)
CEA SERPL-MCNC: 20.4 NG/ML
CHLORIDE SERPL-SCNC: 102 MMOL/L — SIGNIFICANT CHANGE UP (ref 98–110)
CO2 SERPL-SCNC: 25 MMOL/L — SIGNIFICANT CHANGE UP (ref 17–32)
CREAT SERPL-MCNC: 0.5 MG/DL — LOW (ref 0.7–1.5)
EOSINOPHIL # BLD AUTO: 0 K/UL — SIGNIFICANT CHANGE UP (ref 0–0.7)
EOSINOPHIL NFR BLD AUTO: 0 % — SIGNIFICANT CHANGE UP (ref 0–8)
GIANT PLATELETS BLD QL SMEAR: PRESENT — SIGNIFICANT CHANGE UP
GLUCOSE SERPL-MCNC: 64 MG/DL — LOW (ref 70–99)
HCT VFR BLD CALC: 28.4 % — LOW (ref 42–52)
HGB BLD-MCNC: 8.2 G/DL — LOW (ref 14–18)
LDH SERPL L TO P-CCNC: 272 U/L — HIGH (ref 50–242)
LYMPHOCYTES # BLD AUTO: 0.54 K/UL — LOW (ref 1.2–3.4)
LYMPHOCYTES # BLD AUTO: 0.9 % — LOW (ref 20.5–51.1)
MAGNESIUM SERPL-MCNC: 1.9 MG/DL — SIGNIFICANT CHANGE UP (ref 1.8–2.4)
MANUAL SMEAR VERIFICATION: SIGNIFICANT CHANGE UP
MCHC RBC-ENTMCNC: 24.9 PG — LOW (ref 27–31)
MCHC RBC-ENTMCNC: 28.9 G/DL — LOW (ref 32–37)
MCV RBC AUTO: 86.3 FL — SIGNIFICANT CHANGE UP (ref 80–94)
MICROCYTES BLD QL: SIGNIFICANT CHANGE UP
MONOCYTES # BLD AUTO: 0.54 K/UL — SIGNIFICANT CHANGE UP (ref 0.1–0.6)
MONOCYTES NFR BLD AUTO: 0.9 % — LOW (ref 1.7–9.3)
NEUTROPHILS # BLD AUTO: 57.47 K/UL — HIGH (ref 1.4–6.5)
NEUTROPHILS NFR BLD AUTO: 96.5 % — HIGH (ref 42.2–75.2)
PHOSPHATE SERPL-MCNC: 4.4 MG/DL — SIGNIFICANT CHANGE UP (ref 2.1–4.9)
PLAT MORPH BLD: NORMAL — SIGNIFICANT CHANGE UP
PLATELET # BLD AUTO: 317 K/UL — SIGNIFICANT CHANGE UP (ref 130–400)
POIKILOCYTOSIS BLD QL AUTO: SIGNIFICANT CHANGE UP
POLYCHROMASIA BLD QL SMEAR: SIGNIFICANT CHANGE UP
POTASSIUM SERPL-MCNC: 4.2 MMOL/L — SIGNIFICANT CHANGE UP (ref 3.5–5)
POTASSIUM SERPL-SCNC: 4.2 MMOL/L — SIGNIFICANT CHANGE UP (ref 3.5–5)
PROT SERPL-MCNC: 5.7 G/DL — LOW (ref 6–8)
RBC # BLD: 3.29 M/UL — LOW (ref 4.7–6.1)
RBC # FLD: 24.9 % — HIGH (ref 11.5–14.5)
RBC BLD AUTO: ABNORMAL
SARS-COV-2 N GENE NPH QL NAA+PROBE: NOT DETECTED
SODIUM SERPL-SCNC: 137 MMOL/L — SIGNIFICANT CHANGE UP (ref 135–146)
TOXIC GRANULES BLD QL SMEAR: PRESENT — SIGNIFICANT CHANGE UP
URATE SERPL-MCNC: 3.4 MG/DL — SIGNIFICANT CHANGE UP (ref 3.4–8.8)
VARIANT LYMPHS # BLD: 0.9 % — SIGNIFICANT CHANGE UP (ref 0–5)
WBC # BLD: 59.55 K/UL — CRITICAL HIGH (ref 4.8–10.8)
WBC # FLD AUTO: 59.55 K/UL — CRITICAL HIGH (ref 4.8–10.8)

## 2021-05-31 PROCEDURE — 99232 SBSQ HOSP IP/OBS MODERATE 35: CPT

## 2021-05-31 RX ADMIN — HEPARIN SODIUM 5000 UNIT(S): 5000 INJECTION INTRAVENOUS; SUBCUTANEOUS at 05:22

## 2021-05-31 RX ADMIN — LIDOCAINE 2 PATCH: 4 CREAM TOPICAL at 12:58

## 2021-05-31 RX ADMIN — HEPARIN SODIUM 5000 UNIT(S): 5000 INJECTION INTRAVENOUS; SUBCUTANEOUS at 13:01

## 2021-05-31 RX ADMIN — HEPARIN SODIUM 5000 UNIT(S): 5000 INJECTION INTRAVENOUS; SUBCUTANEOUS at 21:24

## 2021-05-31 RX ADMIN — CHLORHEXIDINE GLUCONATE 1 APPLICATION(S): 213 SOLUTION TOPICAL at 05:22

## 2021-05-31 RX ADMIN — PANTOPRAZOLE SODIUM 40 MILLIGRAM(S): 20 TABLET, DELAYED RELEASE ORAL at 05:22

## 2021-05-31 RX ADMIN — LIDOCAINE 2 PATCH: 4 CREAM TOPICAL at 20:30

## 2021-05-31 NOTE — PROGRESS NOTE ADULT - ASSESSMENT
Mr. Orellana is a 80M w/ no known PMHx (doesn't follow with doctors) with recently diagnosed locally advanced rectal adenocarcinoma.     # Dispo:   - PT, patient initially refused but now agreeable, requesting to go home, explained he cannot go home if he is unable to walk and care for himself  - Staying for SC injection of Zarxio while [facility] closed, needs 5 doses of SC Zarxio 480mg QD, first dose 5/30    # Locally advanced rectal adenocarcinoma  - Hx begins in late April 2021 when he presented for loose stools, found to have rectal mass  - s/p biopsy showing adenocarcinoma  - MRI: 18 cm long polypoidal rectal mass with invasion of the right meso rectal fascia and possibly the prostate gland. Additional involvement of the analsphincter complex--T4(a or b)N0M0  - Planned for total neoadjuvant chemotherapy followed by chemoRT followed by surgery  - s/p port placement  - He received cycle 1 5/7/21  - He was supposed to start cycle 2 on 5/21, however his nursing home would not accept the 5FU infusional pump  - C2D1 was 5/27, completed FOLFOX late 5/29  - Started Zarxio 5/30, will need 5 doses, NH won't give it at NH so staying for SC injection    Sundowning/delirium: Patient is AAOx3 today, per family he has intermittent confusion  - He is alert today  - SW to discuss long term planning with daughter    Iron deficiency anemia likely from LGIB from tumor  - On prior admission, Iron Saturation: 12%, Ferritin 34, MCV 68.9   - Completed IV iron 1g  - Stop PO iron    Mycotic toenails:  - Debrided by podiatry, followup outpatient    Knee pain, likely OA:  - Lidocaine patch  - Outpatient followup  - PO pain meds if patient agreeable    PT consult    DVT ppx       Mr. Orellana is a 80M w/ no known PMHx (doesn't follow with doctors) with recently diagnosed locally advanced rectal adenocarcinoma.     # Dispo:   - PT, patient initially refused but now agreeable, requesting to go home, explained he cannot go home if he is unable to walk and care for himself  - Staying for SC injection of Zarxio while [facility] closed (will hold today's dose), may need up to 5 doses of SC Zarxio 480mg QD, first dose 5/30    # Leukocytosis: Due to Zarxio, no need for further workup    # Locally advanced rectal adenocarcinoma  - Hx begins in late April 2021 when he presented for loose stools, found to have rectal mass  - s/p biopsy showing adenocarcinoma  - MRI: 18 cm long polypoidal rectal mass with invasion of the right meso rectal fascia and possibly the prostate gland. Additional involvement of the analsphincter complex--T4(a or b)N0M0  - Planned for total neoadjuvant chemotherapy followed by chemoRT followed by surgery  - s/p port placement  - He received cycle 1 5/7/21  - He was supposed to start cycle 2 on 5/21, however his nursing home would not accept the 5FU infusional pump  - C2D1 was 5/27, completed FOLFOX late 5/29  - Started Zarxio 5/30, will need 5 doses, NH won't give it at NH so staying for SC injection    Sundowning/delirium: Patient is AAOx3 today, per family he has intermittent confusion  - He is alert today  - SW to discuss long term planning with daughter    Iron deficiency anemia likely from LGIB from tumor  - On prior admission, Iron Saturation: 12%, Ferritin 34, MCV 68.9   - Completed IV iron 1g  - Stop PO iron    Mycotic toenails:  - Debrided by podiatry, followup outpatient    Knee pain, likely OA:  - Lidocaine patch  - Outpatient followup  - PO pain meds if patient agreeable    PT consult    DVT ppx

## 2021-05-31 NOTE — PHYSICAL THERAPY INITIAL EVALUATION ADULT - SPECIFY REASON(S)
no activity orders, called ext [phone] & [phone] several times for each number to request for activity orders, no answer. please call ext [phone] when APPROPRIATE activity orders are entered.
8:09-8:18. PT attempted to see the pt for PT IE. Pt declined participating in PT. Pt was educated on importance of OOB activities and participating in early ambulation.

## 2021-05-31 NOTE — PHYSICAL THERAPY INITIAL EVALUATION ADULT - GENERAL OBSERVATIONS, REHAB EVAL
13:55-14:30. Pt was rec'd in semi reclined in bed, NAD, agreeable to participate in PT at this time.

## 2021-05-31 NOTE — PHYSICAL THERAPY INITIAL EVALUATION ADULT - GAIT DISTANCE, PT EVAL
3 steps forward, 3 steps backwards. Pt started having bowel movement after walking 3 steps. PT returned the pt back to the bed. PCA was notified.

## 2021-05-31 NOTE — PHYSICAL THERAPY INITIAL EVALUATION ADULT - GENERAL OBSERVATIONS, REHAB EVAL
Pt continued to declined despite of max encouragement from the PT. Pt started getting agitated, and was verbally abusive to the PT. PT will f/u when appropriate.

## 2021-05-31 NOTE — PHYSICAL THERAPY INITIAL EVALUATION ADULT - ADDITIONAL COMMENTS
Pt is very confused and is poor historian. As per the pt, he was Independent with ADLs and ambulation PTA and used quad cane at home PTA

## 2021-05-31 NOTE — PROGRESS NOTE ADULT - SUBJECTIVE AND OBJECTIVE BOX
24H events:    No acute events overnight  Patient initially refusing to work with PT, now agreeable  Started Zarxio 5/30    PAST MEDICAL & SURGICAL HISTORY  No pertinent past medical history    Amputation of digit of left hand      SOCIAL HISTORY:  Negative for smoking/alcohol/drug use.     ALLERGIES:  No Known Allergies    MEDICATIONS:  STANDING MEDICATIONS  chlorhexidine 4% Liquid 1 Application(s) Topical <User Schedule>  filgrastim-sndz (ZARXIO) Injectable 480 MICROGram(s) SubCutaneous daily  heparin   Injectable 5000 Unit(s) SubCutaneous every 8 hours  lidocaine   Patch 2 Patch Transdermal daily  pantoprazole    Tablet 40 milliGRAM(s) Oral before breakfast    PRN MEDICATIONS  loperamide 2 milliGRAM(s) Oral two times a day PRN  oxycodone    5 mG/acetaminophen 325 mG 1 Tablet(s) Oral every 6 hours PRN  simethicone 80 milliGRAM(s) Chew two times a day PRN    VITALS:   T(F): 96.5  HR: 92  BP: 111/54  RR: 18  SpO2: 96%    LABS:                        8.2    59.55 )-----------( 317      ( 31 May 2021 07:02 )             28.4     05-31    137  |  102  |  12  ----------------------------<  64<L>  4.2   |  25  |  0.5<L>    Ca    8.2<L>      31 May 2021 07:02  Phos  4.4     05-31  Mg     1.9     05-31    TPro  5.7<L>  /  Alb  2.5<L>  /  TBili  0.6  /  DBili  x   /  AST  14  /  ALT  11  /  AlkPhos  79  05-31    RADIOLOGY:    PHYSICAL EXAM:  GEN: No acute distress  HENT: NCAT, EOMI  LYMPH: No appreciable adenopathy  LUNGS: No respiratory distress  HEART: regular rate   ABD: Soft, non-tender, non-distended  SKIN: No rash  EXT: No edema  NEURO: non focal

## 2021-06-01 LAB
ALBUMIN SERPL ELPH-MCNC: 2.6 G/DL — LOW (ref 3.5–5.2)
ALP SERPL-CCNC: 77 U/L — SIGNIFICANT CHANGE UP (ref 30–115)
ALT FLD-CCNC: 9 U/L — SIGNIFICANT CHANGE UP (ref 0–41)
ANION GAP SERPL CALC-SCNC: 11 MMOL/L — SIGNIFICANT CHANGE UP (ref 7–14)
APPEARANCE UR: CLEAR — SIGNIFICANT CHANGE UP
AST SERPL-CCNC: 12 U/L — SIGNIFICANT CHANGE UP (ref 0–41)
BASOPHILS # BLD AUTO: 0.08 K/UL — SIGNIFICANT CHANGE UP (ref 0–0.2)
BASOPHILS NFR BLD AUTO: 0.3 % — SIGNIFICANT CHANGE UP (ref 0–1)
BILIRUB SERPL-MCNC: 0.2 MG/DL — SIGNIFICANT CHANGE UP (ref 0.2–1.2)
BILIRUB UR-MCNC: NEGATIVE — SIGNIFICANT CHANGE UP
BUN SERPL-MCNC: 13 MG/DL — SIGNIFICANT CHANGE UP (ref 10–20)
CALCIUM SERPL-MCNC: 8.2 MG/DL — LOW (ref 8.5–10.1)
CHLORIDE SERPL-SCNC: 104 MMOL/L — SIGNIFICANT CHANGE UP (ref 98–110)
CO2 SERPL-SCNC: 24 MMOL/L — SIGNIFICANT CHANGE UP (ref 17–32)
COLOR SPEC: YELLOW — SIGNIFICANT CHANGE UP
CREAT SERPL-MCNC: 0.5 MG/DL — LOW (ref 0.7–1.5)
DIFF PNL FLD: NEGATIVE — SIGNIFICANT CHANGE UP
EOSINOPHIL # BLD AUTO: 0.04 K/UL — SIGNIFICANT CHANGE UP (ref 0–0.7)
EOSINOPHIL NFR BLD AUTO: 0.2 % — SIGNIFICANT CHANGE UP (ref 0–8)
GLUCOSE SERPL-MCNC: 87 MG/DL — SIGNIFICANT CHANGE UP (ref 70–99)
GLUCOSE UR QL: NEGATIVE — SIGNIFICANT CHANGE UP
HCT VFR BLD CALC: 25.7 % — LOW (ref 42–52)
HGB BLD-MCNC: 7.7 G/DL — LOW (ref 14–18)
IMM GRANULOCYTES NFR BLD AUTO: 1.9 % — HIGH (ref 0.1–0.3)
KETONES UR-MCNC: NEGATIVE — SIGNIFICANT CHANGE UP
LDH SERPL L TO P-CCNC: 228 U/L — SIGNIFICANT CHANGE UP (ref 50–242)
LEUKOCYTE ESTERASE UR-ACNC: NEGATIVE — SIGNIFICANT CHANGE UP
LYMPHOCYTES # BLD AUTO: 1.7 K/UL — SIGNIFICANT CHANGE UP (ref 1.2–3.4)
LYMPHOCYTES # BLD AUTO: 6.6 % — LOW (ref 20.5–51.1)
MAGNESIUM SERPL-MCNC: 1.9 MG/DL — SIGNIFICANT CHANGE UP (ref 1.8–2.4)
MCHC RBC-ENTMCNC: 25.5 PG — LOW (ref 27–31)
MCHC RBC-ENTMCNC: 30 G/DL — LOW (ref 32–37)
MCV RBC AUTO: 85.1 FL — SIGNIFICANT CHANGE UP (ref 80–94)
MONOCYTES # BLD AUTO: 0.11 K/UL — SIGNIFICANT CHANGE UP (ref 0.1–0.6)
MONOCYTES NFR BLD AUTO: 0.4 % — LOW (ref 1.7–9.3)
NEUTROPHILS # BLD AUTO: 23.49 K/UL — HIGH (ref 1.4–6.5)
NEUTROPHILS NFR BLD AUTO: 90.6 % — HIGH (ref 42.2–75.2)
NITRITE UR-MCNC: NEGATIVE — SIGNIFICANT CHANGE UP
NRBC # BLD: 0 /100 WBCS — SIGNIFICANT CHANGE UP (ref 0–0)
PH UR: 6.5 — SIGNIFICANT CHANGE UP (ref 5–8)
PHOSPHATE SERPL-MCNC: 3.3 MG/DL — SIGNIFICANT CHANGE UP (ref 2.1–4.9)
PLATELET # BLD AUTO: 230 K/UL — SIGNIFICANT CHANGE UP (ref 130–400)
POTASSIUM SERPL-MCNC: 4.3 MMOL/L — SIGNIFICANT CHANGE UP (ref 3.5–5)
POTASSIUM SERPL-SCNC: 4.3 MMOL/L — SIGNIFICANT CHANGE UP (ref 3.5–5)
PROT SERPL-MCNC: 5.5 G/DL — LOW (ref 6–8)
PROT UR-MCNC: NEGATIVE — SIGNIFICANT CHANGE UP
RBC # BLD: 3.02 M/UL — LOW (ref 4.7–6.1)
RBC # FLD: 24.7 % — HIGH (ref 11.5–14.5)
SARS-COV-2 RNA SPEC QL NAA+PROBE: SIGNIFICANT CHANGE UP
SODIUM SERPL-SCNC: 139 MMOL/L — SIGNIFICANT CHANGE UP (ref 135–146)
SP GR SPEC: 1.02 — SIGNIFICANT CHANGE UP (ref 1.01–1.03)
URATE SERPL-MCNC: 3.6 MG/DL — SIGNIFICANT CHANGE UP (ref 3.4–8.8)
UROBILINOGEN FLD QL: SIGNIFICANT CHANGE UP
WBC # BLD: 25.91 K/UL — HIGH (ref 4.8–10.8)
WBC # FLD AUTO: 25.91 K/UL — HIGH (ref 4.8–10.8)

## 2021-06-01 PROCEDURE — 99232 SBSQ HOSP IP/OBS MODERATE 35: CPT

## 2021-06-01 RX ORDER — FERROUS SULFATE 325(65) MG
1 TABLET ORAL
Qty: 0 | Refills: 0 | DISCHARGE
End: 2021-06-07

## 2021-06-01 RX ADMIN — HEPARIN SODIUM 5000 UNIT(S): 5000 INJECTION INTRAVENOUS; SUBCUTANEOUS at 21:15

## 2021-06-01 RX ADMIN — PANTOPRAZOLE SODIUM 40 MILLIGRAM(S): 20 TABLET, DELAYED RELEASE ORAL at 06:02

## 2021-06-01 RX ADMIN — LIDOCAINE 2 PATCH: 4 CREAM TOPICAL at 12:33

## 2021-06-01 RX ADMIN — HEPARIN SODIUM 5000 UNIT(S): 5000 INJECTION INTRAVENOUS; SUBCUTANEOUS at 15:06

## 2021-06-01 RX ADMIN — HEPARIN SODIUM 5000 UNIT(S): 5000 INJECTION INTRAVENOUS; SUBCUTANEOUS at 06:02

## 2021-06-01 RX ADMIN — LIDOCAINE 2 PATCH: 4 CREAM TOPICAL at 00:00

## 2021-06-01 RX ADMIN — LIDOCAINE 2 PATCH: 4 CREAM TOPICAL at 20:36

## 2021-06-01 RX ADMIN — CHLORHEXIDINE GLUCONATE 1 APPLICATION(S): 213 SOLUTION TOPICAL at 06:01

## 2021-06-01 NOTE — PROGRESS NOTE ADULT - SUBJECTIVE AND OBJECTIVE BOX
24H events:  He participated in PT yesterday  Held Zarxio yesterday  No acute events    PAST MEDICAL & SURGICAL HISTORY  No pertinent past medical history    Amputation of digit of left hand      SOCIAL HISTORY:  Negative for smoking/alcohol/drug use.     ALLERGIES:  No Known Allergies    MEDICATIONS:  STANDING MEDICATIONS  chlorhexidine 4% Liquid 1 Application(s) Topical <User Schedule>  heparin   Injectable 5000 Unit(s) SubCutaneous every 8 hours  lidocaine   Patch 2 Patch Transdermal daily  pantoprazole    Tablet 40 milliGRAM(s) Oral before breakfast    PRN MEDICATIONS  loperamide 2 milliGRAM(s) Oral two times a day PRN  oxycodone    5 mG/acetaminophen 325 mG 1 Tablet(s) Oral every 6 hours PRN  simethicone 80 milliGRAM(s) Chew two times a day PRN    VITALS:   T(F): 96  HR: 80  BP: 115/54  RR: 18  SpO2: 96%    LABS:                        7.7    25.91 )-----------( 230      ( 2021 05:42 )             25.7     06-    139  |  104  |  13  ----------------------------<  87  4.3   |  24  |  0.5<L>    Ca    8.2<L>      2021 05:42  Phos  3.3     06-  Mg     1.9     06-    TPro  5.5<L>  /  Alb  2.6<L>  /  TBili  0.2  /  DBili  x   /  AST  12  /  ALT  9   /  AlkPhos  77  06-01      Urinalysis Basic - ( 2021 06:00 )    Color: Yellow / Appearance: Clear / S.016 / pH: x  Gluc: x / Ketone: Negative  / Bili: Negative / Urobili: <2 mg/dL   Blood: x / Protein: Negative / Nitrite: Negative   Leuk Esterase: Negative / RBC: x / WBC x   Sq Epi: x / Non Sq Epi: x / Bacteria: x                RADIOLOGY:    PHYSICAL EXAM:  GEN: No acute distress  HENT: NCAT, EOMI  LYMPH: No appreciable adenopathy  LUNGS: No respiratory distress, clear to auscultation bilaterally   HEART: regular rate and rhythm  ABD: Soft, non-tender, non-distended  SKIN: No rash  EXT: No edema  NEURO: non-focal

## 2021-06-01 NOTE — PROGRESS NOTE ADULT - SUBJECTIVE AND OBJECTIVE BOX
MISAEL ORELLANA 80y Male  MRN#: [id number]   CODE STATUS:_______full_      SUBJECTIVE  Patient is a 80y old Male who presents with a chief complaint of Chemotherapy (2021 08:15)  Currently admitted to medicine with the primary diagnosis of Rectal adenocarcinoma      Today is hospital day 6d, and this morning he is feeling well and reports no issues.          OBJECTIVE  PAST MEDICAL & SURGICAL HISTORY  No pertinent past medical history    Amputation of digit of left hand      ALLERGIES:  No Known Allergies    MEDICATIONS:  STANDING MEDICATIONS  chlorhexidine 4% Liquid 1 Application(s) Topical <User Schedule>  heparin   Injectable 5000 Unit(s) SubCutaneous every 8 hours  lidocaine   Patch 2 Patch Transdermal daily  pantoprazole    Tablet 40 milliGRAM(s) Oral before breakfast    PRN MEDICATIONS  loperamide 2 milliGRAM(s) Oral two times a day PRN  oxycodone    5 mG/acetaminophen 325 mG 1 Tablet(s) Oral every 6 hours PRN  simethicone 80 milliGRAM(s) Chew two times a day PRN      VITAL SIGNS: Last 24 Hours  T(C): 36.3 (2021 12:10), Max: 36.3 (31 May 2021 19:55)  T(F): 97.4 (2021 12:10), Max: 97.4 (31 May 2021 19:55)  HR: 80 (2021 12:10) (80 - 85)  BP: 105/54 (2021 12:10) (105/54 - 127/61)  BP(mean): --  RR: 18 (2021 12:10) (18 - 18)  SpO2: 96% (2021 08:06) (96% - 96%)    LABS:                        7.7    25.91 )-----------( 230      ( 2021 05:42 )             25.7     06-01    139  |  104  |  13  ----------------------------<  87  4.3   |  24  |  0.5<L>    Ca    8.2<L>      2021 05:42  Phos  3.3     06-01  Mg     1.9     06-01    TPro  5.5<L>  /  Alb  2.6<L>  /  TBili  0.2  /  DBili  x   /  AST  12  /  ALT  9   /  AlkPhos  77  06-      Urinalysis Basic - ( 2021 06:00 )    Color: Yellow / Appearance: Clear / S.016 / pH: x  Gluc: x / Ketone: Negative  / Bili: Negative / Urobili: <2 mg/dL   Blood: x / Protein: Negative / Nitrite: Negative   Leuk Esterase: Negative / RBC: x / WBC x   Sq Epi: x / Non Sq Epi: x / Bacteria: x                RADIOLOGY:      PHYSICAL EXAM:  GENERAL: NAD, well-developed, AAOx3  HEENT:  Atraumatic, Normocephalic. EOMI, PERRLA,  PULMONARY: Clear to auscultation bilaterally; No wheeze  CARDIOVASCULAR: Regular rate and rhythm; No murmurs, rubs, or gallops  GASTROINTESTINAL: Soft, Nontender, Nondistended; Bowel sounds present  MUSCULOSKELETAL:  2+ Peripheral Pulses, No clubbing, cyanosis, or edema  NEUROLOGY: non-focal  SKIN: No rashes or lesions      ADMISSION SUMMARY  Patient is a 80y old Male who presents with a chief complaint of Chemotherapy (2021 08:15)  Currently admitted to medicine with the primary diagnosis of Rectal adenocarcinoma    Hospital course has been complicated by _______.       ASSESSMENT & PLAN    Mr. Orellana is a 80M with recently diagnosed locally advanced rectal adenocarcinoma presents for cycle 2 of chemo.    # Altered mental status, - RESOLVED  - CT head no acute pathology   - AAOx3 on my exam  - patient was likely sundowning on day of admission. patient gets confused intermittently at baseline per family    # Locally advanced rectal adenocarcinoma  - MRI: 18 cm long polypoidal rectal mass with invasion of the right meso rectal fascia and possibly the prostate gland. Additional involvement of the analsphincter complex--T4(a or b)N0M0  - Oncology plans for for total neoadjuvant chemotherapy followed by chemoRT followed by surgery  -  Cycle 2 () of FOLFOX, second infusion    - Started Zarxio , then was held. Received 1 dsoe. Had leukocytosis (expected), per heme onc, OK to discharge.  - follow tumor lysis labs (uric acid, ldh, potassium, phosphorus)     # Iron deficiency anemia likely from LGIB from tumor  - On prior admission, Iron Saturation: 12%, Ferritin 34, MCV 68.9   - Venofer 200mg x 1 per heme/onc    # Subacute rib fracture noted last admission  - CT: Subacute fracture of the left 10th lateral rib  - Pain control PRN    #onychomycosis  -f/u podiatry outpatient     #Bilateral knee pain  -likely 2/2 OA  -requesting steroid injection - f/u outpatient for further management of OA   - lidocaine patch trial    # Misc  DVT ppx: lovenox  GI ppx: protonix  Diet: low fiber (was having diarrhea last admission)  Dispo: SNF d/c - patient refusing. Spoke w daughter, patient has been stubborn  but is not safe to go home alone - patient lives alone. Daughter checks on him 1-2 times month and each time condition of house was poor per daughter  w/ feces, poor hygiene, pt without transportation for groceries, etc. Anticipate for discharge tomorrow. Hem/onc team to attempt to persuade tomorrow for discharge to rehab.   I personally spoke with him for 15-20 minutes today without success.

## 2021-06-01 NOTE — PROGRESS NOTE ADULT - ASSESSMENT
Mr. Orellana is a 80M w/ no known PMHx (doesn't follow with doctors) with recently diagnosed locally advanced rectal adenocarcinoma.     # Dispo:   - Dc to [facility]  - Will likely get Neulasta today at [facility] and then can discharged to rehab    # Leukocytosis: Due to Zarxio, no need for further workup    # Locally advanced rectal adenocarcinoma  - Hx begins in late April 2021 when he presented for loose stools, found to have rectal mass  - s/p biopsy showing adenocarcinoma  - MRI: 18 cm long polypoidal rectal mass with invasion of the right meso rectal fascia and possibly the prostate gland. Additional involvement of the analsphincter complex--T4(a or b)N0M0  - Planned for total neoadjuvant chemotherapy followed by chemoRT followed by surgery  - s/p port placement  - He received cycle 1 5/7/21  - He was supposed to start cycle 2 on 5/21, however his nursing home would not accept the 5FU infusional pump  - C2D1 was 5/27, completed FOLFOX late 5/29  - Started Zarxio 5/30, will need 5 doses, NH won't give it at NH so staying for SC injection    Sundowning/delirium: Patient is AAOx3 today, per family he has intermittent confusion  - He is alert today  -  to discuss long term planning with daughter    Iron deficiency anemia likely from LGIB from tumor  - On prior admission, Iron Saturation: 12%, Ferritin 34, MCV 68.9   - Completed IV iron 1g  - Stop PO iron    Mycotic toenails:  - Debrided by podiatry, followup outpatient    Knee pain, likely OA:  - Lidocaine patch  - Outpatient followup  - PO pain meds if patient agreeable    PT consult    DVT ppx

## 2021-06-02 LAB
ALBUMIN SERPL ELPH-MCNC: 2.8 G/DL — LOW (ref 3.5–5.2)
ALP SERPL-CCNC: 83 U/L — SIGNIFICANT CHANGE UP (ref 30–115)
ALT FLD-CCNC: 9 U/L — SIGNIFICANT CHANGE UP (ref 0–41)
ANION GAP SERPL CALC-SCNC: 7 MMOL/L — SIGNIFICANT CHANGE UP (ref 7–14)
AST SERPL-CCNC: 12 U/L — SIGNIFICANT CHANGE UP (ref 0–41)
BASOPHILS # BLD AUTO: 0.06 K/UL — SIGNIFICANT CHANGE UP (ref 0–0.2)
BASOPHILS NFR BLD AUTO: 0.4 % — SIGNIFICANT CHANGE UP (ref 0–1)
BILIRUB SERPL-MCNC: 0.3 MG/DL — SIGNIFICANT CHANGE UP (ref 0.2–1.2)
BUN SERPL-MCNC: 13 MG/DL — SIGNIFICANT CHANGE UP (ref 10–20)
CALCIUM SERPL-MCNC: 7.9 MG/DL — LOW (ref 8.5–10.1)
CHLORIDE SERPL-SCNC: 103 MMOL/L — SIGNIFICANT CHANGE UP (ref 98–110)
CO2 SERPL-SCNC: 25 MMOL/L — SIGNIFICANT CHANGE UP (ref 17–32)
CREAT SERPL-MCNC: 0.5 MG/DL — LOW (ref 0.7–1.5)
EOSINOPHIL # BLD AUTO: 0.03 K/UL — SIGNIFICANT CHANGE UP (ref 0–0.7)
EOSINOPHIL NFR BLD AUTO: 0.2 % — SIGNIFICANT CHANGE UP (ref 0–8)
GLUCOSE SERPL-MCNC: 94 MG/DL — SIGNIFICANT CHANGE UP (ref 70–99)
HCT VFR BLD CALC: 25.5 % — LOW (ref 42–52)
HGB BLD-MCNC: 7.6 G/DL — LOW (ref 14–18)
IMM GRANULOCYTES NFR BLD AUTO: 1.3 % — HIGH (ref 0.1–0.3)
LDH SERPL L TO P-CCNC: 226 U/L — SIGNIFICANT CHANGE UP (ref 50–242)
LYMPHOCYTES # BLD AUTO: 1.35 K/UL — SIGNIFICANT CHANGE UP (ref 1.2–3.4)
LYMPHOCYTES # BLD AUTO: 8.9 % — LOW (ref 20.5–51.1)
MAGNESIUM SERPL-MCNC: 1.9 MG/DL — SIGNIFICANT CHANGE UP (ref 1.8–2.4)
MCHC RBC-ENTMCNC: 25 PG — LOW (ref 27–31)
MCHC RBC-ENTMCNC: 29.8 G/DL — LOW (ref 32–37)
MCV RBC AUTO: 83.9 FL — SIGNIFICANT CHANGE UP (ref 80–94)
MONOCYTES # BLD AUTO: 0.19 K/UL — SIGNIFICANT CHANGE UP (ref 0.1–0.6)
MONOCYTES NFR BLD AUTO: 1.2 % — LOW (ref 1.7–9.3)
NEUTROPHILS # BLD AUTO: 13.41 K/UL — HIGH (ref 1.4–6.5)
NEUTROPHILS NFR BLD AUTO: 88 % — HIGH (ref 42.2–75.2)
NRBC # BLD: 0 /100 WBCS — SIGNIFICANT CHANGE UP (ref 0–0)
PHOSPHATE SERPL-MCNC: 2.9 MG/DL — SIGNIFICANT CHANGE UP (ref 2.1–4.9)
PLATELET # BLD AUTO: 211 K/UL — SIGNIFICANT CHANGE UP (ref 130–400)
POTASSIUM SERPL-MCNC: 4.2 MMOL/L — SIGNIFICANT CHANGE UP (ref 3.5–5)
POTASSIUM SERPL-SCNC: 4.2 MMOL/L — SIGNIFICANT CHANGE UP (ref 3.5–5)
PROT SERPL-MCNC: 5.7 G/DL — LOW (ref 6–8)
RBC # BLD: 3.04 M/UL — LOW (ref 4.7–6.1)
RBC # FLD: 24.4 % — HIGH (ref 11.5–14.5)
SODIUM SERPL-SCNC: 135 MMOL/L — SIGNIFICANT CHANGE UP (ref 135–146)
URATE SERPL-MCNC: 3.3 MG/DL — LOW (ref 3.4–8.8)
WBC # BLD: 15.24 K/UL — HIGH (ref 4.8–10.8)
WBC # FLD AUTO: 15.24 K/UL — HIGH (ref 4.8–10.8)

## 2021-06-02 PROCEDURE — 99232 SBSQ HOSP IP/OBS MODERATE 35: CPT | Mod: GC

## 2021-06-02 RX ORDER — LOPERAMIDE HCL 2 MG
2 TABLET ORAL
Refills: 0 | Status: DISCONTINUED | OUTPATIENT
Start: 2021-06-02 | End: 2021-06-07

## 2021-06-02 RX ADMIN — HEPARIN SODIUM 5000 UNIT(S): 5000 INJECTION INTRAVENOUS; SUBCUTANEOUS at 14:21

## 2021-06-02 RX ADMIN — PANTOPRAZOLE SODIUM 40 MILLIGRAM(S): 20 TABLET, DELAYED RELEASE ORAL at 05:22

## 2021-06-02 RX ADMIN — LIDOCAINE 2 PATCH: 4 CREAM TOPICAL at 12:10

## 2021-06-02 RX ADMIN — HEPARIN SODIUM 5000 UNIT(S): 5000 INJECTION INTRAVENOUS; SUBCUTANEOUS at 05:22

## 2021-06-02 RX ADMIN — CHLORHEXIDINE GLUCONATE 1 APPLICATION(S): 213 SOLUTION TOPICAL at 05:22

## 2021-06-02 RX ADMIN — LIDOCAINE 2 PATCH: 4 CREAM TOPICAL at 20:00

## 2021-06-02 RX ADMIN — LIDOCAINE 2 PATCH: 4 CREAM TOPICAL at 00:00

## 2021-06-02 NOTE — PROGRESS NOTE ADULT - ASSESSMENT
Mr. Orellana is a 80M w/ no known PMHx (doesn't follow with doctors) with recently diagnosed locally advanced rectal adenocarcinoma.     # Dispo: Patient has been incontinent and has not shown he is able to care for himself at home. However, he is refusing to go to rehab. Multiple physicians,  and social workers have tried to discuss that we do not feel it is safe for him to go back home and that he only needs a temporary stay at rehab, however he is still refusing    # Leukocytosis: Due to Zarxio, no need for further workup  - He will not get any further zarxio this admission, does not need any for DC    # Locally advanced rectal adenocarcinoma  - Hx begins in late April 2021 when he presented for loose stools, found to have rectal mass  - s/p biopsy showing adenocarcinoma  - MRI: 18 cm long polypoidal rectal mass with invasion of the right meso rectal fascia and possibly the prostate gland. Additional involvement of the analsphincter complex--T4(a or b)N0M0  - Planned for total neoadjuvant chemotherapy followed by chemoRT followed by surgery  - s/p port placement  - He received cycle 1 5/7/21  - He was supposed to start cycle 2 on 5/21, however his nursing home would not accept the 5FU infusional pump  - C2D1 was 5/27, completed FOLFOX late 5/29  - He received 1 dose of Zarxio, no need for further doses  - He is scheduled for June 10th for his next dose    Sundowning/delirium:     Iron deficiency anemia likely from LGIB from tumor  - On prior admission, Iron Saturation: 12%, Ferritin 34, MCV 68.9   - Completed IV iron 1g  - Stop PO iron    Mycotic toenails:  - Debrided by podiatry, followup outpatient    Knee pain, likely OA:  - Lidocaine patch  - Outpatient followup  - PO pain meds if patient agreeable    DVT ppx       Mr. Orellana is a 80M w/ no known PMHx (doesn't follow with doctors) with recently diagnosed locally advanced rectal adenocarcinoma.     # Dispo: Patient has been incontinent and has not shown he is able to care for himself at home. However, he is refusing to go to rehab. Multiple physicians,  and social workers have tried to discuss that we do not feel it is safe for him to go back home and that he only needs a temporary stay at rehab, however he is still refusing. He is AAOx3 however he appears to lack insight into his condition. He states he can care for himself at home however is unable to demonstrate that he is able to walk any considerable distance and he is repeatedly incontinent and unaware that he is covered in feces. I believe he lacks capacity to make decisions and care for himself at home.     # Leukocytosis: Due to Zarxio, no need for further workup  - He will not get any further zarxio this admission, does not need any for DC    # Locally advanced rectal adenocarcinoma  - Hx begins in late April 2021 when he presented for loose stools, found to have rectal mass  - s/p biopsy showing adenocarcinoma  - MRI: 18 cm long polypoidal rectal mass with invasion of the right meso rectal fascia and possibly the prostate gland. Additional involvement of the analsphincter complex--T4(a or b)N0M0  - Planned for total neoadjuvant chemotherapy followed by chemoRT followed by surgery  - s/p port placement  - He received cycle 1 5/7/21  - He was supposed to start cycle 2 on 5/21, however his nursing home would not accept the 5FU infusional pump  - C2D1 was 5/27, completed FOLFOX late 5/29  - He received 1 dose of Zarxio, no need for further doses  - He is scheduled for June 10th for his next dose    Iron deficiency anemia likely from LGIB from tumor  - On prior admission, Iron Saturation: 12%, Ferritin 34, MCV 68.9   - Completed IV iron 1g  - Stop PO iron    Mycotic toenails:  - Debrided by podiatry, followup outpatient    Knee pain, likely OA:  - Lidocaine patch  - Outpatient followup  - PO pain meds if patient agreeable    DVT ppx

## 2021-06-02 NOTE — DIETITIAN INITIAL EVALUATION ADULT. - ORAL INTAKE PTA/DIET HISTORY
Pt poor historian, focused only on d/c & uninterested in conversation w. RD. Per NH paperchart pt received Regular diet, thin liquids + Prostat q24H. Pt w. recent admit to [facility] ~1 month PTA where he received low fiber diet + SF Prosource Gelatin q24H & Ensure Enlive q24H & tolerated meals well prior to d/c. NKFA.

## 2021-06-02 NOTE — DIETITIAN INITIAL EVALUATION ADULT. - ENERGY INTAKE
Good (>75%) As per RN, pt eating well tolerating at least 75% or more of meals & snacks since admit. No need for supplements at this time unless intake <50% meals consistently.

## 2021-06-02 NOTE — DIETITIAN INITIAL EVALUATION ADULT. - FACTORS AFF FOOD INTAKE
no trouble chewing/swallowing noted by staff. no c/o abnormal GI s/s. fecal incontinence w. LBM 6/2.

## 2021-06-02 NOTE — DIETITIAN INITIAL EVALUATION ADULT. - ADD RECOMMEND
Pt to continue to consume/tolerate >75% meals & snacks throughout LOS. RD to reassess in 7 days. RD will monitor diet order, energy intake, nutrition related labs, body composition, NFPF

## 2021-06-02 NOTE — DIETITIAN INITIAL EVALUATION ADULT. - OTHER INFO
Pt presented for cycle 2 chemo w. recently diagnosed locally advanced rectal adenocarcinoma--Per [facility]: place for total neoadjuvant chemotherapy followed by chemoRT followed by surgery; s/p 1st cycle chemo 5/7, s/p 2nd cycle 5/27, completed 5/29; next dose 6/10. Leukocytosis d/t Zarixo. HUSEYIN likely d/t LGIB from tumor. Mycotic toenails--s/p debridement w. podiatry. Knee pain, likely OA.

## 2021-06-02 NOTE — PROGRESS NOTE ADULT - SUBJECTIVE AND OBJECTIVE BOX
MISAEL ORELLANA 80y Male  MRN#: [id number]   CODE STATUS:___full    SUBJECTIVE  Patient is a 80y old Male who presents with a chief complaint of Chemotherapy (2021 13:45)  Currently admitted to medicine with the primary diagnosis of Rectal adenocarcinoma      Today is hospital day 7d, and this morning he is refusing to go to rehab.       OBJECTIVE  PAST MEDICAL & SURGICAL HISTORY  No pertinent past medical history    Amputation of digit of left hand      ALLERGIES:  No Known Allergies    MEDICATIONS:  STANDING MEDICATIONS  chlorhexidine 4% Liquid 1 Application(s) Topical <User Schedule>  heparin   Injectable 5000 Unit(s) SubCutaneous every 8 hours  lidocaine   Patch 2 Patch Transdermal daily  pantoprazole    Tablet 40 milliGRAM(s) Oral before breakfast    PRN MEDICATIONS  loperamide 2 milliGRAM(s) Oral two times a day PRN  oxycodone    5 mG/acetaminophen 325 mG 1 Tablet(s) Oral every 6 hours PRN  simethicone 80 milliGRAM(s) Chew two times a day PRN      VITAL SIGNS: Last 24 Hours  T(C): 36.6 (2021 14:17), Max: 36.6 (2021 19:55)  T(F): 97.9 (2021 14:17), Max: 97.9 (2021 19:55)  HR: 88 (2021 14:17) (85 - 93)  BP: 122/57 (2021 14:17) (115/63 - 124/59)  BP(mean): --  RR: 19 (2021 14:17) (18 - 19)  SpO2: --    LABS:                        7.6    15.24 )-----------( 211      ( 2021 06:12 )             25.5     06-02    135  |  103  |  13  ----------------------------<  94  4.2   |  25  |  0.5<L>    Ca    7.9<L>      2021 06:12  Phos  2.9     06-02  Mg     1.9     06-02    TPro  5.7<L>  /  Alb  2.8<L>  /  TBili  0.3  /  DBili  x   /  AST  12  /  ALT  9   /  AlkPhos  83  06-02      Urinalysis Basic - ( 2021 06:00 )    Color: Yellow / Appearance: Clear / S.016 / pH: x  Gluc: x / Ketone: Negative  / Bili: Negative / Urobili: <2 mg/dL   Blood: x / Protein: Negative / Nitrite: Negative   Leuk Esterase: Negative / RBC: x / WBC x   Sq Epi: x / Non Sq Epi: x / Bacteria: x    RADIOLOGY:      PHYSICAL EXAM:  GENERAL: NAD, well-developed, AAOx3  HEENT:  Atraumatic, Normocephalic. EOMI, PERRLA,  PULMONARY: Clear to auscultation bilaterally; No wheeze  CARDIOVASCULAR: Regular rate and rhythm; No murmurs, rubs, or gallops  GASTROINTESTINAL: Soft, Nontender, Nondistended; Bowel sounds present  MUSCULOSKELETAL:  2+ Peripheral Pulses, No clubbing, cyanosis, or edema  NEUROLOGY: non-focal  SKIN: No rashes or lesions        ADMISSION SUMMARY  Patient is a 80y old Male who presents with a chief complaint of Chemotherapy (2021 13:45)  Currently admitted to medicine with the primary diagnosis of Rectal adenocarcinoma    Hospital course has been complicated by _______.       ASSESSMENT & PLAN  Mr. Orellana is a 80M with recently diagnosed locally advanced rectal adenocarcinoma presents for cycle 2 of chemo.    # Altered mental status, - RESOLVED  - CT head no acute pathology   - AAOx3 on my exam  - patient was likely sundowning on day of admission. patient gets confused intermittently at baseline per family    # Locally advanced rectal adenocarcinoma  - MRI: 18 cm long polypoidal rectal mass with invasion of the right meso rectal fascia and possibly the prostate gland. Additional involvement of the analsphincter complex--T4(a or b)N0M0  - Oncology plans for for total neoadjuvant chemotherapy followed by chemoRT followed by surgery  -  Cycle 2 () of FOLFOX, second infusion    - Started Zarxio , then was held. Received 1 dsoe. Had leukocytosis (expected), per heme onc, OK to discharge.  - follow tumor lysis labs (uric acid, ldh, potassium, phosphorus)     # Iron deficiency anemia likely from LGIB from tumor  - On prior admission, Iron Saturation: 12%, Ferritin 34, MCV 68.9   - Venofer 200mg x 1 per heme/onc    # Subacute rib fracture noted last admission  - CT: Subacute fracture of the left 10th lateral rib  - Pain control PRN    #onychomycosis  -f/u podiatry outpatient     #Bilateral knee pain  -likely 2/2 OA  -requesting steroid injection - f/u outpatient for further management of OA   - lidocaine patch trial    # Misc  DVT ppx: lovenox  GI ppx: protonix  Diet: low fiber (was having diarrhea last admission)  Dispo: SNF d/c - patient refusing. Spoke w daughter , patient has been stubborn  but is not safe to go home alone - patient lives alone. Daughter checks on him 1-2 times month and each time condition of house was poor per daughter  w/ feces, poor hygiene, pt without transportation for groceries, moldy food in refrigerator etc. Anticipate for discharge tomorrow (again) . Hem/onc team unable to persuade for safe discharge to rehab. Requesting Psych consult for capacity to refuse rehab.             # Planned Disposition: ________

## 2021-06-02 NOTE — PROGRESS NOTE ADULT - SUBJECTIVE AND OBJECTIVE BOX
24H events:    Patient is medically cleared for discharge. He was incontinent when PT came to walk with him and therefore did not significantly participate with PT. He is refusing rehab and stating he will only go back to his home.     PAST MEDICAL & SURGICAL HISTORY  No pertinent past medical history    Amputation of digit of left hand      SOCIAL HISTORY:  Negative for smoking/alcohol/drug use.     ALLERGIES:  No Known Allergies    MEDICATIONS:  STANDING MEDICATIONS  chlorhexidine 4% Liquid 1 Application(s) Topical <User Schedule>  heparin   Injectable 5000 Unit(s) SubCutaneous every 8 hours  lidocaine   Patch 2 Patch Transdermal daily  pantoprazole    Tablet 40 milliGRAM(s) Oral before breakfast    PRN MEDICATIONS  loperamide 2 milliGRAM(s) Oral two times a day PRN  oxycodone    5 mG/acetaminophen 325 mG 1 Tablet(s) Oral every 6 hours PRN  simethicone 80 milliGRAM(s) Chew two times a day PRN    VITALS:   T(F): 97  HR: 85  BP: 124/59  RR: 18  SpO2: --    LABS:                        7.6    15.24 )-----------( 211      ( 2021 06:12 )             25.5     06-02    135  |  103  |  13  ----------------------------<  94  4.2   |  25  |  0.5<L>    Ca    7.9<L>      2021 06:12  Phos  2.9     06-02  Mg     1.9     06-02    TPro  5.7<L>  /  Alb  2.8<L>  /  TBili  0.3  /  DBili  x   /  AST  12  /  ALT  9   /  AlkPhos  83  06-02      Urinalysis Basic - ( 2021 06:00 )    Color: Yellow / Appearance: Clear / S.016 / pH: x  Gluc: x / Ketone: Negative  / Bili: Negative / Urobili: <2 mg/dL   Blood: x / Protein: Negative / Nitrite: Negative   Leuk Esterase: Negative / RBC: x / WBC x   Sq Epi: x / Non Sq Epi: x / Bacteria: x                RADIOLOGY:    PHYSICAL EXAM:  GEN: No acute distress  HENT: NCAT, EOMI  LYMPH: No appreciable adenopathy  LUNGS: No respiratory distress, clear to auscultation bilaterally   HEART: regular rate and rhythm  ABD: Soft, non-tender, non-distended  SKIN: No rash  EXT: No edema  NEURO: AAOX3

## 2021-06-02 NOTE — DIETITIAN INITIAL EVALUATION ADULT. - OTHER CALCULATIONS
est kcal needs = 3948-8798 kcal/day (MSJ x1.2-1.3 considering adv age & cancer on active treatment); est protein needs = 82-98 g/day (1.0-1.2 g/kg CBW, reasons mentioned); est fluid needs = per LIP

## 2021-06-03 LAB
ALBUMIN SERPL ELPH-MCNC: 2.8 G/DL — LOW (ref 3.5–5.2)
ALP SERPL-CCNC: 81 U/L — SIGNIFICANT CHANGE UP (ref 30–115)
ALT FLD-CCNC: 9 U/L — SIGNIFICANT CHANGE UP (ref 0–41)
ANION GAP SERPL CALC-SCNC: 8 MMOL/L — SIGNIFICANT CHANGE UP (ref 7–14)
AST SERPL-CCNC: 12 U/L — SIGNIFICANT CHANGE UP (ref 0–41)
BASOPHILS # BLD AUTO: 0.06 K/UL — SIGNIFICANT CHANGE UP (ref 0–0.2)
BASOPHILS NFR BLD AUTO: 0.4 % — SIGNIFICANT CHANGE UP (ref 0–1)
BILIRUB SERPL-MCNC: 0.2 MG/DL — SIGNIFICANT CHANGE UP (ref 0.2–1.2)
BUN SERPL-MCNC: 14 MG/DL — SIGNIFICANT CHANGE UP (ref 10–20)
CALCIUM SERPL-MCNC: 8 MG/DL — LOW (ref 8.5–10.1)
CHLORIDE SERPL-SCNC: 103 MMOL/L — SIGNIFICANT CHANGE UP (ref 98–110)
CO2 SERPL-SCNC: 26 MMOL/L — SIGNIFICANT CHANGE UP (ref 17–32)
CREAT SERPL-MCNC: 0.5 MG/DL — LOW (ref 0.7–1.5)
EOSINOPHIL # BLD AUTO: 0.02 K/UL — SIGNIFICANT CHANGE UP (ref 0–0.7)
EOSINOPHIL NFR BLD AUTO: 0.1 % — SIGNIFICANT CHANGE UP (ref 0–8)
GLUCOSE SERPL-MCNC: 96 MG/DL — SIGNIFICANT CHANGE UP (ref 70–99)
HCT VFR BLD CALC: 25 % — LOW (ref 42–52)
HGB BLD-MCNC: 7.3 G/DL — LOW (ref 14–18)
IMM GRANULOCYTES NFR BLD AUTO: 3.2 % — HIGH (ref 0.1–0.3)
LDH SERPL L TO P-CCNC: 207 U/L — SIGNIFICANT CHANGE UP (ref 50–242)
LYMPHOCYTES # BLD AUTO: 1.27 K/UL — SIGNIFICANT CHANGE UP (ref 1.2–3.4)
LYMPHOCYTES # BLD AUTO: 7.9 % — LOW (ref 20.5–51.1)
MAGNESIUM SERPL-MCNC: 1.8 MG/DL — SIGNIFICANT CHANGE UP (ref 1.8–2.4)
MCHC RBC-ENTMCNC: 24.8 PG — LOW (ref 27–31)
MCHC RBC-ENTMCNC: 29.2 G/DL — LOW (ref 32–37)
MCV RBC AUTO: 85 FL — SIGNIFICANT CHANGE UP (ref 80–94)
MONOCYTES # BLD AUTO: 0.42 K/UL — SIGNIFICANT CHANGE UP (ref 0.1–0.6)
MONOCYTES NFR BLD AUTO: 2.6 % — SIGNIFICANT CHANGE UP (ref 1.7–9.3)
NEUTROPHILS # BLD AUTO: 13.87 K/UL — HIGH (ref 1.4–6.5)
NEUTROPHILS NFR BLD AUTO: 85.8 % — HIGH (ref 42.2–75.2)
NRBC # BLD: 0 /100 WBCS — SIGNIFICANT CHANGE UP (ref 0–0)
PHOSPHATE SERPL-MCNC: 2.9 MG/DL — SIGNIFICANT CHANGE UP (ref 2.1–4.9)
PLATELET # BLD AUTO: 181 K/UL — SIGNIFICANT CHANGE UP (ref 130–400)
POTASSIUM SERPL-MCNC: 4 MMOL/L — SIGNIFICANT CHANGE UP (ref 3.5–5)
POTASSIUM SERPL-SCNC: 4 MMOL/L — SIGNIFICANT CHANGE UP (ref 3.5–5)
PROT SERPL-MCNC: 5.6 G/DL — LOW (ref 6–8)
RBC # BLD: 2.94 M/UL — LOW (ref 4.7–6.1)
RBC # FLD: 24.1 % — HIGH (ref 11.5–14.5)
SODIUM SERPL-SCNC: 137 MMOL/L — SIGNIFICANT CHANGE UP (ref 135–146)
URATE SERPL-MCNC: 3 MG/DL — LOW (ref 3.4–8.8)
WBC # BLD: 16.15 K/UL — HIGH (ref 4.8–10.8)
WBC # FLD AUTO: 16.15 K/UL — HIGH (ref 4.8–10.8)

## 2021-06-03 PROCEDURE — 99232 SBSQ HOSP IP/OBS MODERATE 35: CPT

## 2021-06-03 RX ADMIN — Medication 2 MILLIGRAM(S): at 05:59

## 2021-06-03 RX ADMIN — LIDOCAINE 2 PATCH: 4 CREAM TOPICAL at 23:43

## 2021-06-03 RX ADMIN — LIDOCAINE 2 PATCH: 4 CREAM TOPICAL at 20:00

## 2021-06-03 RX ADMIN — HEPARIN SODIUM 5000 UNIT(S): 5000 INJECTION INTRAVENOUS; SUBCUTANEOUS at 22:27

## 2021-06-03 RX ADMIN — LIDOCAINE 2 PATCH: 4 CREAM TOPICAL at 00:46

## 2021-06-03 RX ADMIN — PANTOPRAZOLE SODIUM 40 MILLIGRAM(S): 20 TABLET, DELAYED RELEASE ORAL at 05:59

## 2021-06-03 RX ADMIN — LIDOCAINE 2 PATCH: 4 CREAM TOPICAL at 12:00

## 2021-06-03 RX ADMIN — CHLORHEXIDINE GLUCONATE 1 APPLICATION(S): 213 SOLUTION TOPICAL at 06:00

## 2021-06-03 RX ADMIN — HEPARIN SODIUM 5000 UNIT(S): 5000 INJECTION INTRAVENOUS; SUBCUTANEOUS at 15:01

## 2021-06-03 RX ADMIN — HEPARIN SODIUM 5000 UNIT(S): 5000 INJECTION INTRAVENOUS; SUBCUTANEOUS at 05:59

## 2021-06-03 NOTE — CONSULT NOTE ADULT - ASSESSMENT
Mr Orellana is a 80 year old  old man with a history of rectal adenocarcinoma but no history of a psychiatric illness who was admitted to the medical floor for chemotherapy. Psychiatry consult was called for the evaluation of patient's capacity to refuse transfer to a nursing home.   At this time, although patient is able to communicate his choice of refusing to be transferred to a nursing home, he does not  seem to have good understanding of the indication for the nursing home care or its associated benefits nor does he understand the risks associated with being discharged back to his home . He doesn't seem to appreciate how the nursing home  will impact his health, well being and quality of life nor is his able to rationally compare this proposed carte to the care he would receive from home. In addition, he is unable manipulate them to his current medical presentations.   Hence, patient does not demonstrate the capacity to refuse nursing home placement . In this situation, we defer the decision to transfer patient to his  next of kin or documented health care proxy . If neither of these people are available a court appointed decision maker should make the  decision.    Diagnosis   Neurocogitive disorder   Plan:   1. Recommend Neurology consult to evaluate for Dementia    2. Recommend contacting the legal department for possible legal assistance with his property

## 2021-06-03 NOTE — PROGRESS NOTE ADULT - ASSESSMENT
Mr. Orellana is a 80M w/ no known PMHx (doesn't follow with doctors) with recently diagnosed locally advanced rectal adenocarcinoma.     # Dispo: Patient has been incontinent and has not shown he is able to care for himself at home. However, he is refusing to go to rehab. Multiple physicians,  and social workers have tried to discuss that we do not feel it is safe for him to go back home and that he only needs a temporary stay at rehab, however he is still refusing. He is AAOx3 however he appears to lack insight into his condition. He states he can care for himself at home however is unable to demonstrate that he is able to walk any considerable distance and he is repeatedly incontinent and unaware that he is covered in feces. I witnessed him attempt to stand with extreme difficulty and he was very off balance with the walker taking only 2 steps. I believe he lacks capacity to make decisions and care for himself at home.     # Suspect dementia/sundowning/delirium:  - Neurology consult to assess for dementia    # Leukocytosis: Due to Zarxio, no need for further workup  - He will not get any further zarxio this admission, does not need any for DC    # Locally advanced rectal adenocarcinoma  - Hx begins in late April 2021 when he presented for loose stools, found to have rectal mass  - s/p biopsy showing adenocarcinoma  - MRI: 18 cm long polypoidal rectal mass with invasion of the right meso rectal fascia and possibly the prostate gland. Additional involvement of the analsphincter complex--T4(a or b)N0M0  - Planned for total neoadjuvant chemotherapy followed by chemoRT followed by surgery  - s/p port placement  - He received cycle 1 5/7/21  - He was supposed to start cycle 2 on 5/21, however his nursing home would not accept the 5FU infusional pump  - C2D1 was 5/27, completed FOLFOX late 5/29  - He received 1 dose of Zarxio, no need for further doses  - He is scheduled for June 10th for his next dose    Iron deficiency anemia likely from LGIB from tumor  - On prior admission, Iron Saturation: 12%, Ferritin 34, MCV 68.9   - Completed IV iron 1g  - Stop PO iron    Mycotic toenails:  - Debrided by podiatry, followup outpatient    Knee pain, likely OA:  - Lidocaine patch  - Outpatient followup  - PO pain meds if patient agreeable    DVT ppx
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
## 2021-06-03 NOTE — PROGRESS NOTE ADULT - SUBJECTIVE AND OBJECTIVE BOX
24H events:  patient is refusing to go to rehab  He had great difficulty standing and was very unsteady on his feet. Additionally, when he stood he was fecally incontinent and completely unaware. He is unable to clean or dress himself. Patient believes he is able to perform his ADLs but when asked to do so he is completely unable. His daughter is mostly responsible for his care, but she is insisting he go to a rehab center and states she will not be responsible for him if he is sent home. Patient is completely unaware of his inability to care for himself and the risks going home alone with no social support pose. Additionally, his home is reportedly nearly unlivable, with feces on the floor and only moldy,  food. He cannot shop and he is not capable to arrange home food services. When asked to make a simple call to appeal his discharge he is unable.     PAST MEDICAL & SURGICAL HISTORY  No pertinent past medical history    Amputation of digit of left hand      SOCIAL HISTORY:  Negative for smoking/alcohol/drug use.     ALLERGIES:  No Known Allergies    MEDICATIONS:  STANDING MEDICATIONS  chlorhexidine 4% Liquid 1 Application(s) Topical <User Schedule>  heparin   Injectable 5000 Unit(s) SubCutaneous every 8 hours  lidocaine   Patch 2 Patch Transdermal daily  pantoprazole    Tablet 40 milliGRAM(s) Oral before breakfast    PRN MEDICATIONS  loperamide 2 milliGRAM(s) Oral five times a day PRN  loperamide 2 milliGRAM(s) Oral two times a day PRN  simethicone 80 milliGRAM(s) Chew two times a day PRN    VITALS:   T(F): 98.9  HR: 80  BP: 119/56  RR: 18  SpO2: --    LABS:                        7.6    15.24 )-----------( 211      ( 2021 06:12 )             25.5     06-02    135  |  103  |  13  ----------------------------<  94  4.2   |  25  |  0.5<L>    Ca    7.9<L>      2021 06:12  Phos  2.9     06-02  Mg     1.9     06-02    TPro  5.7<L>  /  Alb  2.8<L>  /  TBili  0.3  /  DBili  x   /  AST  12  /  ALT  9   /  AlkPhos  83  06-02                  RADIOLOGY:    PHYSICAL EXAM:  GEN: No acute distress  HENT: NCAT, EOMI  LYMPH: No appreciable adenopathy  LUNGS: No respiratory distress, clear to auscultation bilaterally   HEART: regular rate and rhythm  ABD: Soft, non-tender, non-distended  SKIN: No rash  EXT: No edema  NEURO: AAOX3

## 2021-06-03 NOTE — PROGRESS NOTE ADULT - SUBJECTIVE AND OBJECTIVE BOX
MISAEL ORELLANA 80y Male  MRN#: [id number]   CODE STATUS:________      SUBJECTIVE  Patient is a 80y old Male who presents with a chief complaint of Chemotherapy (03 Jun 2021 07:49)  Currently admitted to medicine with the primary diagnosis of Rectal adenocarcinoma  Today is hospital day 8d, and this morning he wants to go home and not rehab.           OBJECTIVE  PAST MEDICAL & SURGICAL HISTORY  No pertinent past medical history    Amputation of digit of left hand      ALLERGIES:  No Known Allergies    MEDICATIONS:  STANDING MEDICATIONS  chlorhexidine 4% Liquid 1 Application(s) Topical <User Schedule>  heparin   Injectable 5000 Unit(s) SubCutaneous every 8 hours  lidocaine   Patch 2 Patch Transdermal daily  pantoprazole    Tablet 40 milliGRAM(s) Oral before breakfast    PRN MEDICATIONS  loperamide 2 milliGRAM(s) Oral five times a day PRN  loperamide 2 milliGRAM(s) Oral two times a day PRN  simethicone 80 milliGRAM(s) Chew two times a day PRN      VITAL SIGNS: Last 24 Hours  T(C): 37.2 (03 Jun 2021 05:34), Max: 37.4 (02 Jun 2021 19:55)  T(F): 98.9 (03 Jun 2021 05:34), Max: 99.4 (02 Jun 2021 19:55)  HR: 80 (03 Jun 2021 05:34) (80 - 91)  BP: 119/56 (03 Jun 2021 05:34) (119/56 - 141/64)  BP(mean): --  RR: 18 (03 Jun 2021 05:34) (18 - 19)  SpO2: --    LABS:                        7.3    16.15 )-----------( 181      ( 03 Jun 2021 07:07 )             25.0     06-03    137  |  103  |  14  ----------------------------<  96  4.0   |  26  |  0.5<L>    Ca    8.0<L>      03 Jun 2021 07:07  Phos  2.9     06-03  Mg     1.8     06-03    TPro  5.6<L>  /  Alb  2.8<L>  /  TBili  0.2  /  DBili  x   /  AST  12  /  ALT  9   /  AlkPhos  81  06-03                  RADIOLOGY:      PHYSICAL EXAM:    GENERAL: NAD, well-developed, AAOx3  HEENT:  Atraumatic, Normocephalic. EOMI, PERRLA,  PULMONARY: Clear to auscultation bilaterally; No wheeze  CARDIOVASCULAR: Regular rate and rhythm; No murmurs, rubs, or gallops  GASTROINTESTINAL: Soft, Nontender, Nondistended; Bowel sounds present  MUSCULOSKELETAL:  2+ Peripheral Pulses, No clubbing, cyanosis, or edema  NEUROLOGY: non-focal  SKIN: No rashes or lesions        ADMISSION SUMMARY  Patient is a 80y old Male who presents with a chief complaint of Chemotherapy (03 Jun 2021 07:49)  Currently admitted to medicine with the primary diagnosis of Rectal adenocarcinoma    Hospital course has been complicated by _______.       ASSESSMENT & PLAN  Mr. Orellana is a 80M with recently diagnosed locally advanced rectal adenocarcinoma presents for cycle 2 of chemo.    # Altered mental status, - RESOLVED  - CT head no acute pathology   - AAOx3 on my exam  - patient was likely sundowning on day of admission. patient gets confused intermittently at baseline per family    # Locally advanced rectal adenocarcinoma  - MRI: 18 cm long polypoidal rectal mass with invasion of the right meso rectal fascia and possibly the prostate gland. Additional involvement of the analsphincter complex--T4(a or b)N0M0  - Oncology plans for for total neoadjuvant chemotherapy followed by chemoRT followed by surgery  -  Cycle 2 (5/27) of FOLFOX, second infusion 5/28   - Started Zarxio 5/30, then was held. Received 1 dsoe. Had leukocytosis (expected), per heme onc, OK to discharge.  - follow tumor lysis labs (uric acid, ldh, potassium, phosphorus)     # Iron deficiency anemia likely from LGIB from tumor  - On prior admission, Iron Saturation: 12%, Ferritin 34, MCV 68.9   - Venofer 200mg x 1 per heme/onc    # Subacute rib fracture noted last admission  - CT: Subacute fracture of the left 10th lateral rib  - Pain control PRN    #onychomycosis  -f/u podiatry outpatient     #Bilateral knee pain  -likely 2/2 OA  -requesting steroid injection - f/u outpatient for further management of OA   - lidocaine patch trial    # Misc  DVT ppx: lovenox  GI ppx: protonix  Diet: low fiber (was having diarrhea last admission)  Dispo: SNF d/c - patient refusing. Spoke w daughter , patient has been stubborn  but is not safe to go home alone - patient lives alone. Daughter checks on him 1-2 times month and each time condition of house was poor per daughter  w/ feces, poor hygiene, pt without transportation for groceries, moldy food in refrigerator etc. Anticipate for discharge tomorrow (again) . Hem/onc team unable to persuade for safe discharge to rehab. f/u Psych consult for capacity to refuse rehab.

## 2021-06-04 LAB
MANUAL DIF COMMENT BLD-IMP: SIGNIFICANT CHANGE UP

## 2021-06-04 PROCEDURE — 99232 SBSQ HOSP IP/OBS MODERATE 35: CPT

## 2021-06-04 PROCEDURE — 99222 1ST HOSP IP/OBS MODERATE 55: CPT

## 2021-06-04 RX ORDER — NYSTATIN CREAM 100000 [USP'U]/G
1 CREAM TOPICAL
Refills: 0 | Status: DISCONTINUED | OUTPATIENT
Start: 2021-06-04 | End: 2021-06-07

## 2021-06-04 RX ADMIN — HEPARIN SODIUM 5000 UNIT(S): 5000 INJECTION INTRAVENOUS; SUBCUTANEOUS at 22:33

## 2021-06-04 RX ADMIN — PANTOPRAZOLE SODIUM 40 MILLIGRAM(S): 20 TABLET, DELAYED RELEASE ORAL at 06:43

## 2021-06-04 RX ADMIN — HEPARIN SODIUM 5000 UNIT(S): 5000 INJECTION INTRAVENOUS; SUBCUTANEOUS at 13:46

## 2021-06-04 RX ADMIN — LIDOCAINE 2 PATCH: 4 CREAM TOPICAL at 19:35

## 2021-06-04 RX ADMIN — HEPARIN SODIUM 5000 UNIT(S): 5000 INJECTION INTRAVENOUS; SUBCUTANEOUS at 06:20

## 2021-06-04 RX ADMIN — LIDOCAINE 2 PATCH: 4 CREAM TOPICAL at 11:19

## 2021-06-04 RX ADMIN — CHLORHEXIDINE GLUCONATE 1 APPLICATION(S): 213 SOLUTION TOPICAL at 06:20

## 2021-06-04 NOTE — PROGRESS NOTE ADULT - ASSESSMENT
Mr. Orellana is a 80M w/ no known PMHx (doesn't follow with doctors) with recently diagnosed locally advanced rectal adenocarcinoma.     # Dispo: Patient has been incontinent and has not shown he is able to care for himself at home. His home was reportedly covered in feces, food was covered in mold and the house is essentially not safe to live in. However, he is refusing to go to rehab. Multiple physicians,  and social workers have tried to discuss that we do not feel it is safe for him to go back home and that we recommend rehab, however he is still refusing. He is stating he can walk however I witnessed him attempt to stand with extreme difficulty and he was very off balance with the walker taking only 2 steps, additionally he was covered in feces and complete unaware. He clearly lacks insight regarding his ability to care for himself. Discharge to home would be unsafe however he is refusing to go back to rehab. - Psychiatry evaluated him and agrees patient does not have the capacity to refuse nursing home     # Suspected dementia/sundowning/delirium:  - Neurology consult to assess for dementia    # Leukocytosis: Due to Zarxio, no need for further workup  - He will not get any further zarxio this admission, does not need any for DC    # Locally advanced rectal adenocarcinoma  - Hx begins in late April 2021 when he presented for loose stools, found to have rectal mass  - s/p biopsy showing adenocarcinoma  - MRI: 18 cm long polypoidal rectal mass with invasion of the right meso rectal fascia and possibly the prostate gland. Additional involvement of the analsphincter complex--T4(a or b)N0M0  - Planned for total neoadjuvant chemotherapy followed by chemoRT followed by surgery  - s/p port placement  - He received cycle 1 5/7/21  - He was supposed to start cycle 2 on 5/21, however his nursing home would not accept the 5FU infusional pump  - C2D1 was 5/27, completed FOLFOX late 5/29  - He received 1 dose of Zarxio, no need for further doses  - He is scheduled for June 10th for his next dose    Iron deficiency anemia likely from LGIB from tumor  - On prior admission, Iron Saturation: 12%, Ferritin 34, MCV 68.9   - Completed IV iron 1g  - Stop PO iron    Mycotic toenails:  - Debrided by podiatry, followup outpatient    Knee pain, likely OA:  - Lidocaine patch  - Outpatient followup  - PO pain meds if patient agreeable    DVT ppx

## 2021-06-04 NOTE — PROGRESS NOTE ADULT - SUBJECTIVE AND OBJECTIVE BOX
24H events:  Afebrile, VSS, labs stable  No acute events overnight  He was seen by psychiatry who agreed patient lacks capacity and insight into his conditions  Neurology evaluated him but patient was poorly compliant with the exam    PAST MEDICAL & SURGICAL HISTORY  No pertinent past medical history    Amputation of digit of left hand      SOCIAL HISTORY:  Negative for smoking/alcohol/drug use.     ALLERGIES:  No Known Allergies    MEDICATIONS:  STANDING MEDICATIONS  chlorhexidine 4% Liquid 1 Application(s) Topical <User Schedule>  heparin   Injectable 5000 Unit(s) SubCutaneous every 8 hours  lidocaine   Patch 2 Patch Transdermal daily  pantoprazole    Tablet 40 milliGRAM(s) Oral before breakfast    PRN MEDICATIONS  loperamide 2 milliGRAM(s) Oral five times a day PRN  loperamide 2 milliGRAM(s) Oral two times a day PRN  simethicone 80 milliGRAM(s) Chew two times a day PRN    VITALS:   T(F): 98.5  HR: 94  BP: 130/60  RR: 18  SpO2: --    LABS:                        7.3    16.15 )-----------( 181      ( 03 Jun 2021 07:07 )             25.0     06-03    137  |  103  |  14  ----------------------------<  96  4.0   |  26  |  0.5<L>    Ca    8.0<L>      03 Jun 2021 07:07  Phos  2.9     06-03  Mg     1.8     06-03    TPro  5.6<L>  /  Alb  2.8<L>  /  TBili  0.2  /  DBili  x   /  AST  12  /  ALT  9   /  AlkPhos  81  06-03                  RADIOLOGY:    PHYSICAL EXAM:  GEN: No acute distress  HENT: NCAT, EOMI  NEURO: Awake and alert     24H events:  Afebrile, VSS, labs stable  No acute events overnight  He was seen by psychiatry who agreed patient lacks capacity and insight into his conditions  Neurology evaluated him but patient was poorly compliant with the exam    PAST MEDICAL & SURGICAL HISTORY  No pertinent past medical history    Amputation of digit of left hand      SOCIAL HISTORY:  Negative for smoking/alcohol/drug use.     ALLERGIES:  No Known Allergies    MEDICATIONS:  STANDING MEDICATIONS  chlorhexidine 4% Liquid 1 Application(s) Topical <User Schedule>  heparin   Injectable 5000 Unit(s) SubCutaneous every 8 hours  lidocaine   Patch 2 Patch Transdermal daily  pantoprazole    Tablet 40 milliGRAM(s) Oral before breakfast    PRN MEDICATIONS  loperamide 2 milliGRAM(s) Oral five times a day PRN  loperamide 2 milliGRAM(s) Oral two times a day PRN  simethicone 80 milliGRAM(s) Chew two times a day PRN    VITALS:   T(F): 98.5  HR: 94  BP: 130/60  RR: 18  SpO2: --    LABS:                        7.3    16.15 )-----------( 181      ( 03 Jun 2021 07:07 )             25.0     06-03    137  |  103  |  14  ----------------------------<  96  4.0   |  26  |  0.5<L>    Ca    8.0<L>      03 Jun 2021 07:07  Phos  2.9     06-03  Mg     1.8     06-03    TPro  5.6<L>  /  Alb  2.8<L>  /  TBili  0.2  /  DBili  x   /  AST  12  /  ALT  9   /  AlkPhos  81  06-03                  RADIOLOGY:    PHYSICAL EXAM:  GEN: No acute distress  HENT: NCAT, EOMI  NEURO: Awake and alert  Rest of exam deferred

## 2021-06-04 NOTE — CONSULT NOTE ADULT - SUBJECTIVE AND OBJECTIVE BOX
MISAEL HUFFMAN     80y     Male    MRN-[id number]                                                           CC:Patient is a 80y old  Male who presents with a chief complaint of Chemotherapy (2021 17:16)      HPI:  79 y/o M with recent diagnosis of rectal adenocarcinoma being admitted from NH for chemotherapy. He is now alert and oriented to name, and is otherwise very confused and poor historian (during previous admission last month he was A&Ox3 but otherwise very confused and poor historian). History obtained from chart and daughter. He has no complaints at this time; he denies SOB, chest pain, abdominal pain, fever, chills, headaches, dysuria, urinary frequency or any other symptoms.    Per oncology: He is getting neoadjuvant FOLFOX for his rectal cancer. This requires a 48 hour infusion; usually patients go home with a pump that infuses it into their port at home, however this patient is currently in a NH and they won't accept the pump, so he is being admitted. He received his first cycle inpatient about 2 weeks ago, this will be cycle 2.    (26 May 2021 15:39)    Patient seen and examined and history, notes, labs, imaging, vitals and meds reviewed personally.  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  He is aware he is undergoing treatment for his malignancy and that he needs repeat treatment in 2 weeks and states he will come back.  He is upset about being given a walker as well and believes were are trying to make him look disabled.  He refuses to be directed with quesitoning and returns to the topic about his daughter and also states that he cleans himself, feeds and cooks for himself and takes care of his laundry every 2 days.    ROS:  Constitutional, Neurological, Psychiatric, Eyes, ENT, Cardiovascular, Respiratory, Gastrointestinal, Genitourinary, Musculoskeletal, Integumentary, Endocrine and Heme/Lymph are otherwise negative. Except for noted above    Social History: NoO smoking, No drinking, No drug use    FAMILY HISTORY:  No pertinent family history in first degree relatives        Vital Signs Last 24 Hrs  T(C): 36.4 (2021 05:42), Max: 37.3 (2021 21:01)  T(F): 97.6 (2021 05:42), Max: 99.1 (2021 21:01)  HR: 94 (2021 05:42) (80 - 104)  BP: 131/60 (2021 05:42) (120/58 - 131/60)  BP(mean): --  RR: 18 (2021 05:42) (18 - 19)  SpO2: --    Physical Exam:  Constitutional: alert and in no acute distress.  Eyes: the sclera and conjunctiva were normal, pupils were equal in size, round, reactive to light, with normal accommodation and extraocular movements were intact.   Back: no costovertebral angle tenderness and no spinal tenderness.      Neuro Exam:  Alert oriented to person, , place, Month and year, Knows President.  No facial on observation, tracks horizontally, BTT present.  Patient became agitated at time of remaining tests  Moving UE symmetrically but refuses to be tested formally  Sensory unable to test  Refused remaining testing.    NIHSS:     Allergies    No Known Allergies    Intolerances       Home Medications:  lidocaine 5% topical film: Apply topically to affected area once (29 May 2021 11:23)  loperamide 2 mg oral capsule: 1 cap(s) orally 2 times a day, As needed, Diarrhea (17 May 2021 13:50)  oxycodone-acetaminophen 5 mg-325 mg oral tablet: 1 tab(s) orally every 6 hours, As needed, Moderate Pain (4 - 6) (17 May 2021 13:50)  pantoprazole 40 mg oral delayed release tablet: 1 tab(s) orally once a day (before a meal) (10 May 2021 10:39)  simethicone 80 mg oral tablet, chewable: 1 tab(s) orally 2 times a day, As needed, Gas (17 May 2021 13:47)      MEDICATIONS  (STANDING):  chlorhexidine 4% Liquid 1 Application(s) Topical <User Schedule>  heparin   Injectable 5000 Unit(s) SubCutaneous every 8 hours  lidocaine   Patch 2 Patch Transdermal daily  pantoprazole    Tablet 40 milliGRAM(s) Oral before breakfast    MEDICATIONS  (PRN):  loperamide 2 milliGRAM(s) Oral five times a day PRN Diarrhea  loperamide 2 milliGRAM(s) Oral two times a day PRN Diarrhea  simethicone 80 milliGRAM(s) Chew two times a day PRN Gas      LABS:                        7.3    16.15 )-----------( 181      ( 2021 07:07 )             25.0     06-03    137  |  103  |  14  ----------------------------<  96  4.0   |  26  |  0.5<L>    Ca    8.0<L>      2021 07:07  Phos  2.9     06-03  Mg     1.8     06-03    TPro  5.6<L>  /  Alb  2.8<L>  /  TBili  0.2  /  DBili  x   /  AST  12  /  ALT  9   /  AlkPhos  81  06-03            [facility]   < from: CT Head No Cont (21 @ 23:27) >  Comparison: CT head 2021    Findings:    The ventricles and cortical sulci demonstrate stable mild to moderate atrophic changes.    There are stable patchy hypodensities throughout the hemispheric white matter without mass effect compatible with chronic microvascular changes.    There is no acute intracranial hemorrhage, extra-axial fluid collection or midline shift.  Gray white matter differentiation is maintained.    There is calcific atherosclerotic disease at the skull base.    There is minor scattered paranasal sinus mucosal thickening. The mastoids are clear.    IMPRESSION:    1.  No CT evidence of acute intracranial pathology. Stable exam since 2021.    2.  Stable mild-moderate chronic microvascular changes.    < end of copied text >      EEG: None        Assessment / Plan: This is a 80y year old Male presenting for chemotherapy and neurology consulted for evaluation of dementia.  Patient is poor compliant with evaluation and is focused on thoughts of his daughter and son-in-law trying to have him declared incompetent.  My evaluation is difficult to interpret dementia, paranoia as he is able to reason some basic information about his care however he is highly focused on his daughter and issues discussed above.  1. Routine EEG  2. Check Thyroid panel, B12, Folate, RPR  3. Will need follow up as an out patient

## 2021-06-04 NOTE — PROGRESS NOTE ADULT - SUBJECTIVE AND OBJECTIVE BOX
SUBJECTIVE:  HPI:  81 y/o M with recent diagnosis of rectal adenocarcinoma being admitted from NH for chemotherapy. He is now alert and oriented to name, and is otherwise very confused and poor historian (during previous admission last month he was A&Ox3 but otherwise very confused and poor historian). History obtained from chart and daughter. He has no complaints at this time; he denies SOB, chest pain, abdominal pain, fever, chills, headaches, dysuria, urinary frequency or any other symptoms.    Per oncology: He is getting neoadjuvant FOLFOX for his rectal cancer. This requires a 48 hour infusion; usually patients go home with a pump that infuses it into their port at home, however this patient is currently in a NH and they won't accept the pump, so he is being admitted. He received his first cycle inpatient about 2 weeks ago, this will be cycle 2.    (26 May 2021 15:39)      PAST MEDICAL & SURGICAL HISTORY  PAST MEDICAL & SURGICAL HISTORY:  No pertinent past medical history    Amputation of digit of left hand      SOCIAL HISTORY:    ALLERGIES:  No Known Allergies    MEDICATIONS:  STANDING MEDICATIONS  chlorhexidine 4% Liquid 1 Application(s) Topical <User Schedule>  heparin   Injectable 5000 Unit(s) SubCutaneous every 8 hours  lidocaine   Patch 2 Patch Transdermal daily  pantoprazole    Tablet 40 milliGRAM(s) Oral before breakfast    PRN MEDICATIONS  loperamide 2 milliGRAM(s) Oral five times a day PRN  loperamide 2 milliGRAM(s) Oral two times a day PRN  simethicone 80 milliGRAM(s) Chew two times a day PRN    VITALS:   T(F): 97.6  HR: 94  BP: 131/60  RR: 18  SpO2: --    LABS:                        7.3    16.15 )-----------( 181      ( 03 Jun 2021 07:07 )             25.0     06-03    137  |  103  |  14  ----------------------------<  96  4.0   |  26  |  0.5<L>    Ca    8.0<L>      03 Jun 2021 07:07  Phos  2.9     06-03  Mg     1.8     06-03    TPro  5.6<L>  /  Alb  2.8<L>  /  TBili  0.2  /  DBili  x   /  AST  12  /  ALT  9   /  AlkPhos  81  06-03    RADIOLOGY:    PHYSICAL EXAM:  GENERAL: NAD, well-developed, AAOx3  HEENT:  Atraumatic, Normocephalic. EOMI, PERRLA  PULMONARY: Clear to auscultation bilaterally; No wheeze  CARDIOVASCULAR: Regular rate and rhythm; No murmurs, rubs, or gallops  GASTROINTESTINAL: Soft, Nontender, Nondistended; Bowel sounds present  MUSCULOSKELETAL:  2+ Peripheral Pulses, No clubbing, cyanosis, or edema  NEUROLOGY: non-focal  SKIN: No rashes or lesions

## 2021-06-04 NOTE — PROGRESS NOTE ADULT - ASSESSMENT
Mr. Orellana is a 80M with recently diagnosed locally advanced rectal adenocarcinoma presents for cycle 2 of chemo.    # Altered mental status, - RESOLVED  - CT head no acute pathology   - AAOx3 on my exam  - patient was likely sundowning on day of admission. patient gets confused intermittently at baseline per family    # Locally advanced rectal adenocarcinoma  - MRI: 18 cm long polypoidal rectal mass with invasion of the right meso rectal fascia and possibly the prostate gland. Additional involvement of the analsphincter complex--T4(a or b)N0M0  - Oncology plans for for total neoadjuvant chemotherapy followed by chemoRT followed by surgery  -  Cycle 2 (5/27) of FOLFOX, second infusion 5/28   - Started Zarxio 5/30, then was held. Received 1 dsoe. Had leukocytosis (expected), per heme onc, OK to discharge.  - follow tumor lysis labs (uric acid, ldh, potassium, phosphorus)     # Iron deficiency anemia likely from LGIB from tumor  - On prior admission, Iron Saturation: 12%, Ferritin 34, MCV 68.9   - Venofer 200mg x 1 per heme/onc    # Subacute rib fracture noted last admission  - CT: Subacute fracture of the left 10th lateral rib  - Pain control PRN    #onychomycosis  -f/u podiatry outpatient     #Bilateral knee pain  -likely 2/2 OA  -requesting steroid injection   - f/u outpatient for further management of OA   - lidocaine patch trial    # Misc  DVT ppx: Lovenox  GI ppx: Protonix  Diet: low fiber (was having diarrhea last admission)  Dispo: SNF d/c - patient refusing. convo's w/ daughter , patient has been stubborn  but is not safe to go home alone - patient lives alone. Daughter checks on him 1-2 times month and each time condition of house was poor per daughter  w/ feces, poor hygiene, pt without transportation for groceries, moldy food in refrigerator etc. Anticipate for discharge tomorrow (again) . Hem/onc team unable to persuade for safe discharge to rehab. Psych deemed patient not have have capacity. Will discuss w/ Daughter today as patient needs placement   Mr. Orellana is a 80M with recently diagnosed locally advanced rectal adenocarcinoma presents for cycle 2 of chemo.    # Altered mental status, - RESOLVED  - CT head no acute pathology   - AAOx3 on my exam  - patient was likely sundowning on day of admission. patient gets confused intermittently at baseline per family  - Psych recc Neuro eval to r/o dementia  - Neuro; Routine EEG, Check Thyroid panel, B12, Folate, RPR, Will need follow up as an out patient    # Locally advanced rectal adenocarcinoma  - MRI: 18 cm long polypoidal rectal mass with invasion of the right meso rectal fascia and possibly the prostate gland. Additional involvement of the analsphincter complex--T4(a or b)N0M0  - Oncology plans for for total neoadjuvant chemotherapy followed by chemoRT followed by surgery  - Cycle 2 (5/27) of FOLFOX, second infusion 5/28   - Started Zarxio 5/30, then was held. Received 1 dsoe. Had leukocytosis (expected), per heme onc, OK to discharge.  - follow tumor lysis labs (uric acid, ldh, potassium, phosphorus)     # Iron deficiency anemia likely from LGIB from tumor  - On prior admission, Iron Saturation: 12%, Ferritin 34, MCV 68.9   - Venofer 200mg x 1 per heme/onc    # Subacute rib fracture noted last admission  - CT: Subacute fracture of the left 10th lateral rib  - Pain control PRN    # Onychomycosis  -f/u podiatry outpatient     # Bilateral knee pain  -likely 2/2 OA  -requesting steroid injection   - f/u outpatient for further management of OA   - lidocaine patch trial    # Misc  DVT ppx: Lovenox  GI ppx: Protonix  Diet: low fiber (was having diarrhea last admission)  Dispo: SNF d/c - patient refusing. convo's w/ daughter , patient has been stubborn  but is not safe to go home alone - patient lives alone. Daughter checks on him 1-2 times month and each time condition of house was poor per daughter  w/ feces, poor hygiene, pt without transportation for groceries, moldy food in refrigerator etc. Anticipate for discharge tomorrow (again) . Hem/onc team unable to persuade for safe discharge to rehab. Psych deemed patient not have have capacity. Daughter ok w/ NH

## 2021-06-05 LAB
FOLATE SERPL-MCNC: 18.7 NG/ML — SIGNIFICANT CHANGE UP
TSH SERPL-MCNC: 6.34 UIU/ML — HIGH (ref 0.27–4.2)
VIT B12 SERPL-MCNC: 1183 PG/ML — SIGNIFICANT CHANGE UP (ref 232–1245)

## 2021-06-05 PROCEDURE — 99232 SBSQ HOSP IP/OBS MODERATE 35: CPT

## 2021-06-05 RX ADMIN — CHLORHEXIDINE GLUCONATE 1 APPLICATION(S): 213 SOLUTION TOPICAL at 05:25

## 2021-06-05 RX ADMIN — LIDOCAINE 2 PATCH: 4 CREAM TOPICAL at 11:54

## 2021-06-05 RX ADMIN — HEPARIN SODIUM 5000 UNIT(S): 5000 INJECTION INTRAVENOUS; SUBCUTANEOUS at 11:55

## 2021-06-05 RX ADMIN — NYSTATIN CREAM 1 APPLICATION(S): 100000 CREAM TOPICAL at 17:52

## 2021-06-05 RX ADMIN — PANTOPRAZOLE SODIUM 40 MILLIGRAM(S): 20 TABLET, DELAYED RELEASE ORAL at 05:36

## 2021-06-05 RX ADMIN — HEPARIN SODIUM 5000 UNIT(S): 5000 INJECTION INTRAVENOUS; SUBCUTANEOUS at 21:24

## 2021-06-05 RX ADMIN — NYSTATIN CREAM 1 APPLICATION(S): 100000 CREAM TOPICAL at 05:36

## 2021-06-05 RX ADMIN — HEPARIN SODIUM 5000 UNIT(S): 5000 INJECTION INTRAVENOUS; SUBCUTANEOUS at 05:37

## 2021-06-05 NOTE — PROGRESS NOTE ADULT - SUBJECTIVE AND OBJECTIVE BOX
Patient is a 80y old  Male who presents with a chief complaint of Chemotherapy (04 Jun 2021 17:44)      Subjective: Pt seen and examined , lying on bed . Very upset and wishes to go home . Refusing to go home .       Vital Signs Last 24 Hrs  T(C): 36.6 (05 Jun 2021 05:09), Max: 37.3 (04 Jun 2021 21:00)  T(F): 97.9 (05 Jun 2021 05:09), Max: 99.1 (04 Jun 2021 21:00)  HR: 71 (05 Jun 2021 05:09) (71 - 94)  BP: 114/57 (05 Jun 2021 05:09) (114/57 - 130/60)  BP(mean): --  RR: 18 (05 Jun 2021 05:09) (18 - 19)  SpO2: --    PHYSICAL EXAM  GEN: No acute distress  HENT: NCAT, EOMI  NEURO: Awake and alert  Rest of exam deferred    MEDICATIONS  (STANDING):  chlorhexidine 4% Liquid 1 Application(s) Topical <User Schedule>  heparin   Injectable 5000 Unit(s) SubCutaneous every 8 hours  lidocaine   Patch 2 Patch Transdermal daily  nystatin Ointment 1 Application(s) Topical two times a day  pantoprazole    Tablet 40 milliGRAM(s) Oral before breakfast    MEDICATIONS  (PRN):  loperamide 2 milliGRAM(s) Oral five times a day PRN Diarrhea  loperamide 2 milliGRAM(s) Oral two times a day PRN Diarrhea  simethicone 80 milliGRAM(s) Chew two times a day PRN Gas      LABS:            Serial CBC's  06-03 @ 07:07  Hct-25.0 / Hgb-7.3 / Plat-181 / RBC-2.94 / WBC-16.15  Serial CBC's  06-02 @ 06:12  Hct-25.5 / Hgb-7.6 / Plat-211 / RBC-3.04 / WBC-15.24                                          BLOOD SMEAR INTERPRETATION:       RADIOLOGY & ADDITIONAL STUDIES:

## 2021-06-05 NOTE — PROGRESS NOTE ADULT - ASSESSMENT
Mr. Orellana is a 80M w/ no known PMHx (doesn't follow with doctors) with recently diagnosed locally advanced rectal adenocarcinoma.     # Dispo:     Patient has been incontinent and has not shown he is able to care for himself at home. His home was reportedly covered in feces, food was covered in mold and the house is essentially not safe to live in. However, he is refusing to go to rehab. Multiple physicians,  and social workers have tried to discuss that we do not feel it is safe for him to go back home and that we recommend rehab, however he is still refusing. He is stating he can walk however he can only attempt to stand with extreme difficulty and he was very off balance with the walker taking only 2 steps, additionally he was covered in feces and complete unaware. He clearly lacks insight regarding his ability to care for himself. Discharge to home would be unsafe however he is refusing to go back to rehab.   - Psychiatry evaluated him and agrees patient does not have the capacity to refuse nursing home     # Suspected dementia/sundowning/delirium:  - Neurology consult to assess for dementia    # Leukocytosis: Due to Zarxio, no need for further workup  - He will not get any further zarxio this admission, does not need any for DC    # Locally advanced rectal adenocarcinoma  - Hx begins in late April 2021 when he presented for loose stools, found to have rectal mass  - s/p biopsy showing adenocarcinoma  - MRI: 18 cm long polypoidal rectal mass with invasion of the right meso rectal fascia and possibly the prostate gland. Additional involvement of the analsphincter complex--T4(a or b)N0M0  - Planned for total neoadjuvant chemotherapy followed by chemoRT followed by surgery  - s/p port placement  - He received cycle 1 5/7/21  - He was supposed to start cycle 2 on 5/21, however his nursing home would not accept the 5FU infusional pump  - C2D1 was 5/27, completed FOLFOX late 5/29  - He received 1 dose of Zarxio, no need for further doses  - He is scheduled for June 10th for his next dose    Iron deficiency anemia likely from LGIB from tumor  - On prior admission, Iron Saturation: 12%, Ferritin 34, MCV 68.9   - Completed IV iron 1g  - Stop PO iron    Mycotic toenails:  - Debrided by podiatry, followup outpatient    Knee pain, likely OA:  - Lidocaine patch  - Outpatient followup  - PO pain meds if patient agreeable    DVT ppx

## 2021-06-06 LAB — T PALLIDUM AB TITR SER: NEGATIVE — SIGNIFICANT CHANGE UP

## 2021-06-06 PROCEDURE — 99232 SBSQ HOSP IP/OBS MODERATE 35: CPT

## 2021-06-06 RX ADMIN — PANTOPRAZOLE SODIUM 40 MILLIGRAM(S): 20 TABLET, DELAYED RELEASE ORAL at 05:20

## 2021-06-06 RX ADMIN — NYSTATIN CREAM 1 APPLICATION(S): 100000 CREAM TOPICAL at 17:34

## 2021-06-06 RX ADMIN — NYSTATIN CREAM 1 APPLICATION(S): 100000 CREAM TOPICAL at 05:20

## 2021-06-06 RX ADMIN — HEPARIN SODIUM 5000 UNIT(S): 5000 INJECTION INTRAVENOUS; SUBCUTANEOUS at 05:20

## 2021-06-06 RX ADMIN — CHLORHEXIDINE GLUCONATE 1 APPLICATION(S): 213 SOLUTION TOPICAL at 05:20

## 2021-06-06 RX ADMIN — Medication 2 MILLIGRAM(S): at 11:32

## 2021-06-06 RX ADMIN — HEPARIN SODIUM 5000 UNIT(S): 5000 INJECTION INTRAVENOUS; SUBCUTANEOUS at 21:17

## 2021-06-06 RX ADMIN — HEPARIN SODIUM 5000 UNIT(S): 5000 INJECTION INTRAVENOUS; SUBCUTANEOUS at 11:26

## 2021-06-06 RX ADMIN — LIDOCAINE 2 PATCH: 4 CREAM TOPICAL at 11:26

## 2021-06-06 NOTE — PROGRESS NOTE ADULT - SUBJECTIVE AND OBJECTIVE BOX
Patient is a 80y old  Male who presents with a chief complaint of Chemotherapy (05 Jun 2021 09:45)      Subjective: Pt seen and examined . Lying on bed . Has no new complaints and wishes to go home .       Vital Signs Last 24 Hrs  T(C): 35.7 (06 Jun 2021 04:59), Max: 37 (05 Jun 2021 20:04)  T(F): 96.3 (06 Jun 2021 04:59), Max: 98.6 (05 Jun 2021 20:04)  HR: 81 (06 Jun 2021 04:59) (79 - 95)  BP: 117/58 (06 Jun 2021 04:59) (117/58 - 145/69)  BP(mean): --  RR: 18 (06 Jun 2021 04:59) (18 - 19)  SpO2: --    PHYSICAL EXAM  General: adult in NAD  Refused physical exam     MEDICATIONS  (STANDING):  chlorhexidine 4% Liquid 1 Application(s) Topical <User Schedule>  heparin   Injectable 5000 Unit(s) SubCutaneous every 8 hours  lidocaine   Patch 2 Patch Transdermal daily  nystatin Ointment 1 Application(s) Topical two times a day  pantoprazole    Tablet 40 milliGRAM(s) Oral before breakfast    MEDICATIONS  (PRN):  loperamide 2 milliGRAM(s) Oral five times a day PRN Diarrhea  loperamide 2 milliGRAM(s) Oral two times a day PRN Diarrhea  simethicone 80 milliGRAM(s) Chew two times a day PRN Gas      LABS:            Serial CBC's  06-03 @ 07:07  Hct-25.0 / Hgb-7.3 / Plat-181 / RBC-2.94 / WBC-16.15                  Vitamin B12, Serum: 1183 pg/mL (06-05 @ 05:49)  Folate, Serum: 18.7 ng/mL (06-05 @ 05:49)                          BLOOD SMEAR INTERPRETATION:       RADIOLOGY & ADDITIONAL STUDIES:

## 2021-06-06 NOTE — PROGRESS NOTE ADULT - ASSESSMENT
Mr. Orellana is a 80M w/ no known PMHx (doesn't follow with doctors) with recently diagnosed locally advanced rectal adenocarcinoma.     # Dispo: Pending    Patient has been incontinent and has not shown he is able to care for himself at home. His home was reportedly covered in feces, food was covered in mold and the house is essentially not safe to live in. However, he is refusing to go to rehab. Multiple physicians,  and social workers have tried to discuss that we do not feel it is safe for him to go back home and that we recommend rehab, however he is still refusing. He is stating he can walk however he can only attempt to stand with extreme difficulty and he was very off balance with the walker taking only 2 steps, additionally he was covered in feces and complete unaware. He clearly lacks insight regarding his ability to care for himself. Discharge to home would be unsafe however he is refusing to go back to rehab.   - Psychiatry evaluated him and agrees patient does not have the capacity to refuse nursing home     # Suspected dementia/sundowning/delirium:  - Neurology consult appreciated , recommended REEG that is pending     # Leukocytosis: Due to Zarxio, no need for further workup  - He will not get any further zarxio this admission, does not need any for DC    # Locally advanced rectal adenocarcinoma  - Hx begins in late April 2021 when he presented for loose stools, found to have rectal mass  - s/p biopsy showing adenocarcinoma  - MRI: 18 cm long polypoidal rectal mass with invasion of the right meso rectal fascia and possibly the prostate gland. Additional involvement of the analsphincter complex--T4(a or b)N0M0  - Planned for total neoadjuvant chemotherapy followed by chemoRT followed by surgery  - s/p port placement  - He received cycle 1 5/7/21  - He was supposed to start cycle 2 on 5/21, however his nursing home would not accept the 5FU infusional pump  - C2D1 was 5/27, completed FOLFOX late 5/29  - He received 1 dose of Zarxio, no need for further doses  - He is scheduled for June 10th for his next dose    Iron deficiency anemia likely from LGIB from tumor  - On prior admission, Iron Saturation: 12%, Ferritin 34, MCV 68.9   - Completed IV iron 1g  - Stop PO iron    Mycotic toenails:  - Debrided by podiatry, followup outpatient    Knee pain, likely OA:  - Lidocaine patch  - Outpatient followup  - PO pain meds if patient agreeable    DVT ppx    Plan :   Dispo , awaiting d/c .   Routine labs in am .

## 2021-06-07 ENCOUNTER — TRANSCRIPTION ENCOUNTER (OUTPATIENT)
Age: 81
End: 2021-06-07

## 2021-06-07 VITALS
DIASTOLIC BLOOD PRESSURE: 58 MMHG | HEART RATE: 87 BPM | TEMPERATURE: 97 F | SYSTOLIC BLOOD PRESSURE: 118 MMHG | RESPIRATION RATE: 19 BRPM

## 2021-06-07 PROCEDURE — 99232 SBSQ HOSP IP/OBS MODERATE 35: CPT

## 2021-06-07 RX ADMIN — NYSTATIN CREAM 1 APPLICATION(S): 100000 CREAM TOPICAL at 05:35

## 2021-06-07 RX ADMIN — HEPARIN SODIUM 5000 UNIT(S): 5000 INJECTION INTRAVENOUS; SUBCUTANEOUS at 05:35

## 2021-06-07 RX ADMIN — PANTOPRAZOLE SODIUM 40 MILLIGRAM(S): 20 TABLET, DELAYED RELEASE ORAL at 05:35

## 2021-06-07 RX ADMIN — Medication 2 MILLIGRAM(S): at 16:26

## 2021-06-07 RX ADMIN — CHLORHEXIDINE GLUCONATE 1 APPLICATION(S): 213 SOLUTION TOPICAL at 05:35

## 2021-06-07 RX ADMIN — LIDOCAINE 2 PATCH: 4 CREAM TOPICAL at 11:31

## 2021-06-07 RX ADMIN — HEPARIN SODIUM 5000 UNIT(S): 5000 INJECTION INTRAVENOUS; SUBCUTANEOUS at 14:28

## 2021-06-07 NOTE — PROGRESS NOTE ADULT - PROVIDER SPECIALTY LIST ADULT
Heme/Onc
Internal Medicine
Heme/Onc
Internal Medicine
Internal Medicine
Heme/Onc
Heme/Onc
Internal Medicine
Internal Medicine
Heme/Onc
Heme/Onc
Internal Medicine
Internal Medicine

## 2021-06-07 NOTE — PROGRESS NOTE ADULT - SUBJECTIVE AND OBJECTIVE BOX
DAILY PROGRESS NOTE  ===========================================================    Patient Information:  MISAEL HUFFMAN  /  80y  /  Male  /  MRN#: [id number]    Hospital Day: 12d     |:::::::::::::::::::::::::::| SUBJECTIVE |:::::::::::::::::::::::::::|    OVERNIGHT EVENTS:   TODAY: Patient was seen today at bedside. Review of systems is otherwise negative.    |:::::::::::::::::::::::::::| OBJECTIVE |:::::::::::::::::::::::::::|    VITAL SIGNS: Last 24 Hours  T(C): 36 (07 Jun 2021 05:24), Max: 37.2 (06 Jun 2021 20:15)  T(F): 96.8 (07 Jun 2021 05:24), Max: 98.9 (06 Jun 2021 20:15)  HR: 84 (07 Jun 2021 05:24) (80 - 99)  BP: 119/58 (07 Jun 2021 05:24) (112/64 - 128/65)  BP(mean): --  RR: 18 (07 Jun 2021 05:24) (18 - 19)  SpO2: --    PHYSICAL EXAM:  GENERAL: NAD, well-developed, AAOx3  HEENT:  Atraumatic, Normocephalic. EOMI, PERRLA  PULMONARY: Clear to auscultation bilaterally; No wheeze  CARDIOVASCULAR: Regular rate and rhythm; No murmurs, rubs, or gallops  GASTROINTESTINAL: Soft, Nontender, Nondistended; Bowel sounds present  MUSCULOSKELETAL:  2+ Peripheral Pulses, No clubbing, cyanosis, or edema  NEUROLOGY: non-focal  SKIN: No rashes or lesions      MICROBIOLOGY:  GI PCR Panel, Stool (05.14.21 @ 13:43)   Culture Results:   GI PCR Results: NOT detected   RADIOLOGY:  < from: CT Head No Cont (05.26.21 @ 23:27) >  IMPRESSION:    1.  No CT evidence of acute intracranial pathology. Stable exam since 4/22/2021.    2.  Stable mild-moderate chronic microvascular changes.    < end of copied text >    ALLERGIES:  No Known Allergies      ===========================================================

## 2021-06-07 NOTE — DISCHARGE NOTE NURSING/CASE MANAGEMENT/SOCIAL WORK - PATIENT PORTAL LINK FT
You can access the FollowMyHealth Patient Portal offered by [facility] by registering at the following website: [email or web address]. By joining [employer]’s FollowMyHealth portal, you will also be able to view your health information using other applications (apps) compatible with our system.

## 2021-06-07 NOTE — PROGRESS NOTE ADULT - REASON FOR ADMISSION
Chemotherapy

## 2021-06-07 NOTE — PROGRESS NOTE ADULT - ASSESSMENT
Mr. Orellana is a 80M w/ no known PMHx (doesn't follow with doctors) with recently diagnosed locally advanced rectal adenocarcinoma.     # Locally advanced rectal adenocarcinoma  - Hx begins in late April 2021 when he presented for loose stools, found to have rectal mass  - s/p biopsy showing adenocarcinoma  - MRI: 18 cm long polypoidal rectal mass with invasion of the right meso rectal fascia and possibly the prostate gland. Additional involvement of the anal sphincter complex--T4(a or b)N0M0  - Planned for total neoadjuvant chemotherapy followed by chemoRT followed by surgery  - s/p port placement  - He received cycle 1 5/7/21  - C2D1 was 5/27, completed FOLFOX late 5/29  - He received 1 dose of Zarxio, no need for further doses  - He is scheduled for June 10th for his next dose    # Suspected dementia/sundowning/delirium:  - Neurology consult appreciated , recommended REEG that is pending     # Leukocytosis: Due to Zarxio, no need for further workup  - He will not get any further zarxio this admission, does not need any for DC    # Iron deficiency anemia likely from LGIB from tumor  - On prior admission, Iron Saturation: 12%, Ferritin 34, MCV 68.9   - Completed IV iron 1g  - Stop PO iron    # Mycotic toenails:  - Debrided by podiatry, followup outpatient    # Knee pain, likely OA:  - Lidocaine patch  - Outpatient followup  - PO pain meds if patient agreeable    #MISC  DVT ppx: Lovenox  Diet: Regular  Dispo:  awaiting d/c

## 2021-06-07 NOTE — PROGRESS NOTE ADULT - NSICDXPILOT_GEN_ALL_CORE
[place]

## 2021-06-07 NOTE — PROGRESS NOTE ADULT - ASSESSMENT
Mr. Orellana is a 80M w/ no known PMHx (doesn't follow with doctors) with recently diagnosed locally advanced rectal adenocarcinoma.     # Dispo: DC to rehab today  # Locally advanced rectal adenocarcinoma  - Hx begins in late April 2021 when he presented for loose stools, found to have rectal mass  - s/p biopsy showing adenocarcinoma  - MRI: 18 cm long polypoidal rectal mass with invasion of the right meso rectal fascia and possibly the prostate gland. Additional involvement of the analsphincter complex--T4(a or b)N0M0  - Planned for total neoadjuvant chemotherapy followed by chemoRT followed by surgery  - s/p port placement  - He received cycle 1 5/7/21  - He was supposed to start cycle 2 on 5/21, however his nursing home would not accept the 5FU infusional pump  - C2D1 was 5/27, completed FOLFOX late 5/29  - He received 1 dose of Zarxio, no need for further doses  - He is scheduled for June 10th for his next dose    Iron deficiency anemia likely from LGIB from tumor  - On prior admission, Iron Saturation: 12%, Ferritin 34, MCV 68.9   - Completed IV iron 1g  - Stop PO iron    Mycotic toenails:  - Debrided by podiatry, followup outpatient    Knee pain, likely OA:  - Lidocaine patch  - Outpatient followup  - PO pain meds if patient agreeable    DVT ppx    Plan :   DC to rehab, return for

## 2021-06-07 NOTE — PROGRESS NOTE ADULT - ATTENDING COMMENTS
He had an episode of diarrhea today. large, once,  confirmed by his RN    If diarrhea continues please check stool for Cdiff. Otherwise he can be discharged if diarrhea resolved and to c/w next cycle once due, possilby inpatient again.
Patient also seen and examined by myself. I agree with Dr. Azul's (Hem-Onc fellow) note above. Situation discussed with him and the patient. All questions answered.
seen/examined w/ hemonc fellow  note reviewed  case discused  d/c planning
seen/examined w/ hemonc fellow; note reviewed; case discussed
seen/examined w/ hemonc fellow; note reviewed; case discussed
seen/examined w/ hemonc fellow; note reviewed; case discussed    pt completed 2nd cycle of chemo ([id number]);    NOT safe to d/c home; he refuses to go to STR/NH; he lacks insight and judgement and does not appear to have a good capacity to make decision; will await for his prmary oncology evaluation on 6/7/21; meantime, will need neurology and psych follow ups;  evaluation daily; palliative care evaluation
Patient examined , alert and conversant , not clear if he has full capacity to make decisions. agree with psych evaluation .   S/P chemotherapy , no adverse effects thus far . monitor Hgb , may require transfusions if HgB <7.5 .
The patient was seen. Agree with above.  79 yo male with locally advanced rectal adenocarcinoma, s/p 2nd cycle FOLFOX.  Lab reviewed.  c/o knee joint pain.    -- Neupogen injection for 5 days to prevent neutropenia.  -- Orthopneic consult for knee pain.  -- Supportive care. Physical therapy.
The patient was seen. Agree with above.  Completed 2nd cycle FOLFOX.   Labs reviewed. WBC 59K. Will hold Neupogen.   Supportive care.  Physical therapy.
The patient was seen. Agree with above.  Completed chemotherapy and tolerated.  Labs reviewed.   Will give Neupogen for neutropenia prophylaxis.   Continue supportive care.
This is a patient with locally advanced rectal cancer who is undergoing neoadjuvant chemotherapy with FOLFOX. There is also lots of social issues and appreciate help from neurology and psychiatry.  Patient feels that his daughter just wants to take his house and we are preventing him from going home.    The patient completed his most recent cycle of chemotherapy and he spending discharge once his social issues are resolved
seen/examained w/ hemonc fellow Dr Azul; note reviewed; plan discussed    Multidisciplinary meeting took place re; disposition; pt lacks insight and judgement to understand it is not safe for him to go home and stay independent; besides, physically his balance is unstable and he is at risk of falls; if he falls and stays alone, he may develop rhabdomyolysis, renal failure, cardiac failure, death; psych eval and neurology eval should be placed. In my opinion, pt should not be discharged home.

## 2021-06-10 ENCOUNTER — INPATIENT (INPATIENT)
Facility: HOSPITAL | Age: 81
LOS: 4 days | Discharge: NURSING, REHAB OR LONG-TERM CARE | End: 2021-06-15
Attending: INTERNAL MEDICINE | Admitting: INTERNAL MEDICINE
Payer: MEDICARE

## 2021-06-10 VITALS
DIASTOLIC BLOOD PRESSURE: 63 MMHG | HEIGHT: 71 IN | SYSTOLIC BLOOD PRESSURE: 132 MMHG | WEIGHT: 149.91 LBS | RESPIRATION RATE: 17 BRPM | TEMPERATURE: 96 F | HEART RATE: 80 BPM | OXYGEN SATURATION: 100 %

## 2021-06-10 DIAGNOSIS — S68.119A COMPLETE TRAUMATIC METACARPOPHALANGEAL AMPUTATION OF UNSPECIFIED FINGER, INITIAL ENCOUNTER: Chronic | ICD-10-CM

## 2021-06-10 LAB
ALBUMIN SERPL ELPH-MCNC: 2.8 G/DL — LOW (ref 3.5–5.2)
ALP SERPL-CCNC: 81 U/L — SIGNIFICANT CHANGE UP (ref 30–115)
ALT FLD-CCNC: 11 U/L — SIGNIFICANT CHANGE UP (ref 0–41)
ANION GAP SERPL CALC-SCNC: 11 MMOL/L — SIGNIFICANT CHANGE UP (ref 7–14)
ANISOCYTOSIS BLD QL: SIGNIFICANT CHANGE UP
AST SERPL-CCNC: 18 U/L — SIGNIFICANT CHANGE UP (ref 0–41)
BASOPHILS # BLD AUTO: 0.07 K/UL — SIGNIFICANT CHANGE UP (ref 0–0.2)
BASOPHILS NFR BLD AUTO: 1.8 % — HIGH (ref 0–1)
BILIRUB SERPL-MCNC: 0.3 MG/DL — SIGNIFICANT CHANGE UP (ref 0.2–1.2)
BUN SERPL-MCNC: 11 MG/DL — SIGNIFICANT CHANGE UP (ref 10–20)
CALCIUM SERPL-MCNC: 8.6 MG/DL — SIGNIFICANT CHANGE UP (ref 8.5–10.1)
CHLORIDE SERPL-SCNC: 99 MMOL/L — SIGNIFICANT CHANGE UP (ref 98–110)
CO2 SERPL-SCNC: 23 MMOL/L — SIGNIFICANT CHANGE UP (ref 17–32)
CREAT SERPL-MCNC: 0.5 MG/DL — LOW (ref 0.7–1.5)
ELLIPTOCYTES BLD QL SMEAR: SLIGHT — SIGNIFICANT CHANGE UP
EOSINOPHIL # BLD AUTO: 0.23 K/UL — SIGNIFICANT CHANGE UP (ref 0–0.7)
EOSINOPHIL NFR BLD AUTO: 6.3 % — SIGNIFICANT CHANGE UP (ref 0–8)
GIANT PLATELETS BLD QL SMEAR: PRESENT — SIGNIFICANT CHANGE UP
GLUCOSE SERPL-MCNC: 130 MG/DL — HIGH (ref 70–99)
HCT VFR BLD CALC: 28.7 % — LOW (ref 42–52)
HGB BLD-MCNC: 8.5 G/DL — LOW (ref 14–18)
LYMPHOCYTES # BLD AUTO: 1.37 K/UL — SIGNIFICANT CHANGE UP (ref 1.2–3.4)
LYMPHOCYTES # BLD AUTO: 37.5 % — SIGNIFICANT CHANGE UP (ref 20.5–51.1)
MANUAL SMEAR VERIFICATION: SIGNIFICANT CHANGE UP
MCHC RBC-ENTMCNC: 25.8 PG — LOW (ref 27–31)
MCHC RBC-ENTMCNC: 29.6 G/DL — LOW (ref 32–37)
MCV RBC AUTO: 87.2 FL — SIGNIFICANT CHANGE UP (ref 80–94)
MICROCYTES BLD QL: SIGNIFICANT CHANGE UP
MONOCYTES # BLD AUTO: 0.75 K/UL — HIGH (ref 0.1–0.6)
MONOCYTES NFR BLD AUTO: 20.5 % — HIGH (ref 1.7–9.3)
NEUTROPHILS # BLD AUTO: 1.2 K/UL — LOW (ref 1.4–6.5)
NEUTROPHILS NFR BLD AUTO: 33 % — LOW (ref 42.2–75.2)
OVALOCYTES BLD QL SMEAR: SLIGHT — SIGNIFICANT CHANGE UP
PLAT MORPH BLD: NORMAL — SIGNIFICANT CHANGE UP
PLATELET # BLD AUTO: 249 K/UL — SIGNIFICANT CHANGE UP (ref 130–400)
POIKILOCYTOSIS BLD QL AUTO: SIGNIFICANT CHANGE UP
POLYCHROMASIA BLD QL SMEAR: SIGNIFICANT CHANGE UP
POTASSIUM SERPL-MCNC: 4.2 MMOL/L — SIGNIFICANT CHANGE UP (ref 3.5–5)
POTASSIUM SERPL-SCNC: 4.2 MMOL/L — SIGNIFICANT CHANGE UP (ref 3.5–5)
PROMYELOCYTES # FLD: 0.9 % — HIGH (ref 0–0)
PROT SERPL-MCNC: 6.6 G/DL — SIGNIFICANT CHANGE UP (ref 6–8)
RBC # BLD: 3.29 M/UL — LOW (ref 4.7–6.1)
RBC # FLD: 24.5 % — HIGH (ref 11.5–14.5)
RBC BLD AUTO: ABNORMAL
SARS-COV-2 RNA SPEC QL NAA+PROBE: SIGNIFICANT CHANGE UP
SODIUM SERPL-SCNC: 133 MMOL/L — LOW (ref 135–146)
WBC # BLD: 3.64 K/UL — LOW (ref 4.8–10.8)
WBC # FLD AUTO: 3.64 K/UL — LOW (ref 4.8–10.8)

## 2021-06-10 PROCEDURE — 93010 ELECTROCARDIOGRAM REPORT: CPT

## 2021-06-10 PROCEDURE — 99285 EMERGENCY DEPT VISIT HI MDM: CPT

## 2021-06-10 RX ORDER — ENOXAPARIN SODIUM 100 MG/ML
40 INJECTION SUBCUTANEOUS AT BEDTIME
Refills: 0 | Status: DISCONTINUED | OUTPATIENT
Start: 2021-06-10 | End: 2021-06-15

## 2021-06-10 RX ORDER — PANTOPRAZOLE SODIUM 20 MG/1
40 TABLET, DELAYED RELEASE ORAL
Refills: 0 | Status: DISCONTINUED | OUTPATIENT
Start: 2021-06-10 | End: 2021-06-15

## 2021-06-10 RX ORDER — CHLORHEXIDINE GLUCONATE 213 G/1000ML
1 SOLUTION TOPICAL
Refills: 0 | Status: DISCONTINUED | OUTPATIENT
Start: 2021-06-10 | End: 2021-06-15

## 2021-06-10 RX ORDER — SIMETHICONE 80 MG/1
80 TABLET, CHEWABLE ORAL
Refills: 0 | Status: DISCONTINUED | OUTPATIENT
Start: 2021-06-10 | End: 2021-06-15

## 2021-06-10 RX ORDER — OXYCODONE AND ACETAMINOPHEN 5; 325 MG/1; MG/1
1 TABLET ORAL EVERY 6 HOURS
Refills: 0 | Status: DISCONTINUED | OUTPATIENT
Start: 2021-06-10 | End: 2021-06-15

## 2021-06-10 RX ADMIN — ENOXAPARIN SODIUM 40 MILLIGRAM(S): 100 INJECTION SUBCUTANEOUS at 21:39

## 2021-06-10 NOTE — H&P ADULT - HISTORY OF PRESENT ILLNESS
This is a 80 year old male with Advanced rectal adenocarcinoma who presented from SNF for chemotherapy admission. Of note the patient reports recent diagnosis of this cancer and is here for FOLFOX cycle 3. He will be requiring a prolonged infusion and given his status at a SNF he requires admission to the hospital for each cycle of treatment. His last cycle was 2 weeks ago and he was discharged. Hospital course at that time complicated by some confusion likely secondary to dementia with neurology advising outpatient follow up .    In the ED initial vitals: /63, HR 80, Sat 100% on room air, T 96. Labs overall unremarkable when compared to his recent labs from discharge a few days ago. He endorsed no acute complaints but did note some frustration with his cancer and the treatment course he is incurring.

## 2021-06-10 NOTE — ED ADULT TRIAGE NOTE - TEMPERATURE IN FAHRENHEIT (DEGREES F)
Impression: Presbyopia: H52.4. Plan: Discussed condition. New mrx given today. Pt to call with any concerns. 96

## 2021-06-10 NOTE — ED PROVIDER NOTE - OBJECTIVE STATEMENT
79 y/o male with a PMH of rectal adenocarcinoma (dx in february 2021) on chemotherapy presents to the ED from [facility] sent in by oncologist for chemotherapy because it cannot be done at the nursing facility. pt reports he has had multiple rounds of chemo therapy he lost count. pt denies fever, chills, cough, chest pain, sob, back pain, abdominal pain, n/v/d/c, leg pain, leg swelling, weakness, dizziness, or rashes.

## 2021-06-10 NOTE — H&P ADULT - NSHPLABSRESULTS_GEN_ALL_CORE
8.5    3.64  )-----------( 249      ( 10 Gonzalo 2021 14:56 )             28.7       06-10    133<L>  |  99  |  11  ----------------------------<  130<H>  4.2   |  23  |  0.5<L>    Ca    8.6      10 Gonzalo 2021 14:56    TPro  6.6  /  Alb  2.8<L>  /  TBili  0.3  /  DBili  x   /  AST  18  /  ALT  11  /  AlkPhos  81  06-10    Culture Results:   GI PCR Results: NOT detected  *******Please Note:*******  GI panel PCR evaluates for:  Campylobacter, Plesiomonas shigelloides, Salmonella,  Vibrio, Yersinia enterocolitica, Enteroaggregative  Escherichia coli (EAEC), Enteropathogenic E.coli (EPEC),  Enterotoxigenic E. coli (ETEC) lt/st, Shiga-like  toxin-producing E. coli (STEC) stx1/stx2,  Shigella/ Enteroinvasive E. coli (EIEC), Cryptosporidium,  Cyclospora cayetanensis, Entamoeba histolytica,  Giardia lamblia, Adenovirus F 40/41, Astrovirus,  Norovirus GI/GII, Rotavirus A, Sapovirus (05-14 @ 13:43)  Culture Results:   No Growth Final (05-13 @ 20:42)  Culture Results:   >100,000 CFU/ml Pseudomonas aeruginosa (05-12 @ 17:02)

## 2021-06-10 NOTE — H&P ADULT - ASSESSMENT
This is a 80 year old male with Advanced rectal adenocarcinoma who presented from SNF for chemotherapy admission.    #Locally advanced rectal adenocarcinoma  - Admit to heme/onc service   - Cycle 3 of FOLFOX due today  - MRI noted for 18cm long polypoidal rectal mass with invasion of the right meso rectal fascia and possibly the prostate gland. T4(a or b)N0M0  - Port in place     #Microcytic Anemia (iron deficient secondary to suspected LGIB from tumor)  - Iron sats from prior workup noted  - Hemoglobin today stable at 8.7     Activity: As tolerated  Diet: Regular  DVT ppx: Lovenox  GI ppx: Protonix  Code Status: Full Code  DISPO: Acute

## 2021-06-10 NOTE — ED PROVIDER NOTE - ATTENDING CONTRIBUTION TO CARE
80 yr old m w/ a pmh significant for rectal adenocarcinoma who presents for admission. Pt was sent to the ED for admission, as he can not start chemotherapy while in the NH. Pt has no medical complaints.     VITAL SIGNS: I have reviewed nursing notes and confirm.  CONSTITUTIONAL: non-toxic, well appearing  SKIN: no rash, no petechiae.  EYES: EOMI, pink conjunctiva, anicteric  ENT: tongue midline, no exudates, MMM  NECK: Supple; no meningismus, no JVD  CARD: RRR,   RESP: CTAB, no respiratory distress  ABD: Soft, non-tender, non-distended, no peritoneal signs, no HSM  EXT: Normal ROM x4. No edema. No calves tenderness  NEURO: Alert, oriented. CN2-12 intact, equal strength bilaterally.  PSYCH: Cooperative, appropriate.    a/p  80 yr old m that presents for admission   -labs  -ekg  -imaging  -admit

## 2021-06-10 NOTE — H&P ADULT - NSHPPHYSICALEXAM_GEN_ALL_CORE
T(C): 35.6 (06-10-21 @ 13:59), Max: 35.6 (06-10-21 @ 13:59)  HR: 80 (06-10-21 @ 13:59) (80 - 80)  BP: 132/63 (06-10-21 @ 13:59) (132/63 - 132/63)  RR: 17 (06-10-21 @ 13:59) (17 - 17)  SpO2: 100% (06-10-21 @ 13:59) (100% - 100%)    PHYSICAL EXAM:  GENERAL: NAD, well-developed  HEAD:  Atraumatic, Normocephalic  EYES: EOMI, PERRLA, conjunctiva and sclera clear  ENT:No nasal obstruction or discharge. No tonsillar exudate, swelling or erythema.  NECK: Supple, No JVD  CHEST/LUNG: Clear to auscultation bilaterally; No wheeze  HEART: Regular rate and rhythm; No murmurs, rubs, or gallops  ABDOMEN: Soft, Nontender, Nondistended; Bowel sounds present  EXTREMITIES:  2+ Peripheral Pulses, No clubbing, cyanosis, or edema; digit amputation on left hand 2/3  PSYCH: AAOx3  NEUROLOGY: non-focal, muscle strength 5/5 bilaterally upper and lower extremities, sensation equal and intact, cranial nerves ii-xii grossly intact  SKIN: No rashes or lesions

## 2021-06-10 NOTE — ED PROVIDER NOTE - PHYSICAL EXAMINATION
Physical Exam    Vital Signs: I have reviewed the initial vital signs.  Constitutional: well-nourished, appears stated age, no acute distress  Eyes: Conjunctiva pink, Sclera clear  Cardiovascular: S1 and S2, regular rate, regular rhythm, well-perfused extremities, radial pulses equal and 2+ b/l.   Respiratory: unlabored respiratory effort, clear to auscultation bilaterally no wheezing, rales and rhonchi. pt is speaking full sentences. no accessory muscle use.   Gastrointestinal: soft, non-tender, nondistended abdomen, no pulsatile mass, normal bowl sounds, no rebound, no guarding, no organomegaly.   Musculoskeletal: supple neck, no lower extremity edema, no calf tenderness, FROM of b/l upper and lower extremities.   Integumentary: warm, dry, no rash  Neurologic: awake, alert, cranial nerves II-XII grossly intact, extremities’ motor and sensory functions grossly intact. steady gait.   Psychiatric: appropriate mood, appropriate affect

## 2021-06-11 LAB
ANION GAP SERPL CALC-SCNC: 9 MMOL/L — SIGNIFICANT CHANGE UP (ref 7–14)
BASOPHILS # BLD AUTO: 0.08 K/UL — SIGNIFICANT CHANGE UP (ref 0–0.2)
BASOPHILS NFR BLD AUTO: 2.3 % — HIGH (ref 0–1)
BUN SERPL-MCNC: 10 MG/DL — SIGNIFICANT CHANGE UP (ref 10–20)
CALCIUM SERPL-MCNC: 8.5 MG/DL — SIGNIFICANT CHANGE UP (ref 8.5–10.1)
CHLORIDE SERPL-SCNC: 102 MMOL/L — SIGNIFICANT CHANGE UP (ref 98–110)
CO2 SERPL-SCNC: 27 MMOL/L — SIGNIFICANT CHANGE UP (ref 17–32)
COVID-19 SPIKE DOMAIN AB INTERP: POSITIVE
COVID-19 SPIKE DOMAIN ANTIBODY RESULT: 6.04 U/ML — HIGH
CREAT SERPL-MCNC: <0.5 MG/DL — LOW (ref 0.7–1.5)
EOSINOPHIL # BLD AUTO: 0.15 K/UL — SIGNIFICANT CHANGE UP (ref 0–0.7)
EOSINOPHIL NFR BLD AUTO: 4.4 % — SIGNIFICANT CHANGE UP (ref 0–8)
GLUCOSE SERPL-MCNC: 83 MG/DL — SIGNIFICANT CHANGE UP (ref 70–99)
HCT VFR BLD CALC: 27.2 % — LOW (ref 42–52)
HGB BLD-MCNC: 7.9 G/DL — LOW (ref 14–18)
IMM GRANULOCYTES NFR BLD AUTO: 5.3 % — HIGH (ref 0.1–0.3)
LYMPHOCYTES # BLD AUTO: 0.98 K/UL — LOW (ref 1.2–3.4)
LYMPHOCYTES # BLD AUTO: 28.7 % — SIGNIFICANT CHANGE UP (ref 20.5–51.1)
MAGNESIUM SERPL-MCNC: 1.8 MG/DL — SIGNIFICANT CHANGE UP (ref 1.8–2.4)
MCHC RBC-ENTMCNC: 25.3 PG — LOW (ref 27–31)
MCHC RBC-ENTMCNC: 29 G/DL — LOW (ref 32–37)
MCV RBC AUTO: 87.2 FL — SIGNIFICANT CHANGE UP (ref 80–94)
MONOCYTES # BLD AUTO: 0.98 K/UL — HIGH (ref 0.1–0.6)
MONOCYTES NFR BLD AUTO: 28.7 % — HIGH (ref 1.7–9.3)
NEUTROPHILS # BLD AUTO: 1.04 K/UL — LOW (ref 1.4–6.5)
NEUTROPHILS NFR BLD AUTO: 30.6 % — LOW (ref 42.2–75.2)
NRBC # BLD: 0 /100 WBCS — SIGNIFICANT CHANGE UP (ref 0–0)
PLATELET # BLD AUTO: 295 K/UL — SIGNIFICANT CHANGE UP (ref 130–400)
POTASSIUM SERPL-MCNC: 4 MMOL/L — SIGNIFICANT CHANGE UP (ref 3.5–5)
POTASSIUM SERPL-SCNC: 4 MMOL/L — SIGNIFICANT CHANGE UP (ref 3.5–5)
RBC # BLD: 3.12 M/UL — LOW (ref 4.7–6.1)
RBC # FLD: 23.9 % — HIGH (ref 11.5–14.5)
SARS-COV-2 IGG+IGM SERPL QL IA: 6.04 U/ML — HIGH
SARS-COV-2 IGG+IGM SERPL QL IA: POSITIVE
SODIUM SERPL-SCNC: 138 MMOL/L — SIGNIFICANT CHANGE UP (ref 135–146)
WBC # BLD: 3.41 K/UL — LOW (ref 4.8–10.8)
WBC # FLD AUTO: 3.41 K/UL — LOW (ref 4.8–10.8)

## 2021-06-11 PROCEDURE — 99223 1ST HOSP IP/OBS HIGH 75: CPT

## 2021-06-11 RX ORDER — LOPERAMIDE HCL 2 MG
2 TABLET ORAL EVERY 4 HOURS
Refills: 0 | Status: DISCONTINUED | OUTPATIENT
Start: 2021-06-11 | End: 2021-06-15

## 2021-06-11 RX ADMIN — CHLORHEXIDINE GLUCONATE 1 APPLICATION(S): 213 SOLUTION TOPICAL at 06:15

## 2021-06-11 RX ADMIN — ENOXAPARIN SODIUM 40 MILLIGRAM(S): 100 INJECTION SUBCUTANEOUS at 21:50

## 2021-06-11 RX ADMIN — Medication 2 MILLIGRAM(S): at 20:39

## 2021-06-11 NOTE — PROGRESS NOTE ADULT - SUBJECTIVE AND OBJECTIVE BOX
SUBJECTIVE:  HPI:  This is a 80 year old male with Advanced rectal adenocarcinoma who presented from SNF for chemotherapy admission. Of note the patient reports recent diagnosis of this cancer and is here for FOLFOX cycle 3. He will be requiring a prolonged infusion and given his status at a SNF he requires admission to the hospital for each cycle of treatment. His last cycle was 2 weeks ago and he was discharged. Hospital course at that time complicated by some confusion likely secondary to dementia with neurology advising outpatient follow up .    In the ED initial vitals: /63, HR 80, Sat 100% on room air, T 96. Labs overall unremarkable when compared to his recent labs from discharge a few days ago. He endorsed no acute complaints but did note some frustration with his cancer and the treatment course he is incurring.  (10 Gonzalo 2021 16:05)      PAST MEDICAL & SURGICAL HISTORY  PAST MEDICAL & SURGICAL HISTORY:  No pertinent past medical history    Amputation of digit of left hand      SOCIAL HISTORY:    ALLERGIES:  No Known Allergies    MEDICATIONS:  STANDING MEDICATIONS  chlorhexidine 4% Liquid 1 Application(s) Topical <User Schedule>  enoxaparin Injectable 40 milliGRAM(s) SubCutaneous at bedtime  pantoprazole    Tablet 40 milliGRAM(s) Oral before breakfast    PRN MEDICATIONS  loperamide 2 milliGRAM(s) Oral every 4 hours PRN  oxycodone    5 mG/acetaminophen 325 mG 1 Tablet(s) Oral every 6 hours PRN  simethicone 80 milliGRAM(s) Chew two times a day PRN    VITALS:   T(F): 99.2  HR: 77  BP: 102/53  RR: 18  SpO2: 96%    LABS:                        8.5    3.64  )-----------( 249      ( 10 Gonzalo 2021 14:56 )             28.7     06-10    133<L>  |  99  |  11  ----------------------------<  130<H>  4.2   |  23  |  0.5<L>    Ca    8.6      10 Gonzalo 2021 14:56    TPro  6.6  /  Alb  2.8<L>  /  TBili  0.3  /  DBili  x   /  AST  18  /  ALT  11  /  AlkPhos  81  06-10                  RADIOLOGY:    PHYSICAL EXAM:  GEN: No acute distress  HEENT: AT/NC PEERLA, EOMI  LUNGS: Clear to auscultation bilaterally  HEART: S1/S2 present  ABD: Soft, non-tender, non-distended  EXT: No edema, no rashes, no cyanosis  NEURO: AAOX3

## 2021-06-11 NOTE — PROGRESS NOTE ADULT - SUBJECTIVE AND OBJECTIVE BOX
24H events:    Patient is doing well, he has no complaints today  His ANC is 1040 today, will hold off on chemo today and reassess tomorrow     PAST MEDICAL & SURGICAL HISTORY  No pertinent past medical history    Amputation of digit of left hand      SOCIAL HISTORY:  Negative for smoking/alcohol/drug use.     ALLERGIES:  No Known Allergies    MEDICATIONS:  STANDING MEDICATIONS  chlorhexidine 4% Liquid 1 Application(s) Topical <User Schedule>  enoxaparin Injectable 40 milliGRAM(s) SubCutaneous at bedtime  pantoprazole    Tablet 40 milliGRAM(s) Oral before breakfast    PRN MEDICATIONS  loperamide 2 milliGRAM(s) Oral every 4 hours PRN  oxycodone    5 mG/acetaminophen 325 mG 1 Tablet(s) Oral every 6 hours PRN  simethicone 80 milliGRAM(s) Chew two times a day PRN    VITALS:   T(F): 99  HR: 93  BP: 107/53  RR: 18  SpO2: 96%    LABS:                        7.9    3.41  )-----------( 295      ( 11 Jun 2021 04:30 )             27.2     06-11    138  |  102  |  10  ----------------------------<  83  4.0   |  27  |  <0.5<L>    Ca    8.5      11 Jun 2021 04:30  Mg     1.8     06-11    TPro  6.6  /  Alb  2.8<L>  /  TBili  0.3  /  DBili  x   /  AST  18  /  ALT  11  /  AlkPhos  81  06-10                  RADIOLOGY:    PHYSICAL EXAM:  GEN: No acute distress  HENT: NCAT, EOMI  LYMPH: No appreciable adenopathy  LUNGS: No respiratory distress, clear to auscultation bilaterally   HEART: regular rate and rhythm  ABD: Soft, non-tender, non-distended  SKIN: No rash  EXT: fingertips amputated on 2 fingers  NEURO: He is alert and oriented, he is answering questions appropriately

## 2021-06-11 NOTE — PROGRESS NOTE ADULT - ASSESSMENT
Mr. Orellana is a 80M w/ no known PMHx (doesn't follow with doctors) with recently diagnosed locally advanced rectal adenocarcinoma.     # Locally advanced rectal adenocarcinoma (T4N0M0)  - Diagnosed late APril 2021 - MRI: 18 cm long polypoidal rectal mass with invasion of the right meso rectal fascia and possibly the prostate gland. Additional involvement of the analsphincter complex--T4(a or b)N0M0  - Planned for total neoadjuvant chemotherapy followed by chemoRT followed by surgery  - s/p port placement  - He received cycle 1 5/7/21  - C2D1 was 5/27 (delayed due to logistical reasons), he received 1 day of Zarxio  - We are holding off on chemo today due to neutropenia, 1200 yesterday and 1040 today, will reassess tomorrow, will either hold again or dose reduce  - He is able to express why we are offering chemotherapy and the risks and benefits. We discussed side effects including but not limited to cytopenias which increase the risk of bleeding and infection, fatigue, nausea, vomiting, diarrhea and neuropathy.    Iron deficiency anemia likely from LGIB from tumor  - On prior admission, Iron Saturation: 12%, Ferritin 34, MCV 68.9   - Completed IV iron 1g  - Recheck iron studies early July    Mycotic toenails:  - Debrided by podiatry, followup outpatient    Knee pain, likely OA:  - Lidocaine patch  - Outpatient followup  - PO pain meds if patient agreeable    DVT ppx    Plan :   DC to rehab, he will need to return 2 weeks after the start of C3 and he will need a CBC and CMP 1 day prior to tx

## 2021-06-11 NOTE — PATIENT PROFILE ADULT - ...
What Type Of Note Output Would You Prefer (Optional)?: Bullet Format 11-Jun-2021 03:36:33 How Severe Is Your Rash?: mild Is This A New Presentation, Or A Follow-Up?: Rash Additional History: Patient states she’s always had dry itchy skin on knees and hands for years, but recent flare on nose over the last week

## 2021-06-11 NOTE — PROGRESS NOTE ADULT - ASSESSMENT
This is a 80 year old male with Advanced rectal adenocarcinoma who presented from SNF for chemotherapy admission.    #Locally Advanced Rectal Adenocarcinoma  - MRI noted for 18cm long polypoidal rectal mass with invasion of the right meso rectal fascia and possibly the prostate gland. T4(a or b)N0M0  - Admit to heme/onc service   - Cycle 3 of FOLFOX due today  - Port in place   - Monitor for TLS    #Microcytic Anemia (iron deficient secondary to suspected LGIB from tumor)  - Iron studies from prior workup noted  - Hemoglobin today stable at 8.7     Activity: As Tolerated  Diet: Regular  DVT ppx: Lovenox  GI ppx: Protonix  Code Status: Full Code  DISPO: Acute
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
## 2021-06-12 LAB
ALBUMIN SERPL ELPH-MCNC: 2.6 G/DL — LOW (ref 3.5–5.2)
ALP SERPL-CCNC: 64 U/L — SIGNIFICANT CHANGE UP (ref 30–115)
ALT FLD-CCNC: 9 U/L — SIGNIFICANT CHANGE UP (ref 0–41)
ANION GAP SERPL CALC-SCNC: 9 MMOL/L — SIGNIFICANT CHANGE UP (ref 7–14)
AST SERPL-CCNC: 14 U/L — SIGNIFICANT CHANGE UP (ref 0–41)
BASOPHILS # BLD AUTO: 0.09 K/UL — SIGNIFICANT CHANGE UP (ref 0–0.2)
BASOPHILS NFR BLD AUTO: 1.7 % — HIGH (ref 0–1)
BILIRUB SERPL-MCNC: 0.2 MG/DL — SIGNIFICANT CHANGE UP (ref 0.2–1.2)
BLD GP AB SCN SERPL QL: SIGNIFICANT CHANGE UP
BUN SERPL-MCNC: 11 MG/DL — SIGNIFICANT CHANGE UP (ref 10–20)
CALCIUM SERPL-MCNC: 8.3 MG/DL — LOW (ref 8.5–10.1)
CHLORIDE SERPL-SCNC: 102 MMOL/L — SIGNIFICANT CHANGE UP (ref 98–110)
CO2 SERPL-SCNC: 25 MMOL/L — SIGNIFICANT CHANGE UP (ref 17–32)
CREAT SERPL-MCNC: 0.5 MG/DL — LOW (ref 0.7–1.5)
EOSINOPHIL # BLD AUTO: 0.21 K/UL — SIGNIFICANT CHANGE UP (ref 0–0.7)
EOSINOPHIL NFR BLD AUTO: 4 % — SIGNIFICANT CHANGE UP (ref 0–8)
GLUCOSE SERPL-MCNC: 96 MG/DL — SIGNIFICANT CHANGE UP (ref 70–99)
HCT VFR BLD CALC: 26.4 % — LOW (ref 42–52)
HGB BLD-MCNC: 7.6 G/DL — LOW (ref 14–18)
IMM GRANULOCYTES NFR BLD AUTO: 7.9 % — HIGH (ref 0.1–0.3)
LDH SERPL L TO P-CCNC: 238 U/L — SIGNIFICANT CHANGE UP (ref 50–242)
LYMPHOCYTES # BLD AUTO: 1.61 K/UL — SIGNIFICANT CHANGE UP (ref 1.2–3.4)
LYMPHOCYTES # BLD AUTO: 31 % — SIGNIFICANT CHANGE UP (ref 20.5–51.1)
MCHC RBC-ENTMCNC: 25.3 PG — LOW (ref 27–31)
MCHC RBC-ENTMCNC: 28.8 G/DL — LOW (ref 32–37)
MCV RBC AUTO: 88 FL — SIGNIFICANT CHANGE UP (ref 80–94)
MONOCYTES # BLD AUTO: 1.28 K/UL — HIGH (ref 0.1–0.6)
MONOCYTES NFR BLD AUTO: 24.7 % — HIGH (ref 1.7–9.3)
NEUTROPHILS # BLD AUTO: 1.59 K/UL — SIGNIFICANT CHANGE UP (ref 1.4–6.5)
NEUTROPHILS NFR BLD AUTO: 30.7 % — LOW (ref 42.2–75.2)
NRBC # BLD: 0 /100 WBCS — SIGNIFICANT CHANGE UP (ref 0–0)
PLATELET # BLD AUTO: 300 K/UL — SIGNIFICANT CHANGE UP (ref 130–400)
POTASSIUM SERPL-MCNC: 4.1 MMOL/L — SIGNIFICANT CHANGE UP (ref 3.5–5)
POTASSIUM SERPL-SCNC: 4.1 MMOL/L — SIGNIFICANT CHANGE UP (ref 3.5–5)
PROT SERPL-MCNC: 5.6 G/DL — LOW (ref 6–8)
RBC # BLD: 3 M/UL — LOW (ref 4.7–6.1)
RBC # FLD: 23.4 % — HIGH (ref 11.5–14.5)
SODIUM SERPL-SCNC: 136 MMOL/L — SIGNIFICANT CHANGE UP (ref 135–146)
URATE SERPL-MCNC: 3.8 MG/DL — SIGNIFICANT CHANGE UP (ref 3.4–8.8)
WBC # BLD: 5.19 K/UL — SIGNIFICANT CHANGE UP (ref 4.8–10.8)
WBC # FLD AUTO: 5.19 K/UL — SIGNIFICANT CHANGE UP (ref 4.8–10.8)

## 2021-06-12 PROCEDURE — 99232 SBSQ HOSP IP/OBS MODERATE 35: CPT

## 2021-06-12 RX ORDER — ONDANSETRON 8 MG/1
16 TABLET, FILM COATED ORAL ONCE
Refills: 0 | Status: COMPLETED | OUTPATIENT
Start: 2021-06-12 | End: 2021-06-12

## 2021-06-12 RX ORDER — LANOLIN ALCOHOL/MO/W.PET/CERES
5 CREAM (GRAM) TOPICAL AT BEDTIME
Refills: 0 | Status: DISCONTINUED | OUTPATIENT
Start: 2021-06-12 | End: 2021-06-15

## 2021-06-12 RX ORDER — DEXAMETHASONE 0.5 MG/5ML
12 ELIXIR ORAL ONCE
Refills: 0 | Status: COMPLETED | OUTPATIENT
Start: 2021-06-12 | End: 2021-06-12

## 2021-06-12 RX ORDER — OXALIPLATIN 5 MG/ML
165 INJECTION, SOLUTION INTRAVENOUS ONCE
Refills: 0 | Status: COMPLETED | OUTPATIENT
Start: 2021-06-12 | End: 2021-06-12

## 2021-06-12 RX ORDER — FOSAPREPITANT DIMEGLUMINE 150 MG/5ML
150 INJECTION, POWDER, LYOPHILIZED, FOR SOLUTION INTRAVENOUS ONCE
Refills: 0 | Status: COMPLETED | OUTPATIENT
Start: 2021-06-12 | End: 2021-06-12

## 2021-06-12 RX ORDER — FLUOROURACIL 50 MG/ML
775 INJECTION, SOLUTION INTRAVENOUS ONCE
Refills: 0 | Status: COMPLETED | OUTPATIENT
Start: 2021-06-12 | End: 2021-06-12

## 2021-06-12 RX ORDER — LEUCOVORIN CALCIUM 5 MG
775 TABLET ORAL ONCE
Refills: 0 | Status: COMPLETED | OUTPATIENT
Start: 2021-06-12 | End: 2021-06-12

## 2021-06-12 RX ORDER — FLUOROURACIL 50 MG/ML
2330 INJECTION, SOLUTION INTRAVENOUS DAILY
Refills: 0 | Status: COMPLETED | OUTPATIENT
Start: 2021-06-12 | End: 2021-06-13

## 2021-06-12 RX ADMIN — Medication 106 MILLIGRAM(S): at 19:37

## 2021-06-12 RX ADMIN — Medication 144.38 MILLIGRAM(S): at 20:25

## 2021-06-12 RX ADMIN — PANTOPRAZOLE SODIUM 40 MILLIGRAM(S): 20 TABLET, DELAYED RELEASE ORAL at 06:11

## 2021-06-12 RX ADMIN — FLUOROURACIL 43.61 MILLIGRAM(S): 50 INJECTION, SOLUTION INTRAVENOUS at 23:33

## 2021-06-12 RX ADMIN — Medication 5 MILLIGRAM(S): at 23:43

## 2021-06-12 RX ADMIN — ONDANSETRON 116 MILLIGRAM(S): 8 TABLET, FILM COATED ORAL at 19:37

## 2021-06-12 RX ADMIN — OXALIPLATIN 141.5 MILLIGRAM(S): 5 INJECTION, SOLUTION INTRAVENOUS at 20:25

## 2021-06-12 RX ADMIN — FLUOROURACIL 196.5 MILLIGRAM(S): 50 INJECTION, SOLUTION INTRAVENOUS at 22:54

## 2021-06-12 RX ADMIN — FOSAPREPITANT DIMEGLUMINE 300 MILLIGRAM(S): 150 INJECTION, POWDER, LYOPHILIZED, FOR SOLUTION INTRAVENOUS at 19:37

## 2021-06-12 RX ADMIN — Medication 2 MILLIGRAM(S): at 06:11

## 2021-06-12 RX ADMIN — CHLORHEXIDINE GLUCONATE 1 APPLICATION(S): 213 SOLUTION TOPICAL at 22:00

## 2021-06-12 RX ADMIN — CHLORHEXIDINE GLUCONATE 1 APPLICATION(S): 213 SOLUTION TOPICAL at 06:12

## 2021-06-12 RX ADMIN — ENOXAPARIN SODIUM 40 MILLIGRAM(S): 100 INJECTION SUBCUTANEOUS at 21:19

## 2021-06-12 NOTE — PROGRESS NOTE ADULT - ASSESSMENT
This is a 80 year old male with Advanced rectal adenocarcinoma who presented from SNF for chemotherapy admission.    #Locally Advanced Rectal Adenocarcinoma  - MRI noted for 18cm long polypoidal rectal mass with invasion of the right meso rectal fascia and possibly the prostate gland. T4(a or b)N0M0  - Admitted to heme/onc service   - Cycle 3 of FOLFOX today  - ANC 1.59  - Port in place  - Monitor for TLS    #Microcytic Anemia (iron deficient secondary to suspected LGIB from tumor)  - Iron studies from prior workup noted  - Hemoglobin stable b/w 7-8    Activity: As Tolerated  Diet: Regular  DVT ppx: Lovenox  GI ppx: Protonix  Code Status: Full Code  DISPO: Chemo w/ FOLFOX

## 2021-06-12 NOTE — PROGRESS NOTE ADULT - SUBJECTIVE AND OBJECTIVE BOX
24H events:    No acute events overnight  CBC pending for this morning    PAST MEDICAL & SURGICAL HISTORY  No pertinent past medical history    Amputation of digit of left hand      SOCIAL HISTORY:  Negative for smoking/alcohol/drug use.     ALLERGIES:  No Known Allergies    MEDICATIONS:  STANDING MEDICATIONS  chlorhexidine 4% Liquid 1 Application(s) Topical <User Schedule>  enoxaparin Injectable 40 milliGRAM(s) SubCutaneous at bedtime  pantoprazole    Tablet 40 milliGRAM(s) Oral before breakfast    PRN MEDICATIONS  loperamide 2 milliGRAM(s) Oral every 4 hours PRN  oxycodone    5 mG/acetaminophen 325 mG 1 Tablet(s) Oral every 6 hours PRN  simethicone 80 milliGRAM(s) Chew two times a day PRN    VITALS:   T(F): 97.8  HR: 85  BP: 108/58  RR: 18  SpO2: --    LABS:                        7.9    3.41  )-----------( 295      ( 11 Jun 2021 04:30 )             27.2     06-11    138  |  102  |  10  ----------------------------<  83  4.0   |  27  |  <0.5<L>    Ca    8.5      11 Jun 2021 04:30  Mg     1.8     06-11    TPro  6.6  /  Alb  2.8<L>  /  TBili  0.3  /  DBili  x   /  AST  18  /  ALT  11  /  AlkPhos  81  06-10                  RADIOLOGY:    PHYSICAL EXAM:  GEN: No acute distress  HENT: NCAT, EOMI  LYMPH: No appreciable adenopathy  LUNGS: No respiratory distress, clear to auscultation bilaterally   HEART: regular rate and rhythm  ABD: Soft, non-tender, non-distended  SKIN: No rash  EXT: No edema  NEURO: AAOX3     24H events:    No acute events overnight  ANC improved, hemoglon 7.6  Will transfuse 1 unit of PRBCs and start chemo    PAST MEDICAL & SURGICAL HISTORY  No pertinent past medical history    Amputation of digit of left hand      SOCIAL HISTORY:  Negative for smoking/alcohol/drug use.     ALLERGIES:  No Known Allergies    MEDICATIONS:  STANDING MEDICATIONS  chlorhexidine 4% Liquid 1 Application(s) Topical <User Schedule>  enoxaparin Injectable 40 milliGRAM(s) SubCutaneous at bedtime  pantoprazole    Tablet 40 milliGRAM(s) Oral before breakfast    PRN MEDICATIONS  loperamide 2 milliGRAM(s) Oral every 4 hours PRN  oxycodone    5 mG/acetaminophen 325 mG 1 Tablet(s) Oral every 6 hours PRN  simethicone 80 milliGRAM(s) Chew two times a day PRN    VITALS:   T(F): 97.8  HR: 85  BP: 108/58  RR: 18  SpO2: --    LABS:                        7.9    3.41  )-----------( 295      ( 11 Jun 2021 04:30 )             27.2     06-11    138  |  102  |  10  ----------------------------<  83  4.0   |  27  |  <0.5<L>    Ca    8.5      11 Jun 2021 04:30  Mg     1.8     06-11    TPro  6.6  /  Alb  2.8<L>  /  TBili  0.3  /  DBili  x   /  AST  18  /  ALT  11  /  AlkPhos  81  06-10                  RADIOLOGY:    PHYSICAL EXAM:  GEN: No acute distress  HENT: NCAT, EOMI  LYMPH: No appreciable adenopathy  LUNGS: No respiratory distress, clear to auscultation bilaterally   HEART: regular rate and rhythm  ABD: Soft, non-tender, non-distended  SKIN: No rash  EXT: No edema  NEURO: AAOX3

## 2021-06-12 NOTE — PROGRESS NOTE ADULT - ASSESSMENT
Mr. Orellana is a 80M w/ no known PMHx (doesn't follow with doctors) with recently diagnosed locally advanced rectal adenocarcinoma.     # Locally advanced rectal adenocarcinoma (T4N0M0)  - Diagnosed late APril 2021 - MRI: 18 cm long polypoidal rectal mass with invasion of the right meso rectal fascia and possibly the prostate gland. Additional involvement of the analsphincter complex--T4(a or b)N0M0  - Planned for total neoadjuvant chemotherapy followed by chemoRT followed by surgery  - s/p port placement  - He received cycle 1 5/7/21  - C2D1 was 5/27 (delayed due to logistical reasons), he received 1 day of Zarxio  - He is able to express why we are offering chemotherapy and the risks and benefits. We discussed side effects including but not limited to cytopenias which increase the risk of bleeding and infection, fatigue, nausea, vomiting, diarrhea and neuropathy.  - Pptentially will start C3 of neoadjuvant FOLFOX today pending his ANC, no result yet    Iron deficiency anemia likely from LGIB from tumor  - On prior admission, Iron Saturation: 12%, Ferritin 34, MCV 68.9   - Completed IV iron 1g  - Recheck iron studies early July    Mycotic toenails:  - Debrided by podiatry, followup outpatient    Knee pain, likely OA:  - Lidocaine patch  - Outpatient followup  - PO pain meds if patient agreeable    DVT ppx    Plan :   DC to rehab, he will need to return 2 weeks after the start of C3 and he will need a CBC and CMP 1 day prior to tx       Mr. Orellana is a 80M w/ no known PMHx (doesn't follow with doctors) with recently diagnosed locally advanced rectal adenocarcinoma.     # Locally advanced rectal adenocarcinoma (T4N0M0)  - Diagnosed late APril 2021 - MRI: 18 cm long polypoidal rectal mass with invasion of the right meso rectal fascia and possibly the prostate gland. Additional involvement of the analsphincter complex--T4(a or b)N0M0  - Planned for total neoadjuvant chemotherapy followed by chemoRT followed by surgery  - s/p port placement  - He received cycle 1 5/7/21  - C2D1 was 5/27 (delayed due to logistical reasons), he received 1 day of Zarxio  - He is able to express why we are offering chemotherapy and the risks and benefits. We discussed side effects including but not limited to cytopenias which increase the risk of bleeding and infection, fatigue, nausea, vomiting, diarrhea and neuropathy.  - Will start C3 of neoadjuvant FOLFOX today    Iron deficiency anemia likely from chronc occult LGIB from tumor  - On prior admission, Iron Saturation: 12%, Ferritin 34, MCV 68.9   - Completed IV iron 1g  - Recheck iron studies early July  - Transfuse 1 unit of PRBCs 6/12/21    Mycotic toenails:  - Debrided by podiatry, followup outpatient    Knee pain, likely OA:  - Lidocaine patch  - Outpatient followup  - PO pain meds if patient agreeable    DVT ppx    Plan :   DC to rehab, he will need to return 2 weeks after the start of C3 and he will need a CBC and CMP 1 day prior to tx

## 2021-06-12 NOTE — PROGRESS NOTE ADULT - SUBJECTIVE AND OBJECTIVE BOX
SUBJECTIVE:  HPI:  This is a 80 year old male with Advanced rectal adenocarcinoma who presented from SNF for chemotherapy admission. Of note the patient reports recent diagnosis of this cancer and is here for FOLFOX cycle 3. He will be requiring a prolonged infusion and given his status at a SNF he requires admission to the hospital for each cycle of treatment. His last cycle was 2 weeks ago and he was discharged. Hospital course at that time complicated by some confusion likely secondary to dementia with neurology advising outpatient follow up .    In the ED initial vitals: /63, HR 80, Sat 100% on room air, T 96. Labs overall unremarkable when compared to his recent labs from discharge a few days ago. He endorsed no acute complaints but did note some frustration with his cancer and the treatment course he is incurring.  (10 Gonzalo 2021 16:05)      PAST MEDICAL & SURGICAL HISTORY  PAST MEDICAL & SURGICAL HISTORY:  No pertinent past medical history    Amputation of digit of left hand      SOCIAL HISTORY:    ALLERGIES:  No Known Allergies    MEDICATIONS:  STANDING MEDICATIONS  chlorhexidine 4% Liquid 1 Application(s) Topical <User Schedule>  enoxaparin Injectable 40 milliGRAM(s) SubCutaneous at bedtime  pantoprazole    Tablet 40 milliGRAM(s) Oral before breakfast    PRN MEDICATIONS  loperamide 2 milliGRAM(s) Oral every 4 hours PRN  oxycodone    5 mG/acetaminophen 325 mG 1 Tablet(s) Oral every 6 hours PRN  simethicone 80 milliGRAM(s) Chew two times a day PRN    VITALS:   T(F): 97.8  HR: 85  BP: 108/58  RR: 18  SpO2: --    LABS:                        7.6    5.19  )-----------( 300      ( 12 Jun 2021 05:36 )             26.4     06-12    136  |  102  |  11  ----------------------------<  96  4.1   |  25  |  0.5<L>    Ca    8.3<L>      12 Jun 2021 05:36  Mg     1.8     06-11    TPro  5.6<L>  /  Alb  2.6<L>  /  TBili  0.2  /  DBili  x   /  AST  14  /  ALT  9   /  AlkPhos  64  06-12                  RADIOLOGY:    PHYSICAL EXAM:  GEN: No acute distress  HEENT: AT/NC PEERLA, EOMI  LUNGS: Clear to auscultation bilaterally  HEART: S1/S2 present  ABD: Soft, non-tender, non-distended  EXT: No edema, no rashes, no cyanosis  NEURO: AAOX3

## 2021-06-13 LAB
ANION GAP SERPL CALC-SCNC: 10 MMOL/L — SIGNIFICANT CHANGE UP (ref 7–14)
BASOPHILS # BLD AUTO: 0.04 K/UL — SIGNIFICANT CHANGE UP (ref 0–0.2)
BASOPHILS NFR BLD AUTO: 1 % — SIGNIFICANT CHANGE UP (ref 0–1)
BUN SERPL-MCNC: 11 MG/DL — SIGNIFICANT CHANGE UP (ref 10–20)
CALCIUM SERPL-MCNC: 8.5 MG/DL — SIGNIFICANT CHANGE UP (ref 8.5–10.1)
CHLORIDE SERPL-SCNC: 104 MMOL/L — SIGNIFICANT CHANGE UP (ref 98–110)
CO2 SERPL-SCNC: 26 MMOL/L — SIGNIFICANT CHANGE UP (ref 17–32)
CREAT SERPL-MCNC: <0.5 MG/DL — LOW (ref 0.7–1.5)
EOSINOPHIL # BLD AUTO: 0.01 K/UL — SIGNIFICANT CHANGE UP (ref 0–0.7)
EOSINOPHIL NFR BLD AUTO: 0.2 % — SIGNIFICANT CHANGE UP (ref 0–8)
GLUCOSE SERPL-MCNC: 174 MG/DL — HIGH (ref 70–99)
HAV IGM SER-ACNC: SIGNIFICANT CHANGE UP
HBV CORE IGM SER-ACNC: SIGNIFICANT CHANGE UP
HBV SURFACE AG SER-ACNC: SIGNIFICANT CHANGE UP
HCT VFR BLD CALC: 32.6 % — LOW (ref 42–52)
HCV AB S/CO SERPL IA: 0.17 S/CO — SIGNIFICANT CHANGE UP (ref 0–0.99)
HCV AB SERPL-IMP: SIGNIFICANT CHANGE UP
HGB BLD-MCNC: 9.7 G/DL — LOW (ref 14–18)
IMM GRANULOCYTES NFR BLD AUTO: 13.9 % — HIGH (ref 0.1–0.3)
LYMPHOCYTES # BLD AUTO: 0.72 K/UL — LOW (ref 1.2–3.4)
LYMPHOCYTES # BLD AUTO: 17.8 % — LOW (ref 20.5–51.1)
MAGNESIUM SERPL-MCNC: 1.8 MG/DL — SIGNIFICANT CHANGE UP (ref 1.8–2.4)
MCHC RBC-ENTMCNC: 26.1 PG — LOW (ref 27–31)
MCHC RBC-ENTMCNC: 29.8 G/DL — LOW (ref 32–37)
MCV RBC AUTO: 87.9 FL — SIGNIFICANT CHANGE UP (ref 80–94)
MONOCYTES # BLD AUTO: 0.35 K/UL — SIGNIFICANT CHANGE UP (ref 0.1–0.6)
MONOCYTES NFR BLD AUTO: 8.7 % — SIGNIFICANT CHANGE UP (ref 1.7–9.3)
NEUTROPHILS # BLD AUTO: 2.36 K/UL — SIGNIFICANT CHANGE UP (ref 1.4–6.5)
NEUTROPHILS NFR BLD AUTO: 58.4 % — SIGNIFICANT CHANGE UP (ref 42.2–75.2)
NRBC # BLD: 0 /100 WBCS — SIGNIFICANT CHANGE UP (ref 0–0)
PLATELET # BLD AUTO: 332 K/UL — SIGNIFICANT CHANGE UP (ref 130–400)
POTASSIUM SERPL-MCNC: 4.2 MMOL/L — SIGNIFICANT CHANGE UP (ref 3.5–5)
POTASSIUM SERPL-SCNC: 4.2 MMOL/L — SIGNIFICANT CHANGE UP (ref 3.5–5)
RBC # BLD: 3.71 M/UL — LOW (ref 4.7–6.1)
RBC # FLD: 21.8 % — HIGH (ref 11.5–14.5)
SODIUM SERPL-SCNC: 140 MMOL/L — SIGNIFICANT CHANGE UP (ref 135–146)
WBC # BLD: 4.04 K/UL — LOW (ref 4.8–10.8)
WBC # FLD AUTO: 4.04 K/UL — LOW (ref 4.8–10.8)

## 2021-06-13 PROCEDURE — 99232 SBSQ HOSP IP/OBS MODERATE 35: CPT

## 2021-06-13 RX ADMIN — ENOXAPARIN SODIUM 40 MILLIGRAM(S): 100 INJECTION SUBCUTANEOUS at 23:01

## 2021-06-13 RX ADMIN — Medication 5 MILLIGRAM(S): at 23:01

## 2021-06-13 RX ADMIN — PANTOPRAZOLE SODIUM 40 MILLIGRAM(S): 20 TABLET, DELAYED RELEASE ORAL at 05:10

## 2021-06-13 NOTE — PROGRESS NOTE ADULT - SUBJECTIVE AND OBJECTIVE BOX
24H events:    Tolerating treatment well  Afebrile, VSS  No acute events    PAST MEDICAL & SURGICAL HISTORY  No pertinent past medical history    Amputation of digit of left hand      SOCIAL HISTORY:  Negative for smoking/alcohol/drug use.     ALLERGIES:  No Known Allergies    MEDICATIONS:  STANDING MEDICATIONS  chlorhexidine 4% Liquid 1 Application(s) Topical <User Schedule>  enoxaparin Injectable 40 milliGRAM(s) SubCutaneous at bedtime  fluorouracil IVPB (eMAR) 2330 milliGRAM(s) IV Intermittent daily  melatonin 5 milliGRAM(s) Oral at bedtime  pantoprazole    Tablet 40 milliGRAM(s) Oral before breakfast    PRN MEDICATIONS  loperamide 2 milliGRAM(s) Oral every 4 hours PRN  oxycodone    5 mG/acetaminophen 325 mG 1 Tablet(s) Oral every 6 hours PRN  simethicone 80 milliGRAM(s) Chew two times a day PRN    VITALS:   T(F): 96.1  HR: 97  BP: 107/59  RR: 20  SpO2: --    LABS:                        9.7    4.04  )-----------( 332      ( 13 Jun 2021 08:12 )             32.6     06-13    140  |  104  |  11  ----------------------------<  174<H>  4.2   |  26  |  <0.5<L>    Ca    8.5      13 Jun 2021 08:12  Mg     1.8     06-13    TPro  5.6<L>  /  Alb  2.6<L>  /  TBili  0.2  /  DBili  x   /  AST  14  /  ALT  9   /  AlkPhos  64  06-12                  RADIOLOGY:    PHYSICAL EXAM:  GEN: No acute distress  HENT: NCAT, EOMI  LYMPH: No appreciable adenopathy  LUNGS: No respiratory distress, clear to auscultation bilaterally   HEART: regular rate and rhythm  ABD: Soft, non-tender, non-distended  NEURO: non focal

## 2021-06-13 NOTE — PROGRESS NOTE ADULT - ASSESSMENT
Mr. Orellana is a 80M w/ no known PMHx (doesn't follow with doctors) with recently diagnosed locally advanced rectal adenocarcinoma.     # Locally advanced rectal adenocarcinoma (T4N0M0)  - Diagnosed late APril 2021 - MRI: 18 cm long polypoidal rectal mass with invasion of the right meso rectal fascia and possibly the prostate gland. Additional involvement of the analsphincter complex--T4(a or b)N0M0  - Planned for total neoadjuvant chemotherapy followed by chemoRT followed by surgery  - s/p port placement  - He received cycle 1 5/7/21  - C2D1 was 5/27 (delayed due to logistical reasons), he received 1 day of Zarxio  - He is able to express why we are offering chemotherapy and the risks and benefits. We discussed side effects including but not limited to cytopenias which increase the risk of bleeding and infection, fatigue, nausea, vomiting, diarrhea and neuropathy.  - C3D1 6/12  - Continue with C3 of FOLFOX, tolerating well so far    Iron deficiency anemia likely from chronc occult LGIB from tumor  - On prior admission, Iron Saturation: 12%, Ferritin 34, MCV 68.9   - Completed IV iron 1g  - Recheck iron studies early July  - Transfuse 1 unit of PRBCs 6/12/21    Mycotic toenails:  - Debrided by podiatry, followup outpatient    Knee pain, likely OA:  - Lidocaine patch  - Outpatient followup  - PO pain meds if patient agreeable    DVT ppx    Plan :   DC to rehab, he will need to return 2 weeks after the start of C3 and he will need a CBC and CMP 1 day prior to tx

## 2021-06-14 LAB
ALBUMIN SERPL ELPH-MCNC: 2.9 G/DL — LOW (ref 3.5–5.2)
ALP SERPL-CCNC: 66 U/L — SIGNIFICANT CHANGE UP (ref 30–115)
ALT FLD-CCNC: 14 U/L — SIGNIFICANT CHANGE UP (ref 0–41)
ANION GAP SERPL CALC-SCNC: 8 MMOL/L — SIGNIFICANT CHANGE UP (ref 7–14)
AST SERPL-CCNC: 24 U/L — SIGNIFICANT CHANGE UP (ref 0–41)
BASOPHILS # BLD AUTO: 0.03 K/UL — SIGNIFICANT CHANGE UP (ref 0–0.2)
BASOPHILS NFR BLD AUTO: 0.3 % — SIGNIFICANT CHANGE UP (ref 0–1)
BILIRUB SERPL-MCNC: 0.3 MG/DL — SIGNIFICANT CHANGE UP (ref 0.2–1.2)
BUN SERPL-MCNC: 17 MG/DL — SIGNIFICANT CHANGE UP (ref 10–20)
CALCIUM SERPL-MCNC: 8.9 MG/DL — SIGNIFICANT CHANGE UP (ref 8.5–10.1)
CHLORIDE SERPL-SCNC: 106 MMOL/L — SIGNIFICANT CHANGE UP (ref 98–110)
CO2 SERPL-SCNC: 26 MMOL/L — SIGNIFICANT CHANGE UP (ref 17–32)
CREAT SERPL-MCNC: 0.5 MG/DL — LOW (ref 0.7–1.5)
EOSINOPHIL # BLD AUTO: 0.01 K/UL — SIGNIFICANT CHANGE UP (ref 0–0.7)
EOSINOPHIL NFR BLD AUTO: 0.1 % — SIGNIFICANT CHANGE UP (ref 0–8)
GLUCOSE SERPL-MCNC: 145 MG/DL — HIGH (ref 70–99)
HCT VFR BLD CALC: 32.2 % — LOW (ref 42–52)
HGB BLD-MCNC: 9.5 G/DL — LOW (ref 14–18)
IMM GRANULOCYTES NFR BLD AUTO: 5.8 % — HIGH (ref 0.1–0.3)
LYMPHOCYTES # BLD AUTO: 1.15 K/UL — LOW (ref 1.2–3.4)
LYMPHOCYTES # BLD AUTO: 13.2 % — LOW (ref 20.5–51.1)
MAGNESIUM SERPL-MCNC: 1.8 MG/DL — SIGNIFICANT CHANGE UP (ref 1.8–2.4)
MCHC RBC-ENTMCNC: 25.7 PG — LOW (ref 27–31)
MCHC RBC-ENTMCNC: 29.5 G/DL — LOW (ref 32–37)
MCV RBC AUTO: 87.3 FL — SIGNIFICANT CHANGE UP (ref 80–94)
MONOCYTES # BLD AUTO: 0.86 K/UL — HIGH (ref 0.1–0.6)
MONOCYTES NFR BLD AUTO: 9.9 % — HIGH (ref 1.7–9.3)
NEUTROPHILS # BLD AUTO: 6.14 K/UL — SIGNIFICANT CHANGE UP (ref 1.4–6.5)
NEUTROPHILS NFR BLD AUTO: 70.7 % — SIGNIFICANT CHANGE UP (ref 42.2–75.2)
NRBC # BLD: 0 /100 WBCS — SIGNIFICANT CHANGE UP (ref 0–0)
PLATELET # BLD AUTO: 362 K/UL — SIGNIFICANT CHANGE UP (ref 130–400)
POTASSIUM SERPL-MCNC: 5.4 MMOL/L — HIGH (ref 3.5–5)
POTASSIUM SERPL-SCNC: 5.4 MMOL/L — HIGH (ref 3.5–5)
PROT SERPL-MCNC: 5.9 G/DL — LOW (ref 6–8)
RBC # BLD: 3.69 M/UL — LOW (ref 4.7–6.1)
RBC # FLD: 21.3 % — HIGH (ref 11.5–14.5)
SODIUM SERPL-SCNC: 140 MMOL/L — SIGNIFICANT CHANGE UP (ref 135–146)
WBC # BLD: 8.69 K/UL — SIGNIFICANT CHANGE UP (ref 4.8–10.8)
WBC # FLD AUTO: 8.69 K/UL — SIGNIFICANT CHANGE UP (ref 4.8–10.8)

## 2021-06-14 PROCEDURE — 99233 SBSQ HOSP IP/OBS HIGH 50: CPT

## 2021-06-14 RX ORDER — SODIUM ZIRCONIUM CYCLOSILICATE 10 G/10G
10 POWDER, FOR SUSPENSION ORAL ONCE
Refills: 0 | Status: COMPLETED | OUTPATIENT
Start: 2021-06-14 | End: 2021-06-14

## 2021-06-14 RX ADMIN — PANTOPRAZOLE SODIUM 40 MILLIGRAM(S): 20 TABLET, DELAYED RELEASE ORAL at 05:58

## 2021-06-14 RX ADMIN — Medication 5 MILLIGRAM(S): at 21:01

## 2021-06-14 RX ADMIN — CHLORHEXIDINE GLUCONATE 1 APPLICATION(S): 213 SOLUTION TOPICAL at 02:00

## 2021-06-14 RX ADMIN — FLUOROURACIL 43.61 MILLIGRAM(S): 50 INJECTION, SOLUTION INTRAVENOUS at 00:02

## 2021-06-14 RX ADMIN — ENOXAPARIN SODIUM 40 MILLIGRAM(S): 100 INJECTION SUBCUTANEOUS at 21:02

## 2021-06-14 RX ADMIN — SODIUM ZIRCONIUM CYCLOSILICATE 10 GRAM(S): 10 POWDER, FOR SUSPENSION ORAL at 13:36

## 2021-06-14 NOTE — CONSULT NOTE ADULT - ASSESSMENT
IMPRESSION: Rehab of gait dysfunction     PRECAUTIONS: [   ] Cardiac  [   ] Respiratory  [   ] Seizures [   ] Contact Isolation  [   ] Droplet Isolation  [   ] Other    Weight Bearing Status:     RECOMMENDATION:    Out of Bed to Chair     DVT/Decubiti Prophylaxis    REHAB PLAN:     [  x  ] Bedside P/T 3-5 times a week   [    ]   Bedside O/T  2-3 times a week             [    ] Speech Therapy               [    ]  No Rehab Therapy Indicated   Conditioning/ROM                                    ADL  Bed Mobility                                               Conditioning/ROM  Transfers                                                     Bed Mobility  Sitting /Standing Balance                         Transfers                                        Gait Training                                               Sitting/Standing Balance  Stair Training [   ]Applicable                    Home equipment Eval                                                                        Splinting  [   ] Only      GOALS:   ADL   [    ]   Independent                    Transfers  [ x   ] Independent                          Ambulation  [ x   ] Independent     [    x ] With device                            [    ]  CG                                                         [    ]  CG                                                                  [    ] CG                            [    ] Min A                                                   [    ] Min A                                                              [    ] Min  A          DISCHARGE PLAN:   [    ]  Good candidate for Intensive Rehabilitation/Hospital based                                             Will tolerate 3hrs Intensive Rehab Daily                                       [ x    ]  Short Term Rehab in Skilled Nursing Facility                                       [     ]  Home with Outpatient or  services                                         [     ]  Possible Candidate for Intensive Hospital based Rehab

## 2021-06-14 NOTE — CONSULT NOTE ADULT - SUBJECTIVE AND OBJECTIVE BOX
HPI:  This is a 80 year old male with Advanced rectal adenocarcinoma who presented from SNF for chemotherapy admission. Of note the patient reports recent diagnosis of this cancer and is here for FOLFOX cycle 3. He will be requiring a prolonged infusion and given his status at a SNF he requires admission to the hospital for each cycle of treatment. His last cycle was 2 weeks ago and he was discharged. Hospital course at that time complicated by some confusion likely secondary to dementia with neurology advising outpatient follow up .    In the ED initial vitals: /63, HR 80, Sat 100% on room air, T 96. Labs overall unremarkable when compared to his recent labs from discharge a few days ago. He endorsed no acute complaints but did note some frustration with his cancer and the treatment course he is incurring.       PAST MEDICAL & SURGICAL HISTORY:  No pertinent past medical history    Amputation of digit of left hand        Hospital Course:    TODAY'S SUBJECTIVE & REVIEW OF SYMPTOMS:     Constitutional WNL   Cardio WNL   Resp WNL   GI WNL  Heme WNL  Endo WNL  Skin WNL  MSK Weakness  Neuro WNL  Cognitive WNL  Psych WNL      MEDICATIONS  (STANDING):  chlorhexidine 4% Liquid 1 Application(s) Topical <User Schedule>  enoxaparin Injectable 40 milliGRAM(s) SubCutaneous at bedtime  melatonin 5 milliGRAM(s) Oral at bedtime  pantoprazole    Tablet 40 milliGRAM(s) Oral before breakfast    MEDICATIONS  (PRN):  loperamide 2 milliGRAM(s) Oral every 4 hours PRN Diarrhea  oxycodone    5 mG/acetaminophen 325 mG 1 Tablet(s) Oral every 6 hours PRN Moderate Pain (4 - 6)  simethicone 80 milliGRAM(s) Chew two times a day PRN Gas      FAMILY HISTORY:  No pertinent family history in first degree relatives        Allergies    No Known Allergies    Intolerances        SOCIAL HISTORY:    [  ] Etoh  [  ] Smoking  [  ] Substance abuse     Home Environment:  [   ] Home Alone  [   ] Lives with Family  [   ] Home Health Aid    Dwelling:  [   ] Apartment  [   ] Private House  [   ] Adult Home  [   ] Skilled Nursing Facility      [ x  ] Short Term  [   ] Long Term  [   ] Stairs       Elevator [   ]    FUNCTIONAL STATUS PTA: (Check all that apply)  Ambulation: [    ]Independent    [ x  ] Dependent     [   ] Non-Ambulatory  Assistive Device: [   ] SA Cane  [   ]  Q Cane  [ x  ] Walker  [   ]  Wheelchair  ADL : [   ] Independent  [ x   ]  Dependent       Vital Signs Last 24 Hrs  T(C): 35.6 (14 Jun 2021 05:35), Max: 36.1 (13 Jun 2021 19:27)  T(F): 96 (14 Jun 2021 05:35), Max: 97 (13 Jun 2021 19:27)  HR: 62 (14 Jun 2021 05:35) (62 - 97)  BP: 120/58 (14 Jun 2021 05:35) (107/59 - 120/58)  BP(mean): --  RR: 20 (13 Jun 2021 19:27) (20 - 20)  SpO2: 94% (14 Jun 2021 07:48) (94% - 94%)      PHYSICAL EXAM: Awake & Alert  GENERAL: NAD  HEAD:  Normocephalic  CHEST/LUNG: Clear   HEART: S1S2+  ABDOMEN: Soft, Nontender  EXTREMITIES:  no calf tenderness    NERVOUS SYSTEM:  Cranial Nerves 2-12 intact [   ] Abnormal  [   ]  ROM: WFL all extremities [  x ]  Abnormal [   ]  Motor Strength: WFL all extremities  [   ]  Abnormal [x   ]4/5 all ext  Sensation: intact to light touch [  x ] Abnormal [   ]    FUNCTIONAL STATUS:  Bed Mobility: Independent [   ]  Supervision [   ]  Needs Assistance [  x ]  N/A [   ]  Transfers: Independent [   ]  Supervision [   ]  Needs Assistance [  x ]  N/A [   ]   Ambulation: Independent [   ]  Supervision [   ]  Needs Assistance [   ]  N/A [   ]  ADL: Independent [   ] Requires Assistance [   ] N/A [   ]      LABS:                        9.5    8.69  )-----------( 362      ( 14 Jun 2021 04:30 )             32.2     06-14    140  |  106  |  17  ----------------------------<  145<H>  5.4<H>   |  26  |  0.5<L>    Ca    8.9      14 Jun 2021 04:30  Mg     1.8     06-14    TPro  5.9<L>  /  Alb  2.9<L>  /  TBili  0.3  /  DBili  x   /  AST  24  /  ALT  14  /  AlkPhos  66  06-14          RADIOLOGY & ADDITIONAL STUDIES:

## 2021-06-14 NOTE — PROGRESS NOTE ADULT - SUBJECTIVE AND OBJECTIVE BOX
MISAEL HUFFMAN 80y Male  MRN#: [id number]   Hospital Day: 4d    SUBJECTIVE  Patient is a 80y old Male who presents with a chief complaint of Chemotherapy (14 Jun 2021 13:32)  Currently admitted to medicine with the primary diagnosis of Colon cancer      INTERVAL HPI AND OVERNIGHT EVENTS:  Patient was examined and seen at bedside. This morning he is resting comfortably in bed and reports no issues or overnight events.    REVIEW OF SYMPTOMS:  CONSTITUTIONAL: No weakness, fevers or chills; No headaches  EYES: No visual changes, eye pain, or discharge  ENT: No vertigo; No ear pain or change in hearing; No sore throat or difficulty swallowing  NECK: No pain or stiffness  RESPIRATORY: No cough, wheezing, or hemoptysis; No shortness of breath  CARDIOVASCULAR: No chest pain or palpitations  GASTROINTESTINAL: No abdominal or epigastric pain; No nausea, vomiting, or hematemesis; No diarrhea or constipation; No melena or hematochezia  GENITOURINARY: No dysuria, frequency or hematuria  MUSCULOSKELETAL: No joint pain, no muscle pain, no weakness  NEUROLOGICAL: No numbness or weakness  SKIN: No itching or rashes    OBJECTIVE  PAST MEDICAL & SURGICAL HISTORY  No pertinent past medical history    Amputation of digit of left hand      ALLERGIES:  No Known Allergies    MEDICATIONS:  STANDING MEDICATIONS  chlorhexidine 4% Liquid 1 Application(s) Topical <User Schedule>  enoxaparin Injectable 40 milliGRAM(s) SubCutaneous at bedtime  melatonin 5 milliGRAM(s) Oral at bedtime  pantoprazole    Tablet 40 milliGRAM(s) Oral before breakfast    PRN MEDICATIONS  loperamide 2 milliGRAM(s) Oral every 4 hours PRN  oxycodone    5 mG/acetaminophen 325 mG 1 Tablet(s) Oral every 6 hours PRN  simethicone 80 milliGRAM(s) Chew two times a day PRN      VITAL SIGNS: Last 24 Hours  T(C): 36.1 (14 Jun 2021 13:30), Max: 36.1 (13 Jun 2021 19:27)  T(F): 96.9 (14 Jun 2021 13:30), Max: 97 (13 Jun 2021 19:27)  HR: 73 (14 Jun 2021 13:30) (62 - 73)  BP: 101/51 (14 Jun 2021 13:30) (101/51 - 120/58)  BP(mean): --  RR: 18 (14 Jun 2021 13:30) (18 - 20)  SpO2: 94% (14 Jun 2021 07:48) (94% - 94%)    LABS:                        9.5    8.69  )-----------( 362      ( 14 Jun 2021 04:30 )             32.2     06-14    140  |  106  |  17  ----------------------------<  145<H>  5.4<H>   |  26  |  0.5<L>    Ca    8.9      14 Jun 2021 04:30  Mg     1.8     06-14    TPro  5.9<L>  /  Alb  2.9<L>  /  TBili  0.3  /  DBili  x   /  AST  24  /  ALT  14  /  AlkPhos  66  06-14                  RADIOLOGY:      PHYSICAL EXAM:  GEN: No acute distress  HEENT: AT/NC PEERLA, EOMI  LUNGS: Clear to auscultation bilaterally  HEART: S1/S2 present  ABD: Soft, non-tender, non-distended  EXT: No edema, no rashes, no cyanosis  NEURO: AAOX3

## 2021-06-14 NOTE — PROGRESS NOTE ADULT - ASSESSMENT
This is a 80 year old male with Advanced rectal adenocarcinoma who presented from SNF for chemotherapy admission.    #Locally Advanced Rectal Adenocarcinoma  - MRI noted for 18cm long polypoidal rectal mass with invasion of the right meso rectal fascia and possibly the prostate gland. T4(a or b)N0M0  - Admitted to heme/onc service   - C2D1 was 5/27 (delayed due to logistical reasons), he received 1 day of Zarxio  - He is able to express why we are offering chemotherapy and the risks and benefits. We discussed side effects including but not limited to cytopenias which increase the risk of bleeding and infection, fatigue, nausea, vomiting, diarrhea and neuropathy.  - C3D1 6/12  - Cycle 3 of FOLFOX today, plan for d/c tomorrow   - ANC 1.59  - Port in place  - Monitor for TLS    #Microcytic Anemia (iron deficient secondary to suspected LGIB from tumor)  - Iron studies from prior workup noted  - Hemoglobin stable b/w 7-8    Activity: As Tolerated  Diet: Regular  DVT ppx: Lovenox  GI ppx: Protonix  Code Status: Full Code  DISPO: DC to rehab, he will need to return 2 weeks after the start of C3 and he will need a CBC and CMP 1 day prior to tx

## 2021-06-14 NOTE — PROGRESS NOTE ADULT - SUBJECTIVE AND OBJECTIVE BOX
24H events:    No events, tolerating well, no complaints  ANC wnl  Afebrile    PAST MEDICAL & SURGICAL HISTORY  No pertinent past medical history    Amputation of digit of left hand      SOCIAL HISTORY:  Negative for smoking/alcohol/drug use.     ALLERGIES:  No Known Allergies    MEDICATIONS:  STANDING MEDICATIONS  chlorhexidine 4% Liquid 1 Application(s) Topical <User Schedule>  enoxaparin Injectable 40 milliGRAM(s) SubCutaneous at bedtime  melatonin 5 milliGRAM(s) Oral at bedtime  pantoprazole    Tablet 40 milliGRAM(s) Oral before breakfast  sodium zirconium cyclosilicate 10 Gram(s) Oral once    PRN MEDICATIONS  loperamide 2 milliGRAM(s) Oral every 4 hours PRN  oxycodone    5 mG/acetaminophen 325 mG 1 Tablet(s) Oral every 6 hours PRN  simethicone 80 milliGRAM(s) Chew two times a day PRN    VITALS:   T(F): 96.9  HR: 73  BP: 101/51  RR: 18  SpO2: 94%    LABS:                        9.5    8.69  )-----------( 362      ( 14 Jun 2021 04:30 )             32.2     06-14    140  |  106  |  17  ----------------------------<  145<H>  5.4<H>   |  26  |  0.5<L>    Ca    8.9      14 Jun 2021 04:30  Mg     1.8     06-14    TPro  5.9<L>  /  Alb  2.9<L>  /  TBili  0.3  /  DBili  x   /  AST  24  /  ALT  14  /  AlkPhos  66  06-14                  RADIOLOGY:    PHYSICAL EXAM:  GEN: No acute distress  HENT: NCAT, EOMI  LYMPH: No appreciable adenopathy  LUNGS: No respiratory distress, clear to auscultation bilaterally   HEART: regular rate and rhythm  ABD: Soft, non-tender, non-distended  SKIN: No rash  EXT: No edema  NEURO: AAOX3

## 2021-06-14 NOTE — PROGRESS NOTE ADULT - ASSESSMENT
Mr. Orellana is a 80M w/ no known PMHx (doesn't follow with doctors) with recently diagnosed locally advanced rectal adenocarcinoma.     # Locally advanced rectal adenocarcinoma (T4N0M0)  - Diagnosed late APril 2021 - MRI: 18 cm long polypoidal rectal mass with invasion of the right meso rectal fascia and possibly the prostate gland. Additional involvement of the analsphincter complex--T4(a or b)N0M0  - Planned for total neoadjuvant chemotherapy followed by chemoRT followed by surgery  - s/p port placement  - He received cycle 1 5/7/21  - C2D1 was 5/27 (delayed due to logistical reasons), he received 1 day of Zarxio  - He is able to express why we are offering chemotherapy and the risks and benefits. We discussed side effects including but not limited to cytopenias which increase the risk of bleeding and infection, fatigue, nausea, vomiting, diarrhea and neuropathy.  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

## 2021-06-15 ENCOUNTER — TRANSCRIPTION ENCOUNTER (OUTPATIENT)
Age: 81
End: 2021-06-15

## 2021-06-15 VITALS
RESPIRATION RATE: 18 BRPM | TEMPERATURE: 98 F | SYSTOLIC BLOOD PRESSURE: 100 MMHG | HEART RATE: 95 BPM | DIASTOLIC BLOOD PRESSURE: 58 MMHG

## 2021-06-15 LAB
ANION GAP SERPL CALC-SCNC: 9 MMOL/L — SIGNIFICANT CHANGE UP (ref 7–14)
BASOPHILS # BLD AUTO: 0.05 K/UL — SIGNIFICANT CHANGE UP (ref 0–0.2)
BASOPHILS NFR BLD AUTO: 0.7 % — SIGNIFICANT CHANGE UP (ref 0–1)
BUN SERPL-MCNC: 15 MG/DL — SIGNIFICANT CHANGE UP (ref 10–20)
CALCIUM SERPL-MCNC: 8.7 MG/DL — SIGNIFICANT CHANGE UP (ref 8.5–10.1)
CHLORIDE SERPL-SCNC: 104 MMOL/L — SIGNIFICANT CHANGE UP (ref 98–110)
CO2 SERPL-SCNC: 26 MMOL/L — SIGNIFICANT CHANGE UP (ref 17–32)
CREAT SERPL-MCNC: 0.5 MG/DL — LOW (ref 0.7–1.5)
EOSINOPHIL # BLD AUTO: 0.1 K/UL — SIGNIFICANT CHANGE UP (ref 0–0.7)
EOSINOPHIL NFR BLD AUTO: 1.5 % — SIGNIFICANT CHANGE UP (ref 0–8)
GLUCOSE SERPL-MCNC: 82 MG/DL — SIGNIFICANT CHANGE UP (ref 70–99)
HCT VFR BLD CALC: 35.8 % — LOW (ref 42–52)
HGB BLD-MCNC: 10.6 G/DL — LOW (ref 14–18)
IMM GRANULOCYTES NFR BLD AUTO: 3.9 % — HIGH (ref 0.1–0.3)
LYMPHOCYTES # BLD AUTO: 1.46 K/UL — SIGNIFICANT CHANGE UP (ref 1.2–3.4)
LYMPHOCYTES # BLD AUTO: 21.2 % — SIGNIFICANT CHANGE UP (ref 20.5–51.1)
MAGNESIUM SERPL-MCNC: 1.8 MG/DL — SIGNIFICANT CHANGE UP (ref 1.8–2.4)
MCHC RBC-ENTMCNC: 26.1 PG — LOW (ref 27–31)
MCHC RBC-ENTMCNC: 29.6 G/DL — LOW (ref 32–37)
MCV RBC AUTO: 88.2 FL — SIGNIFICANT CHANGE UP (ref 80–94)
MONOCYTES # BLD AUTO: 0.16 K/UL — SIGNIFICANT CHANGE UP (ref 0.1–0.6)
MONOCYTES NFR BLD AUTO: 2.3 % — SIGNIFICANT CHANGE UP (ref 1.7–9.3)
NEUTROPHILS # BLD AUTO: 4.84 K/UL — SIGNIFICANT CHANGE UP (ref 1.4–6.5)
NEUTROPHILS NFR BLD AUTO: 70.4 % — SIGNIFICANT CHANGE UP (ref 42.2–75.2)
NRBC # BLD: 0 /100 WBCS — SIGNIFICANT CHANGE UP (ref 0–0)
PLATELET # BLD AUTO: 337 K/UL — SIGNIFICANT CHANGE UP (ref 130–400)
POTASSIUM SERPL-MCNC: 4.3 MMOL/L — SIGNIFICANT CHANGE UP (ref 3.5–5)
POTASSIUM SERPL-SCNC: 4.3 MMOL/L — SIGNIFICANT CHANGE UP (ref 3.5–5)
RBC # BLD: 4.06 M/UL — LOW (ref 4.7–6.1)
RBC # FLD: 21.8 % — HIGH (ref 11.5–14.5)
SODIUM SERPL-SCNC: 139 MMOL/L — SIGNIFICANT CHANGE UP (ref 135–146)
WBC # BLD: 6.88 K/UL — SIGNIFICANT CHANGE UP (ref 4.8–10.8)
WBC # FLD AUTO: 6.88 K/UL — SIGNIFICANT CHANGE UP (ref 4.8–10.8)

## 2021-06-15 PROCEDURE — 99239 HOSP IP/OBS DSCHRG MGMT >30: CPT

## 2021-06-15 RX ORDER — FILGRASTIM 480MCG/1.6
480 VIAL (ML) INJECTION ONCE
Refills: 0 | Status: COMPLETED | OUTPATIENT
Start: 2021-06-15 | End: 2021-06-15

## 2021-06-15 RX ADMIN — PANTOPRAZOLE SODIUM 40 MILLIGRAM(S): 20 TABLET, DELAYED RELEASE ORAL at 05:23

## 2021-06-15 RX ADMIN — OXYCODONE AND ACETAMINOPHEN 1 TABLET(S): 5; 325 TABLET ORAL at 13:12

## 2021-06-15 RX ADMIN — Medication 480 MICROGRAM(S): at 15:55

## 2021-06-15 RX ADMIN — CHLORHEXIDINE GLUCONATE 1 APPLICATION(S): 213 SOLUTION TOPICAL at 05:22

## 2021-06-15 NOTE — PHYSICAL THERAPY INITIAL EVALUATION ADULT - PERTINENT HX OF CURRENT PROBLEM, REHAB EVAL
80 year old male with Advanced rectal adenocarcinoma who presented from SNF for chemotherapy admission. Of note the patient reports recent diagnosis of this cancer and is here for FOLFOX cycle 3. He will be requiring a prolonged infusion and given his status at a SNF he requires admission to the hospital for each cycle of treatment. His last cycle was 2 weeks ago and he was discharged.

## 2021-06-15 NOTE — DISCHARGE NOTE PROVIDER - CARE PROVIDER_API CALL
Yamel Tubbs)  Hematology; Internal Medicine; Medical Oncology  [address] [place] [address]  Phone: [phone]  Fax: [phone]  Established Patient  Follow Up Time: Routine    Reg Morelos)  EEGEpilepsy; Neurology  [address] [place] [address], [address]  [place] [address]  Phone: [phone]  Fax: [phone]  Established Patient  Follow Up Time: Routine    Louis Siddiqui)  Podiatric Medicine  [address] [facility] [address] [place] [address]  Phone: [phone]  Fax: [phone]  Established Patient  Follow Up Time: Routine

## 2021-06-15 NOTE — DISCHARGE NOTE PROVIDER - NSDCMRMEDTOKEN_GEN_ALL_CORE_FT
lidocaine 5% topical film: Apply topically to affected area once  loperamide 2 mg oral capsule: 1 cap(s) orally 2 times a day, As needed, Diarrhea  oxycodone-acetaminophen 5 mg-325 mg oral tablet: 1 tab(s) orally every 6 hours, As needed, Moderate Pain (4 - 6)  pantoprazole 40 mg oral delayed release tablet: 1 tab(s) orally once a day (before a meal)  simethicone 80 mg oral tablet, chewable: 1 tab(s) orally 2 times a day, As needed, Gas

## 2021-06-15 NOTE — DISCHARGE NOTE PROVIDER - HOSPITAL COURSE
This is a 80 year old male with Advanced rectal adenocarcinoma who presented from SNF for chemotherapy admission. Of note the patient reports recent diagnosis of this cancer and is here for FOLFOX cycle 3. He will be requiring a prolonged infusion and given his status at a SNF he requires admission to the hospital for each cycle of treatment. His last cycle was 2 weeks ago and he was discharged. Hospital course at that time complicated by some confusion likely secondary to dementia with neurology advising outpatient follow up .    In the ED initial vitals: /63, HR 80, Sat 100% on room air, T 96. Labs overall unremarkable when compared to his recent labs from discharge a few days ago. He endorsed no acute complaints but did note some frustration with his cancer and the treatment course he is incurring.     Patient was admitted under Heme/Onc service, received 3rd Cycle of FOLFOX on this admission and is now medically stable to be discharged directly to [facility] for Neulasta shot and back to rehab. Patient will be directly admitted again on June 25th for the next cycle and will need COVID swab prior to admission.     # Locally advanced rectal adenocarcinoma (T4N0M0)  - Diagnosed late APril 2021 - MRI: 18 cm long polypoidal rectal mass with invasion of the right meso rectal fascia and possibly the prostate gland. Additional involvement of the analsphincter complex--T4(a or b)N0M0  - Planned for total neoadjuvant chemotherapy followed by chemoRT followed by surgery  - s/p port placement  - He received cycle 1 5/7/21  - C2D1 was 5/27 (delayed due to logistical reasons), he received 1 day of Zarxio  - He is able to express why we are offering chemotherapy and the risks and benefits. We discussed side effects including but not limited to cytopenias which increase the risk of bleeding and infection, fatigue, nausea, vomiting, diarrhea and neuropathy.  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

## 2021-06-15 NOTE — PROGRESS NOTE ADULT - SUBJECTIVE AND OBJECTIVE BOX
24H events:    Completed chemotherapy  Remains afebrile  Tolerated treatment well  Plan for DC and Neulasta at [facility] today    PAST MEDICAL & SURGICAL HISTORY  No pertinent past medical history    Amputation of digit of left hand      SOCIAL HISTORY:  Negative for smoking/alcohol/drug use.     ALLERGIES:  No Known Allergies    MEDICATIONS:  STANDING MEDICATIONS  chlorhexidine 4% Liquid 1 Application(s) Topical <User Schedule>  enoxaparin Injectable 40 milliGRAM(s) SubCutaneous at bedtime  melatonin 5 milliGRAM(s) Oral at bedtime  pantoprazole    Tablet 40 milliGRAM(s) Oral before breakfast    PRN MEDICATIONS  loperamide 2 milliGRAM(s) Oral every 4 hours PRN  oxycodone    5 mG/acetaminophen 325 mG 1 Tablet(s) Oral every 6 hours PRN  simethicone 80 milliGRAM(s) Chew two times a day PRN    VITALS:   T(F): 97.8  HR: 68  BP: 115/60  RR: 18  SpO2: 94%    LABS:                        9.5    8.69  )-----------( 362      ( 14 Jun 2021 04:30 )             32.2     06-14    140  |  106  |  17  ----------------------------<  145<H>  5.4<H>   |  26  |  0.5<L>    Ca    8.9      14 Jun 2021 04:30  Mg     1.8     06-14    TPro  5.9<L>  /  Alb  2.9<L>  /  TBili  0.3  /  DBili  x   /  AST  24  /  ALT  14  /  AlkPhos  66  06-14                  RADIOLOGY:    PHYSICAL EXAM:  GEN: No acute distress  HENT: NCAT, EOMI  LYMPH: No appreciable adenopathy  LUNGS: No respiratory distress, clear to auscultation bilaterally   HEART: regular rate and rhythm  ABD: Soft, non-tender, non-distended  SKIN: No rash  EXT: No edema, fingertip amputated  NEURO: non focal

## 2021-06-15 NOTE — PROGRESS NOTE ADULT - SUBJECTIVE AND OBJECTIVE BOX
MISAEL HUFFMAN 80y Male  MRN#: [id number]   Hospital Day: 5d    SUBJECTIVE  Patient is a 80y old Male who presents with a chief complaint of Chemotherapy (14 Jun 2021 13:32)  Currently admitted to medicine with the primary diagnosis of Colon cancer      INTERVAL HPI AND OVERNIGHT EVENTS:  Patient was examined and seen at bedside. This morning he is resting comfortably in bed and reports no issues or overnight events.    REVIEW OF SYMPTOMS:  CONSTITUTIONAL: No weakness, fevers or chills; No headaches  EYES: No visual changes, eye pain, or discharge  ENT: No vertigo; No ear pain or change in hearing; No sore throat or difficulty swallowing  NECK: No pain or stiffness  RESPIRATORY: No cough, wheezing, or hemoptysis; No shortness of breath  CARDIOVASCULAR: No chest pain or palpitations  GASTROINTESTINAL: No abdominal or epigastric pain; No nausea, vomiting, or hematemesis; No diarrhea or constipation; No melena or hematochezia  GENITOURINARY: No dysuria, frequency or hematuria  MUSCULOSKELETAL: No joint pain, no muscle pain, no weakness  NEUROLOGICAL: No numbness or weakness  SKIN: No itching or rashes    OBJECTIVE  PAST MEDICAL & SURGICAL HISTORY  No pertinent past medical history    Amputation of digit of left hand      ALLERGIES:  No Known Allergies    MEDICATIONS:  STANDING MEDICATIONS  chlorhexidine 4% Liquid 1 Application(s) Topical <User Schedule>  enoxaparin Injectable 40 milliGRAM(s) SubCutaneous at bedtime  melatonin 5 milliGRAM(s) Oral at bedtime  pantoprazole    Tablet 40 milliGRAM(s) Oral before breakfast    PRN MEDICATIONS  loperamide 2 milliGRAM(s) Oral every 4 hours PRN  oxycodone    5 mG/acetaminophen 325 mG 1 Tablet(s) Oral every 6 hours PRN  simethicone 80 milliGRAM(s) Chew two times a day PRN      VITAL SIGNS: Last 24 Hours  Vital Signs Last 24 Hrs  T(C): 36.6 (15 Gonzalo 2021 04:53), Max: 36.6 (15 Gonzalo 2021 04:53)  T(F): 97.8 (15 Gonzalo 2021 04:53), Max: 97.8 (15 Gonzalo 2021 04:53)  HR: 68 (15 Gonzalo 2021 04:53) (68 - 69)  BP: 115/60 (15 Gonzalo 2021 04:53) (106/55 - 115/60)  BP(mean): --  RR: 18 (15 Gonzalo 2021 04:53) (18 - 18)  SpO2: --    LABS:                                10.6   6.88  )-----------( 337      ( 15 Gonzalo 2021 08:26 )             35.8     06-15    139  |  104  |  15  ----------------------------<  82  4.3   |  26  |  0.5<L>    Ca    8.7      15 Gonzalo 2021 08:26  Mg     1.8     06-15    TPro  5.9<L>  /  Alb  2.9<L>  /  TBili  0.3  /  DBili  x   /  AST  24  /  ALT  14  /  AlkPhos  66  06-14        RADIOLOGY:      PHYSICAL EXAM:  GEN: No acute distress  HEENT: AT/NC PEERLA, EOMI  LUNGS: Clear to auscultation bilaterally  HEART: S1/S2 present  ABD: Soft, non-tender, non-distended  EXT: No edema, no rashes, no cyanosis  NEURO: AAOX3

## 2021-06-15 NOTE — PROGRESS NOTE ADULT - ASSESSMENT
This is a 80 year old male with Advanced rectal adenocarcinoma who presented from SNF for chemotherapy admission.    #Locally Advanced Rectal Adenocarcinoma  - MRI noted for 18cm long polypoidal rectal mass with invasion of the right meso rectal fascia and possibly the prostate gland. T4(a or b)N0M0  - Admitted to heme/onc service   - C2D1 was 5/27 (delayed due to logistical reasons), he received 1 day of Zarxio  - He is able to express why we are offering chemotherapy and the risks and benefits. We discussed side effects including but not limited to cytopenias which increase the risk of bleeding and infection, fatigue, nausea, vomiting, diarrhea and neuropathy.  - C3D1 6/12  - Cycle 3 of FOLFOX finished, plan for d/c to rehab tomorrow   - ANC 1.59  - Port in place  - Monitor for TLS    #Microcytic Anemia (iron deficient secondary to suspected LGIB from tumor)  - Iron studies from prior workup noted  - Hemoglobin stable b/w 7-8    Activity: As Tolerated  Diet: Regular  DVT ppx: Lovenox  GI ppx: Protonix  Code Status: Full Code  DISPO: Pending auth to DC to rehab, COVID swab pending, he will need to return 2 weeks after the start of C3 and he will need a CBC and CMP 1 day prior to tx

## 2021-06-15 NOTE — DISCHARGE NOTE NURSING/CASE MANAGEMENT/SOCIAL WORK - PATIENT PORTAL LINK FT
You can access the FollowMyHealth Patient Portal offered by [facility] by registering at the following website: [email or web address]. By joining [employer]’s FollowMyHealth portal, you will also be able to view your health information using other applications (apps) compatible with our system.

## 2021-06-15 NOTE — PROGRESS NOTE ADULT - REASON FOR ADMISSION
Chemotherapy

## 2021-06-15 NOTE — PROGRESS NOTE ADULT - ASSESSMENT
Mr. Orellana is a 80M w/ no known PMHx (doesn't follow with doctors) with recently diagnosed locally advanced rectal adenocarcinoma.     # Locally advanced rectal adenocarcinoma (T4N0M0)  - Diagnosed late APril 2021 - MRI: 18 cm long polypoidal rectal mass with invasion of the right meso rectal fascia and possibly the prostate gland. Additional involvement of the analsphincter complex--T4(a or b)N0M0  - Planned for total neoadjuvant chemotherapy followed by chemoRT followed by surgery  - s/p port placement  - He received cycle 1 5/7/21  - C2D1 was 5/27 (delayed due to logistical reasons), he received 1 day of Zarxio  - He is able to express why we are offering chemotherapy and the risks and benefits. We discussed side effects including but not limited to cytopenias which increase the risk of bleeding and infection, fatigue, nausea, vomiting, diarrhea and neuropathy.  - C3D1 6/12  - Completed cycle 3, he will be discharged directly to [facility] for Neulasta and then back to rehab  - He should be direct admitted June 25th for next cycle, will need COVID swab prior to admission    Iron deficiency anemia likely from chronc occult LGIB from tumor  - On prior admission, Iron Saturation: 12%, Ferritin 34, MCV 68.9   - Completed IV iron 1g  - Recheck iron studies early July  - Transfuse 1 unit of PRBCs 6/12/21    Mycotic toenails:  - Debrided by podiatry, followup outpatient    Knee pain, likely OA:  - Lidocaine patch  - Outpatient followup  - PO pain meds if patient agreeable    DVT ppx    Plan :   DC to rehab, he will need to return June 25th for direct admit for cycle and he will need COVID swab prior to admission

## 2021-06-15 NOTE — PROGRESS NOTE ADULT - ATTENDING COMMENTS
81 yo male with locally advanced rectal CA, admitted for 3rd cycle of FOLFOX, received 2 cycles at full dose, today he offers no major complaints, reports having tolerated chemotherapy without difficulty in the past.  ANC today is borderline at 1200, will observe into tomorrow, may require delay in cycle vs dose adjustment.   Will follow.
pt completed chemo without complications.  DC to SNF. Check cbc in 1 week
ANC improved, continue with FOLFOX.   Patient offers no complaints.   Will follow.
Pt tolerating chemo well, Continue with day 2.  Pt to receive Neulasta post DC .  Return for next chemo in 2 weeks
ANC adequate today, proceed with cycle 3 of FOLFOX, no dose adjustment.   Check iron levels, transfuse 1 unit of PRBC.   Will follow.

## 2021-06-15 NOTE — PHYSICAL THERAPY INITIAL EVALUATION ADULT - GENERAL OBSERVATIONS, REHAB EVAL
Pt actively receiving chemo. Will hold PT at this time. To f/u as appropriate.
13:50-14:15; Pt encountered in bed, + chemo. Attempted PT IE. Pt reports he's going to rehab and he doesn't need to do anything now. Educated on reason for PT IE, importance of mobility, pt not receptive to education, appears to not comprehend any information said by PT. Reported he's a ping pong ball and no one knows what they're doing. RN Linda aware. Will cont to f/u as appropriate.
encountered supine in bed, NAD, agreeable to PT evaluation. Time in: 9:00 Time out: 9:30

## 2021-06-15 NOTE — PROGRESS NOTE ADULT - PROVIDER SPECIALTY LIST ADULT
Internal Medicine
Heme/Onc
Internal Medicine
Heme/Onc
Heme/Onc
Internal Medicine
Internal Medicine

## 2021-06-15 NOTE — DISCHARGE NOTE PROVIDER - NSDCCPCAREPLAN_GEN_ALL_CORE_FT
PRINCIPAL DISCHARGE DIAGNOSIS  Diagnosis: Colon cancer  Assessment and Plan of Treatment: You completed your 3rd cycle of FOLFOX for the rectal adenocarcinoma this admission without incident. If you notice any bright red blood in the stool, dizziness or lightheadedness, chest pain or pressure, changes in your vision, fever, chills, or any other symptoms or signs that alarm you, please seek immediate medical attention. To get better and follow your care plan as instructed.       PRINCIPAL DISCHARGE DIAGNOSIS  Diagnosis: Colon cancer  Assessment and Plan of Treatment: You completed your 3rd cycle of FOLFOX for the rectal adenocarcinoma this admission without incident. If you notice any bright red blood in the stool, dizziness or lightheadedness, chest pain or pressure, changes in your vision, fever, chills, or any other symptoms or signs that alarm you, please seek immediate medical attention. To get better and follow your care plan as instructed.  Please follow up with Dr. Tubbs for CBC's.       PRINCIPAL DISCHARGE DIAGNOSIS  Diagnosis: Colon cancer  Assessment and Plan of Treatment: You completed your 3rd cycle of FOLFOX for the rectal adenocarcinoma this admission without incident. If you notice any bright red blood in the stool, dizziness or lightheadedness, chest pain or pressure, changes in your vision, fever, chills, or any other symptoms or signs that alarm you, please seek immediate medical attention. To get better and follow your care plan as instructed.  Please complete CBC in 1 week, and call Dr. Tubbs [phone].

## 2021-06-15 NOTE — PHYSICAL THERAPY INITIAL EVALUATION ADULT - IMPAIRMENTS FOUND, PT EVAL
aerobic capacity/endurance/cognitive impairment/ergonomics and body mechanics/gait, locomotion, and balance/muscle strength/poor safety awareness

## 2021-06-15 NOTE — DISCHARGE NOTE PROVIDER - PROVIDER TOKENS
PROVIDER:[TOKEN:[[id number]],FOLLOWUP:[Routine],ESTABLISHEDPATIENT:[T]],PROVIDER:[TOKEN:[[id number]],FOLLOWUP:[Routine],ESTABLISHEDPATIENT:[T]],PROVIDER:[TOKEN:[[id number]],FOLLOWUP:[Routine],ESTABLISHEDPATIENT:[T]]

## 2021-06-15 NOTE — DISCHARGE NOTE PROVIDER - CARE PROVIDERS DIRECT ADDRESSES
[email or web address]

## 2021-06-16 ENCOUNTER — APPOINTMENT (OUTPATIENT)
Dept: INFUSION THERAPY | Facility: CLINIC | Age: 81
End: 2021-06-16

## 2021-06-16 DIAGNOSIS — D50.0 IRON DEFICIENCY ANEMIA SECONDARY TO BLOOD LOSS (CHRONIC): ICD-10-CM

## 2021-06-16 DIAGNOSIS — L60.0 INGROWING NAIL: ICD-10-CM

## 2021-06-16 DIAGNOSIS — C20 MALIGNANT NEOPLASM OF RECTUM: ICD-10-CM

## 2021-06-16 DIAGNOSIS — D72.829 ELEVATED WHITE BLOOD CELL COUNT, UNSPECIFIED: ICD-10-CM

## 2021-06-16 DIAGNOSIS — L60.3 NAIL DYSTROPHY: ICD-10-CM

## 2021-06-16 DIAGNOSIS — Z51.11 ENCOUNTER FOR ANTINEOPLASTIC CHEMOTHERAPY: ICD-10-CM

## 2021-06-16 DIAGNOSIS — B35.1 TINEA UNGUIUM: ICD-10-CM

## 2021-06-16 DIAGNOSIS — R15.9 FULL INCONTINENCE OF FECES: ICD-10-CM

## 2021-06-16 DIAGNOSIS — R41.9 UNSPECIFIED SYMPTOMS AND SIGNS INVOLVING COGNITIVE FUNCTIONS AND AWARENESS: ICD-10-CM

## 2021-06-16 DIAGNOSIS — M17.9 OSTEOARTHRITIS OF KNEE, UNSPECIFIED: ICD-10-CM

## 2021-06-16 DIAGNOSIS — L60.8 OTHER NAIL DISORDERS: ICD-10-CM

## 2021-06-16 DIAGNOSIS — R26.81 UNSTEADINESS ON FEET: ICD-10-CM

## 2021-06-16 DIAGNOSIS — F05 DELIRIUM DUE TO KNOWN PHYSIOLOGICAL CONDITION: ICD-10-CM

## 2021-06-16 DIAGNOSIS — Z89.022 ACQUIRED ABSENCE OF LEFT FINGER(S): ICD-10-CM

## 2021-06-16 RX ORDER — PEGFILGRASTIM-CBQV 6 MG/.6ML
6 INJECTION, SOLUTION SUBCUTANEOUS ONCE
Refills: 0 | Status: COMPLETED | OUTPATIENT
Start: 2021-06-16 | End: 2021-06-16

## 2021-06-16 RX ADMIN — PEGFILGRASTIM-CBQV 6 MILLIGRAM(S): 6 INJECTION, SOLUTION SUBCUTANEOUS at 14:58

## 2021-06-21 DIAGNOSIS — Z51.11 ENCOUNTER FOR ANTINEOPLASTIC CHEMOTHERAPY: ICD-10-CM

## 2021-06-21 DIAGNOSIS — Z89.022 ACQUIRED ABSENCE OF LEFT FINGER(S): ICD-10-CM

## 2021-06-21 DIAGNOSIS — M17.9 OSTEOARTHRITIS OF KNEE, UNSPECIFIED: ICD-10-CM

## 2021-06-21 DIAGNOSIS — C20 MALIGNANT NEOPLASM OF RECTUM: ICD-10-CM

## 2021-06-21 DIAGNOSIS — F03.90 UNSPECIFIED DEMENTIA WITHOUT BEHAVIORAL DISTURBANCE: ICD-10-CM

## 2021-06-21 DIAGNOSIS — D50.0 IRON DEFICIENCY ANEMIA SECONDARY TO BLOOD LOSS (CHRONIC): ICD-10-CM

## 2021-06-21 DIAGNOSIS — L60.8 OTHER NAIL DISORDERS: ICD-10-CM

## 2021-06-24 ENCOUNTER — INPATIENT (INPATIENT)
Facility: HOSPITAL | Age: 81
LOS: 4 days | Discharge: NURSING, REHAB OR LONG-TERM CARE | End: 2021-06-29
Attending: INTERNAL MEDICINE | Admitting: INTERNAL MEDICINE
Payer: MEDICARE

## 2021-06-24 VITALS
RESPIRATION RATE: 18 BRPM | SYSTOLIC BLOOD PRESSURE: 141 MMHG | HEART RATE: 77 BPM | TEMPERATURE: 96 F | DIASTOLIC BLOOD PRESSURE: 65 MMHG

## 2021-06-24 DIAGNOSIS — S68.119A COMPLETE TRAUMATIC METACARPOPHALANGEAL AMPUTATION OF UNSPECIFIED FINGER, INITIAL ENCOUNTER: Chronic | ICD-10-CM

## 2021-06-24 RX ORDER — LANOLIN ALCOHOL/MO/W.PET/CERES
5 CREAM (GRAM) TOPICAL AT BEDTIME
Refills: 0 | Status: DISCONTINUED | OUTPATIENT
Start: 2021-06-24 | End: 2021-06-29

## 2021-06-24 RX ORDER — SODIUM CHLORIDE 9 MG/ML
1000 INJECTION, SOLUTION INTRAVENOUS
Refills: 0 | Status: DISCONTINUED | OUTPATIENT
Start: 2021-06-24 | End: 2021-06-26

## 2021-06-24 RX ORDER — CHLORHEXIDINE GLUCONATE 213 G/1000ML
1 SOLUTION TOPICAL
Refills: 0 | Status: DISCONTINUED | OUTPATIENT
Start: 2021-06-24 | End: 2021-06-29

## 2021-06-24 RX ORDER — SIMETHICONE 80 MG/1
80 TABLET, CHEWABLE ORAL
Refills: 0 | Status: DISCONTINUED | OUTPATIENT
Start: 2021-06-24 | End: 2021-06-29

## 2021-06-24 RX ORDER — PANTOPRAZOLE SODIUM 20 MG/1
40 TABLET, DELAYED RELEASE ORAL
Refills: 0 | Status: DISCONTINUED | OUTPATIENT
Start: 2021-06-24 | End: 2021-06-29

## 2021-06-24 RX ORDER — ENOXAPARIN SODIUM 100 MG/ML
40 INJECTION SUBCUTANEOUS AT BEDTIME
Refills: 0 | Status: DISCONTINUED | OUTPATIENT
Start: 2021-06-24 | End: 2021-06-29

## 2021-06-24 RX ORDER — OXYCODONE AND ACETAMINOPHEN 5; 325 MG/1; MG/1
1 TABLET ORAL EVERY 6 HOURS
Refills: 0 | Status: DISCONTINUED | OUTPATIENT
Start: 2021-06-24 | End: 2021-06-29

## 2021-06-24 RX ORDER — LOPERAMIDE HCL 2 MG
2 TABLET ORAL
Refills: 0 | Status: DISCONTINUED | OUTPATIENT
Start: 2021-06-24 | End: 2021-06-29

## 2021-06-24 RX ORDER — LIDOCAINE 4 G/100G
2 CREAM TOPICAL DAILY
Refills: 0 | Status: DISCONTINUED | OUTPATIENT
Start: 2021-06-24 | End: 2021-06-29

## 2021-06-24 RX ADMIN — ENOXAPARIN SODIUM 40 MILLIGRAM(S): 100 INJECTION SUBCUTANEOUS at 21:44

## 2021-06-24 RX ADMIN — SODIUM CHLORIDE 75 MILLILITER(S): 9 INJECTION, SOLUTION INTRAVENOUS at 21:44

## 2021-06-24 RX ADMIN — Medication 5 MILLIGRAM(S): at 21:44

## 2021-06-24 NOTE — H&P ADULT - NSHPPHYSICALEXAM_GEN_ALL_CORE
General: NAD  Neuro: alert and oriented, no focal deficits, moves all extremities spontaneously  HEENT: NCAT, EOMI, anicteric, mucosa moist  Respiratory: airway patent, respirations unlabored  CVS: regular rate and rhythm  Abdomen: soft, nontender, nondistended  Extremities: no edema, no knee effusion  Skin: warm, dry, appropriate color

## 2021-06-24 NOTE — H&P ADULT - HISTORY OF PRESENT ILLNESS
This is a 80 year old male with Advanced rectal adenocarcinoma, Microcytic Anemia, Chronic Osteoarthritis who presented from [facility] for chemotherapy admission. Patient recently completed his third cycle of FOLFOX therapy two weeks ago without any complications  and currently admitted to complete his fourth cycle of chemo. Per heme onc, patient is planned for concurrent chemoradiation followed by surgery once he completed neoadjuvant chemotherapy. On evaluation, patient is alert and oriented 2 (baseline per aide from [facility]), and endorses chronic knee pain, but otherwise denies any chest pain, sob, nausea, vomiting, recent fevers, or sick contacts.     Ed course: Vitally stable, pending routine labs.

## 2021-06-24 NOTE — H&P ADULT - ASSESSMENT
This is a 80 year old male with Advanced rectal adenocarcinoma, Microcytic Anemia, Chronic Osteoarthritis who presented from [facility] for chemotherapy.      # Locally advanced rectal adenocarcinoma (T4N0M0)  - patient currently HD stable, no new complaints since last chemotherapy session  - Diagnosed late APril 2021 - MRI: 18 cm long polypoidal rectal mass with invasion of the right meso rectal fascia and possibly the prostate gland. Additional involvement of the analsphincter complex--T4(a or b)N0M0  - Planned for total neoadjuvant chemotherapy followed by chemoRT followed by surgery  - s/p received cycle 3 6/10/21  - C2D1 was 5/27 (delayed due to logistical reasons), he received 1 day of Zarxio  - He is able to express why we are offering chemotherapy and the risks and benefits. We discussed side effects including but not limited to cytopenias which increase the risk of bleeding and infection, fatigue, nausea, vomiting, diarrhea and neuropathy.  - C3D1 6/12  - f/u routine labs, will start maintenance fluids   - will likely be discharged back to SNF post chemo, PT eval    Iron deficiency anemia likely from chronc occult LGIB from tumor  - Iron studies 4/21: Iron Saturation: 12%, Ferritin 34, MCV 68.9   - s/p 1 dose of Venofer last admission, s/p 1 PRBC 6/12  - Recheck iron studies early July    Mycotic toenails:  - recently Debrided by podiatry  -  followup outpatient    Knee pain, likely OA:  - Lidocaine patch  - Outpatient followup  - PO pain meds if patient agreeable    Code: full  Diet: DASH  DVT ppx: lovenox  Dispo: acute

## 2021-06-24 NOTE — H&P ADULT - NSHPSOCIALHISTORY_GEN_ALL_CORE
Marital Status:  (   )    (   ) Single    (   )    (  )   Lives with: (  ) alone  (  ) children   (  ) spouse   (  ) parents  (  ) other  Recent Travel: No recent travel      Substance Use (street drugs): ( x ) never used  (  ) other:  Tobacco Usage:  ( x  ) never smoked   (   ) former smoker   (   ) current smoker  (     ) pack year  Alcohol Usage: None

## 2021-06-24 NOTE — H&P ADULT - NSHPREVIEWOFSYSTEMS_GEN_ALL_CORE
REVIEW OF SYSTEMS:    CONSTITUTIONAL:+ fatigue  EYES/ENT: No visual changes;  No vertigo or throat pain   NECK: No pain or stiffness  RESPIRATORY: No cough, wheezing, hemoptysis; No shortness of breath  CARDIOVASCULAR: No chest pain or palpitations  GASTROINTESTINAL: No abdominal or epigastric pain. No nausea, vomiting, or hematemesis; No diarrhea or constipation. No melena or hematochezia.  GENITOURINARY: No dysuria, frequency or hematuria  NEUROLOGICAL: No numbness or weakness  MSK: b/l Knee pain   SKIN: No itching, rashes

## 2021-06-25 LAB
ALBUMIN SERPL ELPH-MCNC: 2.8 G/DL — LOW (ref 3.5–5.2)
ALBUMIN SERPL ELPH-MCNC: 3 G/DL — LOW (ref 3.5–5.2)
ALP SERPL-CCNC: 104 U/L — SIGNIFICANT CHANGE UP (ref 30–115)
ALP SERPL-CCNC: 114 U/L — SIGNIFICANT CHANGE UP (ref 30–115)
ALT FLD-CCNC: 8 U/L — SIGNIFICANT CHANGE UP (ref 0–41)
ALT FLD-CCNC: 8 U/L — SIGNIFICANT CHANGE UP (ref 0–41)
ANION GAP SERPL CALC-SCNC: 6 MMOL/L — LOW (ref 7–14)
ANION GAP SERPL CALC-SCNC: 9 MMOL/L — SIGNIFICANT CHANGE UP (ref 7–14)
APTT BLD: 34.5 SEC — SIGNIFICANT CHANGE UP (ref 27–39.2)
AST SERPL-CCNC: 13 U/L — SIGNIFICANT CHANGE UP (ref 0–41)
AST SERPL-CCNC: 14 U/L — SIGNIFICANT CHANGE UP (ref 0–41)
BASOPHILS # BLD AUTO: 0.07 K/UL — SIGNIFICANT CHANGE UP (ref 0–0.2)
BASOPHILS # BLD AUTO: 0.1 K/UL — SIGNIFICANT CHANGE UP (ref 0–0.2)
BASOPHILS NFR BLD AUTO: 1.5 % — HIGH (ref 0–1)
BASOPHILS NFR BLD AUTO: 1.6 % — HIGH (ref 0–1)
BILIRUB SERPL-MCNC: 0.3 MG/DL — SIGNIFICANT CHANGE UP (ref 0.2–1.2)
BILIRUB SERPL-MCNC: <0.2 MG/DL — SIGNIFICANT CHANGE UP (ref 0.2–1.2)
BLD GP AB SCN SERPL QL: SIGNIFICANT CHANGE UP
BUN SERPL-MCNC: 13 MG/DL — SIGNIFICANT CHANGE UP (ref 10–20)
BUN SERPL-MCNC: 13 MG/DL — SIGNIFICANT CHANGE UP (ref 10–20)
CALCIUM SERPL-MCNC: 8.7 MG/DL — SIGNIFICANT CHANGE UP (ref 8.5–10.1)
CALCIUM SERPL-MCNC: 8.8 MG/DL — SIGNIFICANT CHANGE UP (ref 8.5–10.1)
CHLORIDE SERPL-SCNC: 102 MMOL/L — SIGNIFICANT CHANGE UP (ref 98–110)
CHLORIDE SERPL-SCNC: 105 MMOL/L — SIGNIFICANT CHANGE UP (ref 98–110)
CO2 SERPL-SCNC: 25 MMOL/L — SIGNIFICANT CHANGE UP (ref 17–32)
CO2 SERPL-SCNC: 28 MMOL/L — SIGNIFICANT CHANGE UP (ref 17–32)
CREAT SERPL-MCNC: 0.5 MG/DL — LOW (ref 0.7–1.5)
CREAT SERPL-MCNC: 0.5 MG/DL — LOW (ref 0.7–1.5)
EOSINOPHIL # BLD AUTO: 0.06 K/UL — SIGNIFICANT CHANGE UP (ref 0–0.7)
EOSINOPHIL # BLD AUTO: 0.08 K/UL — SIGNIFICANT CHANGE UP (ref 0–0.7)
EOSINOPHIL NFR BLD AUTO: 1.2 % — SIGNIFICANT CHANGE UP (ref 0–8)
EOSINOPHIL NFR BLD AUTO: 1.4 % — SIGNIFICANT CHANGE UP (ref 0–8)
GLUCOSE SERPL-MCNC: 110 MG/DL — HIGH (ref 70–99)
GLUCOSE SERPL-MCNC: 89 MG/DL — SIGNIFICANT CHANGE UP (ref 70–99)
HCT VFR BLD CALC: 28.6 % — LOW (ref 42–52)
HCT VFR BLD CALC: 30 % — LOW (ref 42–52)
HGB BLD-MCNC: 8.6 G/DL — LOW (ref 14–18)
HGB BLD-MCNC: 8.8 G/DL — LOW (ref 14–18)
IMM GRANULOCYTES NFR BLD AUTO: 1.6 % — HIGH (ref 0.1–0.3)
IMM GRANULOCYTES NFR BLD AUTO: 1.6 % — HIGH (ref 0.1–0.3)
INR BLD: 1.28 RATIO — SIGNIFICANT CHANGE UP (ref 0.65–1.3)
LYMPHOCYTES # BLD AUTO: 0.92 K/UL — LOW (ref 1.2–3.4)
LYMPHOCYTES # BLD AUTO: 1.47 K/UL — SIGNIFICANT CHANGE UP (ref 1.2–3.4)
LYMPHOCYTES # BLD AUTO: 21.1 % — SIGNIFICANT CHANGE UP (ref 20.5–51.1)
LYMPHOCYTES # BLD AUTO: 21.9 % — SIGNIFICANT CHANGE UP (ref 20.5–51.1)
MAGNESIUM SERPL-MCNC: 1.5 MG/DL — LOW (ref 1.8–2.4)
MAGNESIUM SERPL-MCNC: 1.6 MG/DL — LOW (ref 1.8–2.4)
MCHC RBC-ENTMCNC: 26.1 PG — LOW (ref 27–31)
MCHC RBC-ENTMCNC: 26.8 PG — LOW (ref 27–31)
MCHC RBC-ENTMCNC: 29.3 G/DL — LOW (ref 32–37)
MCHC RBC-ENTMCNC: 30.1 G/DL — LOW (ref 32–37)
MCV RBC AUTO: 89 FL — SIGNIFICANT CHANGE UP (ref 80–94)
MCV RBC AUTO: 89.1 FL — SIGNIFICANT CHANGE UP (ref 80–94)
MONOCYTES # BLD AUTO: 0.74 K/UL — HIGH (ref 0.1–0.6)
MONOCYTES # BLD AUTO: 1.11 K/UL — HIGH (ref 0.1–0.6)
MONOCYTES NFR BLD AUTO: 16.5 % — HIGH (ref 1.7–9.3)
MONOCYTES NFR BLD AUTO: 17 % — HIGH (ref 1.7–9.3)
NEUTROPHILS # BLD AUTO: 2.5 K/UL — SIGNIFICANT CHANGE UP (ref 1.4–6.5)
NEUTROPHILS # BLD AUTO: 3.85 K/UL — SIGNIFICANT CHANGE UP (ref 1.4–6.5)
NEUTROPHILS NFR BLD AUTO: 57.3 % — SIGNIFICANT CHANGE UP (ref 42.2–75.2)
NEUTROPHILS NFR BLD AUTO: 57.3 % — SIGNIFICANT CHANGE UP (ref 42.2–75.2)
NRBC # BLD: 0 /100 WBCS — SIGNIFICANT CHANGE UP (ref 0–0)
NRBC # BLD: 0 /100 WBCS — SIGNIFICANT CHANGE UP (ref 0–0)
PHOSPHATE SERPL-MCNC: 3.5 MG/DL — SIGNIFICANT CHANGE UP (ref 2.1–4.9)
PLATELET # BLD AUTO: 155 K/UL — SIGNIFICANT CHANGE UP (ref 130–400)
PLATELET # BLD AUTO: 166 K/UL — SIGNIFICANT CHANGE UP (ref 130–400)
POTASSIUM SERPL-MCNC: 4 MMOL/L — SIGNIFICANT CHANGE UP (ref 3.5–5)
POTASSIUM SERPL-MCNC: 4.1 MMOL/L — SIGNIFICANT CHANGE UP (ref 3.5–5)
POTASSIUM SERPL-SCNC: 4 MMOL/L — SIGNIFICANT CHANGE UP (ref 3.5–5)
POTASSIUM SERPL-SCNC: 4.1 MMOL/L — SIGNIFICANT CHANGE UP (ref 3.5–5)
PROT SERPL-MCNC: 5.5 G/DL — LOW (ref 6–8)
PROT SERPL-MCNC: 5.6 G/DL — LOW (ref 6–8)
PROTHROM AB SERPL-ACNC: 14.7 SEC — HIGH (ref 9.95–12.87)
RBC # BLD: 3.21 M/UL — LOW (ref 4.7–6.1)
RBC # BLD: 3.37 M/UL — LOW (ref 4.7–6.1)
RBC # FLD: 21.6 % — HIGH (ref 11.5–14.5)
RBC # FLD: 21.6 % — HIGH (ref 11.5–14.5)
SODIUM SERPL-SCNC: 136 MMOL/L — SIGNIFICANT CHANGE UP (ref 135–146)
SODIUM SERPL-SCNC: 139 MMOL/L — SIGNIFICANT CHANGE UP (ref 135–146)
WBC # BLD: 4.36 K/UL — LOW (ref 4.8–10.8)
WBC # BLD: 6.72 K/UL — SIGNIFICANT CHANGE UP (ref 4.8–10.8)
WBC # FLD AUTO: 4.36 K/UL — LOW (ref 4.8–10.8)
WBC # FLD AUTO: 6.72 K/UL — SIGNIFICANT CHANGE UP (ref 4.8–10.8)

## 2021-06-25 PROCEDURE — 99223 1ST HOSP IP/OBS HIGH 75: CPT

## 2021-06-25 RX ORDER — MAGNESIUM SULFATE 500 MG/ML
2 VIAL (ML) INJECTION ONCE
Refills: 0 | Status: COMPLETED | OUTPATIENT
Start: 2021-06-25 | End: 2021-06-25

## 2021-06-25 RX ORDER — HYDROCORTISONE 1 %
1 OINTMENT (GRAM) TOPICAL
Refills: 0 | Status: DISCONTINUED | OUTPATIENT
Start: 2021-06-25 | End: 2021-06-29

## 2021-06-25 RX ADMIN — PANTOPRAZOLE SODIUM 40 MILLIGRAM(S): 20 TABLET, DELAYED RELEASE ORAL at 05:28

## 2021-06-25 RX ADMIN — OXYCODONE AND ACETAMINOPHEN 1 TABLET(S): 5; 325 TABLET ORAL at 14:43

## 2021-06-25 RX ADMIN — SIMETHICONE 80 MILLIGRAM(S): 80 TABLET, CHEWABLE ORAL at 17:48

## 2021-06-25 RX ADMIN — Medication 2 MILLIGRAM(S): at 14:42

## 2021-06-25 RX ADMIN — Medication 5 MILLIGRAM(S): at 22:02

## 2021-06-25 RX ADMIN — CHLORHEXIDINE GLUCONATE 1 APPLICATION(S): 213 SOLUTION TOPICAL at 05:28

## 2021-06-25 RX ADMIN — OXYCODONE AND ACETAMINOPHEN 1 TABLET(S): 5; 325 TABLET ORAL at 15:35

## 2021-06-25 RX ADMIN — ENOXAPARIN SODIUM 40 MILLIGRAM(S): 100 INJECTION SUBCUTANEOUS at 22:02

## 2021-06-25 RX ADMIN — Medication 50 GRAM(S): at 11:05

## 2021-06-25 RX ADMIN — LIDOCAINE 2 PATCH: 4 CREAM TOPICAL at 11:05

## 2021-06-25 RX ADMIN — Medication 1 APPLICATION(S): at 17:48

## 2021-06-25 RX ADMIN — SODIUM CHLORIDE 75 MILLILITER(S): 9 INJECTION, SOLUTION INTRAVENOUS at 06:56

## 2021-06-25 NOTE — PROGRESS NOTE ADULT - ASSESSMENT
ASSESSMENT & PLAN    Mr. Orellana is a 80M w/ no known PMHx (doesn't follow with doctors) with recently diagnosed locally advanced rectal adenocarcinoma.     # Locally advanced rectal adenocarcinoma (T4N0M0)  - Diagnosed late APril 2021 - MRI: 18 cm long polypoidal rectal mass with invasion of the right meso rectal fascia and possibly the prostate gland. Additional involvement of the analsphincter complex--T4(a or b)N0M0  - Planned for total neoadjuvant therapy (chemotherapy for 12-16 weeks followed by chemoRT followed by surgery)  - s/p port placement  - He received cycle 1 5/7/21  - C2D1 was 5/27 (delayed due to logistical reasons), he received 1 day of Zarxio  - He is able to express why we are offering chemotherapy and the risks and benefits. We discussed side effects including but not limited to cytopenias which increase the risk of bleeding and infection, fatigue, nausea, vomiting, diarrhea and neuropathy.  - C3D1 6/12  - C4D1 planned for 6/26, will continue fluids and replete mag prior to starting therapy  - He should be planned for direct admission July 9th for cycle 5, he will need a CBC and COVID swab prior to the admission which can be done at the rehab and results sent to [facility]    # Hypomagnesemia: likely from diarrhea  - Monitor and replete.   - Pt on 2mg loperamide PRN    #Iron deficiency anemia likely from chronc occult LGIB from tumor  - On previous admission, Iron Saturation: 12%, Ferritin 34, MCV 68.9   - Completed IV iron 1g  - Recheck iron studies early July    #Knee pain, likely OA:  - Lidocaine patch  - Outpatient followup  - PO pain meds if patient agreeable  - PT saw patient but                                                                             ----------------------------------------------------  # DVT prophylaxis enoxaparin 40mg subq daily    # GI prophylaxis pantoprazole 40mg po daily    # Diet regular    # Activity Score (AM-PAC)    # Code status     # Disposition Home                                                                             --------------------------------------------------------    Angie aRiney    ASSESSMENT & PLAN    Mr. Orellana is a 80M w/ no known PMHx (doesn't follow with doctors) with recently diagnosed locally advanced rectal adenocarcinoma.     # Locally advanced rectal adenocarcinoma (T4N0M0)  - Diagnosed late APril 2021 - MRI: 18 cm long polypoidal rectal mass with invasion of the right meso rectal fascia and possibly the prostate gland. Additional involvement of the analsphincter complex--T4(a or b)N0M0  - Planned for total neoadjuvant therapy (chemotherapy for 12-16 weeks followed by chemoRT followed by surgery)  - s/p port placement  - He received cycle 1 5/7/21  - C2D1 was 5/27 (delayed due to logistical reasons), he received 1 day of Zarxio  - He is able to express why we are offering chemotherapy and the risks and benefits. We discussed side effects including but not limited to cytopenias which increase the risk of bleeding and infection, fatigue, nausea, vomiting, diarrhea and neuropathy.  - C3D1 6/12  - C4D1 planned for 6/26, will continue fluids and replete mag prior to starting therapy  - He should be planned for direct admission July 9th for cycle 5, he will need a CBC and COVID swab prior to the admission which can be done at the rehab and results sent to [facility]    # Hypomagnesemia: likely from diarrhea  - Monitor and replete.   - Pt on 2mg loperamide PRN    #Iron deficiency anemia likely from chronc occult LGIB from tumor  - On previous admission, Iron Saturation: 12%, Ferritin 34, MCV 68.9   - Completed IV iron 1g  - Recheck iron studies early July    #Knee pain, likely OA:  - Lidocaine patch  - Outpatient followup  - PO pain meds if patient agreeable  - PT saw patient but pt was not cooperative. PT will f/u later                                                                            ----------------------------------------------------  # DVT prophylaxis enoxaparin 40mg subq daily    # GI prophylaxis pantoprazole 40mg po daily    # Diet regular    # Activity Score (AM-PAC)    # Code status     # Disposition Home                                                                             --------------------------------------------------------    Angie Rainey    ASSESSMENT & PLAN    Mr. Orellana is a 80M w/ no known PMHx (doesn't follow with doctors) with recently diagnosed locally advanced rectal adenocarcinoma.     # Locally advanced rectal adenocarcinoma (T4N0M0)  - Diagnosed late APril 2021 - MRI: 18 cm long polypoidal rectal mass with invasion of the right meso rectal fascia and possibly the prostate gland. Additional involvement of the analsphincter complex--T4(a or b)N0M0  - Planned for total neoadjuvant therapy (chemotherapy for 12-16 weeks followed by chemoRT followed by surgery)  - s/p port placement  - He received cycle 1 5/7/21  - C2D1 was 5/27 (delayed due to logistical reasons), he received 1 day of Zarxio  - He is able to express why we are offering chemotherapy and the risks and benefits. We discussed side effects including but not limited to cytopenias which increase the risk of bleeding and infection, fatigue, nausea, vomiting, diarrhea and neuropathy.  - C3D1 6/12  - C4D1 planned for 6/26, will continue fluids and replete mag prior to starting therapy  - Expected to stay until Monday/tuesday.  - He should be planned for direct admission July 9th for cycle 5, he will need a CT, CBC and COVID swab prior to the admission which can be done at the rehab and results sent to [facility]    # Hypomagnesemia: likely from diarrhea  - Monitor and replete.   - Pt on 2mg loperamide PRN    #Iron deficiency anemia likely from chronc occult LGIB from tumor  - On previous admission, Iron Saturation: 12%, Ferritin 34, MCV 68.9   - Completed IV iron 1g  - Recheck iron studies early July    #Knee pain, likely OA:  - Lidocaine patch  - Outpatient followup  - PO pain meds if patient agreeable  - PT saw patient but pt was not cooperative. PT will f/u later                                                                            ----------------------------------------------------  # DVT prophylaxis enoxaparin 40mg subq daily    # GI prophylaxis pantoprazole 40mg po daily    # Diet regular    # Activity Score (AM-PAC)    # Code status     # Disposition Home                                                                             --------------------------------------------------------    Angie Rainey

## 2021-06-25 NOTE — PROGRESS NOTE ADULT - ASSESSMENT
Mr. Orellana is a 80M w/ no known PMHx (doesn't follow with doctors) with recently diagnosed locally advanced rectal adenocarcinoma.     # Locally advanced rectal adenocarcinoma (T4N0M0)  - Diagnosed late APril 2021 - MRI: 18 cm long polypoidal rectal mass with invasion of the right meso rectal fascia and possibly the prostate gland. Additional involvement of the analsphincter complex--T4(a or b)N0M0  - Planned for total neoadjuvant therapy (chemotherapy for 12-16 weeks followed by chemoRT followed by surgery)  - s/p port placement  - He received cycle 1 5/7/21  - C2D1 was 5/27 (delayed due to logistical reasons), he received 1 day of Zarxio  - He is able to express why we are offering chemotherapy and the risks and benefits. We discussed side effects including but not limited to cytopenias which increase the risk of bleeding and infection, fatigue, nausea, vomiting, diarrhea and neuropathy.  - C3D1 6/12  - C4D1 planned for 6/26, will continue fluids and replete mag prior to starting therapy  - He should be planned for direct admission July 9th for cycle 5, he will need a CBC and COVID swab prior to the admission which can be done at the rehab and results sent to [facility]    # Hypomagnesemia: likely from diarrhea  - Monitor and replete    Iron deficiency anemia likely from chronc occult LGIB from tumor  - On previous admission, Iron Saturation: 12%, Ferritin 34, MCV 68.9   - Completed IV iron 1g  - Recheck iron studies early July    Knee pain, likely OA:  - Lidocaine patch  - Outpatient followup  - PO pain meds if patient agreeable    DVT ppx         Mr. Orellana is a 80M w/ no known PMHx (doesn't follow with doctors) with recently diagnosed locally advanced rectal adenocarcinoma.     # Locally advanced rectal adenocarcinoma (T4N0M0)  - Diagnosed late APril 2021 - MRI: 18 cm long polypoidal rectal mass with invasion of the right meso rectal fascia and possibly the prostate gland. Additional involvement of the analsphincter complex--T4(a or b)N0M0  - Planned for total neoadjuvant therapy (chemotherapy for 12-16 weeks followed by chemoRT followed by surgery)  - s/p port placement  - He received cycle 1 5/7/21  - C2D1 was 5/27 (delayed due to logistical reasons), he received 1 day of Zarxio  - C3D1 6/12  - C4D1 planned for 6/26, will continue fluids and replete mag prior to starting therapy  - Check CEA  - He is able to express why we are offering chemotherapy and the risks and benefits. We discussed side effects including but not limited to cytopenias which increase the risk of bleeding and infection, fatigue, nausea, vomiting, diarrhea and neuropathy.  - His next cycle if he continues with FOLFOX should be July 9th for cycle 5, for which he would need a direct admission. However he needs restaging scans done and then an appointment at [facility] to discuss the results and next steps.     # Dry skin/eczema on neck  - Triamcinolone to affected areas    # Hypomagnesemia: likely from diarrhea  - Monitor and replete    Iron deficiency anemia likely from chronc occult LGIB from tumor  - On previous admission, Iron Saturation: 12%, Ferritin 34, MCV 68.9   - Completed IV iron 1g  - Recheck iron studies early July    Knee pain, likely OA:  - Lidocaine patch  - Outpatient followup  - PO pain meds if patient agreeable    DVT ppx    PLAN:  Cycle 4 of FOLFOX tomorrow (6/26)  Triamcinolone cream  Check CEA, CBC, CMP, mag  Will need restaging scans and appointment at [facility] prior to cycle 5

## 2021-06-26 LAB
ALBUMIN SERPL ELPH-MCNC: 2.6 G/DL — LOW (ref 3.5–5.2)
ALP SERPL-CCNC: 106 U/L — SIGNIFICANT CHANGE UP (ref 30–115)
ALT FLD-CCNC: 7 U/L — SIGNIFICANT CHANGE UP (ref 0–41)
ANION GAP SERPL CALC-SCNC: 7 MMOL/L — SIGNIFICANT CHANGE UP (ref 7–14)
ANISOCYTOSIS BLD QL: SIGNIFICANT CHANGE UP
AST SERPL-CCNC: 12 U/L — SIGNIFICANT CHANGE UP (ref 0–41)
BASOPHILS # BLD AUTO: 0.15 K/UL — SIGNIFICANT CHANGE UP (ref 0–0.2)
BASOPHILS NFR BLD AUTO: 3.5 % — HIGH (ref 0–1)
BILIRUB DIRECT SERPL-MCNC: <0.2 MG/DL — SIGNIFICANT CHANGE UP (ref 0–0.2)
BILIRUB INDIRECT FLD-MCNC: >0.1 MG/DL — LOW (ref 0.2–1.2)
BILIRUB SERPL-MCNC: 0.3 MG/DL — SIGNIFICANT CHANGE UP (ref 0.2–1.2)
BUN SERPL-MCNC: 7 MG/DL — LOW (ref 10–20)
CALCIUM SERPL-MCNC: 8.5 MG/DL — SIGNIFICANT CHANGE UP (ref 8.5–10.1)
CEA SERPL-MCNC: 10.1 NG/ML — HIGH (ref 0–3.8)
CHLORIDE SERPL-SCNC: 104 MMOL/L — SIGNIFICANT CHANGE UP (ref 98–110)
CO2 SERPL-SCNC: 28 MMOL/L — SIGNIFICANT CHANGE UP (ref 17–32)
CREAT SERPL-MCNC: 0.5 MG/DL — LOW (ref 0.7–1.5)
ELLIPTOCYTES BLD QL SMEAR: SLIGHT — SIGNIFICANT CHANGE UP
EOSINOPHIL # BLD AUTO: 0.04 K/UL — SIGNIFICANT CHANGE UP (ref 0–0.7)
EOSINOPHIL NFR BLD AUTO: 0.9 % — SIGNIFICANT CHANGE UP (ref 0–8)
GIANT PLATELETS BLD QL SMEAR: PRESENT — SIGNIFICANT CHANGE UP
GLUCOSE SERPL-MCNC: 86 MG/DL — SIGNIFICANT CHANGE UP (ref 70–99)
HCT VFR BLD CALC: 30.5 % — LOW (ref 42–52)
HGB BLD-MCNC: 9.1 G/DL — LOW (ref 14–18)
HYPOCHROMIA BLD QL: SLIGHT — SIGNIFICANT CHANGE UP
LYMPHOCYTES # BLD AUTO: 0.65 K/UL — LOW (ref 1.2–3.4)
LYMPHOCYTES # BLD AUTO: 14.9 % — LOW (ref 20.5–51.1)
MAGNESIUM SERPL-MCNC: 1.8 MG/DL — SIGNIFICANT CHANGE UP (ref 1.8–2.4)
MANUAL SMEAR VERIFICATION: SIGNIFICANT CHANGE UP
MCHC RBC-ENTMCNC: 26.8 PG — LOW (ref 27–31)
MCHC RBC-ENTMCNC: 29.8 G/DL — LOW (ref 32–37)
MCV RBC AUTO: 89.7 FL — SIGNIFICANT CHANGE UP (ref 80–94)
MICROCYTES BLD QL: SLIGHT — SIGNIFICANT CHANGE UP
MONOCYTES # BLD AUTO: 0.92 K/UL — HIGH (ref 0.1–0.6)
MONOCYTES NFR BLD AUTO: 21 % — HIGH (ref 1.7–9.3)
NEUTROPHILS # BLD AUTO: 2.61 K/UL — SIGNIFICANT CHANGE UP (ref 1.4–6.5)
NEUTROPHILS NFR BLD AUTO: 57.9 % — SIGNIFICANT CHANGE UP (ref 42.2–75.2)
NEUTS BAND # BLD: 1.8 % — SIGNIFICANT CHANGE UP (ref 0–6)
OVALOCYTES BLD QL SMEAR: SLIGHT — SIGNIFICANT CHANGE UP
PLAT MORPH BLD: ABNORMAL
PLATELET # BLD AUTO: 194 K/UL — SIGNIFICANT CHANGE UP (ref 130–400)
POIKILOCYTOSIS BLD QL AUTO: SIGNIFICANT CHANGE UP
POLYCHROMASIA BLD QL SMEAR: SLIGHT — SIGNIFICANT CHANGE UP
POTASSIUM SERPL-MCNC: 4.5 MMOL/L — SIGNIFICANT CHANGE UP (ref 3.5–5)
POTASSIUM SERPL-SCNC: 4.5 MMOL/L — SIGNIFICANT CHANGE UP (ref 3.5–5)
PROT SERPL-MCNC: 5.6 G/DL — LOW (ref 6–8)
RBC # BLD: 3.4 M/UL — LOW (ref 4.7–6.1)
RBC # FLD: 21.5 % — HIGH (ref 11.5–14.5)
RBC BLD AUTO: ABNORMAL
SODIUM SERPL-SCNC: 139 MMOL/L — SIGNIFICANT CHANGE UP (ref 135–146)
WBC # BLD: 4.37 K/UL — LOW (ref 4.8–10.8)
WBC # FLD AUTO: 4.37 K/UL — LOW (ref 4.8–10.8)

## 2021-06-26 PROCEDURE — 99233 SBSQ HOSP IP/OBS HIGH 50: CPT

## 2021-06-26 RX ORDER — ONDANSETRON 8 MG/1
16 TABLET, FILM COATED ORAL ONCE
Refills: 0 | Status: COMPLETED | OUTPATIENT
Start: 2021-06-26 | End: 2021-06-26

## 2021-06-26 RX ORDER — LEUCOVORIN CALCIUM 5 MG
810 TABLET ORAL ONCE
Refills: 0 | Status: COMPLETED | OUTPATIENT
Start: 2021-06-26 | End: 2021-06-26

## 2021-06-26 RX ORDER — FLUOROURACIL 50 MG/ML
2425 INJECTION, SOLUTION INTRAVENOUS EVERY 24 HOURS
Refills: 0 | Status: COMPLETED | OUTPATIENT
Start: 2021-06-26 | End: 2021-06-28

## 2021-06-26 RX ORDER — FOSAPREPITANT DIMEGLUMINE 150 MG/5ML
150 INJECTION, POWDER, LYOPHILIZED, FOR SOLUTION INTRAVENOUS ONCE
Refills: 0 | Status: COMPLETED | OUTPATIENT
Start: 2021-06-26 | End: 2021-06-26

## 2021-06-26 RX ORDER — DEXAMETHASONE 0.5 MG/5ML
12 ELIXIR ORAL ONCE
Refills: 0 | Status: COMPLETED | OUTPATIENT
Start: 2021-06-26 | End: 2021-06-26

## 2021-06-26 RX ORDER — OXALIPLATIN 5 MG/ML
170 INJECTION, SOLUTION INTRAVENOUS ONCE
Refills: 0 | Status: COMPLETED | OUTPATIENT
Start: 2021-06-26 | End: 2021-06-26

## 2021-06-26 RX ORDER — FLUOROURACIL 50 MG/ML
810 INJECTION, SOLUTION INTRAVENOUS ONCE
Refills: 0 | Status: COMPLETED | OUTPATIENT
Start: 2021-06-26 | End: 2021-06-26

## 2021-06-26 RX ADMIN — LIDOCAINE 2 PATCH: 4 CREAM TOPICAL at 22:55

## 2021-06-26 RX ADMIN — Medication 106 MILLIGRAM(S): at 15:00

## 2021-06-26 RX ADMIN — Medication 1 APPLICATION(S): at 17:09

## 2021-06-26 RX ADMIN — LIDOCAINE 2 PATCH: 4 CREAM TOPICAL at 11:09

## 2021-06-26 RX ADMIN — LIDOCAINE 2 PATCH: 4 CREAM TOPICAL at 20:12

## 2021-06-26 RX ADMIN — ONDANSETRON 116 MILLIGRAM(S): 8 TABLET, FILM COATED ORAL at 14:59

## 2021-06-26 RX ADMIN — FOSAPREPITANT DIMEGLUMINE 300 MILLIGRAM(S): 150 INJECTION, POWDER, LYOPHILIZED, FOR SOLUTION INTRAVENOUS at 14:59

## 2021-06-26 RX ADMIN — SIMETHICONE 80 MILLIGRAM(S): 80 TABLET, CHEWABLE ORAL at 21:14

## 2021-06-26 RX ADMIN — FLUOROURACIL 43.69 MILLIGRAM(S): 50 INJECTION, SOLUTION INTRAVENOUS at 19:00

## 2021-06-26 RX ADMIN — ENOXAPARIN SODIUM 40 MILLIGRAM(S): 100 INJECTION SUBCUTANEOUS at 21:14

## 2021-06-26 RX ADMIN — OXALIPLATIN 142 MILLIGRAM(S): 5 INJECTION, SOLUTION INTRAVENOUS at 15:47

## 2021-06-26 RX ADMIN — FLUOROURACIL 198.6 MILLIGRAM(S): 50 INJECTION, SOLUTION INTRAVENOUS at 18:11

## 2021-06-26 RX ADMIN — SIMETHICONE 80 MILLIGRAM(S): 80 TABLET, CHEWABLE ORAL at 11:10

## 2021-06-26 RX ADMIN — Medication 1 APPLICATION(S): at 06:22

## 2021-06-26 RX ADMIN — Medication 145.25 MILLIGRAM(S): at 15:47

## 2021-06-26 RX ADMIN — CHLORHEXIDINE GLUCONATE 1 APPLICATION(S): 213 SOLUTION TOPICAL at 06:22

## 2021-06-26 RX ADMIN — Medication 5 MILLIGRAM(S): at 21:14

## 2021-06-26 RX ADMIN — PANTOPRAZOLE SODIUM 40 MILLIGRAM(S): 20 TABLET, DELAYED RELEASE ORAL at 07:52

## 2021-06-26 NOTE — PROGRESS NOTE ADULT - ASSESSMENT
Mr. Orellana is a 80M w/ no known PMHx (doesn't follow with doctors) with recently diagnosed locally advanced rectal adenocarcinoma.     # Locally advanced rectal adenocarcinoma (T4N0M0)  - Diagnosed late APril 2021 - MRI: 18 cm long polypoidal rectal mass with invasion of the right meso rectal fascia and possibly the prostate gland. Additional involvement of the analsphincter complex--T4(a or b)N0M0  - Planned for total neoadjuvant therapy (chemotherapy for 12-16 weeks followed by chemoRT followed by surgery)  - s/p port placement  - He received cycle 1 5/7/21  - C2D1 was 5/27 (delayed due to logistical reasons), he received 1 day of Zarxio  - C3D1 6/12  - C4D1 today 6/26, will continue fluids and replete mag prior to starting therapy ( CMP pending , replete if needed )   - Check CEA  - He is able to express why we are offering chemotherapy and the risks and benefits. We discussed side effects including but not limited to cytopenias which increase the risk of bleeding and infection, fatigue, nausea, vomiting, diarrhea and neuropathy.  - His next cycle if he continues with FOLFOX should be July 9th for cycle 5, for which he would need a direct admission. However he needs restaging scans done and then an appointment at [facility] to discuss the results and next steps.     # Dry skin/eczema on neck  - Triamcinolone to affected areas    # Hypomagnesemia: likely from diarrhea  - Monitor and replete    Iron deficiency anemia likely from chronic occult LGIB from tumor  - On previous admission, Iron Saturation: 12%, Ferritin 34, MCV 68.9   - Completed IV iron 1g  - Recheck iron studies early July    Knee pain, likely OA:  - Lidocaine patch  - Outpatient followup  - PO pain meds if patient agreeable    DVT ppx    PLAN:  Cycle 4 of FOLFOX today (6/26)  Triamcinolone cream  Check CEA, CBC, CMP, mag  Will need restaging scans and appointment at [facility] prior to cycle 5

## 2021-06-26 NOTE — PHARMACOTHERAPY INTERVENTION NOTE - COMMENTS
Doctor Arley wanted 5FU to given as IV piggy bag instead of IV push. The change was documented on green chemo form.

## 2021-06-26 NOTE — PROGRESS NOTE ADULT - ASSESSMENT
Mr. Orellana is a 80M w/ no known PMHx (doesn't follow with doctors) with recently diagnosed locally advanced rectal adenocarcinoma.     # Locally advanced rectal adenocarcinoma (T4N0M0)  - Diagnosed late APril 2021 - MRI: 18 cm long polypoidal rectal mass with invasion of the right meso rectal fascia and possibly the prostate gland. Additional involvement of the analsphincter complex--T4(a or b)N0M0  - Planned for total neoadjuvant therapy (chemotherapy for 12-16 weeks followed by chemoRT followed by surgery)  - s/p port placement  - He received cycle 1 5/7/21  - C2D1 was 5/27 (delayed due to logistical reasons), he received 1 day of Zarxio  - C3D1 6/12  - C4D1 today 6/26, will continue fluids and replete mag prior to starting therapy ( CMP pending , replete if needed )   - Check CEA  - He is able to express why we are offering chemotherapy and the risks and benefits. We discussed side effects including but not limited to cytopenias which increase the risk of bleeding and infection, fatigue, nausea, vomiting, diarrhea and neuropathy.  - His next cycle if he continues with FOLFOX should be July 9th for cycle 5, for which he would need a direct admission. However he needs restaging scans done and then an appointment at [facility] to discuss the results and next steps.     # Dry skin/eczema on neck  - Triamcinolone to affected areas    # Hypomagnesemia: likely from diarrhea  - Monitor and replete    Iron deficiency anemia likely from chronic occult LGIB from tumor  - On previous admission, Iron Saturation: 12%, Ferritin 34, MCV 68.9   - Completed IV iron 1g  - Recheck iron studies early July    Knee pain, likely OA:  - Lidocaine patch  - Outpatient followup  - PO pain meds if patient agreeable    DVT ppx    PLAN:  Cycle 4 of FOLFOX today (6/26)  Triamcinolone cream  Check CEA, CBC, CMP, mag  Will need restaging scans and appointment at [facility] prior to cycle 5

## 2021-06-27 LAB
ANION GAP SERPL CALC-SCNC: 7 MMOL/L — SIGNIFICANT CHANGE UP (ref 7–14)
BASOPHILS # BLD AUTO: 0.03 K/UL — SIGNIFICANT CHANGE UP (ref 0–0.2)
BASOPHILS NFR BLD AUTO: 0.5 % — SIGNIFICANT CHANGE UP (ref 0–1)
BUN SERPL-MCNC: 9 MG/DL — LOW (ref 10–20)
CALCIUM SERPL-MCNC: 8.7 MG/DL — SIGNIFICANT CHANGE UP (ref 8.5–10.1)
CHLORIDE SERPL-SCNC: 103 MMOL/L — SIGNIFICANT CHANGE UP (ref 98–110)
CO2 SERPL-SCNC: 27 MMOL/L — SIGNIFICANT CHANGE UP (ref 17–32)
CREAT SERPL-MCNC: <0.5 MG/DL — LOW (ref 0.7–1.5)
EOSINOPHIL # BLD AUTO: 0 K/UL — SIGNIFICANT CHANGE UP (ref 0–0.7)
EOSINOPHIL NFR BLD AUTO: 0 % — SIGNIFICANT CHANGE UP (ref 0–8)
GLUCOSE SERPL-MCNC: 156 MG/DL — HIGH (ref 70–99)
HCT VFR BLD CALC: 33.4 % — LOW (ref 42–52)
HGB BLD-MCNC: 9.9 G/DL — LOW (ref 14–18)
IMM GRANULOCYTES NFR BLD AUTO: 3.8 % — HIGH (ref 0.1–0.3)
LYMPHOCYTES # BLD AUTO: 0.79 K/UL — LOW (ref 1.2–3.4)
LYMPHOCYTES # BLD AUTO: 13.7 % — LOW (ref 20.5–51.1)
MAGNESIUM SERPL-MCNC: 1.6 MG/DL — LOW (ref 1.8–2.4)
MCHC RBC-ENTMCNC: 26.4 PG — LOW (ref 27–31)
MCHC RBC-ENTMCNC: 29.6 G/DL — LOW (ref 32–37)
MCV RBC AUTO: 89.1 FL — SIGNIFICANT CHANGE UP (ref 80–94)
MONOCYTES # BLD AUTO: 0.59 K/UL — SIGNIFICANT CHANGE UP (ref 0.1–0.6)
MONOCYTES NFR BLD AUTO: 10.2 % — HIGH (ref 1.7–9.3)
NEUTROPHILS # BLD AUTO: 4.13 K/UL — SIGNIFICANT CHANGE UP (ref 1.4–6.5)
NEUTROPHILS NFR BLD AUTO: 71.8 % — SIGNIFICANT CHANGE UP (ref 42.2–75.2)
NRBC # BLD: 0 /100 WBCS — SIGNIFICANT CHANGE UP (ref 0–0)
PLATELET # BLD AUTO: 223 K/UL — SIGNIFICANT CHANGE UP (ref 130–400)
POTASSIUM SERPL-MCNC: 4.8 MMOL/L — SIGNIFICANT CHANGE UP (ref 3.5–5)
POTASSIUM SERPL-SCNC: 4.8 MMOL/L — SIGNIFICANT CHANGE UP (ref 3.5–5)
RBC # BLD: 3.75 M/UL — LOW (ref 4.7–6.1)
RBC # FLD: 21.1 % — HIGH (ref 11.5–14.5)
SODIUM SERPL-SCNC: 137 MMOL/L — SIGNIFICANT CHANGE UP (ref 135–146)
WBC # BLD: 5.76 K/UL — SIGNIFICANT CHANGE UP (ref 4.8–10.8)
WBC # FLD AUTO: 5.76 K/UL — SIGNIFICANT CHANGE UP (ref 4.8–10.8)

## 2021-06-27 PROCEDURE — 99232 SBSQ HOSP IP/OBS MODERATE 35: CPT

## 2021-06-27 RX ADMIN — Medication 1 APPLICATION(S): at 18:08

## 2021-06-27 RX ADMIN — Medication 1 APPLICATION(S): at 06:51

## 2021-06-27 RX ADMIN — ENOXAPARIN SODIUM 40 MILLIGRAM(S): 100 INJECTION SUBCUTANEOUS at 22:07

## 2021-06-27 RX ADMIN — OXYCODONE AND ACETAMINOPHEN 1 TABLET(S): 5; 325 TABLET ORAL at 23:29

## 2021-06-27 RX ADMIN — PANTOPRAZOLE SODIUM 40 MILLIGRAM(S): 20 TABLET, DELAYED RELEASE ORAL at 06:51

## 2021-06-27 RX ADMIN — CHLORHEXIDINE GLUCONATE 1 APPLICATION(S): 213 SOLUTION TOPICAL at 06:51

## 2021-06-27 RX ADMIN — Medication 5 MILLIGRAM(S): at 22:07

## 2021-06-27 RX ADMIN — LIDOCAINE 2 PATCH: 4 CREAM TOPICAL at 12:39

## 2021-06-27 RX ADMIN — OXYCODONE AND ACETAMINOPHEN 1 TABLET(S): 5; 325 TABLET ORAL at 22:06

## 2021-06-27 RX ADMIN — SIMETHICONE 80 MILLIGRAM(S): 80 TABLET, CHEWABLE ORAL at 18:12

## 2021-06-27 NOTE — PROGRESS NOTE ADULT - ASSESSMENT
Mr. Orellana is a 80M w/ no known PMHx (doesn't follow with doctors) with recently diagnosed locally advanced rectal adenocarcinoma.     # Locally advanced rectal adenocarcinoma (T4N0M0)  - Diagnosed late April 2021 - MRI: 18 cm long polypoidal rectal mass with invasion of the right meso rectal fascia and possibly the prostate gland. Additional involvement of the analsphincter complex--T4(a or b)N0M0  - Planned for total neoadjuvant therapy (chemotherapy for 12-16 weeks followed by chemoRT followed by surgery)  - s/p port placement  - He received cycle 1 5/7/21  - C2D1 was 5/27 (delayed due to logistical reasons), he received 1 day of Zarxio  - C3D1 6/12  - C4D1 started on  6/26, will continue fluids .  - Check CEA  - He is able to express why we are offering chemotherapy and the risks and benefits. We discussed side effects including but not limited to cytopenias which increase the risk of bleeding and infection, fatigue, nausea, vomiting, diarrhea and neuropathy.  - His next cycle if he continues with FOLFOX should be July 9th for cycle 5, for which he would need a direct admission. However he needs restaging scans done and then an appointment at [facility] to discuss the results and next steps.     # Dry skin/eczema on neck  - Triamcinolone to affected areas    # Hypomagnesemia: likely from diarrhea  - Monitor and replete    Iron deficiency anemia likely from chronic occult LGIB from tumor  - On previous admission, Iron Saturation: 12%, Ferritin 34, MCV 68.9   - Completed IV iron 1g  - Recheck iron studies early July    Knee pain, likely OA:  - Lidocaine patch  - Outpatient followup  - PO pain meds if patient agreeable    DVT ppx    PLAN:  Cycle 4 of FOLFOX started on 6/26  Triamcinolone cream  Check CEA, CBC, CMP, mag  Will need restaging scans and appointment at [facility] prior to cycle 5

## 2021-06-28 ENCOUNTER — APPOINTMENT (OUTPATIENT)
Dept: HEMATOLOGY ONCOLOGY | Facility: CLINIC | Age: 81
End: 2021-06-28

## 2021-06-28 ENCOUNTER — APPOINTMENT (OUTPATIENT)
Dept: INFUSION THERAPY | Facility: CLINIC | Age: 81
End: 2021-06-28

## 2021-06-28 LAB
ALBUMIN SERPL ELPH-MCNC: 3.1 G/DL — LOW (ref 3.5–5.2)
ALP SERPL-CCNC: 103 U/L — SIGNIFICANT CHANGE UP (ref 30–115)
ALT FLD-CCNC: 30 U/L — SIGNIFICANT CHANGE UP (ref 0–41)
ANION GAP SERPL CALC-SCNC: 7 MMOL/L — SIGNIFICANT CHANGE UP (ref 7–14)
AST SERPL-CCNC: 54 U/L — HIGH (ref 0–41)
BASOPHILS # BLD AUTO: 0.03 K/UL — SIGNIFICANT CHANGE UP (ref 0–0.2)
BASOPHILS NFR BLD AUTO: 0.4 % — SIGNIFICANT CHANGE UP (ref 0–1)
BILIRUB SERPL-MCNC: 0.3 MG/DL — SIGNIFICANT CHANGE UP (ref 0.2–1.2)
BUN SERPL-MCNC: 11 MG/DL — SIGNIFICANT CHANGE UP (ref 10–20)
CALCIUM SERPL-MCNC: 9.1 MG/DL — SIGNIFICANT CHANGE UP (ref 8.5–10.1)
CEA SERPL-MCNC: 10 NG/ML — HIGH (ref 0–3.8)
CHLORIDE SERPL-SCNC: 104 MMOL/L — SIGNIFICANT CHANGE UP (ref 98–110)
CO2 SERPL-SCNC: 31 MMOL/L — SIGNIFICANT CHANGE UP (ref 17–32)
CREAT SERPL-MCNC: <0.5 MG/DL — LOW (ref 0.7–1.5)
EOSINOPHIL # BLD AUTO: 0.01 K/UL — SIGNIFICANT CHANGE UP (ref 0–0.7)
EOSINOPHIL NFR BLD AUTO: 0.1 % — SIGNIFICANT CHANGE UP (ref 0–8)
GLUCOSE SERPL-MCNC: 118 MG/DL — HIGH (ref 70–99)
HCT VFR BLD CALC: 33.8 % — LOW (ref 42–52)
HGB BLD-MCNC: 10 G/DL — LOW (ref 14–18)
IMM GRANULOCYTES NFR BLD AUTO: 1.5 % — HIGH (ref 0.1–0.3)
LYMPHOCYTES # BLD AUTO: 0.7 K/UL — LOW (ref 1.2–3.4)
LYMPHOCYTES # BLD AUTO: 8.5 % — LOW (ref 20.5–51.1)
MAGNESIUM SERPL-MCNC: 2.2 MG/DL — SIGNIFICANT CHANGE UP (ref 1.8–2.4)
MCHC RBC-ENTMCNC: 26.7 PG — LOW (ref 27–31)
MCHC RBC-ENTMCNC: 29.6 G/DL — LOW (ref 32–37)
MCV RBC AUTO: 90.1 FL — SIGNIFICANT CHANGE UP (ref 80–94)
MONOCYTES # BLD AUTO: 0.48 K/UL — SIGNIFICANT CHANGE UP (ref 0.1–0.6)
MONOCYTES NFR BLD AUTO: 5.9 % — SIGNIFICANT CHANGE UP (ref 1.7–9.3)
NEUTROPHILS # BLD AUTO: 6.85 K/UL — HIGH (ref 1.4–6.5)
NEUTROPHILS NFR BLD AUTO: 83.6 % — HIGH (ref 42.2–75.2)
NRBC # BLD: 0 /100 WBCS — SIGNIFICANT CHANGE UP (ref 0–0)
PLATELET # BLD AUTO: 263 K/UL — SIGNIFICANT CHANGE UP (ref 130–400)
POTASSIUM SERPL-MCNC: 5.3 MMOL/L — HIGH (ref 3.5–5)
POTASSIUM SERPL-SCNC: 5.3 MMOL/L — HIGH (ref 3.5–5)
PROT SERPL-MCNC: 6.1 G/DL — SIGNIFICANT CHANGE UP (ref 6–8)
RBC # BLD: 3.75 M/UL — LOW (ref 4.7–6.1)
RBC # FLD: 21.2 % — HIGH (ref 11.5–14.5)
SARS-COV-2 RNA SPEC QL NAA+PROBE: SIGNIFICANT CHANGE UP
SODIUM SERPL-SCNC: 142 MMOL/L — SIGNIFICANT CHANGE UP (ref 135–146)
WBC # BLD: 8.19 K/UL — SIGNIFICANT CHANGE UP (ref 4.8–10.8)
WBC # FLD AUTO: 8.19 K/UL — SIGNIFICANT CHANGE UP (ref 4.8–10.8)

## 2021-06-28 PROCEDURE — 99232 SBSQ HOSP IP/OBS MODERATE 35: CPT

## 2021-06-28 RX ORDER — MAGNESIUM SULFATE 500 MG/ML
2 VIAL (ML) INJECTION ONCE
Refills: 0 | Status: COMPLETED | OUTPATIENT
Start: 2021-06-28 | End: 2021-06-28

## 2021-06-28 RX ADMIN — CHLORHEXIDINE GLUCONATE 1 APPLICATION(S): 213 SOLUTION TOPICAL at 06:19

## 2021-06-28 RX ADMIN — PANTOPRAZOLE SODIUM 40 MILLIGRAM(S): 20 TABLET, DELAYED RELEASE ORAL at 06:19

## 2021-06-28 RX ADMIN — FLUOROURACIL 43.69 MILLIGRAM(S): 50 INJECTION, SOLUTION INTRAVENOUS at 01:06

## 2021-06-28 RX ADMIN — ENOXAPARIN SODIUM 40 MILLIGRAM(S): 100 INJECTION SUBCUTANEOUS at 22:33

## 2021-06-28 RX ADMIN — Medication 5 MILLIGRAM(S): at 22:33

## 2021-06-28 RX ADMIN — LIDOCAINE 2 PATCH: 4 CREAM TOPICAL at 12:20

## 2021-06-28 RX ADMIN — Medication 1 APPLICATION(S): at 17:00

## 2021-06-28 RX ADMIN — Medication 1 APPLICATION(S): at 06:19

## 2021-06-28 NOTE — PROGRESS NOTE ADULT - ASSESSMENT
Mr. Orellana is a 80M w/ no known PMHx (doesn't follow with doctors) with recently diagnosed locally advanced rectal adenocarcinoma.     # Locally advanced rectal adenocarcinoma (T4N0M0)  - management per ID      - Diagnosed late April 2021 - MRI: 18 cm long polypoidal rectal mass with invasion of the right meso rectal fascia and possibly the prostate gland. Additional involvement of the analsphincter complex--T4(a or b)N0M0     - Planned for total neoadjuvant therapy (chemotherapy for 12-16 weeks followed by chemoRT followed by surgery)     - s/p port placement     - He received cycle 1 5/7/21     - C2D1 was 5/27 (delayed due to logistical reasons), he received 1 day of Zarxio     - C3D1 6/12    - C4D1 started on  6/26, will continue fluids .    - CEA on this admission 10.1, down from 24.3 prior to chemo    - His next cycle if he continues with FOLFOX should be July 9th for cycle 5, for which he would need a direct admission. However he needs restaging scans done and then an appointment at [facility] to discuss the          results and next steps.   - Monitor labs and replete as needed  - Possible DC to [facility] after chemo.     # Dry skin/eczema on neck  - Triamcinolone to affected areas    # Hypomagnesemia: likely from diarrhea  - Monitor and replete    Iron deficiency anemia likely from chronic occult LGIB from tumor  - On previous admission, Iron Saturation: 12%, Ferritin 34, MCV 68.9   - Completed IV iron 1g  - Recheck iron studies early July    Knee pain, likely OA:  - Lidocaine patch  - Outpatient followup  - PO pain meds if patient agreeable    DVT ppx    PLAN:  Cycle 4 of FOLFOX started on 6/26  Triamcinolone cream  Monitor CBC, CMP, mag  Will need CT CAP and appointment at [facility] prior to cycle 5

## 2021-06-28 NOTE — PROGRESS NOTE ADULT - ASSESSMENT
Mr. Orellana is a 80M w/ no known PMHx (doesn't follow with doctors) with recently diagnosed locally advanced rectal adenocarcinoma.     # Locally advanced rectal adenocarcinoma (T4N0M0)  - Diagnosed late April 2021 - MRI: 18 cm long polypoidal rectal mass with invasion of the right meso rectal fascia and possibly the prostate gland. Additional involvement of the analsphincter complex--T4(a or b)N0M0  - Planned for total neoadjuvant therapy (chemotherapy for 12-16 weeks followed by chemoRT followed by surgery)  - s/p port placement  - He received cycle 1 5/7/21  - C2D1 was 5/27 (delayed due to logistical reasons), he received 1 day of Zarxio  - C3D1 6/12  - C4D1 started on  6/26, will continue fluids .  - CEA on this admission 10.1, down from 24.3 prior to chemo  - He is able to express why we are offering chemotherapy and the risks and benefits. We discussed side effects including but not limited to cytopenias which increase the risk of bleeding and infection, fatigue, nausea, vomiting, diarrhea and neuropathy.  - His next cycle if he continues with FOLFOX should be July 9th for cycle 5, for which he would need a direct admission. However he needs restaging scans done and then an appointment at [facility] to discuss the results and next steps.     # Dry skin/eczema on neck  - Triamcinolone to affected areas    # Hypomagnesemia: likely from diarrhea  - Monitor and replete    Iron deficiency anemia likely from chronic occult LGIB from tumor  - On previous admission, Iron Saturation: 12%, Ferritin 34, MCV 68.9   - Completed IV iron 1g  - Recheck iron studies early July    Knee pain, likely OA:  - Lidocaine patch  - Outpatient followup  - PO pain meds if patient agreeable    DVT ppx    PLAN:  Cycle 4 of FOLFOX started on 6/26  Triamcinolone cream  Monitor CBC, CMP, mag  Will need CT CAP and appointment at [facility] prior to cycle 5

## 2021-06-28 NOTE — PHYSICAL THERAPY INITIAL EVALUATION ADULT - SPECIFY REASON(S)
Spoke to CINDI Martin.  Pt is currently actively receiving chemotherapy.  Will hold PT for now. PT to follow.

## 2021-06-29 ENCOUNTER — TRANSCRIPTION ENCOUNTER (OUTPATIENT)
Age: 81
End: 2021-06-29

## 2021-06-29 VITALS
HEART RATE: 85 BPM | DIASTOLIC BLOOD PRESSURE: 58 MMHG | TEMPERATURE: 99 F | SYSTOLIC BLOOD PRESSURE: 101 MMHG | RESPIRATION RATE: 19 BRPM

## 2021-06-29 LAB
ALBUMIN SERPL ELPH-MCNC: 2.8 G/DL — LOW (ref 3.5–5.2)
ALP SERPL-CCNC: 99 U/L — SIGNIFICANT CHANGE UP (ref 30–115)
ALT FLD-CCNC: 31 U/L — SIGNIFICANT CHANGE UP (ref 0–41)
ANION GAP SERPL CALC-SCNC: 9 MMOL/L — SIGNIFICANT CHANGE UP (ref 7–14)
AST SERPL-CCNC: 39 U/L — SIGNIFICANT CHANGE UP (ref 0–41)
BASOPHILS # BLD AUTO: 0.07 K/UL — SIGNIFICANT CHANGE UP (ref 0–0.2)
BASOPHILS NFR BLD AUTO: 0.8 % — SIGNIFICANT CHANGE UP (ref 0–1)
BILIRUB SERPL-MCNC: 0.3 MG/DL — SIGNIFICANT CHANGE UP (ref 0.2–1.2)
BUN SERPL-MCNC: 12 MG/DL — SIGNIFICANT CHANGE UP (ref 10–20)
CALCIUM SERPL-MCNC: 8.7 MG/DL — SIGNIFICANT CHANGE UP (ref 8.5–10.1)
CHLORIDE SERPL-SCNC: 101 MMOL/L — SIGNIFICANT CHANGE UP (ref 98–110)
CO2 SERPL-SCNC: 27 MMOL/L — SIGNIFICANT CHANGE UP (ref 17–32)
CREAT SERPL-MCNC: 0.5 MG/DL — LOW (ref 0.7–1.5)
EOSINOPHIL # BLD AUTO: 0.11 K/UL — SIGNIFICANT CHANGE UP (ref 0–0.7)
EOSINOPHIL NFR BLD AUTO: 1.3 % — SIGNIFICANT CHANGE UP (ref 0–8)
GLUCOSE SERPL-MCNC: 72 MG/DL — SIGNIFICANT CHANGE UP (ref 70–99)
HCT VFR BLD CALC: 33.5 % — LOW (ref 42–52)
HGB BLD-MCNC: 9.9 G/DL — LOW (ref 14–18)
IMM GRANULOCYTES NFR BLD AUTO: 1.3 % — HIGH (ref 0.1–0.3)
LYMPHOCYTES # BLD AUTO: 1.26 K/UL — SIGNIFICANT CHANGE UP (ref 1.2–3.4)
LYMPHOCYTES # BLD AUTO: 15.2 % — LOW (ref 20.5–51.1)
MAGNESIUM SERPL-MCNC: 1.8 MG/DL — SIGNIFICANT CHANGE UP (ref 1.8–2.4)
MCHC RBC-ENTMCNC: 26.8 PG — LOW (ref 27–31)
MCHC RBC-ENTMCNC: 29.6 G/DL — LOW (ref 32–37)
MCV RBC AUTO: 90.8 FL — SIGNIFICANT CHANGE UP (ref 80–94)
MONOCYTES # BLD AUTO: 0.24 K/UL — SIGNIFICANT CHANGE UP (ref 0.1–0.6)
MONOCYTES NFR BLD AUTO: 2.9 % — SIGNIFICANT CHANGE UP (ref 1.7–9.3)
NEUTROPHILS # BLD AUTO: 6.49 K/UL — SIGNIFICANT CHANGE UP (ref 1.4–6.5)
NEUTROPHILS NFR BLD AUTO: 78.5 % — HIGH (ref 42.2–75.2)
NRBC # BLD: 0 /100 WBCS — SIGNIFICANT CHANGE UP (ref 0–0)
PHOSPHATE SERPL-MCNC: 3.2 MG/DL — SIGNIFICANT CHANGE UP (ref 2.1–4.9)
PLATELET # BLD AUTO: 237 K/UL — SIGNIFICANT CHANGE UP (ref 130–400)
POTASSIUM SERPL-MCNC: 4.4 MMOL/L — SIGNIFICANT CHANGE UP (ref 3.5–5)
POTASSIUM SERPL-SCNC: 4.4 MMOL/L — SIGNIFICANT CHANGE UP (ref 3.5–5)
PROT SERPL-MCNC: 5.8 G/DL — LOW (ref 6–8)
RBC # BLD: 3.69 M/UL — LOW (ref 4.7–6.1)
RBC # FLD: 21.2 % — HIGH (ref 11.5–14.5)
SODIUM SERPL-SCNC: 137 MMOL/L — SIGNIFICANT CHANGE UP (ref 135–146)
WBC # BLD: 8.28 K/UL — SIGNIFICANT CHANGE UP (ref 4.8–10.8)
WBC # FLD AUTO: 8.28 K/UL — SIGNIFICANT CHANGE UP (ref 4.8–10.8)

## 2021-06-29 PROCEDURE — 99239 HOSP IP/OBS DSCHRG MGMT >30: CPT

## 2021-06-29 RX ORDER — GUAIFENESIN/DEXTROMETHORPHAN 600MG-30MG
15 TABLET, EXTENDED RELEASE 12 HR ORAL ONCE
Refills: 0 | Status: COMPLETED | OUTPATIENT
Start: 2021-06-29 | End: 2021-06-29

## 2021-06-29 RX ORDER — DIPHENHYDRAMINE HYDROCHLORIDE AND LIDOCAINE HYDROCHLORIDE AND ALUMINUM HYDROXIDE AND MAGNESIUM HYDRO
15 KIT
Refills: 0 | Status: DISCONTINUED | OUTPATIENT
Start: 2021-06-29 | End: 2021-06-29

## 2021-06-29 RX ORDER — DIPHENHYDRAMINE HYDROCHLORIDE AND LIDOCAINE HYDROCHLORIDE AND ALUMINUM HYDROXIDE AND MAGNESIUM HYDRO
1 KIT
Qty: 0 | Refills: 0 | DISCHARGE
Start: 2021-06-29

## 2021-06-29 RX ADMIN — LIDOCAINE 2 PATCH: 4 CREAM TOPICAL at 11:07

## 2021-06-29 RX ADMIN — SIMETHICONE 80 MILLIGRAM(S): 80 TABLET, CHEWABLE ORAL at 13:28

## 2021-06-29 RX ADMIN — Medication 30 MILLILITER(S): at 12:28

## 2021-06-29 RX ADMIN — PANTOPRAZOLE SODIUM 40 MILLIGRAM(S): 20 TABLET, DELAYED RELEASE ORAL at 05:16

## 2021-06-29 RX ADMIN — CHLORHEXIDINE GLUCONATE 1 APPLICATION(S): 213 SOLUTION TOPICAL at 05:15

## 2021-06-29 RX ADMIN — Medication 1 APPLICATION(S): at 05:15

## 2021-06-29 RX ADMIN — Medication 15 MILLILITER(S): at 15:02

## 2021-06-29 NOTE — DISCHARGE NOTE PROVIDER - CARE PROVIDER_API CALL
Yamel Tubbs)  Hematology; Internal Medicine; Medical Oncology  [address] [place] [address]  Phone: [phone]  Fax: [phone]  Follow Up Time:

## 2021-06-29 NOTE — PROGRESS NOTE ADULT - ASSESSMENT
Mr. Orellana is a 80M w/ no known PMHx (doesn't follow with doctors) with recently diagnosed locally advanced rectal adenocarcinoma.     # Locally advanced rectal adenocarcinoma (T4N0M0)  - Diagnosed late April 2021 - MRI: 18 cm long polypoidal rectal mass with invasion of the right meso rectal fascia and possibly the prostate gland. Additional involvement of the analsphincter complex--T4(a or b)N0M0  - Planned for total neoadjuvant therapy (chemotherapy for 12-16 weeks followed by chemoRT followed by surgery)  - s/p port placement  - He received cycle 1 5/7/21  - C2D1 was 5/27 (delayed due to logistical reasons), he received 1 day of Zarxio  - C3D1 6/12  - C4D1 started on  6/26  - CEA on this admission 10.1, down from 24.3 prior to chemo  - He is able to express why we are offering chemotherapy and the risks and benefits. We discussed side effects including but not limited to cytopenias which increase the risk of bleeding and infection, fatigue, nausea, vomiting, diarrhea and neuropathy.  - He completed 4 cycles. He needs scan to assess response response within the next 7-10 days and then an appointment at [facility] to see Dr. Tubbs. At that time they will discuss the next treatment options.    # Dry skin/eczema on neck  - Triamcinolone to affected areas    # Hypomagnesemia: likely from diarrhea  - Monitor and replete    Iron deficiency anemia likely from chronic occult LGIB from tumor  - On previous admission, Iron Saturation: 12%, Ferritin 34, MCV 68.9   - Completed IV iron 1g  - Recheck iron studies early July    Knee pain, likely OA:  - Lidocaine patch  - Outpatient followup  - PO pain meds if patient agreeable    DVT ppx    PLAN:  Cycle 4 of FOLFOX started on 6/26  Triamcinolone cream  Scripts for CT CAP printed and placed in chart, he needs these completed within 7-10 days and then an appintment with Dr. Tubbs at [facility] to determine next steps  Return to [facility] tomorrow for Fulphilia, discussed with raine at [facility]

## 2021-06-29 NOTE — DISCHARGE NOTE NURSING/CASE MANAGEMENT/SOCIAL WORK - NSDCVIVACCINE_GEN_ALL_CORE_FT
COVID-19 vaccine, vector-nr, rS-Ad26, PF, 0.5 mL (Sveta); 10-May-2021 14:11; India Lal (CINDI); [facility]; [id number] (Exp. Date: 23-Jun-2021); IntraMuscular; Deltoid Left.; 0.5 milliLiter(s);

## 2021-06-29 NOTE — PHYSICAL THERAPY INITIAL EVALUATION ADULT - GENERAL OBSERVATIONS, REHAB EVAL
patient refusing OOB activities today, patient is known to unit and has history of refusing PT and getting agitated if given max encouragement.,
10:45-Chart reviewed. Attempted to see pt for PT IE however pt refusing at this time. Pt educated on importance of participating in PT tx however pt becoming more agitated and perseverating on his knee pain. Pt continues to refuse OOB at this time. PT to f/u.
9:50-10:15  Pt encountered in bed in NAD.  He initiaaly declined any PT intervention.  After max encouragement pt was agreeble to participate.  He was able to perfrom bed mobility transfers and ambulate a few feet, limited by bilateral knee pain.  Continue PT as tolerated.

## 2021-06-29 NOTE — PROGRESS NOTE ADULT - SUBJECTIVE AND OBJECTIVE BOX
MISAEL HUFFMAN 80y Male  MRN#: [id number]   CODE STATUS:________    Hospital Day: 1d    Pt is currently admitted with the primary diagnosis of Rectal adenocarcinoma [id number] chemo    SUBJECTIVE  Hospital Course  Patient has mild diarrhea on loperamide. Complaining of knee pain and itchy rash    Overnight events   No overnight events    Subjective complaints  Pt complains of knee pain and itchy rash on his right shoulder     Present Today:   - Villeda:  No [  ], Yes [   ] : Indication:     - Type of IV Access:       .. CVC/Piccline:  No [  ], Yes [   ] : Indication:       .. Midline: No [  ], Yes [   ] : Indication:                                             ----------------------------------------------------------  OBJECTIVE  PAST MEDICAL & SURGICAL HISTORY  No pertinent past medical history    Amputation of digit of left hand                                              -----------------------------------------------------------  ALLERGIES:  No Known Allergies                                            ------------------------------------------------------------    HOME MEDICATIONS  Home Medications:  lidocaine 5% topical film: Apply topically to affected area once (24 Jun 2021 19:47)  loperamide 2 mg oral capsule: 1 cap(s) orally 2 times a day, As needed, Diarrhea (24 Jun 2021 19:47)  oxycodone-acetaminophen 5 mg-325 mg oral tablet: 1 tab(s) orally every 6 hours, As needed, Moderate Pain (4 - 6) (24 Jun 2021 19:47)  pantoprazole 40 mg oral delayed release tablet: 1 tab(s) orally once a day (before a meal) (24 Jun 2021 19:47)  simethicone 80 mg oral tablet, chewable: 1 tab(s) orally 2 times a day, As needed, Gas (24 Jun 2021 19:47)                           MEDICATIONS:  STANDING MEDICATIONS  chlorhexidine 4% Liquid 1 Application(s) Topical <User Schedule>  enoxaparin Injectable 40 milliGRAM(s) SubCutaneous at bedtime  lactated ringers. 1000 milliLiter(s) IV Continuous <Continuous>  lidocaine   Patch 2 Patch Transdermal daily  melatonin 5 milliGRAM(s) Oral at bedtime  pantoprazole    Tablet 40 milliGRAM(s) Oral before breakfast    PRN MEDICATIONS  loperamide 2 milliGRAM(s) Oral two times a day PRN  oxycodone    5 mG/acetaminophen 325 mG 1 Tablet(s) Oral every 6 hours PRN  simethicone 80 milliGRAM(s) Chew two times a day PRN                                            ------------------------------------------------------------  VITAL SIGNS: Last 24 Hours  T(C): 35.6 (25 Jun 2021 05:30), Max: 35.8 (24 Jun 2021 19:43)  T(F): 96.1 (25 Jun 2021 05:30), Max: 96.4 (24 Jun 2021 19:43)  HR: 71 (25 Jun 2021 07:50) (71 - 88)  BP: 129/61 (25 Jun 2021 05:30) (129/61 - 141/65)  BP(mean): --  RR: 18 (25 Jun 2021 05:30) (18 - 18)  SpO2: 97% (25 Jun 2021 07:50) (97% - 97%)                                             --------------------------------------------------------------  LABS:                        8.6    4.36  )-----------( 166      ( 25 Jun 2021 09:38 )             28.6     06-25    139  |  105  |  13  ----------------------------<  110<H>  4.0   |  28  |  0.5<L>    Ca    8.8      25 Jun 2021 09:38  Phos  3.5     06-25  Mg     1.5     06-25    TPro  5.5<L>  /  Alb  3.0<L>  /  TBili  <0.2  /  DBili  x   /  AST  14  /  ALT  8   /  AlkPhos  104  06-25    PT/INR - ( 25 Jun 2021 09:38 )   PT: 14.70 sec;   INR: 1.28 ratio         PTT - ( 25 Jun 2021 09:38 )  PTT:34.5 sec                                              -------------------------------------------------------------  RADIOLOGY: No new imaging results                                            --------------------------------------------------------------    PHYSICAL EXAM:  General: NADMarcellus AOdana  HEENT: Normocephalic, atraumatic  LUNGS: Normal breath sounds, no wheezes/crackles  HEART: RRR, no murmurs, rubs, or gallops  ABDOMEN: Soft, NT/ND  EXT: peripheral pulses +2, no cyanosis/edema, knees look normal  NEURO:   SKIN: macular rash on right shoulder and right upper chest. No vesicles.                                           --------------------------------------------------------------  
Patient is a 80y old  Male who presents with a chief complaint of Chemotherapy (24 Jun 2021 19:24)      HPI:  This is a 80 year old male with Advanced rectal adenocarcinoma, Microcytic Anemia, Chronic Osteoarthritis who presented from [facility] for chemotherapy admission. Patient recently completed his third cycle of FOLFOX therapy two weeks ago without any complications  and currently admitted to complete his fourth cycle of chemo. Per heme onc, patient is planned for concurrent chemoradiation followed by surgery once he completed neoadjuvant chemotherapy. On evaluation, patient is alert and oriented 2 (baseline per aide from [facility]), and endorses chronic knee pain, but otherwise denies any chest pain, sob, nausea, vomiting, recent fevers, or sick contacts.     Ed course: Vitally stable, pending routine labs.  (24 Jun 2021 19:24)       ROS negative except for the above.     PAST MEDICAL & SURGICAL HISTORY:  No pertinent past medical history    Amputation of digit of left hand        SOCIAL HISTORY:    FAMILY HISTORY:  No pertinent family history in first degree relatives        MEDICATIONS  (STANDING):  chlorhexidine 4% Liquid 1 Application(s) Topical <User Schedule>  enoxaparin Injectable 40 milliGRAM(s) SubCutaneous at bedtime  lactated ringers. 1000 milliLiter(s) (75 mL/Hr) IV Continuous <Continuous>  lidocaine   Patch 2 Patch Transdermal daily  magnesium sulfate  IVPB 2 Gram(s) IV Intermittent once  melatonin 5 milliGRAM(s) Oral at bedtime  pantoprazole    Tablet 40 milliGRAM(s) Oral before breakfast    MEDICATIONS  (PRN):  loperamide 2 milliGRAM(s) Oral two times a day PRN Diarrhea  oxycodone    5 mG/acetaminophen 325 mG 1 Tablet(s) Oral every 6 hours PRN Moderate Pain (4 - 6)  simethicone 80 milliGRAM(s) Chew two times a day PRN Gas      Allergies    No Known Allergies    Intolerances        Vital Signs Last 24 Hrs  T(C): 35.6 (25 Jun 2021 05:30), Max: 35.8 (24 Jun 2021 19:43)  T(F): 96.1 (25 Jun 2021 05:30), Max: 96.4 (24 Jun 2021 19:43)  HR: 71 (25 Jun 2021 07:50) (71 - 88)  BP: 129/61 (25 Jun 2021 05:30) (129/61 - 141/65)  BP(mean): --  RR: 18 (25 Jun 2021 05:30) (18 - 18)  SpO2: 97% (25 Jun 2021 07:50) (97% - 97%)    PHYSICAL EXAM  General: NAD  HEENT: NCAT, EOMI  Neck: supple  CV: Regular rate and rhythm  Lungs: Clear to ascultation bilaterally, no respiratory distress  Abdomen: soft non-tender non-distended  Ext: No edema  Skin: no rashes and no petechiae  Neuro: alert and oriented, no focal deficits      LABS:                          8.8    6.72  )-----------( 155      ( 25 Jun 2021 00:03 )             30.0         Mean Cell Volume : 89.0 fL  Mean Cell Hemoglobin : 26.1 pg  Mean Cell Hemoglobin Concentration : 29.3 g/dL  Auto Neutrophil # : 3.85 K/uL  Auto Lymphocyte # : 1.47 K/uL  Auto Monocyte # : 1.11 K/uL  Auto Eosinophil # : 0.08 K/uL  Auto Basophil # : 0.10 K/uL  Auto Neutrophil % : 57.3 %  Auto Lymphocyte % : 21.9 %  Auto Monocyte % : 16.5 %  Auto Eosinophil % : 1.2 %  Auto Basophil % : 1.5 %      Serial CBC's  06-25 @ 00:03  Hct-30.0 / Hgb-8.8 / Plat-155 / RBC-3.37 / WBC-6.72      06-25    136  |  102  |  13  ----------------------------<  89  4.1   |  25  |  0.5<L>    Ca    8.7      25 Jun 2021 00:03  Phos  3.5     06-25  Mg     1.6     06-25    TPro  5.6<L>  /  Alb  2.8<L>  /  TBili  0.3  /  DBili  x   /  AST  13  /  ALT  8   /  AlkPhos  114  06-25                      BLOOD SMEAR INTERPRETATION:       RADIOLOGY & ADDITIONAL STUDIES:    
MISAEL HUFFMAN 80y Male  MRN#: [id number]   Hospital Day: 4d    HPI:  This is a 80 year old male with Advanced rectal adenocarcinoma, Microcytic Anemia, Chronic Osteoarthritis who presented from [facility] for chemotherapy admission. Patient recently completed his third cycle of FOLFOX therapy two weeks ago without any complications  and currently admitted to complete his fourth cycle of chemo. Per heme onc, patient is planned for concurrent chemoradiation followed by surgery once he completed neoadjuvant chemotherapy. On evaluation, patient is alert and oriented 2 (baseline per aide from [facility]), and endorses chronic knee pain, but otherwise denies any chest pain, sob, nausea, vomiting, recent fevers, or sick contacts.     Ed course: Vitally stable, pending routine labs.  (24 Jun 2021 19:24)      SUBJECTIVE  Patient is a 80y old Male who presents with a chief complaint of Chemotherapy (28 Jun 2021 12:18)  Currently admitted to medicine with the primary diagnosis of   INTERVAL HPI AND OVERNIGHT EVENTS:  Patient was examined and seen at bedside. This morning he is resting comfortably in bed and reports no issues or overnight events.    OBJECTIVE  PAST MEDICAL & SURGICAL HISTORY  No pertinent past medical history    Amputation of digit of left hand      ALLERGIES:  No Known Allergies    MEDICATIONS:  STANDING MEDICATIONS  chlorhexidine 4% Liquid 1 Application(s) Topical <User Schedule>  enoxaparin Injectable 40 milliGRAM(s) SubCutaneous at bedtime  hydrocortisone 1% Cream 1 Application(s) Topical two times a day  lidocaine   Patch 2 Patch Transdermal daily  melatonin 5 milliGRAM(s) Oral at bedtime  pantoprazole    Tablet 40 milliGRAM(s) Oral before breakfast    PRN MEDICATIONS  loperamide 2 milliGRAM(s) Oral two times a day PRN  oxycodone    5 mG/acetaminophen 325 mG 1 Tablet(s) Oral every 6 hours PRN  simethicone 80 milliGRAM(s) Chew two times a day PRN      VITAL SIGNS: Last 24 Hours  T(C): 36.4 (28 Jun 2021 12:46), Max: 36.4 (28 Jun 2021 12:46)  T(F): 97.6 (28 Jun 2021 12:46), Max: 97.6 (28 Jun 2021 12:46)  HR: 79 (28 Jun 2021 12:46) (62 - 79)  BP: 118/59 (28 Jun 2021 12:46) (110/53 - 118/59)  BP(mean): --  RR: 19 (28 Jun 2021 12:46) (18 - 19)  SpO2: --    LABS:                        10.0   8.19  )-----------( 263      ( 28 Jun 2021 07:06 )             33.8     06-28    142  |  104  |  11  ----------------------------<  118<H>  5.3<H>   |  31  |  <0.5<L>    Ca    9.1      28 Jun 2021 07:06  Mg     2.2     06-28    TPro  6.1  /  Alb  3.1<L>  /  TBili  0.3  /  DBili  x   /  AST  54<H>  /  ALT  30  /  AlkPhos  103  06-28                  RADIOLOGY:      PHYSICAL EXAM:  CONSTITUTIONAL: No acute distress, cachectic, AAOx3  EYES: EOM intact, PERRLA, conjunctiva and sclera clear  PULMONARY: Clear to auscultation bilaterally; no wheezes, rales, or rhonchi  CARDIOVASCULAR: Regular rate and rhythm; no murmurs, rubs, or gallops  GASTROINTESTINAL: Soft, non-tender, non-distended; bowel sounds present  MUSCULOSKELETAL: 2+ peripheral pulses; no clubbing, no cyanosis, no edema      
MISAEL HUFFMAN 80y Male  MRN#: [id number]   Hospital Day: 2d    HPI:  This is a 80 year old male with Advanced rectal adenocarcinoma, Microcytic Anemia, Chronic Osteoarthritis who presented from [facility] for chemotherapy admission. Patient recently completed his third cycle of FOLFOX therapy two weeks ago without any complications  and currently admitted to complete his fourth cycle of chemo. Per heme onc, patient is planned for concurrent chemoradiation followed by surgery once he completed neoadjuvant chemotherapy. On evaluation, patient is alert and oriented 2 (baseline per aide from [facility]), and endorses chronic knee pain, but otherwise denies any chest pain, sob, nausea, vomiting, recent fevers, or sick contacts.     Ed course: Vitally stable, pending routine labs.  (24 Jun 2021 19:24)      SUBJECTIVE  Patient is a 80y old Male who presents with a chief complaint of Chemotherapy (26 Jun 2021 09:14)  Currently admitted to medicine with the primary diagnosis of chemotherapy    INTERVAL HPI AND OVERNIGHT EVENTS:  Patient was examined and seen at bedside. This morning he is resting comfortably in bed and reports no issues or overnight events.    REVIEW OF SYMPTOMS:  CONSTITUTIONAL: No weakness, fevers or chills; No headaches  EYES: No visual changes, eye pain, or discharge  ENT: No vertigo; No ear pain or change in hearing; No sore throat or difficulty swallowing  NECK: No pain or stiffness  RESPIRATORY: No cough, wheezing, or hemoptysis; No shortness of breath  CARDIOVASCULAR: No chest pain or palpitations  GASTROINTESTINAL: No abdominal or epigastric pain; No nausea, vomiting, or hematemesis; No diarrhea or constipation; No melena or hematochezia  GENITOURINARY: No dysuria, frequency or hematuria  MUSCULOSKELETAL: No joint pain, no muscle pain, no weakness  NEUROLOGICAL: No numbness or weakness  SKIN: No itching or rashes    OBJECTIVE  PAST MEDICAL & SURGICAL HISTORY  No pertinent past medical history    Amputation of digit of left hand      ALLERGIES:  No Known Allergies    MEDICATIONS:  STANDING MEDICATIONS  chlorhexidine 4% Liquid 1 Application(s) Topical <User Schedule>  enoxaparin Injectable 40 milliGRAM(s) SubCutaneous at bedtime  fluorouracil IVPB (eMAR) 2425 milliGRAM(s) IV Intermittent every 24 hours  fluorouracil IVPB (eMAR) 810 milliGRAM(s) IV Intermittent once  hydrocortisone 1% Cream 1 Application(s) Topical two times a day  lactated ringers. 1000 milliLiter(s) IV Continuous <Continuous>  lidocaine   Patch 2 Patch Transdermal daily  melatonin 5 milliGRAM(s) Oral at bedtime  pantoprazole    Tablet 40 milliGRAM(s) Oral before breakfast    PRN MEDICATIONS  loperamide 2 milliGRAM(s) Oral two times a day PRN  oxycodone    5 mG/acetaminophen 325 mG 1 Tablet(s) Oral every 6 hours PRN  simethicone 80 milliGRAM(s) Chew two times a day PRN      VITAL SIGNS: Last 24 Hours  T(C): 36.9 (26 Jun 2021 13:23), Max: 36.9 (26 Jun 2021 13:23)  T(F): 98.5 (26 Jun 2021 13:23), Max: 98.5 (26 Jun 2021 13:23)  HR: 74 (26 Jun 2021 13:23) (70 - 76)  BP: 134/62 (26 Jun 2021 13:23) (113/59 - 134/62)  BP(mean): --  RR: 20 (26 Jun 2021 13:23) (18 - 20)  SpO2: 99% (26 Jun 2021 05:22) (99% - 99%)    LABS:                        9.1    4.37  )-----------( 194      ( 26 Jun 2021 07:13 )             30.5     06-26    139  |  104  |  7<L>  ----------------------------<  86  4.5   |  28  |  0.5<L>    Ca    8.5      26 Jun 2021 07:13  Phos  3.5     06-25  Mg     1.8     06-26    TPro  5.6<L>  /  Alb  2.6<L>  /  TBili  0.3  /  DBili  <0.2  /  AST  12  /  ALT  7   /  AlkPhos  106  06-26    PT/INR - ( 25 Jun 2021 09:38 )   PT: 14.70 sec;   INR: 1.28 ratio         PTT - ( 25 Jun 2021 09:38 )  PTT:34.5 sec              PHYSICAL EXAM:  CONSTITUTIONAL: No acute distress, well-developed, well-groomed, AAOx3  HEAD: Atraumatic, normocephalic  EYES: EOM intact, PERRLA, conjunctiva and sclera clear  ENT: Supple, no masses, no thyromegaly, no bruits, no JVD; moist mucous membranes  PULMONARY: Clear to auscultation bilaterally; no wheezes, rales, or rhonchi  CARDIOVASCULAR: Regular rate and rhythm; no murmurs, rubs, or gallops  GASTROINTESTINAL: Soft, non-tender, non-distended; bowel sounds present  MUSCULOSKELETAL: 2+ peripheral pulses; no clubbing, no cyanosis, no edema  NEUROLOGY: non-focal  SKIN: No rashes or lesions; warm and dry  
Patient is a 80y old  Male who presents with a chief complaint of Chemotherapy (26 Jun 2021 16:14)      Subjective: Pt seen and examined . Lying on bed . Started treatment yesterday . Tolerating it well . C/o knee pain .   Afebrile      Vital Signs Last 24 Hrs  T(C): 34.2 (27 Jun 2021 05:53), Max: 36.9 (26 Jun 2021 13:23)  T(F): 93.5 (27 Jun 2021 05:53), Max: 98.5 (26 Jun 2021 13:23)  HR: 62 (27 Jun 2021 05:53) (62 - 75)  BP: 107/53 (27 Jun 2021 05:53) (107/53 - 134/62)  BP(mean): --  RR: 19 (27 Jun 2021 05:53) (19 - 22)  SpO2: 91% (26 Jun 2021 20:15) (91% - 91%)    PHYSICAL EXAM  General: lying on bed old frail NAD   HEENT: pale conjuctiva   Neck: supple  CV: Regular rate and rhythm  Lungs: Clear to ascultation bilaterally, no respiratory distress  Abdomen: soft non-tender non-distended  Ext: No edema  Skin: no rashes and no petechiae  Neuro: alert and oriented, no focal deficits    MEDICATIONS  (STANDING):  chlorhexidine 4% Liquid 1 Application(s) Topical <User Schedule>  enoxaparin Injectable 40 milliGRAM(s) SubCutaneous at bedtime  fluorouracil IVPB (eMAR) 2425 milliGRAM(s) IV Intermittent every 24 hours  hydrocortisone 1% Cream 1 Application(s) Topical two times a day  lidocaine   Patch 2 Patch Transdermal daily  melatonin 5 milliGRAM(s) Oral at bedtime  pantoprazole    Tablet 40 milliGRAM(s) Oral before breakfast    MEDICATIONS  (PRN):  loperamide 2 milliGRAM(s) Oral two times a day PRN Diarrhea  oxycodone    5 mG/acetaminophen 325 mG 1 Tablet(s) Oral every 6 hours PRN Moderate Pain (4 - 6)  simethicone 80 milliGRAM(s) Chew two times a day PRN Gas      LABS:                          9.1    4.37  )-----------( 194      ( 26 Jun 2021 07:13 )             30.5         Mean Cell Volume : 89.7 fL  Mean Cell Hemoglobin : 26.8 pg  Mean Cell Hemoglobin Concentration : 29.8 g/dL  Auto Neutrophil # : 2.61 K/uL  Auto Lymphocyte # : 0.65 K/uL  Auto Monocyte # : 0.92 K/uL  Auto Eosinophil # : 0.04 K/uL  Auto Basophil # : 0.15 K/uL  Auto Neutrophil % : 57.9 %  Auto Lymphocyte % : 14.9 %  Auto Monocyte % : 21.0 %  Auto Eosinophil % : 0.9 %  Auto Basophil % : 3.5 %      Serial CBC's  06-26 @ 07:13  Hct-30.5 / Hgb-9.1 / Plat-194 / RBC-3.40 / WBC-4.37  Serial CBC's  06-25 @ 09:38  Hct-28.6 / Hgb-8.6 / Plat-166 / RBC-3.21 / WBC-4.36  Serial CBC's  06-25 @ 00:03  Hct-30.0 / Hgb-8.8 / Plat-155 / RBC-3.37 / WBC-6.72      06-26    139  |  104  |  7<L>  ----------------------------<  86  4.5   |  28  |  0.5<L>    Ca    8.5      26 Jun 2021 07:13  Mg     1.8     06-26    TPro  5.6<L>  /  Alb  2.6<L>  /  TBili  0.3  /  DBili  <0.2  /  AST  12  /  ALT  7   /  AlkPhos  106  06-26      PT/INR - ( 25 Jun 2021 09:38 )   PT: 14.70 sec;   INR: 1.28 ratio         PTT - ( 25 Jun 2021 09:38 )  PTT:34.5 sec                            BLOOD SMEAR INTERPRETATION:       RADIOLOGY & ADDITIONAL STUDIES:    
Patient is a 80y old  Male who presents with a chief complaint of Chemotherapy for rectal adenocarcinoma (25 Jun 2021 11:07)      Subjective: Pt seen and examined , lying on bed . Reports has knee pain . Wishes to get treatment . Has good appetite .   Afebrile       Vital Signs Last 24 Hrs  T(C): 36.6 (26 Jun 2021 05:22), Max: 36.6 (26 Jun 2021 05:22)  T(F): 97.8 (26 Jun 2021 05:22), Max: 97.8 (26 Jun 2021 05:22)  HR: 76 (26 Jun 2021 05:22) (70 - 80)  BP: 121/57 (26 Jun 2021 05:22) (103/60 - 121/57)  BP(mean): --  RR: 18 (26 Jun 2021 05:22) (18 - 19)  SpO2: 99% (26 Jun 2021 05:22) (99% - 100%)    PHYSICAL EXAM  General: lying on bed old frail NAD   HEENT: pale conjuctiva   Neck: supple  CV: Regular rate and rhythm  Lungs: Clear to ascultation bilaterally, no respiratory distress  Abdomen: soft non-tender non-distended  Ext: No edema  Skin: no rashes and no petechiae  Neuro: alert and oriented, no focal deficits    MEDICATIONS  (STANDING):  chlorhexidine 4% Liquid 1 Application(s) Topical <User Schedule>  enoxaparin Injectable 40 milliGRAM(s) SubCutaneous at bedtime  hydrocortisone 1% Cream 1 Application(s) Topical two times a day  lactated ringers. 1000 milliLiter(s) (75 mL/Hr) IV Continuous <Continuous>  lidocaine   Patch 2 Patch Transdermal daily  melatonin 5 milliGRAM(s) Oral at bedtime  pantoprazole    Tablet 40 milliGRAM(s) Oral before breakfast    MEDICATIONS  (PRN):  loperamide 2 milliGRAM(s) Oral two times a day PRN Diarrhea  oxycodone    5 mG/acetaminophen 325 mG 1 Tablet(s) Oral every 6 hours PRN Moderate Pain (4 - 6)  simethicone 80 milliGRAM(s) Chew two times a day PRN Gas      LABS:                          8.6    4.36  )-----------( 166      ( 25 Jun 2021 09:38 )             28.6         Mean Cell Volume : 89.1 fL  Mean Cell Hemoglobin : 26.8 pg  Mean Cell Hemoglobin Concentration : 30.1 g/dL  Auto Neutrophil # : 2.50 K/uL  Auto Lymphocyte # : 0.92 K/uL  Auto Monocyte # : 0.74 K/uL  Auto Eosinophil # : 0.06 K/uL  Auto Basophil # : 0.07 K/uL  Auto Neutrophil % : 57.3 %  Auto Lymphocyte % : 21.1 %  Auto Monocyte % : 17.0 %  Auto Eosinophil % : 1.4 %  Auto Basophil % : 1.6 %      Serial CBC's  06-25 @ 09:38  Hct-28.6 / Hgb-8.6 / Plat-166 / RBC-3.21 / WBC-4.36  Serial CBC's  06-25 @ 00:03  Hct-30.0 / Hgb-8.8 / Plat-155 / RBC-3.37 / WBC-6.72      06-25    139  |  105  |  13  ----------------------------<  110<H>  4.0   |  28  |  0.5<L>    Ca    8.8      25 Jun 2021 09:38  Phos  3.5     06-25  Mg     1.5     06-25    TPro  5.5<L>  /  Alb  3.0<L>  /  TBili  <0.2  /  DBili  x   /  AST  14  /  ALT  8   /  AlkPhos  104  06-25      PT/INR - ( 25 Jun 2021 09:38 )   PT: 14.70 sec;   INR: 1.28 ratio         PTT - ( 25 Jun 2021 09:38 )  PTT:34.5 sec                            BLOOD SMEAR INTERPRETATION:       RADIOLOGY & ADDITIONAL STUDIES:    
24H events:    He is complaining of oral pain as well as gas  His cell counts are stable  No diarrhea  His scripts were printed and placed in chart, discussed with liason about return tomorrow for Fulphilia    PAST MEDICAL & SURGICAL HISTORY  No pertinent past medical history    Amputation of digit of left hand      SOCIAL HISTORY:  Negative for smoking/alcohol/drug use.     ALLERGIES:  No Known Allergies    MEDICATIONS:  STANDING MEDICATIONS  chlorhexidine 4% Liquid 1 Application(s) Topical <User Schedule>  enoxaparin Injectable 40 milliGRAM(s) SubCutaneous at bedtime  FIRST- Mouthwash  BLM 15 milliLiter(s) Swish and Swallow two times a day  hydrocortisone 1% Cream 1 Application(s) Topical two times a day  lidocaine   Patch 2 Patch Transdermal daily  melatonin 5 milliGRAM(s) Oral at bedtime  pantoprazole    Tablet 40 milliGRAM(s) Oral before breakfast    PRN MEDICATIONS  loperamide 2 milliGRAM(s) Oral two times a day PRN  oxycodone    5 mG/acetaminophen 325 mG 1 Tablet(s) Oral every 6 hours PRN  simethicone 80 milliGRAM(s) Chew two times a day PRN    VITALS:   T(F): 98.6  HR: 85  BP: 101/58  RR: 19  SpO2: --    LABS:                        9.9    8.28  )-----------( 237      ( 29 Jun 2021 07:03 )             33.5     06-29    137  |  101  |  12  ----------------------------<  72  4.4   |  27  |  0.5<L>    Ca    8.7      29 Jun 2021 07:03  Phos  3.2     06-29  Mg     1.8     06-29    TPro  5.8<L>  /  Alb  2.8<L>  /  TBili  0.3  /  DBili  x   /  AST  39  /  ALT  31  /  AlkPhos  99  06-29                  RADIOLOGY:    PHYSICAL EXAM:  GEN: No acute distress  HENT: NCAT, EOMI, no oral ulcers seen  LYMPH: No appreciable adenopathy  LUNGS: No respiratory distress, clear to auscultation bilaterally   HEART: regular rate and rhythm  ABD: Soft, non-tender, non-distended  SKIN: No rash  EXT: No edema  NEURO: AAOX3    
24H events:  He is tolerating chemo well  No acute events  There was 1 reading of 93.5 documented, however asymptomatic and resolved on repeat, will monitor for signs of infection and repeat hypothermia  CEA decreased to 10.1 from 24.3  PAST MEDICAL & SURGICAL HISTORY  No pertinent past medical history    Amputation of digit of left hand      SOCIAL HISTORY:  Negative for smoking/alcohol/drug use.     ALLERGIES:  No Known Allergies    MEDICATIONS:  STANDING MEDICATIONS  chlorhexidine 4% Liquid 1 Application(s) Topical <User Schedule>  enoxaparin Injectable 40 milliGRAM(s) SubCutaneous at bedtime  hydrocortisone 1% Cream 1 Application(s) Topical two times a day  lidocaine   Patch 2 Patch Transdermal daily  melatonin 5 milliGRAM(s) Oral at bedtime  pantoprazole    Tablet 40 milliGRAM(s) Oral before breakfast    PRN MEDICATIONS  loperamide 2 milliGRAM(s) Oral two times a day PRN  oxycodone    5 mG/acetaminophen 325 mG 1 Tablet(s) Oral every 6 hours PRN  simethicone 80 milliGRAM(s) Chew two times a day PRN    VITALS:   T(F): 97.4  HR: 62  BP: 110/53  RR: 18  SpO2: --    LABS:                        10.0   8.19  )-----------( 263      ( 28 Jun 2021 07:06 )             33.8     06-28    142  |  104  |  11  ----------------------------<  118<H>  5.3<H>   |  31  |  <0.5<L>    Ca    9.1      28 Jun 2021 07:06  Mg     2.2     06-28    TPro  6.1  /  Alb  3.1<L>  /  TBili  0.3  /  DBili  x   /  AST  54<H>  /  ALT  30  /  AlkPhos  103  06-28                  RADIOLOGY:    PHYSICAL EXAM:  GEN: No acute distress  HENT: NCAT, EOMI  LYMPH: No appreciable adenopathy  LUNGS: No respiratory distress, clear to auscultation bilaterally   HEART: regular rate and rhythm  ABD: Soft, non-tender, non-distended  SKIN: No rash  EXT: No edema  NEURO: AAOX3

## 2021-06-29 NOTE — DISCHARGE NOTE NURSING/CASE MANAGEMENT/SOCIAL WORK - PATIENT PORTAL LINK FT
You can access the FollowMyHealth Patient Portal offered by [facility] by registering at the following website: [email or web address]. By joining [employer]’s FollowMyHealth portal, you will also be able to view your health information using other applications (apps) compatible with our system.

## 2021-06-29 NOTE — DISCHARGE NOTE PROVIDER - HOSPITAL COURSE
Mr. Orellana is a 80M w/ no known PMHx (doesn't follow with doctors) with recently diagnosed locally advanced rectal adenocarcinoma. Was admitted for chemo and followed by Oncology.    # Locally advanced rectal adenocarcinoma (T4N0M0)  - Diagnosed late April 2021 - MRI: 18 cm long polypoidal rectal mass with invasion of the right meso rectal fascia and possibly the prostate gland. Additional involvement of the analsphincter complex--T4(a or b)N0M0  - Planned for total neoadjuvant therapy (chemotherapy for 12-16 weeks followed by chemoRT followed by surgery)  - s/p port placement  - He received cycle 1 5/7/21  - C2D1 was 5/27 (delayed due to logistical reasons), he received 1 day of Zarxio  - C3D1 6/12  - C4D1 started on  6/26, will continue fluids there were no issues during this hospotal stay.  - CEA on this admission 10.1, down from 24.3 prior to chemo  - He is able to express why we are offering chemotherapy and the risks and benefits. We discussed side effects including but not limited to cytopenias which increase the risk of bleeding and infection, fatigue, nausea, vomiting, diarrhea and neuropathy.  - His next cycle if he continues with FOLFOX should be July 9th for cycle 5, for which he would need a direct admission.   However he needs restaging scans done and then an appointment at [facility] to discuss the results and next steps. - Pt. will be sent back to NH with script for repeat CT scan and will follow up with ONcology at [facility].

## 2021-06-29 NOTE — PROGRESS NOTE ADULT - ATTENDING COMMENTS
Agree with above A/P.  Proceed with chemotherapy with FOLFOX.  Monitor counts.
Agree with above A/p.  DC to snf today.  Tolerated chemo well
Continue with Day 2 of FOLFOX as ordered. Pt tolerating chemo well.
pt needs to be assessed for response with CT C/A/P after this cycle.  Scripts are in outpt AEHR [employer]. Please print and send with DC papers.  Ct scans should be completed in 7-10 days.  Pt should follow up in office after scans for further recommendations
Pt seen and  examined   Agree with above A/P.  Proceed with chemo as ordered with mFOLFOX

## 2021-06-29 NOTE — DISCHARGE NOTE PROVIDER - NSDCCPCAREPLAN_GEN_ALL_CORE_FT
PRINCIPAL DISCHARGE DIAGNOSIS  Diagnosis: Patient on antineoplastic chemotherapy regimen  Assessment and Plan of Treatment: You had an uneventful chemo round this hospital admission. PLease obtrain repeat CT scan in 10 days prior to follow up with your oncolologist.

## 2021-06-29 NOTE — PROGRESS NOTE ADULT - REASON FOR ADMISSION
Chemotherapy
Chemotherapy for rectal adenocarcinoma

## 2021-06-29 NOTE — PHYSICAL THERAPY INITIAL EVALUATION ADULT - IMPAIRMENTS FOUND, PT EVAL

## 2021-06-29 NOTE — DISCHARGE NOTE PROVIDER - NSDCMRMEDTOKEN_GEN_ALL_CORE_FT
diphenhydramine/lidocaine/aluminum hydroxide/magnesium hydroxide/simethicone mucous membrane suspension: 1 application mucous membrane once a day (at bedtime), As Needed  lidocaine 5% topical film: Apply topically to affected area once  loperamide 2 mg oral capsule: 1 cap(s) orally 2 times a day, As needed, Diarrhea  oxycodone-acetaminophen 5 mg-325 mg oral tablet: 1 tab(s) orally every 6 hours, As needed, Moderate Pain (4 - 6)  pantoprazole 40 mg oral delayed release tablet: 1 tab(s) orally once a day (before a meal)  simethicone 80 mg oral tablet, chewable: 1 tab(s) orally 2 times a day, As needed, Gas

## 2021-06-30 ENCOUNTER — APPOINTMENT (OUTPATIENT)
Dept: INFUSION THERAPY | Facility: CLINIC | Age: 81
End: 2021-06-30

## 2021-06-30 RX ORDER — PEGFILGRASTIM-CBQV 6 MG/.6ML
6 INJECTION, SOLUTION SUBCUTANEOUS ONCE
Refills: 0 | Status: COMPLETED | OUTPATIENT
Start: 2021-06-30 | End: 2021-06-30

## 2021-06-30 RX ADMIN — PEGFILGRASTIM-CBQV 6 MILLIGRAM(S): 6 INJECTION, SOLUTION SUBCUTANEOUS at 16:12

## 2021-07-05 DIAGNOSIS — E83.42 HYPOMAGNESEMIA: ICD-10-CM

## 2021-07-05 DIAGNOSIS — B35.1 TINEA UNGUIUM: ICD-10-CM

## 2021-07-05 DIAGNOSIS — M17.10 UNILATERAL PRIMARY OSTEOARTHRITIS, UNSPECIFIED KNEE: ICD-10-CM

## 2021-07-05 DIAGNOSIS — Z51.11 ENCOUNTER FOR ANTINEOPLASTIC CHEMOTHERAPY: ICD-10-CM

## 2021-07-05 DIAGNOSIS — L85.3 XEROSIS CUTIS: ICD-10-CM

## 2021-07-05 DIAGNOSIS — Z79.891 LONG TERM (CURRENT) USE OF OPIATE ANALGESIC: ICD-10-CM

## 2021-07-05 DIAGNOSIS — D50.9 IRON DEFICIENCY ANEMIA, UNSPECIFIED: ICD-10-CM

## 2021-07-05 DIAGNOSIS — R19.7 DIARRHEA, UNSPECIFIED: ICD-10-CM

## 2021-07-05 DIAGNOSIS — R68.0 HYPOTHERMIA, NOT ASSOCIATED WITH LOW ENVIRONMENTAL TEMPERATURE: ICD-10-CM

## 2021-07-05 DIAGNOSIS — C20 MALIGNANT NEOPLASM OF RECTUM: ICD-10-CM

## 2021-07-13 ENCOUNTER — LABORATORY RESULT (OUTPATIENT)
Age: 81
End: 2021-07-13

## 2021-07-13 ENCOUNTER — APPOINTMENT (OUTPATIENT)
Dept: HEMATOLOGY ONCOLOGY | Facility: CLINIC | Age: 81
End: 2021-07-13
Payer: MEDICARE

## 2021-07-13 VITALS
WEIGHT: 163 LBS | BODY MASS INDEX: 22.08 KG/M2 | HEART RATE: 104 BPM | DIASTOLIC BLOOD PRESSURE: 77 MMHG | SYSTOLIC BLOOD PRESSURE: 131 MMHG | HEIGHT: 72 IN | TEMPERATURE: 98.3 F

## 2021-07-13 LAB
HCT VFR BLD CALC: 37.7 %
HGB BLD-MCNC: 11.3 G/DL
MCHC RBC-ENTMCNC: 28 PG
MCHC RBC-ENTMCNC: 30 G/DL
MCV RBC AUTO: 93.3 FL
PLATELET # BLD AUTO: 221 K/UL
PMV BLD: 9.2 FL
RBC # BLD: 4.04 M/UL
RBC # FLD: 20.5 %
WBC # FLD AUTO: 25 K/UL

## 2021-07-13 PROCEDURE — 99215 OFFICE O/P EST HI 40 MIN: CPT

## 2021-07-14 LAB
ALBUMIN SERPL ELPH-MCNC: 3.2 G/DL
ALP BLD-CCNC: 141 U/L
ALT SERPL-CCNC: 7 U/L
ANION GAP SERPL CALC-SCNC: 15 MMOL/L
AST SERPL-CCNC: 20 U/L
BILIRUB SERPL-MCNC: 0.4 MG/DL
BUN SERPL-MCNC: 9 MG/DL
CALCIUM SERPL-MCNC: 9 MG/DL
CEA SERPL-MCNC: 10.3 NG/ML
CHLORIDE SERPL-SCNC: 104 MMOL/L
CO2 SERPL-SCNC: 23 MMOL/L
CREAT SERPL-MCNC: 0.5 MG/DL
GLUCOSE SERPL-MCNC: 80 MG/DL
POTASSIUM SERPL-SCNC: 3.9 MMOL/L
PROT SERPL-MCNC: 6.8 G/DL
SODIUM SERPL-SCNC: 142 MMOL/L

## 2021-07-16 NOTE — PHYSICAL EXAM
[Ambulatory and capable of all self care but unable to carry out any work activities] : Status 2- Ambulatory and capable of all self care but unable to carry out any work activities. Up and about more than 50% of waking hours [Normal] : affect appropriate [de-identified] : Pt seen in a wheelchair

## 2021-07-16 NOTE — ASSESSMENT
[FreeTextEntry1] : Rectal adenocarcinoma Locally advanced\par MRI showing : 18 cm long polypoidal rectal mass with invasion of the right meso\par rectal fascia and possibly the prostate gland. Additional involvement of the\par anal sphincter complex--  T4(a or b)N0M0\par \par He was told that he has locally advanced disease and we recommended total\par neoadjuvant therapy . Consisting of oxaliplatin based chemotherapy followed by\par chemo RT and then evaluation for surgical resection .\par \par PT has started FOLFOX s/p Cycle #4 \par CBC adequate today,  will proceed with Cycle #5, with Neulasta ( pt at higher risk for febrile neutropenia given his age and frail status).\par Chemo will be scheduled as in patient\par \par Discussed side effects in detail including nausea, vomiting, diarrhea, HFS, myelosuppression, allergic reactions risk of sepsis and neuropathy\par \par The chemotherapy dose was adjusted according to pt's height, weight, labs and anticipated tolerance.\par The high risk of complications and complexity associated with antineoplastic therapy administration has been explained to the pt and family. Chemotherapy will be given with adequate precautions including premedications, hydration and close monitoring during treatment.\par Chemotherapy will be administered under my direct supervision\par \par SUPPORTIVE MEASURES\par Zofran 8mg Q 8 hrs prn for emesis and nausea.\par Compazine  Q 6hrs prn\par Encourage adequate fluid untake,\par GI prophylaxis with PPI\par \par LABS \par CMP, CEA,  CBC reviewed, CEA is declining.\par \par I called the SNF several times and coordinated his care. I spoke to Caitlin who informed me that the auth for CT scan took time and now imaging is scheduled for next week.\par \par Will schedule for in pt chemo on 7/18/21.\par Advised the NH staff of the plan and the need to do Covid swab beforehand.\par \par Due to COVID 19, pre-visit patient instructions were explained to the patient and their symptoms were checked upon arrival. \par Masks were used by the health care providers and staff and the examination room was cleaned before and after the patient visit was completed.\par PT IS VACCINATED\par \par RTC in 2 weeks

## 2021-07-16 NOTE — HISTORY OF PRESENT ILLNESS
[de-identified] : 80 year old male with no past medical history (has not seen a doctor in many\par years, not on any meds) presented to the ED due to loose bowel movements.\par Patient is alert and orient, but appears to be confused and is a poor\par historian. History obtained from patient and the charts. Patient has had\par chronic diarrhea for the past year which is foul smelling, then over the past\par week it has gotten worse with amount and frequency. The patients daughter\par believed that their was blood in here stools so she brought him to the ED. The\par patient endorses decreased PO intake and nausea, but denies abdominal pain. He\par is not on AC, but will take a baby aspirin from time to time. He denies SOB,\par CP, changes in urination, headache. He lives alone and ambulates with a walker.\par \par COLONOSCOPY  4/27/21\par  Circumferential, ulcerated, irregular and friable infiltrating thickening \par rectal thickening (mass) starting from anal verge up to 17 cm proximally and \par suggestive of adenocarcinoma. (Biopsy).  \par  Hard rectal mass felt on digital exam.  \par  Diverticulosis of the whole colon.  \par \par  Polyp (7 mm) in the ascending colon. (Polypectomy).  \par  Polyp (5 mm) in the proximal transverse colon. (Polypectomy).  \par  Polyp (8 mm) in the distal transverse colon. (Polypectomy).  \par  Polyp (1 cm) in the mid-transverse colon. (Polypectomy).  \par  Polyp (5 cm) in the distal transverse colon.  \par  Polyp (3 cm) in the descending colon. (Polypectomy).  \par  \par PATHOLOGY\par \par MMR proficient\par Rectal mass, \par - Invasive adenocarcinoma, moderately to poorly differentiated,\par involving and undermining squamous mucosa (see Comment).\par \par CT BRAIN\par IMPRESSION:\par \par No acute intracranial pathology.\par \par Mild chronic microvascular ischemic changes.\par \par \par MRI PELVIS\par IMPRESSION:\par \par 18 cm long polypoidal rectal mass with invasion of the right meso rectal fascia and possibly the prostate gland. Additional involvement of the anal sphincter complex--T4(a or b)N0Mx\par \par CT CHEST 4/28/21\par Impression:\par \par Small bilateral pleural effusions with compressive atelectasis.\par \par Nonspecific mediastinal lymph nodes.\par \par CT ABDOMEN AND PELVIS IC\par IMPRESSION:\par \par Circumferential rectal wall thickening spanning at least 14 cm in length with a partially exophytic mass extending into the right mesorectal fat measuring approximately 5.7 x 3.0 x 6.8 cm. The exophytic mass abuts the right posterior lateral prostate gland. Primary consideration is rectal cancer. Limited ability to exclude superimposed proctitis.\par \par 9 mm right lower lobe pulmonary nodule.\par \par Small wedge-shaped hypodensities at the superior aspect of the spleen may reflect splenic infarcts.\par \par \par \par -s/p mediport palcement by IR .\par \par \par He started FOLFOX cycle 1 chemo on 5/7/21-  5/9/21\par  [de-identified] : 5/24/21\par Pt tolerated 1st cycle of FOLFOX with some side effects. Mild nausea. Still has mild bleeding NM. No fever.\par \par 7/13/21\par Pt is here for follow up.\par He is s/p 4 cycles of FOLFOx given in patient.\par He feels well. So far has tolerated chemo well.\par He had been advised to go for imaging prior to his 5th cycle. However Ct scans have not been completed yet,\par He denies rectal bleeding, abdominal pain, N, V, D, fevr\par \par

## 2021-07-18 ENCOUNTER — INPATIENT (INPATIENT)
Facility: HOSPITAL | Age: 81
LOS: 3 days | Discharge: NURSING, REHAB OR LONG-TERM CARE | End: 2021-07-22
Attending: INTERNAL MEDICINE | Admitting: INTERNAL MEDICINE
Payer: MEDICARE

## 2021-07-18 VITALS
DIASTOLIC BLOOD PRESSURE: 60 MMHG | HEART RATE: 92 BPM | SYSTOLIC BLOOD PRESSURE: 134 MMHG | TEMPERATURE: 99 F | HEIGHT: 72 IN | RESPIRATION RATE: 16 BRPM | OXYGEN SATURATION: 98 % | WEIGHT: 169.98 LBS

## 2021-07-18 DIAGNOSIS — S68.119A COMPLETE TRAUMATIC METACARPOPHALANGEAL AMPUTATION OF UNSPECIFIED FINGER, INITIAL ENCOUNTER: Chronic | ICD-10-CM

## 2021-07-18 LAB
ALBUMIN SERPL ELPH-MCNC: 3.1 G/DL — LOW (ref 3.5–5.2)
ALP SERPL-CCNC: 108 U/L — SIGNIFICANT CHANGE UP (ref 30–115)
ALT FLD-CCNC: 5 U/L — SIGNIFICANT CHANGE UP (ref 0–41)
ANION GAP SERPL CALC-SCNC: 7 MMOL/L — SIGNIFICANT CHANGE UP (ref 7–14)
ANISOCYTOSIS BLD QL: SLIGHT — SIGNIFICANT CHANGE UP
AST SERPL-CCNC: 14 U/L — SIGNIFICANT CHANGE UP (ref 0–41)
BASOPHILS # BLD AUTO: 0.16 K/UL — SIGNIFICANT CHANGE UP (ref 0–0.2)
BASOPHILS NFR BLD AUTO: 0.9 % — SIGNIFICANT CHANGE UP (ref 0–1)
BILIRUB SERPL-MCNC: 0.3 MG/DL — SIGNIFICANT CHANGE UP (ref 0.2–1.2)
BUN SERPL-MCNC: 12 MG/DL — SIGNIFICANT CHANGE UP (ref 10–20)
CALCIUM SERPL-MCNC: 8.7 MG/DL — SIGNIFICANT CHANGE UP (ref 8.5–10.1)
CHLORIDE SERPL-SCNC: 105 MMOL/L — SIGNIFICANT CHANGE UP (ref 98–110)
CO2 SERPL-SCNC: 27 MMOL/L — SIGNIFICANT CHANGE UP (ref 17–32)
CREAT SERPL-MCNC: 0.7 MG/DL — SIGNIFICANT CHANGE UP (ref 0.7–1.5)
EOSINOPHIL # BLD AUTO: 0.16 K/UL — SIGNIFICANT CHANGE UP (ref 0–0.7)
EOSINOPHIL NFR BLD AUTO: 0.9 % — SIGNIFICANT CHANGE UP (ref 0–8)
GLUCOSE SERPL-MCNC: 90 MG/DL — SIGNIFICANT CHANGE UP (ref 70–99)
HCT VFR BLD CALC: 34.8 % — LOW (ref 42–52)
HGB BLD-MCNC: 10.4 G/DL — LOW (ref 14–18)
LYMPHOCYTES # BLD AUTO: 12.6 % — LOW (ref 20.5–51.1)
LYMPHOCYTES # BLD AUTO: 2.17 K/UL — SIGNIFICANT CHANGE UP (ref 1.2–3.4)
MANUAL SMEAR VERIFICATION: SIGNIFICANT CHANGE UP
MCHC RBC-ENTMCNC: 27.3 PG — SIGNIFICANT CHANGE UP (ref 27–31)
MCHC RBC-ENTMCNC: 29.9 G/DL — LOW (ref 32–37)
MCV RBC AUTO: 91.3 FL — SIGNIFICANT CHANGE UP (ref 80–94)
METAMYELOCYTES # FLD: 0.9 % — HIGH (ref 0–0)
MICROCYTES BLD QL: SLIGHT — SIGNIFICANT CHANGE UP
MONOCYTES # BLD AUTO: 0.16 K/UL — SIGNIFICANT CHANGE UP (ref 0.1–0.6)
MONOCYTES NFR BLD AUTO: 0.9 % — LOW (ref 1.7–9.3)
NEUTROPHILS # BLD AUTO: 14 K/UL — HIGH (ref 1.4–6.5)
NEUTROPHILS NFR BLD AUTO: 81.1 % — HIGH (ref 42.2–75.2)
OVALOCYTES BLD QL SMEAR: SLIGHT — SIGNIFICANT CHANGE UP
PLAT MORPH BLD: NORMAL — SIGNIFICANT CHANGE UP
PLATELET # BLD AUTO: 172 K/UL — SIGNIFICANT CHANGE UP (ref 130–400)
POIKILOCYTOSIS BLD QL AUTO: SLIGHT — SIGNIFICANT CHANGE UP
POLYCHROMASIA BLD QL SMEAR: SIGNIFICANT CHANGE UP
POTASSIUM SERPL-MCNC: 4.3 MMOL/L — SIGNIFICANT CHANGE UP (ref 3.5–5)
POTASSIUM SERPL-SCNC: 4.3 MMOL/L — SIGNIFICANT CHANGE UP (ref 3.5–5)
PROT SERPL-MCNC: 6.3 G/DL — SIGNIFICANT CHANGE UP (ref 6–8)
RBC # BLD: 3.81 M/UL — LOW (ref 4.7–6.1)
RBC # FLD: 20.8 % — HIGH (ref 11.5–14.5)
RBC BLD AUTO: ABNORMAL
SARS-COV-2 RNA SPEC QL NAA+PROBE: SIGNIFICANT CHANGE UP
SODIUM SERPL-SCNC: 139 MMOL/L — SIGNIFICANT CHANGE UP (ref 135–146)
TOXIC GRANULES BLD QL SMEAR: PRESENT — SIGNIFICANT CHANGE UP
VARIANT LYMPHS # BLD: 2.7 % — SIGNIFICANT CHANGE UP (ref 0–5)
WBC # BLD: 17.26 K/UL — HIGH (ref 4.8–10.8)
WBC # FLD AUTO: 17.26 K/UL — HIGH (ref 4.8–10.8)

## 2021-07-18 PROCEDURE — 93010 ELECTROCARDIOGRAM REPORT: CPT

## 2021-07-18 PROCEDURE — 99285 EMERGENCY DEPT VISIT HI MDM: CPT

## 2021-07-18 RX ORDER — SIMETHICONE 80 MG/1
80 TABLET, CHEWABLE ORAL
Refills: 0 | Status: DISCONTINUED | OUTPATIENT
Start: 2021-07-18 | End: 2021-07-21

## 2021-07-18 RX ORDER — ONDANSETRON 8 MG/1
4 TABLET, FILM COATED ORAL EVERY 8 HOURS
Refills: 0 | Status: DISCONTINUED | OUTPATIENT
Start: 2021-07-18 | End: 2021-07-22

## 2021-07-18 RX ORDER — OXYCODONE AND ACETAMINOPHEN 5; 325 MG/1; MG/1
1 TABLET ORAL EVERY 6 HOURS
Refills: 0 | Status: DISCONTINUED | OUTPATIENT
Start: 2021-07-18 | End: 2021-07-22

## 2021-07-18 RX ORDER — LOPERAMIDE HCL 2 MG
2 TABLET ORAL
Refills: 0 | Status: DISCONTINUED | OUTPATIENT
Start: 2021-07-18 | End: 2021-07-21

## 2021-07-18 RX ORDER — PANTOPRAZOLE SODIUM 20 MG/1
40 TABLET, DELAYED RELEASE ORAL
Refills: 0 | Status: DISCONTINUED | OUTPATIENT
Start: 2021-07-18 | End: 2021-07-22

## 2021-07-18 RX ORDER — LANOLIN ALCOHOL/MO/W.PET/CERES
3 CREAM (GRAM) TOPICAL AT BEDTIME
Refills: 0 | Status: DISCONTINUED | OUTPATIENT
Start: 2021-07-18 | End: 2021-07-22

## 2021-07-18 RX ORDER — ENOXAPARIN SODIUM 100 MG/ML
40 INJECTION SUBCUTANEOUS DAILY
Refills: 0 | Status: DISCONTINUED | OUTPATIENT
Start: 2021-07-18 | End: 2021-07-22

## 2021-07-18 RX ORDER — LIDOCAINE 4 G/100G
1 CREAM TOPICAL DAILY
Refills: 0 | Status: DISCONTINUED | OUTPATIENT
Start: 2021-07-18 | End: 2021-07-22

## 2021-07-18 NOTE — ED PROVIDER NOTE - CLINICAL SUMMARY MEDICAL DECISION MAKING FREE TEXT BOX
79 yo m presented to Ed for chemotherapy. pt had labs. Talked with heme/onc and pt admitted for further evaluation and management.

## 2021-07-18 NOTE — H&P ADULT - HISTORY OF PRESENT ILLNESS
80 year old male with Advanced rectal adenocarcinoma, Microcytic Anemia, Chronic Osteoarthritis presenting for chemotherapy admission. Patient is to complete another cycle of FOLFOX. Patietn denies any fevers, chills, chest pain, sob, nausea, vomiting, diarrhea or sick contacts.     80 year old male with Advanced rectal adenocarcinoma, Microcytic Anemia, Chronic Osteoarthritis presenting for chemotherapy admission. Patient is to complete another cycle of FOLFOX. Patient is very adamant about not wanting to get another round of chemo. He wants to have surgery instead even if it results in a colostomy bag. Patient denies any fevers, chills, chest pain, sob, nausea, vomiting, diarrhea or sick contacts.     in ED vitals: T 99.3, /60, HR 92, RR 16, SpO2 99% on RA   Labs unremarkable

## 2021-07-18 NOTE — H&P ADULT - ASSESSMENT
80 year old male with Advanced rectal adenocarcinoma, Microcytic Anemia, Chronic Osteoarthritis who presented from [facility] for chemotherapy.    # Locally advanced rectal adenocarcinoma (T4N0M0)  - patient currently HD stable, no new complaints since last chemotherapy session  - Diagnosed late April 2021 - MRI: 18 cm long polypoidal rectal mass with invasion of the right meso rectal fascia and possibly the prostate gland. Additional involvement of the analsphincter complex--T4(a or b)N0M0  - Planned for total neoadjuvant chemotherapy followed by chemoRT followed by surgery  - patient refusing chemo and wants to have surgery     #Iron deficiency anemia likely from chronc occult LGIB from tumor  - Iron studies 4/21: Iron Saturation: 12%, Ferritin 34, MCV 68.9   - s/p 1 dose of Venofer previous admission    #Mycotic toenails - podiatry outpatient f/u     #Knee pain, likely OA:  - Lidocaine patch  - Outpatient followup    Diet: DASH  DVT ppx: lovenox  Dispo: acute   Code ; DNR - moslt needs to filled out

## 2021-07-18 NOTE — ED ADULT TRIAGE NOTE - CHIEF COMPLAINT QUOTE
Pt KATY from [facility] for chemo infusion for rectal cancer; [facility] was closed today when they arrived, so the nurse at [facility] wanted him to be seen anyway. Pt believes he was being sent to hospital for an MRI and is also stating he just wants "it to be cut out of him," referring to his rectal cancer.

## 2021-07-18 NOTE — H&P ADULT - BIRTH SEX
Male Unna Boot Text: An Unna boot was placed to help immobilize the limb and facilitate more rapid healing.

## 2021-07-18 NOTE — ED PROVIDER NOTE - OBJECTIVE STATEMENT
80 year old male with pmhx of rectal adenocarcinoma sent in for admission for chemotherapy. pt follows up with Dr Tubbs. No chest pain, sob, abd pain, nausea, vomiting, diarrhea, or urinary symptoms.

## 2021-07-18 NOTE — ED PROVIDER NOTE - ATTENDING CONTRIBUTION TO CARE
ATTENDING NOTE: I personally evaluated the patient. I reviewed the Physician Assistant’s note (as assigned above), and agree with the findings and plan except as documented in my note.     79 y/o M with PMH of rectal cancer presents to ED for chemo infusion. Pt states he wants the tumor by his rectum cut out. No f/c, SOB or CP.     Const: Well nourished, well developed, appears stated age. Eyes: PERRL, no conjunctival injection. HENT:  Neck supple without meningismus. CV: RRR, Warm, well-perfused extremities. RESP: CTA B/L, no tachypnea. GI: soft, non-tender, non-distended. MSK: No gross deformities appreciated. Skin: Warm, dry. No rashes. Neuro: Alert, CNs II-XII grossly intact. Sensation and motor function of extremities grossly intact.     Plan for labs and admit.

## 2021-07-18 NOTE — H&P ADULT - NSHPPHYSICALEXAM_GEN_ALL_CORE
Vital Signs Last 24 Hrs  T(C): 36.9 (18 Jul 2021 17:41), Max: 37.4 (18 Jul 2021 14:44)  T(F): 98.5 (18 Jul 2021 17:41), Max: 99.3 (18 Jul 2021 14:44)  HR: 80 (18 Jul 2021 17:41) (80 - 92)  BP: 124/58 (18 Jul 2021 17:41) (124/58 - 134/60)  BP(mean): --  RR: 18 (18 Jul 2021 17:41) (16 - 18)  SpO2: 98% (18 Jul 2021 17:41) (98% - 98%)    GENERAL: NAD, lying in bed comfortably  HEAD:  Atraumatic, Normocephalic  EYES: EOMI, conjunctiva and sclera clear  ENT: Moist mucous membranes  NECK: Supple, No JVD  CHEST/LUNG: Clear to auscultation bilaterally; No rales, rhonchi, wheezing, or rubs. Unlabored respirations  HEART: Regular rate and rhythm; No murmurs, rubs, or gallops  ABDOMEN: Bowel sounds present; Soft, Nontender, Nondistended.   EXTREMITIES: No clubbing, cyanosis, or edema  NERVOUS SYSTEM:  Alert & Oriented X2, speech clear. No deficits   SKIN: No rashes or lesions

## 2021-07-18 NOTE — ED ADULT NURSE NOTE - NSIMPLEMENTINTERV_GEN_ALL_ED
Implemented All Fall with Harm Risk Interventions:  [place] to call system. Call bell, personal items and telephone within reach. Instruct patient to call for assistance. Room bathroom lighting operational. Non-slip footwear when patient is off stretcher. Physically safe environment: no spills, clutter or unnecessary equipment. Stretcher in lowest position, wheels locked, appropriate side rails in place. Provide visual cue, wrist band, yellow gown, etc. Monitor gait and stability. Monitor for mental status changes and reorient to person, place, and time. Review medications for side effects contributing to fall risk. Reinforce activity limits and safety measures with patient and family. Provide visual clues: red socks. No

## 2021-07-18 NOTE — ED ADULT NURSE NOTE - OBJECTIVE STATEMENT
Pt KATY from [facility] for chemo infusion for rectal cancer; [facility] was closed today when they arrived, so the nurse at [facility] wanted him to be seen anyway. Pt believes he was being sent to hospital for an MRI and is also stating he just wants "it to be cut out of him," referring to his rectal cancer.

## 2021-07-18 NOTE — H&P ADULT - NSHPLABSRESULTS_GEN_ALL_CORE
10.4   17.26 )-----------( 172      ( 18 Jul 2021 16:00 )             34.8       07-18    139  |  105  |  12  ----------------------------<  90  4.3   |  27  |  0.7    Ca    8.7      18 Jul 2021 16:00    TPro  6.3  /  Alb  3.1<L>  /  TBili  0.3  /  DBili  x   /  AST  14  /  ALT  5   /  AlkPhos  108  07-18        Lactate Trend    CAPILLARY BLOOD GLUCOSE

## 2021-07-18 NOTE — ED PROVIDER NOTE - PROGRESS NOTE DETAILS
SPOKE WITH HEMONC FELLOW - WILL ADMIT TO DR MARTINEZ SERVICE FOR CHEMO ATTENDING NOTE: I personally evaluated the patient. I reviewed the Physician Assistant’s note (as assigned above), and agree with the findings and plan except as documented in my note.   79 y/o M with PMH of rectal cancer presents to ED for chemo infusion. Pt states he wants the tumor by his rectum cut out. No f/c, SOB or CP.   Const: Well nourished, well developed, appears stated age. Eyes: PERRL, no conjunctival injection. HENT:  Neck supple without meningismus. CV: RRR, Warm, well-perfused extremities. RESP: CTA B/L, no tachypnea. GI: soft, non-tender, non-distended. MSK: No gross deformities appreciated. Skin: Warm, dry. No rashes. Neuro: Alert, CNs II-XII grossly intact. Sensation and motor function of extremities grossly intact. Psych: Appropriate mood.   Plan for labs and admit.

## 2021-07-18 NOTE — ED ADULT NURSE NOTE - CHIEF COMPLAINT QUOTE
Pt KATY from [facility] for chemo infusion for rectal cancer; [facility] was closed today when they arrived, so the nurse at [facility] wanted him to be seen anyway. Pt believes he was being sent to hospital for an MRI and is also stating he just wants "it to be cut out of him," referring to his rectal cancer.

## 2021-07-19 LAB
ALBUMIN SERPL ELPH-MCNC: 3 G/DL — LOW (ref 3.5–5.2)
ALBUMIN SERPL ELPH-MCNC: 3 G/DL — LOW (ref 3.5–5.2)
ALP SERPL-CCNC: 104 U/L — SIGNIFICANT CHANGE UP (ref 30–115)
ALP SERPL-CCNC: 105 U/L — SIGNIFICANT CHANGE UP (ref 30–115)
ALT FLD-CCNC: 5 U/L — SIGNIFICANT CHANGE UP (ref 0–41)
ALT FLD-CCNC: <5 U/L — SIGNIFICANT CHANGE UP (ref 0–41)
ANION GAP SERPL CALC-SCNC: 6 MMOL/L — LOW (ref 7–14)
ANION GAP SERPL CALC-SCNC: 7 MMOL/L — SIGNIFICANT CHANGE UP (ref 7–14)
ANION GAP SERPL CALC-SCNC: 8 MMOL/L — SIGNIFICANT CHANGE UP (ref 7–14)
APPEARANCE UR: ABNORMAL
AST SERPL-CCNC: 14 U/L — SIGNIFICANT CHANGE UP (ref 0–41)
AST SERPL-CCNC: 14 U/L — SIGNIFICANT CHANGE UP (ref 0–41)
BACTERIA # UR AUTO: ABNORMAL
BASOPHILS # BLD AUTO: 0.19 K/UL — SIGNIFICANT CHANGE UP (ref 0–0.2)
BASOPHILS NFR BLD AUTO: 1.4 % — HIGH (ref 0–1)
BILIRUB SERPL-MCNC: 0.4 MG/DL — SIGNIFICANT CHANGE UP (ref 0.2–1.2)
BILIRUB SERPL-MCNC: 0.7 MG/DL — SIGNIFICANT CHANGE UP (ref 0.2–1.2)
BILIRUB UR-MCNC: NEGATIVE — SIGNIFICANT CHANGE UP
BUN SERPL-MCNC: 11 MG/DL — SIGNIFICANT CHANGE UP (ref 10–20)
BUN SERPL-MCNC: 11 MG/DL — SIGNIFICANT CHANGE UP (ref 10–20)
BUN SERPL-MCNC: 14 MG/DL — SIGNIFICANT CHANGE UP (ref 10–20)
CALCIUM SERPL-MCNC: 8.8 MG/DL — SIGNIFICANT CHANGE UP (ref 8.5–10.1)
CALCIUM SERPL-MCNC: 8.9 MG/DL — SIGNIFICANT CHANGE UP (ref 8.5–10.1)
CALCIUM SERPL-MCNC: 9 MG/DL — SIGNIFICANT CHANGE UP (ref 8.5–10.1)
CHLORIDE SERPL-SCNC: 103 MMOL/L — SIGNIFICANT CHANGE UP (ref 98–110)
CHLORIDE SERPL-SCNC: 104 MMOL/L — SIGNIFICANT CHANGE UP (ref 98–110)
CHLORIDE SERPL-SCNC: 106 MMOL/L — SIGNIFICANT CHANGE UP (ref 98–110)
CO2 SERPL-SCNC: 25 MMOL/L — SIGNIFICANT CHANGE UP (ref 17–32)
CO2 SERPL-SCNC: 28 MMOL/L — SIGNIFICANT CHANGE UP (ref 17–32)
CO2 SERPL-SCNC: 28 MMOL/L — SIGNIFICANT CHANGE UP (ref 17–32)
COLOR SPEC: YELLOW — SIGNIFICANT CHANGE UP
CREAT SERPL-MCNC: 0.5 MG/DL — LOW (ref 0.7–1.5)
DIFF PNL FLD: NEGATIVE — SIGNIFICANT CHANGE UP
EOSINOPHIL # BLD AUTO: 0.16 K/UL — SIGNIFICANT CHANGE UP (ref 0–0.7)
EOSINOPHIL NFR BLD AUTO: 1.2 % — SIGNIFICANT CHANGE UP (ref 0–8)
EPI CELLS # UR: 0 /HPF — SIGNIFICANT CHANGE UP (ref 0–5)
GLUCOSE SERPL-MCNC: 118 MG/DL — HIGH (ref 70–99)
GLUCOSE SERPL-MCNC: 73 MG/DL — SIGNIFICANT CHANGE UP (ref 70–99)
GLUCOSE SERPL-MCNC: 82 MG/DL — SIGNIFICANT CHANGE UP (ref 70–99)
GLUCOSE UR QL: NEGATIVE — SIGNIFICANT CHANGE UP
HCT VFR BLD CALC: 33.8 % — LOW (ref 42–52)
HCT VFR BLD CALC: 34.9 % — LOW (ref 42–52)
HGB BLD-MCNC: 10 G/DL — LOW (ref 14–18)
HGB BLD-MCNC: 10.5 G/DL — LOW (ref 14–18)
HYALINE CASTS # UR AUTO: 0 /LPF — SIGNIFICANT CHANGE UP (ref 0–7)
IMM GRANULOCYTES NFR BLD AUTO: 5 % — HIGH (ref 0.1–0.3)
KETONES UR-MCNC: NEGATIVE — SIGNIFICANT CHANGE UP
LEUKOCYTE ESTERASE UR-ACNC: ABNORMAL
LYMPHOCYTES # BLD AUTO: 15.5 % — LOW (ref 20.5–51.1)
LYMPHOCYTES # BLD AUTO: 2.07 K/UL — SIGNIFICANT CHANGE UP (ref 1.2–3.4)
MAGNESIUM SERPL-MCNC: 1.8 MG/DL — SIGNIFICANT CHANGE UP (ref 1.8–2.4)
MAGNESIUM SERPL-MCNC: 1.8 MG/DL — SIGNIFICANT CHANGE UP (ref 1.8–2.4)
MCHC RBC-ENTMCNC: 27.4 PG — SIGNIFICANT CHANGE UP (ref 27–31)
MCHC RBC-ENTMCNC: 27.9 PG — SIGNIFICANT CHANGE UP (ref 27–31)
MCHC RBC-ENTMCNC: 29.6 G/DL — LOW (ref 32–37)
MCHC RBC-ENTMCNC: 30.1 G/DL — LOW (ref 32–37)
MCV RBC AUTO: 92.6 FL — SIGNIFICANT CHANGE UP (ref 80–94)
MCV RBC AUTO: 92.8 FL — SIGNIFICANT CHANGE UP (ref 80–94)
MONOCYTES # BLD AUTO: 1 K/UL — HIGH (ref 0.1–0.6)
MONOCYTES NFR BLD AUTO: 7.5 % — SIGNIFICANT CHANGE UP (ref 1.7–9.3)
NEUTROPHILS # BLD AUTO: 9.24 K/UL — HIGH (ref 1.4–6.5)
NEUTROPHILS NFR BLD AUTO: 69.4 % — SIGNIFICANT CHANGE UP (ref 42.2–75.2)
NITRITE UR-MCNC: NEGATIVE — SIGNIFICANT CHANGE UP
NRBC # BLD: 0 /100 WBCS — SIGNIFICANT CHANGE UP (ref 0–0)
NRBC # BLD: 0 /100 WBCS — SIGNIFICANT CHANGE UP (ref 0–0)
PH UR: 6.5 — SIGNIFICANT CHANGE UP (ref 5–8)
PLATELET # BLD AUTO: 173 K/UL — SIGNIFICANT CHANGE UP (ref 130–400)
PLATELET # BLD AUTO: 176 K/UL — SIGNIFICANT CHANGE UP (ref 130–400)
POTASSIUM SERPL-MCNC: 4.2 MMOL/L — SIGNIFICANT CHANGE UP (ref 3.5–5)
POTASSIUM SERPL-MCNC: 4.3 MMOL/L — SIGNIFICANT CHANGE UP (ref 3.5–5)
POTASSIUM SERPL-MCNC: 4.7 MMOL/L — SIGNIFICANT CHANGE UP (ref 3.5–5)
POTASSIUM SERPL-SCNC: 4.2 MMOL/L — SIGNIFICANT CHANGE UP (ref 3.5–5)
POTASSIUM SERPL-SCNC: 4.3 MMOL/L — SIGNIFICANT CHANGE UP (ref 3.5–5)
POTASSIUM SERPL-SCNC: 4.7 MMOL/L — SIGNIFICANT CHANGE UP (ref 3.5–5)
PROT SERPL-MCNC: 6.3 G/DL — SIGNIFICANT CHANGE UP (ref 6–8)
PROT SERPL-MCNC: 6.3 G/DL — SIGNIFICANT CHANGE UP (ref 6–8)
PROT UR-MCNC: SIGNIFICANT CHANGE UP
RBC # BLD: 3.65 M/UL — LOW (ref 4.7–6.1)
RBC # BLD: 3.76 M/UL — LOW (ref 4.7–6.1)
RBC # FLD: 20.8 % — HIGH (ref 11.5–14.5)
RBC # FLD: 21.1 % — HIGH (ref 11.5–14.5)
RBC CASTS # UR COMP ASSIST: 2 /HPF — SIGNIFICANT CHANGE UP (ref 0–4)
SODIUM SERPL-SCNC: 138 MMOL/L — SIGNIFICANT CHANGE UP (ref 135–146)
SODIUM SERPL-SCNC: 138 MMOL/L — SIGNIFICANT CHANGE UP (ref 135–146)
SODIUM SERPL-SCNC: 139 MMOL/L — SIGNIFICANT CHANGE UP (ref 135–146)
SP GR SPEC: 1.02 — SIGNIFICANT CHANGE UP (ref 1.01–1.03)
UROBILINOGEN FLD QL: SIGNIFICANT CHANGE UP
WBC # BLD: 13.32 K/UL — HIGH (ref 4.8–10.8)
WBC # BLD: 14.97 K/UL — HIGH (ref 4.8–10.8)
WBC # FLD AUTO: 13.32 K/UL — HIGH (ref 4.8–10.8)
WBC # FLD AUTO: 14.97 K/UL — HIGH (ref 4.8–10.8)
WBC UR QL: 195 /HPF — HIGH (ref 0–5)

## 2021-07-19 PROCEDURE — 71045 X-RAY EXAM CHEST 1 VIEW: CPT | Mod: 26

## 2021-07-19 PROCEDURE — 99223 1ST HOSP IP/OBS HIGH 75: CPT

## 2021-07-19 RX ORDER — FOSAPREPITANT DIMEGLUMINE 150 MG/5ML
150 INJECTION, POWDER, LYOPHILIZED, FOR SOLUTION INTRAVENOUS ONCE
Refills: 0 | Status: COMPLETED | OUTPATIENT
Start: 2021-07-19 | End: 2021-07-19

## 2021-07-19 RX ORDER — ONDANSETRON 8 MG/1
16 TABLET, FILM COATED ORAL ONCE
Refills: 0 | Status: COMPLETED | OUTPATIENT
Start: 2021-07-19 | End: 2021-07-19

## 2021-07-19 RX ORDER — FLUOROURACIL 50 MG/ML
2400 INJECTION, SOLUTION INTRAVENOUS DAILY
Refills: 0 | Status: COMPLETED | OUTPATIENT
Start: 2021-07-19 | End: 2021-07-21

## 2021-07-19 RX ORDER — FLUOROURACIL 50 MG/ML
800 INJECTION, SOLUTION INTRAVENOUS ONCE
Refills: 0 | Status: COMPLETED | OUTPATIENT
Start: 2021-07-19 | End: 2021-07-20

## 2021-07-19 RX ORDER — LEUCOVORIN CALCIUM 5 MG
800 TABLET ORAL ONCE
Refills: 0 | Status: COMPLETED | OUTPATIENT
Start: 2021-07-19 | End: 2021-07-19

## 2021-07-19 RX ORDER — MAGNESIUM OXIDE 400 MG ORAL TABLET 241.3 MG
400 TABLET ORAL
Refills: 0 | Status: DISCONTINUED | OUTPATIENT
Start: 2021-07-19 | End: 2021-07-21

## 2021-07-19 RX ORDER — HYDROCORTISONE 1 %
1 OINTMENT (GRAM) TOPICAL EVERY 4 HOURS
Refills: 0 | Status: DISCONTINUED | OUTPATIENT
Start: 2021-07-19 | End: 2021-07-22

## 2021-07-19 RX ORDER — DEXAMETHASONE 0.5 MG/5ML
12 ELIXIR ORAL ONCE
Refills: 0 | Status: COMPLETED | OUTPATIENT
Start: 2021-07-19 | End: 2021-07-19

## 2021-07-19 RX ORDER — OXALIPLATIN 5 MG/ML
170 INJECTION, SOLUTION INTRAVENOUS ONCE
Refills: 0 | Status: COMPLETED | OUTPATIENT
Start: 2021-07-19 | End: 2021-07-19

## 2021-07-19 RX ADMIN — Medication 159 MILLIGRAM(S): at 20:27

## 2021-07-19 RX ADMIN — Medication 1 APPLICATION(S): at 04:41

## 2021-07-19 RX ADMIN — MAGNESIUM OXIDE 400 MG ORAL TABLET 400 MILLIGRAM(S): 241.3 TABLET ORAL at 17:10

## 2021-07-19 RX ADMIN — ONDANSETRON 174 MILLIGRAM(S): 8 TABLET, FILM COATED ORAL at 20:27

## 2021-07-19 RX ADMIN — Medication 145 MILLIGRAM(S): at 23:57

## 2021-07-19 RX ADMIN — PANTOPRAZOLE SODIUM 40 MILLIGRAM(S): 20 TABLET, DELAYED RELEASE ORAL at 05:02

## 2021-07-19 RX ADMIN — FOSAPREPITANT DIMEGLUMINE 300 MILLIGRAM(S): 150 INJECTION, POWDER, LYOPHILIZED, FOR SOLUTION INTRAVENOUS at 20:27

## 2021-07-19 RX ADMIN — ENOXAPARIN SODIUM 40 MILLIGRAM(S): 100 INJECTION SUBCUTANEOUS at 11:32

## 2021-07-19 RX ADMIN — OXALIPLATIN 142 MILLIGRAM(S): 5 INJECTION, SOLUTION INTRAVENOUS at 21:39

## 2021-07-19 NOTE — PATIENT PROFILE ADULT - 
ADDITIONAL INFORMATION
pt unsure when he received the kiah and kiah vaccine but states he received two doses. pt confused at times not the best historian

## 2021-07-19 NOTE — PHYSICAL THERAPY INITIAL EVALUATION ADULT - SPECIFY REASON(S)
8:30-8:42 am. Pt attempted to see the pt for PT IE. Pt was rec'd ambulating back from the bathroom with Rolling walker. Pt ambulated ~10 feet with Rolling walker with Close supervision.

## 2021-07-19 NOTE — CHART NOTE - NSCHARTNOTEFT_GEN_A_CORE
Transfer Note    Transfer from:   Transfer to:  (  ) Medicine    (  ) Telemetry    (  ) RCU    (  ) Palliative    (  ) Stroke Unit    ( x )     Hospital COURSE:  80 year old male with Advanced rectal adenocarcinoma, Microcytic Anemia, Chronic Osteoarthritis presenting for chemotherapy admission. Patient is to complete another cycle of FOLFOX. Patient is very adamant about not wanting to get another round of chemo. He wants to have surgery instead even if it results in a colostomy bag. Patient denies any fevers, chills, chest pain, sob, nausea, vomiting, diarrhea or sick contacts.     in ED vitals: T 99.3, /60, HR 92, RR 16, SpO2 99% on RA   Labs unremarkable    Patient was admitted to  for chemotherapy. After speaking to the patient, patient still is not agreeable to doing any more chemotherapy for his T4N0M0 rectal adenocarcinoma. Patient states that he did not feel great after his 4 cycles of chemotherapy and states that he is in the hospital only because he wants to get surgery with colostomy bag. After speaking to the heme onc fellow, patient is agreeable to receive additional chemotherapy. Patient is therefore stable to transfer to  for chemo.       ASSESSMENT & PLAN:   80 year old male with Advanced rectal adenocarcinoma, Microcytic Anemia, Chronic Osteoarthritis who presented from [facility] for chemotherapy. Patient in refusal for further chemotherapy, is only agreeable to surgery with colostomy bag placement.     # Locally advanced rectal adenocarcinoma (T4N0M0) with plan for 12-16 weeks of chemo  - patient currently HD stable, reports not feeling good since last chemotherapy session. patient did not follow up with Dr. Tubbs outpatient since discharge from last admission.  - Diagnosed late April 2021 - MRI: 18 cm long polypoidal rectal mass with invasion of the right meso rectal fascia and possibly the prostate gland. Additional involvement of the analsphincter complex--T4(a or b)N0M0  - Planned for total neoadjuvant chemotherapy followed by chemoRT followed by surgery  - s/p 4 cycles of chemo s/p port placement (5/7, 5/27, 6/12, 6/26), CEA trending down 10.1 from 24.3 on last admission  - patient refusing chemo and wants to have surgery   - f/u heme/onc- spoke to heme/onc fellow, will speak to Dr. 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  - Iron studies 4/21: Iron Saturation: 12%, Ferritin 34, MCV 68.9   - s/p 1 dose of Venofer previous admission  - Hgb stable: 10.5>10.4    #positive U/A for large leuks, bacteria, WBC  - asymptomatic  - ordered urine cx    #Mycotic toenails - podiatry outpatient f/u     #Knee pain, patient reports RA history  - Lidocaine patch  - Outpatient followup    Diet: regular  DVT ppx: lovenox  GI ppx: protonix   Dispo: transfer to   Code; DNR - molst needs to filled out        For Follow-Up:  - urine cx  - heme/onc recs        Vital Signs Last 24 Hrs  T(C): 35.8 (19 Jul 2021 07:56), Max: 37.4 (18 Jul 2021 14:44)  T(F): 96.5 (19 Jul 2021 07:56), Max: 99.3 (18 Jul 2021 14:44)  HR: 75 (19 Jul 2021 07:56) (75 - 92)  BP: 107/57 (19 Jul 2021 07:56) (107/57 - 134/60)  BP(mean): --  RR: 19 (19 Jul 2021 07:56) (16 - 19)  SpO2: 98% (18 Jul 2021 17:41) (98% - 98%)  I&O's Summary    18 Jul 2021 07:01  -  19 Jul 2021 07:00  --------------------------------------------------------  IN: 0 mL / OUT: 200 mL / NET: -200 mL    19 Jul 2021 07:01  -  19 Jul 2021 13:09  --------------------------------------------------------  IN: 240 mL / OUT: 0 mL / NET: 240 mL          MEDICATIONS  (STANDING):  dexAMETHasone  IVPB 12 milliGRAM(s) IV Intermittent once  enoxaparin Injectable 40 milliGRAM(s) SubCutaneous daily  fluorouracil IVPB (eMAR) 2400 milliGRAM(s) IV Intermittent daily  fluorouracil IVPB (eMAR) 800 milliGRAM(s) IV Intermittent once  fosaprepitant IVPB 150 milliGRAM(s) IV Intermittent once  leucovorin IVPB (eMAR) 800 milliGRAM(s) IV Intermittent once  lidocaine   Patch 1 Patch Transdermal daily  ondansetron  IVPB 16 milliGRAM(s) IV Intermittent once  oxaliplatin IVPB (eMAR) 170 milliGRAM(s) IV Intermittent once  pantoprazole    Tablet 40 milliGRAM(s) Oral before breakfast    MEDICATIONS  (PRN):  aluminum hydroxide/magnesium hydroxide/simethicone Suspension 30 milliLiter(s) Oral every 4 hours PRN Dyspepsia  hydrocortisone 1% Cream 1 Application(s) Topical every 4 hours PRN Rash  loperamide 2 milliGRAM(s) Oral two times a day PRN Diarrhea  melatonin 3 milliGRAM(s) Oral at bedtime PRN Insomnia  ondansetron Injectable 4 milliGRAM(s) IV Push every 8 hours PRN Nausea and/or Vomiting  oxycodone    5 mG/acetaminophen 325 mG 1 Tablet(s) Oral every 6 hours PRN Moderate Pain (4 - 6)  simethicone 80 milliGRAM(s) Chew two times a day PRN Gas        LABS                                            10.0                  Neurophils% (auto):   x      (07-19 @ 11:55):    14.97)-----------(173          Lymphocytes% (auto):  x                                             33.8                   Eosinphils% (auto):   x        Manual%: Neutrophils x    ; Lymphocytes x    ; Eosinophils x    ; Bands%: x    ; Blasts x                                    139    |  103    |  11                  Calcium: 9.0   / iCa: x      (07-19 @ 07:46)    ----------------------------<  73        Magnesium: 1.8                              4.3     |  28     |  0.5              Phosphorous: x        TPro  6.3    /  Alb  3.0    /  TBili  0.4    /  DBili  x      /  AST  14     /  ALT  <5     /  AlkPhos  104    19 Jul 2021 07:46 Transfer Note    Transfer from:   Transfer to:  (  ) Medicine    (  ) Telemetry    (  ) RCU    (  ) Palliative    (  ) Stroke Unit    ( x )     Hospital COURSE:  80 year old male with Advanced rectal adenocarcinoma, Microcytic Anemia, Chronic Osteoarthritis presenting for chemotherapy admission. Patient is to complete another cycle of FOLFOX. Patient is very adamant about not wanting to get another round of chemo. He wants to have surgery instead even if it results in a colostomy bag. Patient denies any fevers, chills, chest pain, sob, nausea, vomiting, diarrhea or sick contacts.     in ED vitals: T 99.3, /60, HR 92, RR 16, SpO2 99% on RA   Labs unremarkable    Patient was admitted to  for chemotherapy. After speaking to the patient, patient still is not agreeable to doing any more chemotherapy for his T4N0M0 rectal adenocarcinoma. Patient states that he did not feel great after his 4 cycles of chemotherapy and states that he is in the hospital only because he wants to get surgery with colostomy bag. After speaking to the heme onc fellow, patient is agreeable to receive additional chemotherapy. Patient is therefore stable to transfer to  for chemo.       ASSESSMENT & PLAN:   80 year old male with Advanced rectal adenocarcinoma, Microcytic Anemia, Chronic Osteoarthritis who presented from [facility] for chemotherapy. Patient in refusal for further chemotherapy, is only agreeable to surgery with colostomy bag placement.     # Locally advanced rectal adenocarcinoma (T4N0M0) with plan for 12-16 weeks of chemo  - patient currently HD stable, reports not feeling good since last chemotherapy session. patient did not follow up with Dr. Tubbs outpatient since discharge from last admission.  - Diagnosed late April 2021 - MRI: 18 cm long polypoidal rectal mass with invasion of the right meso rectal fascia and possibly the prostate gland. Additional involvement of the analsphincter complex--T4(a or b)N0M0  - Planned for total neoadjuvant chemotherapy followed by chemoRT followed by surgery  - s/p 4 cycles of chemo s/p port placement (5/7, 5/27, 6/12, 6/26), CEA trending down 10.1 from 24.3 on last admission  - patient refusing chemo and wants to have surgery   - f/u heme/onc- spoke to heme/onc fellow, will speak to Dr. 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  - Iron studies 4/21: Iron Saturation: 12%, Ferritin 34, MCV 68.9   - s/p 1 dose of Venofer previous admission  - Hgb stable: 10.5>10.4    #positive U/A for large leuks, bacteria, WBC  - asymptomatic  - ordered urine cx    #Mycotic toenails - podiatry outpatient f/u     #Knee pain, patient reports RA history  - Lidocaine patch  - Outpatient followup    Diet: regular  DVT ppx: lovenox  GI ppx: protonix   Dispo: transfer to   Code status: DNR/DNI      For Follow-Up:  - urine cx  - heme/onc recs        Vital Signs Last 24 Hrs  T(C): 35.8 (19 Jul 2021 07:56), Max: 37.4 (18 Jul 2021 14:44)  T(F): 96.5 (19 Jul 2021 07:56), Max: 99.3 (18 Jul 2021 14:44)  HR: 75 (19 Jul 2021 07:56) (75 - 92)  BP: 107/57 (19 Jul 2021 07:56) (107/57 - 134/60)  BP(mean): --  RR: 19 (19 Jul 2021 07:56) (16 - 19)  SpO2: 98% (18 Jul 2021 17:41) (98% - 98%)  I&O's Summary    18 Jul 2021 07:01  -  19 Jul 2021 07:00  --------------------------------------------------------  IN: 0 mL / OUT: 200 mL / NET: -200 mL    19 Jul 2021 07:01  -  19 Jul 2021 13:09  --------------------------------------------------------  IN: 240 mL / OUT: 0 mL / NET: 240 mL          MEDICATIONS  (STANDING):  dexAMETHasone  IVPB 12 milliGRAM(s) IV Intermittent once  enoxaparin Injectable 40 milliGRAM(s) SubCutaneous daily  fluorouracil IVPB (eMAR) 2400 milliGRAM(s) IV Intermittent daily  fluorouracil IVPB (eMAR) 800 milliGRAM(s) IV Intermittent once  fosaprepitant IVPB 150 milliGRAM(s) IV Intermittent once  leucovorin IVPB (eMAR) 800 milliGRAM(s) IV Intermittent once  lidocaine   Patch 1 Patch Transdermal daily  ondansetron  IVPB 16 milliGRAM(s) IV Intermittent once  oxaliplatin IVPB (eMAR) 170 milliGRAM(s) IV Intermittent once  pantoprazole    Tablet 40 milliGRAM(s) Oral before breakfast    MEDICATIONS  (PRN):  aluminum hydroxide/magnesium hydroxide/simethicone Suspension 30 milliLiter(s) Oral every 4 hours PRN Dyspepsia  hydrocortisone 1% Cream 1 Application(s) Topical every 4 hours PRN Rash  loperamide 2 milliGRAM(s) Oral two times a day PRN Diarrhea  melatonin 3 milliGRAM(s) Oral at bedtime PRN Insomnia  ondansetron Injectable 4 milliGRAM(s) IV Push every 8 hours PRN Nausea and/or Vomiting  oxycodone    5 mG/acetaminophen 325 mG 1 Tablet(s) Oral every 6 hours PRN Moderate Pain (4 - 6)  simethicone 80 milliGRAM(s) Chew two times a day PRN Gas        LABS                                            10.0                  Neurophils% (auto):   x      (07-19 @ 11:55):    14.97)-----------(173          Lymphocytes% (auto):  x                                             33.8                   Eosinphils% (auto):   x        Manual%: Neutrophils x    ; Lymphocytes x    ; Eosinophils x    ; Bands%: x    ; Blasts x                                    139    |  103    |  11                  Calcium: 9.0   / iCa: x      (07-19 @ 07:46)    ----------------------------<  73        Magnesium: 1.8                              4.3     |  28     |  0.5              Phosphorous: x        TPro  6.3    /  Alb  3.0    /  TBili  0.4    /  DBili  x      /  AST  14     /  ALT  <5     /  AlkPhos  104    19 Jul 2021 07:46 Transfer Note    Transfer from:   Transfer to:  (  ) Medicine    (  ) Telemetry    (  ) RCU    (  ) Palliative    (  ) Stroke Unit    ( x )     Hospital COURSE:  80 year old male with Advanced rectal adenocarcinoma, Microcytic Anemia, Chronic Osteoarthritis presenting for chemotherapy admission. Patient is to complete another cycle of FOLFOX. Patient is very adamant about not wanting to get another round of chemo. He wants to have surgery instead even if it results in a colostomy bag. Patient denies any fevers, chills, chest pain, sob, nausea, vomiting, diarrhea or sick contacts.     in ED vitals: T 99.3, /60, HR 92, RR 16, SpO2 99% on RA   Labs unremarkable    Patient was admitted to  for chemotherapy. After speaking to the patient, patient still is not agreeable to doing any more chemotherapy for his T4N0M0 rectal adenocarcinoma. Patient states that he did not feel great after his 4 cycles of chemotherapy and states that he is in the hospital only because he wants to get surgery with colostomy bag. After speaking to the heme onc fellow, patient is agreeable to receive additional chemotherapy. Patient is therefore stable to transfer to  for chemo.       ASSESSMENT & PLAN:   80 year old male with Advanced rectal adenocarcinoma, Microcytic Anemia, Chronic Osteoarthritis who presented from [facility] for chemotherapy. Patient in refusal for further chemotherapy, is only agreeable to surgery with colostomy bag placement.     # Locally advanced rectal adenocarcinoma (T4N0M0) with plan for 12-16 weeks of chemo  - patient currently HD stable, reports not feeling good since last chemotherapy session. patient did not follow up with Dr. Tubbs outpatient since discharge from last admission.  - Diagnosed late April 2021 - MRI: 18 cm long polypoidal rectal mass with invasion of the right meso rectal fascia and possibly the prostate gland. 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  - Iron studies 4/21: Iron Saturation: 12%, Ferritin 34, MCV 68.9   - s/p 1 dose of Venofer previous admission  - Hgb stable: 10.5>10.4    #positive U/A for large leuks, bacteria, WBC  - asymptomatic  - ordered urine cx    #Mycotic toenails - podiatry outpatient f/u     #Knee pain, patient reports RA history  - Lidocaine patch  - Outpatient followup    Diet: regular  DVT ppx: lovenox  GI ppx: protonix   Dispo: transfer to   Code status: DNR/DNI      For Follow-Up:  - urine cx  - heme/onc recs        Vital Signs Last 24 Hrs  T(C): 35.8 (19 Jul 2021 07:56), Max: 37.4 (18 Jul 2021 14:44)  T(F): 96.5 (19 Jul 2021 07:56), Max: 99.3 (18 Jul 2021 14:44)  HR: 75 (19 Jul 2021 07:56) (75 - 92)  BP: 107/57 (19 Jul 2021 07:56) (107/57 - 134/60)  BP(mean): --  RR: 19 (19 Jul 2021 07:56) (16 - 19)  SpO2: 98% (18 Jul 2021 17:41) (98% - 98%)  I&O's Summary    18 Jul 2021 07:01  -  19 Jul 2021 07:00  --------------------------------------------------------  IN: 0 mL / OUT: 200 mL / NET: -200 mL    19 Jul 2021 07:01  -  19 Jul 2021 13:09  --------------------------------------------------------  IN: 240 mL / OUT: 0 mL / NET: 240 mL          MEDICATIONS  (STANDING):  dexAMETHasone  IVPB 12 milliGRAM(s) IV Intermittent once  enoxaparin Injectable 40 milliGRAM(s) SubCutaneous daily  fluorouracil IVPB (eMAR) 2400 milliGRAM(s) IV Intermittent daily  fluorouracil IVPB (eMAR) 800 milliGRAM(s) IV Intermittent once  fosaprepitant IVPB 150 milliGRAM(s) IV Intermittent once  leucovorin IVPB (eMAR) 800 milliGRAM(s) IV Intermittent once  lidocaine   Patch 1 Patch Transdermal daily  ondansetron  IVPB 16 milliGRAM(s) IV Intermittent once  oxaliplatin IVPB (eMAR) 170 milliGRAM(s) IV Intermittent once  pantoprazole    Tablet 40 milliGRAM(s) Oral before breakfast    MEDICATIONS  (PRN):  aluminum hydroxide/magnesium hydroxide/simethicone Suspension 30 milliLiter(s) Oral every 4 hours PRN Dyspepsia  hydrocortisone 1% Cream 1 Application(s) Topical every 4 hours PRN Rash  loperamide 2 milliGRAM(s) Oral two times a day PRN Diarrhea  melatonin 3 milliGRAM(s) Oral at bedtime PRN Insomnia  ondansetron Injectable 4 milliGRAM(s) IV Push every 8 hours PRN Nausea and/or Vomiting  oxycodone    5 mG/acetaminophen 325 mG 1 Tablet(s) Oral every 6 hours PRN Moderate Pain (4 - 6)  simethicone 80 milliGRAM(s) Chew two times a day PRN Gas        LABS                                            10.0                  Neurophils% (auto):   x      (07-19 @ 11:55):    14.97)-----------(173          Lymphocytes% (auto):  x                                             33.8                   Eosinphils% (auto):   x        Manual%: Neutrophils x    ; Lymphocytes x    ; Eosinophils x    ; Bands%: x    ; Blasts x                                    139    |  103    |  11                  Calcium: 9.0   / iCa: x      (07-19 @ 07:46)    ----------------------------<  73        Magnesium: 1.8                              4.3     |  28     |  0.5              Phosphorous: x        TPro  6.3    /  Alb  3.0    /  TBili  0.4    /  DBili  x      /  AST  14     /  ALT  <5     /  AlkPhos  104    19 Jul 2021 07:46 Transfer Note    Transfer from:   Transfer to:  (  ) Medicine    (  ) Telemetry    (  ) RCU    (  ) Palliative    (  ) Stroke Unit    ( x )     Hospital COURSE:  80 year old male with Advanced rectal adenocarcinoma, Microcytic Anemia, Chronic Osteoarthritis presenting for chemotherapy admission. Patient is to complete another cycle of FOLFOX. Patient is very adamant about not wanting to get another round of chemo. He wants to have surgery instead even if it results in a colostomy bag. Patient denies any fevers, chills, chest pain, sob, nausea, vomiting, diarrhea or sick contacts.     in ED vitals: T 99.3, /60, HR 92, RR 16, SpO2 99% on RA   Labs unremarkable    Patient was admitted to  for chemotherapy. After speaking to the patient, patient still is not agreeable to doing any more chemotherapy for his T4N0M0 rectal adenocarcinoma. Patient states that he did not feel great after his 4 cycles of chemotherapy and states that he is in the hospital only because he wants to get surgery with colostomy bag. After speaking to the heme onc fellow, patient is agreeable to receive additional chemotherapy. Patient is therefore stable to transfer to  for chemo.       ASSESSMENT & PLAN:   80 year old male with Advanced rectal adenocarcinoma, Microcytic Anemia, Chronic Osteoarthritis who presented from [facility] for chemotherapy. Patient in refusal for further chemotherapy, is only agreeable to surgery with colostomy bag placement.     # Locally advanced rectal adenocarcinoma (T4N0M0) with plan for 12-16 weeks of chemo  - patient currently HD stable, reports not feeling good since last chemotherapy session. patient did not follow up with Dr. Tubbs outpatient since discharge from last admission.  - Diagnosed late April 2021 - MRI: 18 cm long polypoidal rectal mass with invasion of the right meso rectal fascia and possibly the prostate gland. 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 Tubbs    Vital Signs Last 24 Hrs  T(C): 35.8 (19 Jul 2021 07:56), Max: 37.4 (18 Jul 2021 14:44)  T(F): 96.5 (19 Jul 2021 07:56), Max: 99.3 (18 Jul 2021 14:44)  HR: 75 (19 Jul 2021 07:56) (75 - 92)  BP: 107/57 (19 Jul 2021 07:56) (107/57 - 134/60)  BP(mean): --  RR: 19 (19 Jul 2021 07:56) (16 - 19)  SpO2: 98% (18 Jul 2021 17:41) (98% - 98%)  I&O's Summary    18 Jul 2021 07:01  -  19 Jul 2021 07:00  --------------------------------------------------------  IN: 0 mL / OUT: 200 mL / NET: -200 mL    19 Jul 2021 07:01  -  19 Jul 2021 13:09  --------------------------------------------------------  IN: 240 mL / OUT: 0 mL / NET: 240 mL          MEDICATIONS  (STANDING):  dexAMETHasone  IVPB 12 milliGRAM(s) IV Intermittent once  enoxaparin Injectable 40 milliGRAM(s) SubCutaneous daily  fluorouracil IVPB (eMAR) 2400 milliGRAM(s) IV Intermittent daily  fluorouracil IVPB (eMAR) 800 milliGRAM(s) IV Intermittent once  fosaprepitant IVPB 150 milliGRAM(s) IV Intermittent once  leucovorin IVPB (eMAR) 800 milliGRAM(s) IV Intermittent once  lidocaine   Patch 1 Patch Transdermal daily  ondansetron  IVPB 16 milliGRAM(s) IV Intermittent once  oxaliplatin IVPB (eMAR) 170 milliGRAM(s) IV Intermittent once  pantoprazole    Tablet 40 milliGRAM(s) Oral before breakfast    MEDICATIONS  (PRN):  aluminum hydroxide/magnesium hydroxide/simethicone Suspension 30 milliLiter(s) Oral every 4 hours PRN Dyspepsia  hydrocortisone 1% Cream 1 Application(s) Topical every 4 hours PRN Rash  loperamide 2 milliGRAM(s) Oral two times a day PRN Diarrhea  melatonin 3 milliGRAM(s) Oral at bedtime PRN Insomnia  ondansetron Injectable 4 milliGRAM(s) IV Push every 8 hours PRN Nausea and/or Vomiting  oxycodone    5 mG/acetaminophen 325 mG 1 Tablet(s) Oral every 6 hours PRN Moderate Pain (4 - 6)  simethicone 80 milliGRAM(s) Chew two times a day PRN Gas        LABS                                            10.0                  Neurophils% (auto):   x      (07-19 @ 11:55):    14.97)-----------(173          Lymphocytes% (auto):  x                                             33.8                   Eosinphils% (auto):   x        Manual%: Neutrophils x    ; Lymphocytes x    ; Eosinophils x    ; Bands%: x    ; Blasts x                                    139    |  103    |  11                  Calcium: 9.0   / iCa: x      (07-19 @ 07:46)    ----------------------------<  73        Magnesium: 1.8                              4.3     |  28     |  0.5              Phosphorous: x        TPro  6.3    /  Alb  3.0    /  TBili  0.4    /  DBili  x      /  AST  14     /  ALT  <5     /  AlkPhos  104    19 Jul 2021 07:46

## 2021-07-19 NOTE — PATIENT PROFILE ADULT - NS PRO AD NO ADVANCE DIRECTIVE
pt not fuolly oriented and was not giving a straight answer to me. filling out profile to the best of my ablities given pt mental status/No

## 2021-07-20 PROBLEM — M19.90 UNSPECIFIED OSTEOARTHRITIS, UNSPECIFIED SITE: Chronic | Status: ACTIVE | Noted: 2021-07-18

## 2021-07-20 PROBLEM — D50.9 IRON DEFICIENCY ANEMIA, UNSPECIFIED: Chronic | Status: ACTIVE | Noted: 2021-07-18

## 2021-07-20 LAB
ALBUMIN SERPL ELPH-MCNC: 3.2 G/DL — LOW (ref 3.5–5.2)
ALP SERPL-CCNC: 100 U/L — SIGNIFICANT CHANGE UP (ref 30–115)
ALT FLD-CCNC: <5 U/L — SIGNIFICANT CHANGE UP (ref 0–41)
ANION GAP SERPL CALC-SCNC: 8 MMOL/L — SIGNIFICANT CHANGE UP (ref 7–14)
AST SERPL-CCNC: 12 U/L — SIGNIFICANT CHANGE UP (ref 0–41)
BASOPHILS # BLD AUTO: 0.05 K/UL — SIGNIFICANT CHANGE UP (ref 0–0.2)
BASOPHILS NFR BLD AUTO: 0.4 % — SIGNIFICANT CHANGE UP (ref 0–1)
BILIRUB SERPL-MCNC: 0.4 MG/DL — SIGNIFICANT CHANGE UP (ref 0.2–1.2)
BUN SERPL-MCNC: 12 MG/DL — SIGNIFICANT CHANGE UP (ref 10–20)
CALCIUM SERPL-MCNC: 9.1 MG/DL — SIGNIFICANT CHANGE UP (ref 8.5–10.1)
CHLORIDE SERPL-SCNC: 104 MMOL/L — SIGNIFICANT CHANGE UP (ref 98–110)
CO2 SERPL-SCNC: 27 MMOL/L — SIGNIFICANT CHANGE UP (ref 17–32)
COVID-19 SPIKE DOMAIN AB INTERP: POSITIVE
COVID-19 SPIKE DOMAIN ANTIBODY RESULT: 89.8 U/ML — HIGH
CREAT SERPL-MCNC: 0.5 MG/DL — LOW (ref 0.7–1.5)
EOSINOPHIL # BLD AUTO: 0 K/UL — SIGNIFICANT CHANGE UP (ref 0–0.7)
EOSINOPHIL NFR BLD AUTO: 0 % — SIGNIFICANT CHANGE UP (ref 0–8)
FERRITIN SERPL-MCNC: 475 NG/ML — HIGH (ref 30–400)
GLUCOSE SERPL-MCNC: 154 MG/DL — HIGH (ref 70–99)
HCT VFR BLD CALC: 36.3 % — LOW (ref 42–52)
HGB BLD-MCNC: 10.9 G/DL — LOW (ref 14–18)
IMM GRANULOCYTES NFR BLD AUTO: 2.6 % — HIGH (ref 0.1–0.3)
LDH SERPL L TO P-CCNC: 254 U/L — HIGH (ref 50–242)
LYMPHOCYTES # BLD AUTO: 1.02 K/UL — LOW (ref 1.2–3.4)
LYMPHOCYTES # BLD AUTO: 8.2 % — LOW (ref 20.5–51.1)
MAGNESIUM SERPL-MCNC: 1.9 MG/DL — SIGNIFICANT CHANGE UP (ref 1.8–2.4)
MCHC RBC-ENTMCNC: 27.9 PG — SIGNIFICANT CHANGE UP (ref 27–31)
MCHC RBC-ENTMCNC: 30 G/DL — LOW (ref 32–37)
MCV RBC AUTO: 93.1 FL — SIGNIFICANT CHANGE UP (ref 80–94)
MONOCYTES # BLD AUTO: 0.17 K/UL — SIGNIFICANT CHANGE UP (ref 0.1–0.6)
MONOCYTES NFR BLD AUTO: 1.4 % — LOW (ref 1.7–9.3)
NEUTROPHILS # BLD AUTO: 10.94 K/UL — HIGH (ref 1.4–6.5)
NEUTROPHILS NFR BLD AUTO: 87.4 % — HIGH (ref 42.2–75.2)
NRBC # BLD: 0 /100 WBCS — SIGNIFICANT CHANGE UP (ref 0–0)
PHOSPHATE SERPL-MCNC: 3.8 MG/DL — SIGNIFICANT CHANGE UP (ref 2.1–4.9)
PLATELET # BLD AUTO: 170 K/UL — SIGNIFICANT CHANGE UP (ref 130–400)
POTASSIUM SERPL-MCNC: 4.6 MMOL/L — SIGNIFICANT CHANGE UP (ref 3.5–5)
POTASSIUM SERPL-SCNC: 4.6 MMOL/L — SIGNIFICANT CHANGE UP (ref 3.5–5)
PROT SERPL-MCNC: 6.7 G/DL — SIGNIFICANT CHANGE UP (ref 6–8)
RBC # BLD: 3.9 M/UL — LOW (ref 4.7–6.1)
RBC # FLD: 20.6 % — HIGH (ref 11.5–14.5)
SARS-COV-2 IGG+IGM SERPL QL IA: 89.8 U/ML — HIGH
SARS-COV-2 IGG+IGM SERPL QL IA: POSITIVE
SODIUM SERPL-SCNC: 139 MMOL/L — SIGNIFICANT CHANGE UP (ref 135–146)
URATE SERPL-MCNC: 5.1 MG/DL — SIGNIFICANT CHANGE UP (ref 3.4–8.8)
WBC # BLD: 12.5 K/UL — HIGH (ref 4.8–10.8)
WBC # FLD AUTO: 12.5 K/UL — HIGH (ref 4.8–10.8)

## 2021-07-20 PROCEDURE — 99233 SBSQ HOSP IP/OBS HIGH 50: CPT

## 2021-07-20 RX ADMIN — MAGNESIUM OXIDE 400 MG ORAL TABLET 400 MILLIGRAM(S): 241.3 TABLET ORAL at 17:03

## 2021-07-20 RX ADMIN — MAGNESIUM OXIDE 400 MG ORAL TABLET 400 MILLIGRAM(S): 241.3 TABLET ORAL at 11:01

## 2021-07-20 RX ADMIN — LIDOCAINE 1 PATCH: 4 CREAM TOPICAL at 11:02

## 2021-07-20 RX ADMIN — ENOXAPARIN SODIUM 40 MILLIGRAM(S): 100 INJECTION SUBCUTANEOUS at 11:03

## 2021-07-20 RX ADMIN — MAGNESIUM OXIDE 400 MG ORAL TABLET 400 MILLIGRAM(S): 241.3 TABLET ORAL at 11:00

## 2021-07-20 RX ADMIN — PANTOPRAZOLE SODIUM 40 MILLIGRAM(S): 20 TABLET, DELAYED RELEASE ORAL at 05:28

## 2021-07-20 RX ADMIN — FLUOROURACIL 198 MILLIGRAM(S): 50 INJECTION, SOLUTION INTRAVENOUS at 02:14

## 2021-07-20 RX ADMIN — FLUOROURACIL 43.67 MILLIGRAM(S): 50 INJECTION, SOLUTION INTRAVENOUS at 04:14

## 2021-07-20 RX ADMIN — LIDOCAINE 1 PATCH: 4 CREAM TOPICAL at 17:03

## 2021-07-20 RX ADMIN — LIDOCAINE 1 PATCH: 4 CREAM TOPICAL at 23:30

## 2021-07-20 NOTE — PHYSICAL THERAPY INITIAL EVALUATION ADULT - PERTINENT HX OF CURRENT PROBLEM, REHAB EVAL
80 year old male with Advanced rectal adenocarcinoma, Microcytic Anemia, Chronic Osteoarthritis presenting for chemotherapy admission. Patient is to complete another cycle of FOLFOX. Patient is very adamant about not wanting to get another round of chemo. He wants to have surgery instead even if it results in a colostomy bag.

## 2021-07-20 NOTE — PHYSICAL THERAPY INITIAL EVALUATION ADULT - GENERAL OBSERVATIONS, REHAB EVAL
Pt declined participating in PT IE. Pt did not provide any particular reasoning. PT educated the patient on importance of participating in PT, PT goals and role of PT in d/c planning. Patient started getting agitated, started raising his voice and told PT never to return back. PT will follow up again next day to see if the patient is agreeable to participate in PT.
encountered supine in bed, NAD, agreeable to PT evaluation. Time in: 9:00 Time out: 9:30

## 2021-07-20 NOTE — PHYSICAL THERAPY INITIAL EVALUATION ADULT - WEIGHT-BEARING RESTRICTIONS: GAIT, REHAB EVAL
Pt admitted for SOB after a mechanical fall. He was found to have a large pneumothorax. S/p chest tube placement. Pt followed by CT surgery and pulmonary. Reason for assessment: LOS.
full weight-bearing

## 2021-07-20 NOTE — PHYSICAL THERAPY INITIAL EVALUATION ADULT - LIVES WITH, PROFILE
no steps to enter/alone no steps to enter; patient is an admission from [facility]

## 2021-07-21 LAB
ALBUMIN SERPL ELPH-MCNC: 3.2 G/DL — LOW (ref 3.5–5.2)
ALP SERPL-CCNC: 83 U/L — SIGNIFICANT CHANGE UP (ref 30–115)
ALT FLD-CCNC: 11 U/L — SIGNIFICANT CHANGE UP (ref 0–41)
ANION GAP SERPL CALC-SCNC: 7 MMOL/L — SIGNIFICANT CHANGE UP (ref 7–14)
AST SERPL-CCNC: 24 U/L — SIGNIFICANT CHANGE UP (ref 0–41)
BASOPHILS # BLD AUTO: 0.05 K/UL — SIGNIFICANT CHANGE UP (ref 0–0.2)
BASOPHILS NFR BLD AUTO: 0.3 % — SIGNIFICANT CHANGE UP (ref 0–1)
BILIRUB SERPL-MCNC: 0.3 MG/DL — SIGNIFICANT CHANGE UP (ref 0.2–1.2)
BUN SERPL-MCNC: 17 MG/DL — SIGNIFICANT CHANGE UP (ref 10–20)
CALCIUM SERPL-MCNC: 9 MG/DL — SIGNIFICANT CHANGE UP (ref 8.5–10.1)
CHLORIDE SERPL-SCNC: 105 MMOL/L — SIGNIFICANT CHANGE UP (ref 98–110)
CO2 SERPL-SCNC: 27 MMOL/L — SIGNIFICANT CHANGE UP (ref 17–32)
CREAT SERPL-MCNC: 0.6 MG/DL — LOW (ref 0.7–1.5)
EOSINOPHIL # BLD AUTO: 0.01 K/UL — SIGNIFICANT CHANGE UP (ref 0–0.7)
EOSINOPHIL NFR BLD AUTO: 0.1 % — SIGNIFICANT CHANGE UP (ref 0–8)
GLUCOSE SERPL-MCNC: 120 MG/DL — HIGH (ref 70–99)
HCT VFR BLD CALC: 32.6 % — LOW (ref 42–52)
HGB BLD-MCNC: 9.6 G/DL — LOW (ref 14–18)
IMM GRANULOCYTES NFR BLD AUTO: 1.8 % — HIGH (ref 0.1–0.3)
LYMPHOCYTES # BLD AUTO: 1.31 K/UL — SIGNIFICANT CHANGE UP (ref 1.2–3.4)
LYMPHOCYTES # BLD AUTO: 7.8 % — LOW (ref 20.5–51.1)
MAGNESIUM SERPL-MCNC: 2 MG/DL — SIGNIFICANT CHANGE UP (ref 1.8–2.4)
MCHC RBC-ENTMCNC: 27.4 PG — SIGNIFICANT CHANGE UP (ref 27–31)
MCHC RBC-ENTMCNC: 29.4 G/DL — LOW (ref 32–37)
MCV RBC AUTO: 92.9 FL — SIGNIFICANT CHANGE UP (ref 80–94)
MONOCYTES # BLD AUTO: 0.91 K/UL — HIGH (ref 0.1–0.6)
MONOCYTES NFR BLD AUTO: 5.4 % — SIGNIFICANT CHANGE UP (ref 1.7–9.3)
NEUTROPHILS # BLD AUTO: 14.21 K/UL — HIGH (ref 1.4–6.5)
NEUTROPHILS NFR BLD AUTO: 84.6 % — HIGH (ref 42.2–75.2)
NRBC # BLD: 0 /100 WBCS — SIGNIFICANT CHANGE UP (ref 0–0)
PLATELET # BLD AUTO: 171 K/UL — SIGNIFICANT CHANGE UP (ref 130–400)
POTASSIUM SERPL-MCNC: 5.1 MMOL/L — HIGH (ref 3.5–5)
POTASSIUM SERPL-SCNC: 5.1 MMOL/L — HIGH (ref 3.5–5)
PROT SERPL-MCNC: 6.2 G/DL — SIGNIFICANT CHANGE UP (ref 6–8)
RBC # BLD: 3.51 M/UL — LOW (ref 4.7–6.1)
RBC # FLD: 20.7 % — HIGH (ref 11.5–14.5)
SODIUM SERPL-SCNC: 139 MMOL/L — SIGNIFICANT CHANGE UP (ref 135–146)
WBC # BLD: 16.79 K/UL — HIGH (ref 4.8–10.8)
WBC # FLD AUTO: 16.79 K/UL — HIGH (ref 4.8–10.8)

## 2021-07-21 PROCEDURE — 99232 SBSQ HOSP IP/OBS MODERATE 35: CPT

## 2021-07-21 RX ORDER — SENNA PLUS 8.6 MG/1
2 TABLET ORAL AT BEDTIME
Refills: 0 | Status: DISCONTINUED | OUTPATIENT
Start: 2021-07-21 | End: 2021-07-22

## 2021-07-21 RX ADMIN — MAGNESIUM OXIDE 400 MG ORAL TABLET 400 MILLIGRAM(S): 241.3 TABLET ORAL at 08:00

## 2021-07-21 RX ADMIN — FLUOROURACIL 43.67 MILLIGRAM(S): 50 INJECTION, SOLUTION INTRAVENOUS at 06:17

## 2021-07-21 RX ADMIN — LIDOCAINE 1 PATCH: 4 CREAM TOPICAL at 21:17

## 2021-07-21 RX ADMIN — LIDOCAINE 1 PATCH: 4 CREAM TOPICAL at 11:23

## 2021-07-21 RX ADMIN — PANTOPRAZOLE SODIUM 40 MILLIGRAM(S): 20 TABLET, DELAYED RELEASE ORAL at 06:19

## 2021-07-21 RX ADMIN — ENOXAPARIN SODIUM 40 MILLIGRAM(S): 100 INJECTION SUBCUTANEOUS at 11:23

## 2021-07-22 ENCOUNTER — TRANSCRIPTION ENCOUNTER (OUTPATIENT)
Age: 81
End: 2021-07-22

## 2021-07-22 VITALS
TEMPERATURE: 97 F | DIASTOLIC BLOOD PRESSURE: 60 MMHG | RESPIRATION RATE: 18 BRPM | HEART RATE: 68 BPM | SYSTOLIC BLOOD PRESSURE: 129 MMHG

## 2021-07-22 LAB
ALBUMIN SERPL ELPH-MCNC: 3.3 G/DL — LOW (ref 3.5–5.2)
ALP SERPL-CCNC: 87 U/L — SIGNIFICANT CHANGE UP (ref 30–115)
ALT FLD-CCNC: 14 U/L — SIGNIFICANT CHANGE UP (ref 0–41)
ANION GAP SERPL CALC-SCNC: 10 MMOL/L — SIGNIFICANT CHANGE UP (ref 7–14)
AST SERPL-CCNC: 27 U/L — SIGNIFICANT CHANGE UP (ref 0–41)
BASOPHILS # BLD AUTO: 0.1 K/UL — SIGNIFICANT CHANGE UP (ref 0–0.2)
BASOPHILS NFR BLD AUTO: 0.7 % — SIGNIFICANT CHANGE UP (ref 0–1)
BILIRUB SERPL-MCNC: 0.3 MG/DL — SIGNIFICANT CHANGE UP (ref 0.2–1.2)
BUN SERPL-MCNC: 12 MG/DL — SIGNIFICANT CHANGE UP (ref 10–20)
CALCIUM SERPL-MCNC: 8.9 MG/DL — SIGNIFICANT CHANGE UP (ref 8.5–10.1)
CHLORIDE SERPL-SCNC: 105 MMOL/L — SIGNIFICANT CHANGE UP (ref 98–110)
CO2 SERPL-SCNC: 27 MMOL/L — SIGNIFICANT CHANGE UP (ref 17–32)
CREAT SERPL-MCNC: 0.5 MG/DL — LOW (ref 0.7–1.5)
EOSINOPHIL # BLD AUTO: 0.04 K/UL — SIGNIFICANT CHANGE UP (ref 0–0.7)
EOSINOPHIL NFR BLD AUTO: 0.3 % — SIGNIFICANT CHANGE UP (ref 0–8)
GLUCOSE SERPL-MCNC: 77 MG/DL — SIGNIFICANT CHANGE UP (ref 70–99)
HCT VFR BLD CALC: 34.9 % — LOW (ref 42–52)
HGB BLD-MCNC: 10.3 G/DL — LOW (ref 14–18)
IMM GRANULOCYTES NFR BLD AUTO: 1 % — HIGH (ref 0.1–0.3)
LYMPHOCYTES # BLD AUTO: 1.28 K/UL — SIGNIFICANT CHANGE UP (ref 1.2–3.4)
LYMPHOCYTES # BLD AUTO: 8.9 % — LOW (ref 20.5–51.1)
MAGNESIUM SERPL-MCNC: 2 MG/DL — SIGNIFICANT CHANGE UP (ref 1.8–2.4)
MCHC RBC-ENTMCNC: 27.9 PG — SIGNIFICANT CHANGE UP (ref 27–31)
MCHC RBC-ENTMCNC: 29.5 G/DL — LOW (ref 32–37)
MCV RBC AUTO: 94.6 FL — HIGH (ref 80–94)
MONOCYTES # BLD AUTO: 0.43 K/UL — SIGNIFICANT CHANGE UP (ref 0.1–0.6)
MONOCYTES NFR BLD AUTO: 3 % — SIGNIFICANT CHANGE UP (ref 1.7–9.3)
NEUTROPHILS # BLD AUTO: 12.41 K/UL — HIGH (ref 1.4–6.5)
NEUTROPHILS NFR BLD AUTO: 86.1 % — HIGH (ref 42.2–75.2)
NRBC # BLD: 0 /100 WBCS — SIGNIFICANT CHANGE UP (ref 0–0)
PHOSPHATE SERPL-MCNC: 3.4 MG/DL — SIGNIFICANT CHANGE UP (ref 2.1–4.9)
PLATELET # BLD AUTO: 151 K/UL — SIGNIFICANT CHANGE UP (ref 130–400)
POTASSIUM SERPL-MCNC: 4.4 MMOL/L — SIGNIFICANT CHANGE UP (ref 3.5–5)
POTASSIUM SERPL-SCNC: 4.4 MMOL/L — SIGNIFICANT CHANGE UP (ref 3.5–5)
PROT SERPL-MCNC: 6.3 G/DL — SIGNIFICANT CHANGE UP (ref 6–8)
RBC # BLD: 3.69 M/UL — LOW (ref 4.7–6.1)
RBC # FLD: 21 % — HIGH (ref 11.5–14.5)
SODIUM SERPL-SCNC: 142 MMOL/L — SIGNIFICANT CHANGE UP (ref 135–146)
URATE SERPL-MCNC: 5.3 MG/DL — SIGNIFICANT CHANGE UP (ref 3.4–8.8)
WBC # BLD: 14.4 K/UL — HIGH (ref 4.8–10.8)
WBC # FLD AUTO: 14.4 K/UL — HIGH (ref 4.8–10.8)

## 2021-07-22 PROCEDURE — 99239 HOSP IP/OBS DSCHRG MGMT >30: CPT

## 2021-07-22 RX ORDER — LOPERAMIDE HCL 2 MG
1 TABLET ORAL
Qty: 0 | Refills: 0 | DISCHARGE
Start: 2021-07-22

## 2021-07-22 RX ORDER — SENNA PLUS 8.6 MG/1
2 TABLET ORAL
Qty: 0 | Refills: 0 | DISCHARGE
Start: 2021-07-22

## 2021-07-22 RX ADMIN — PANTOPRAZOLE SODIUM 40 MILLIGRAM(S): 20 TABLET, DELAYED RELEASE ORAL at 05:52

## 2021-07-22 RX ADMIN — LIDOCAINE 1 PATCH: 4 CREAM TOPICAL at 00:00

## 2021-07-22 RX ADMIN — ENOXAPARIN SODIUM 40 MILLIGRAM(S): 100 INJECTION SUBCUTANEOUS at 13:05

## 2021-07-22 RX ADMIN — LIDOCAINE 1 PATCH: 4 CREAM TOPICAL at 13:05

## 2021-07-22 NOTE — DISCHARGE NOTE PROVIDER - NSDCMRMEDTOKEN_GEN_ALL_CORE_FT
lidocaine 5% topical film: Apply topically to affected area once, As Needed for pain..  loperamide 2 mg oral capsule: 1 cap(s) orally 2 times a day, As Needed  oxycodone-acetaminophen 5 mg-325 mg oral tablet: 1 tab(s) orally every 6 hours, As needed, Moderate Pain (4 - 6)  pantoprazole 40 mg oral delayed release tablet: 1 tab(s) orally once a day (before a meal)  senna oral tablet: 2 tab(s) orally once a day (at bedtime)

## 2021-07-22 NOTE — DISCHARGE NOTE PROVIDER - NSDCFUSCHEDAPPT_GEN_ALL_CORE_FT
MISAEL HUFFMAN ; 07/27/2021 ; [facility] MISAEL HUFFMAN ; 07/23/2021 ; [facility] MISAEL Arredondo ; 07/27/2021 ; [facility]  MISAEL HUFFMAN ; 08/18/2021 ; [facility] [address] MISAEL HUFFMAN ; 08/18/2021 ; [facility] [address]

## 2021-07-22 NOTE — DISCHARGE NOTE PROVIDER - HOSPITAL COURSE
80 year old male with Advanced rectal adenocarcinoma (T4N0M0, s/p 4 rounds chemo), Microcytic Anemia, Chronic Osteoarthritis presenting for chemotherapy admission. Patient is to complete another cycle of FOLFOX. Patient is very adamant about not wanting to get another round of chemo. He wants to have surgery instead even if it results in a colostomy bag. However, after discussion with heme/onc fellow, patient agreeable to chemotherapy, if it means he can get surgery. Patient denied any fevers, chills, chest pain, sob, nausea, vomiting, diarrhea or sick contacts.   ED vitals were within normal limits. Labs were unremarkable. Patient admitted to hospital floor for chemo.  Hospital course was benign. Started chemo cycle 5, finished 3 days. No complaints beside chronic bilateral knee pain. Patient to be discharged to NH and scheduled for heme onc follow up tomorrow, CT on 7/27, and to return to hospital on 8/1 for re-admission for next cycle of chemo.

## 2021-07-22 NOTE — PROGRESS NOTE ADULT - ASSESSMENT
80 year old male with Advanced rectal adenocarcinoma, Microcytic Anemia, Chronic Osteoarthritis who presented from [facility] for chemotherapy.    # Locally advanced rectal adenocarcinoma (T4N0M0) s/p 4 cycles  - Diagnosed late April 2021 - MRI: 18 cm long polypoidal rectal mass with invasion of the right meso rectal fascia and possibly the prostate gland. Additional involvement of the anal sphincter complex--T4(a or b)N0M0  - had extensive discussion with pt about the importance of getting chemo prior to getting surgery   - plan was to have total neoadjuvant chemotherapy followed by chemoRT followed by surgery  - s/p 4 cycles of chemo s/p port placement (5/7, 5/27, 6/12, 6/26), CEA trending down 10.1 from 24.3 on last admission  - completed cycle 5 (patient agreeable now) w/ FOLFOX in this hospital stay  - plan for d/c to NH today, medication reconciliation completed   - pt will return to [facility] on 7/23/21 for Neulasta  - CT scan scheduled for 7/27/21  - will readmit pt for next cycle of chemo on 8/1/21    # Iron deficiency anemia, stable  - likely secondary to LGIB from cancer   - s/p 1 dose of Venofer previous admission  - Hgb stable: 10.5>10.4>10.9>9.6    # Leukocytosis likely related to filgrastim use  - doubt infection given no fever, tachycardia, chill  - trend CBC   - panculture if fever develops    # Mycotic toenails - podiatry outpatient f/u     #Knee pain, patient reports RA history  - Lidocaine patch PRN  - Outpatient followup, please give follow up to ortho team    Diet: Regular diet w/ low potassium given mild hyperkalemia (resolved now)  GI ppx: Protonix  DVT ppx: Lovenox 40 mg qD  Activity: Increase as tolerated  Disposition: plan for d/c to SNF today
80 year old male with Advanced rectal adenocarcinoma, Microcytic Anemia, Chronic Osteoarthritis who presented from [facility] for chemotherapy.    # Locally advanced rectal adenocarcinoma (T4N0M0) s/p 4 cycles  - Diagnosed late April 2021 - MRI: 18 cm long polypoidal rectal mass with invasion of the right meso rectal fascia and possibly the prostate gland. Additional involvement of the anal sphincter complex--T4(a or b)N0M0  - had extensive discussion with pt about the importance of getting chemo prior to getting surgery   - plan was to have total neoadjuvant chemotherapy followed by chemoRT followed by surgery  - s/p 4 cycles of chemo s/p port placement (5/7, 5/27, 6/12, 6/26), CEA trending down 10.1 from 24.3 on last admission  - planned for cycle 5 (patient agreeable now) w/ FOLFOX, currently on C5D2  - plan for d/c to NH tomorrow   - pt will return to [facility] on 7/23/21 for Neulasta  - CT scan scheduled for 7/27/21  - will readmit pt for next cycle of chemo on 8/1/21    # Iron deficiency anemia, stable  - likely secondary to LGIB from cancer   - s/p 1 dose of Venofer previous admission  - Hgb stable: 10.5>10.4>10.9>9.6    # Leukocytosis likely related to filgrastim use  - doubt infection given no fever, tachycardia, chill  - trend CBC   - panculture if fever develops    # Mycotic toenails - podiatry outpatient f/u     #Knee pain, patient reports RA history  - Lidocaine patch PRN  - Outpatient followup    Diet: Regular diet w/ low potassium given mild hyperkalemia  GI ppx: Protonix  DVT ppx: Lovenox 40 mg qD  Activity: Increase as tolerated  Disposition: plan for d/c to [facility] tomorrow 
80 year old male with Advanced rectal adenocarcinoma, Microcytic Anemia, Chronic Osteoarthritis who presented from [facility] for chemotherapy.    # Locally advanced rectal adenocarcinoma (T4N0M0) s/p 4 cycles  - Diagnosed late April 2021 - MRI: 18 cm long polypoidal rectal mass with invasion of the right meso rectal fascia and possibly the prostate gland. Additional involvement of the anal sphincter complex--T4(a or b)N0M0  - had extensive discussion with pt about the importance of getting chemo prior to getting surgery   - plan was to have total neoadjuvant chemotherapy followed by chemoRT followed by surgery  - s/p 4 cycles of chemo s/p port placement (5/7, 5/27, 6/12, 6/26), CEA trending down 10.1 from 24.3 on last admission  - planned for cycle 5 today (patient agreeable now) w/ FOLFOX   - transfer pt to 3B    # Iron deficiency anemia, stable  - likely secondary to LGIB from cancer   - s/p 1 dose of Venofer previous admission  - Hgb stable: 10.5>10.4  - obtain iron studies     # Mycotic toenails - podiatry outpatient f/u     #Knee pain, patient reports RA history  - Lidocaine patch  - Outpatient followup    Diet: Regular diet  GI ppx: Protonix  DVT ppx: Lovenox 40 mg qD  Activity: Increase as tolerated  Disposition: need 2 days of chemo 
ASSESSMENT/PLAN:  80 year old male with Advanced rectal adenocarcinoma, Microcytic Anemia, Chronic Osteoarthritis who presented from [facility] for chemotherapy. Patient in refusal for further chemotherapy, is only agreeable to surgery with colostomy bag placement.     # Locally advanced rectal adenocarcinoma (T4N0M0)  - patient currently HD stable, reports not feeling good since last chemotherapy session. patient did not follow up with Dr. Tubbs outpatient since discharge from last admission.  - Diagnosed late April 2021 - MRI: 18 cm long polypoidal rectal mass with invasion of the right meso rectal fascia and possibly the prostate gland. Additional involvement of the analsphincter complex--T4(a or b)N0M0  - Planned for total neoadjuvant chemotherapy followed by chemoRT followed by surgery  - s/p 4 cycles of chemo s/p port placement (5/7, 5/27, 6/12, 6/26), CEA trending down 10.1 from 24.3 on last admission  - patient refusing chemo and wants to have surgery   - after heme onc spoke to patient, patient is agreeable to further chemotherapy, will be transferred to 3B  - plan for 12-16 weeks of chemo  - follow up with heme/onc recommendations, especially with whether they want to pursue treatment plan including possible radiatio and surgery inpatient vs outpatient     #positive U/A for large leuks, bacteria, WBC  - asymptomatic  - re-ordered urine cx, follow up    #Iron deficiency anemia likely from chronic occult LGIB from tumor  - Iron studies 4/21: Iron Saturation: 12%, Ferritin 34, MCV 68.9   - s/p 1 dose of Venofer previous admission  -continue to monitor hemoglobin, stable ~10s    #Mycotic toenails - podiatry outpatient f/u     #Knee pain, patient reports RA history  - Lidocaine patch  - Outpatient followup    Code status: DNR/DNI  Diet: regular  DVT ppx: lovenox  GI ppx: protonix   Dispo: 3B for now, heme-onc recs appreciated    MEDICATIONS  (STANDING):  enoxaparin Injectable 40 milliGRAM(s) SubCutaneous daily  fluorouracil IVPB (eMAR) 2400 milliGRAM(s) IV Intermittent daily  lidocaine   Patch 1 Patch Transdermal daily  magnesium oxide 400 milliGRAM(s) Oral three times a day with meals  pantoprazole    Tablet 40 milliGRAM(s) Oral before breakfast    MEDICATIONS  (PRN):  aluminum hydroxide/magnesium hydroxide/simethicone Suspension 30 milliLiter(s) Oral every 4 hours PRN Dyspepsia  hydrocortisone 1% Cream 1 Application(s) Topical every 4 hours PRN Rash  loperamide 2 milliGRAM(s) Oral two times a day PRN Diarrhea  melatonin 3 milliGRAM(s) Oral at bedtime PRN Insomnia  ondansetron Injectable 4 milliGRAM(s) IV Push every 8 hours PRN Nausea and/or Vomiting  oxycodone    5 mG/acetaminophen 325 mG 1 Tablet(s) Oral every 6 hours PRN Moderate Pain (4 - 6)  simethicone 80 milliGRAM(s) Chew two times a day PRN Gas    Home Medications:  diphenhydramine/lidocaine/aluminum hydroxide/magnesium hydroxide/simethicone mucous membrane suspension: 1 application mucous membrane once a day (at bedtime), As Needed (18 Jul 2021 19:06)  lidocaine 5% topical film: Apply topically to affected area once (18 Jul 2021 19:06)  loperamide 2 mg oral capsule: 1 cap(s) orally 2 times a day, As needed, Diarrhea (18 Jul 2021 19:06)  oxycodone-acetaminophen 5 mg-325 mg oral tablet: 1 tab(s) orally every 6 hours, As needed, Moderate Pain (4 - 6) (18 Jul 2021 19:06)  pantoprazole 40 mg oral delayed release tablet: 1 tab(s) orally once a day (before a meal) (18 Jul 2021 19:06)  simethicone 80 mg oral tablet, chewable: 1 tab(s) orally 2 times a day, As needed, Gas (18 Jul 2021 19:06)    Allergies    No Known Allergies    Intolerances    
ASSESSMENT/PLAN:  80 year old male with Advanced rectal adenocarcinoma, Microcytic Anemia, Chronic Osteoarthritis who presented from [facility] for chemotherapy. Patient in refusal for further chemotherapy, is only agreeable to surgery with colostomy bag placement.     # Locally advanced rectal adenocarcinoma (T4N0M0)  - patient currently HD stable, reports not feeling good since last chemotherapy session. patient did not follow up with Dr. Tubbs outpatient since discharge from last admission.  - Diagnosed late April 2021 - MRI: 18 cm long polypoidal rectal mass with invasion of the right meso rectal fascia and possibly the prostate gland. Additional involvement of the analsphincter complex--T4(a or b)N0M0  - Planned for total neoadjuvant chemotherapy followed by chemoRT followed by surgery  - s/p 4 cycles of chemo s/p port placement (5/7, 5/27, 6/12, 6/26), CEA trending down 10.1 from 24.3 on last admission  - patient refusing chemo and wants to have surgery   - after heme onc spoke to patient, patient is agreeable to further chemotherapy, will be transferred to   - plan for 12-16 weeks of chemo, followed by chemoradiation then surgery  - tolerated 2nd day chemo well (5th round)  - per heme onc -> discharge to SNF tomorrow   - pt will return to [facility] on 7/23/21 for Neulasta  - CT scan scheduled for 7/27/21  - will readmit pt for next cycle of chemo on 8/1/21    #Hyperkalemia  -K noted 5.1  -decrease K in diet    #positive U/A for large leuks, bacteria, WBC  - asymptomatic    #Iron deficiency anemia likely from chronic occult LGIB from tumor  - Iron studies 4/21: Iron Saturation: 12%, Ferritin 34, MCV 68.9   - s/p 1 dose of Venofer previous admission  -continue to monitor hemoglobin, stable ~10s    #Mycotic toenails - podiatry outpatient f/u     #Knee pain, patient reports RA history  - Lidocaine patch  - Outpatient followup    DVT ppx: lovenox  GI ppx: protonix   Diet: regular diet with low K given hyperkalemia  Activity: ambulate as tolerated  Code status: DNR/DNI  Dispo:  plan for SNF tomorrow    MEDICATIONS  (STANDING):  enoxaparin Injectable 40 milliGRAM(s) SubCutaneous daily  fluorouracil IVPB (eMAR) 2400 milliGRAM(s) IV Intermittent daily  lidocaine   Patch 1 Patch Transdermal daily  magnesium oxide 400 milliGRAM(s) Oral three times a day with meals  pantoprazole    Tablet 40 milliGRAM(s) Oral before breakfast    MEDICATIONS  (PRN):  aluminum hydroxide/magnesium hydroxide/simethicone Suspension 30 milliLiter(s) Oral every 4 hours PRN Dyspepsia  hydrocortisone 1% Cream 1 Application(s) Topical every 4 hours PRN Rash  loperamide 2 milliGRAM(s) Oral two times a day PRN Diarrhea  melatonin 3 milliGRAM(s) Oral at bedtime PRN Insomnia  ondansetron Injectable 4 milliGRAM(s) IV Push every 8 hours PRN Nausea and/or Vomiting  oxycodone    5 mG/acetaminophen 325 mG 1 Tablet(s) Oral every 6 hours PRN Moderate Pain (4 - 6)  simethicone 80 milliGRAM(s) Chew two times a day PRN Gas    Home Medications:  diphenhydramine/lidocaine/aluminum hydroxide/magnesium hydroxide/simethicone mucous membrane suspension: 1 application mucous membrane once a day (at bedtime), As Needed (18 Jul 2021 19:06)  lidocaine 5% topical film: Apply topically to affected area once (18 Jul 2021 19:06)  loperamide 2 mg oral capsule: 1 cap(s) orally 2 times a day, As needed, Diarrhea (18 Jul 2021 19:06)  oxycodone-acetaminophen 5 mg-325 mg oral tablet: 1 tab(s) orally every 6 hours, As needed, Moderate Pain (4 - 6) (18 Jul 2021 19:06)  pantoprazole 40 mg oral delayed release tablet: 1 tab(s) orally once a day (before a meal) (18 Jul 2021 19:06)  simethicone 80 mg oral tablet, chewable: 1 tab(s) orally 2 times a day, As needed, Gas (18 Jul 2021 19:06)    Allergies    No Known Allergies    Intolerances    
80 year old male with Advanced rectal adenocarcinoma, Microcytic Anemia, Chronic Osteoarthritis who presented from [facility] for chemotherapy.    # Locally advanced rectal adenocarcinoma (T4N0M0) s/p 4 cycles  - Diagnosed late April 2021 - MRI: 18 cm long polypoidal rectal mass with invasion of the right meso rectal fascia and possibly the prostate gland. Additional involvement of the anal sphincter complex--T4(a or b)N0M0  - had extensive discussion with pt about the importance of getting chemo prior to getting surgery   - plan was to have total neoadjuvant chemotherapy followed by chemoRT followed by surgery  - s/p 4 cycles of chemo s/p port placement (5/7, 5/27, 6/12, 6/26), CEA trending down 10.1 from 24.3 on last admission  - planned for cycle 5 today (patient agreeable now) w/ FOLFOX, currently on C5D1     # Iron deficiency anemia, stable  - likely secondary to LGIB from cancer   - s/p 1 dose of Venofer previous admission  - Hgb stable: 10.5>10.4>10.9    # Leukocytosis likely related to filgrastim use, trending down  - doubt infection given no fever, tachycardia, chill  - trend CBC   - panculture if fever develops    # Mycotic toenails - podiatry outpatient f/u     #Knee pain, patient reports RA history  - Lidocaine patch PRN  - Outpatient followup    Diet: Regular diet  GI ppx: Protonix  DVT ppx: Lovenox 40 mg qD  Activity: Increase as tolerated  Disposition: need 2 days of chemo 
80 year old male with Advanced rectal adenocarcinoma, Microcytic Anemia, Chronic Osteoarthritis who presented from [facility] for chemotherapy. Patient in refusal for further chemotherapy, is only agreeable to surgery with colostomy bag placement.     # Locally advanced rectal adenocarcinoma (T4N0M0) with plan for 12-16 weeks of chemo  - patient currently HD stable, reports not feeling good since last chemotherapy session. patient did not follow up with Dr. Tubbs outpatient since discharge from last admission.  - Diagnosed late April 2021 - MRI: 18 cm long polypoidal rectal mass with invasion of the right meso rectal fascia and possibly the prostate gland. Additional involvement of the analsphincter complex--T4(a or b)N0M0  - Planned for total neoadjuvant chemotherapy followed by chemoRT followed by surgery  - s/p 4 cycles of chemo s/p port placement (5/7, 5/27, 6/12, 6/26), CEA trending down 10.1 from 24.3 on last admission  - patient refusing chemo and wants to have surgery   - f/u heme/onc- spoke to heme/onc fellow, will speak to Dr. 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  - Iron studies 4/21: Iron Saturation: 12%, Ferritin 34, MCV 68.9   - s/p 1 dose of Venofer previous admission  - Hgb stable: 10.5>10.4    #positive U/A for large leuks, bacteria, WBC  - asymptomatic  - ordered urine cx    #Mycotic toenails - podiatry outpatient f/u     #Knee pain, patient reports RA history  - Lidocaine patch  - Outpatient followup    Diet: regular  DVT ppx: lovenox  GI ppx: protonix   Dispo: acute   Code; DNR - molst needs to filled out

## 2021-07-22 NOTE — PROGRESS NOTE ADULT - ATTENDING COMMENTS
Agree with A/P.  Pt will complete chemotherapy today.   He will be Dced to SNF tomorrow.
Agree with above A/P.  Proceed with FOLFOX as ordered.  Pt aware of SE.
Agree with above A/P.  Pt tolerated chemo well.  DC to SNF today.
Pt seen.  Day 1 of FOLFOX, pt has mild neuropathy.  Continue with chemo. He will be Dced to SNF on 7/22/21  Neulasta at [facility] on 7/23/21.  pt has a Ct scan scheduled for 7/27/21  Next admission to be arranged for 8/1/21

## 2021-07-22 NOTE — PROGRESS NOTE ADULT - SUBJECTIVE AND OBJECTIVE BOX
MISAEL HFUFMAN 80y Male  MRN#: [id number]     SUBJECTIVE  Patient is a 80y old Male who presents with a chief complaint of Chemo (2021 13:08)  Today is hospital day 2d, and this morning he is lying in bed without distress. Reporting he has tingling sensation in his b/l hands. Otherwise, pt states he has good appetite. Denies nausea, vomiting, fatigue or chest tightness.   No acute overnight events.     OBJECTIVE  PAST MEDICAL & SURGICAL HISTORY  Iron deficiency anemia  Osteoarthritis  Amputation of digit of left hand      ALLERGIES:  No Known Allergies    MEDICATIONS:  STANDING MEDICATIONS  enoxaparin Injectable 40 milliGRAM(s) SubCutaneous daily  fluorouracil IVPB (eMAR) 2400 milliGRAM(s) IV Intermittent daily  lidocaine   Patch 1 Patch Transdermal daily  magnesium oxide 400 milliGRAM(s) Oral three times a day with meals  pantoprazole    Tablet 40 milliGRAM(s) Oral before breakfast    PRN MEDICATIONS  aluminum hydroxide/magnesium hydroxide/simethicone Suspension 30 milliLiter(s) Oral every 4 hours PRN  hydrocortisone 1% Cream 1 Application(s) Topical every 4 hours PRN  loperamide 2 milliGRAM(s) Oral two times a day PRN  melatonin 3 milliGRAM(s) Oral at bedtime PRN  ondansetron Injectable 4 milliGRAM(s) IV Push every 8 hours PRN  oxycodone    5 mG/acetaminophen 325 mG 1 Tablet(s) Oral every 6 hours PRN  simethicone 80 milliGRAM(s) Chew two times a day PRN    HOME MEDICATIONS  Home Medications:  diphenhydramine/lidocaine/aluminum hydroxide/magnesium hydroxide/simethicone mucous membrane suspension: 1 application mucous membrane once a day (at bedtime), As Needed (2021 19:06)  lidocaine 5% topical film: Apply topically to affected area once (2021 19:06)  loperamide 2 mg oral capsule: 1 cap(s) orally 2 times a day, As needed, Diarrhea (2021 19:06)  oxycodone-acetaminophen 5 mg-325 mg oral tablet: 1 tab(s) orally every 6 hours, As needed, Moderate Pain (4 - 6) (2021 19:06)  pantoprazole 40 mg oral delayed release tablet: 1 tab(s) orally once a day (before a meal) (2021 19:06)  simethicone 80 mg oral tablet, chewable: 1 tab(s) orally 2 times a day, As needed, Gas (2021 19:06)      VITAL SIGNS: Last 24 Hours  T(C): 35.9 (2021 04:50), Max: 36.4 (2021 21:22)  T(F): 96.7 (2021 04:50), Max: 97.5 (2021 21:22)  HR: 70 (2021 04:50) (70 - 75)  BP: 120/58 (2021 04:50) (106/53 - 120/58)  BP(mean): --  RR: 18 (2021 04:50) (18 - 18)  SpO2: 96% (2021 07:44) (96% - 98%)    21 @ 07:01  -  -21 @ 07:00  --------------------------------------------------------  IN: 600 mL / OUT: 0 mL / NET: 600 mL        LABS:                        10.9   12.50 )-----------( 170      ( 2021 07:27 )             36.3     07-20    139  |  104  |  12  ----------------------------<  154<H>  4.6   |  27  |  0.5<L>    Ca    9.1      2021 07:27  Phos  3.8     07-20  Mg     1.9     07-20    TPro  6.7  /  Alb  3.2<L>  /  TBili  0.4  /  DBili  x   /  AST  12  /  ALT  <5  /  AlkPhos  100  07-20    LIVER FUNCTIONS - ( 2021 07:27 )  Alb: 3.2 g/dL / Pro: 6.7 g/dL / ALK PHOS: 100 U/L / ALT: <5 U/L / AST: 12 U/L / GGT: x             Urinalysis Basic - ( 2021 05:30 )    Color: Yellow / Appearance: Slightly Turbid / S.016 / pH: x  Gluc: x / Ketone: Negative  / Bili: Negative / Urobili: <2 mg/dL   Blood: x / Protein: Trace / Nitrite: Negative   Leuk Esterase: Large / RBC: 2 /HPF /  /HPF   Sq Epi: x / Non Sq Epi: 0 /HPF / Bacteria: Few    CAPILLARY BLOOD GLUCOSE    RADIOLOGY:    PHYSICAL EXAM:  GENERAL: NAD, AAO x 4, 80y M  HEAD:  Atraumatic, Normocephalic  EYES: EOMI, conjunctiva clear and sclera white  NECK: Supple, No JVD  CHEST/LUNG: Clear to auscultation bilaterally; No wheeze; No crackles; No accessory muscles used  HEART: Regular rate and rhythm; No murmurs;   ABDOMEN: Soft, Nontender, Nondistended; Bowel sounds present; No guarding  EXTREMITIES:  2+ Peripheral Pulses, No cyanosis or edema  NEUROLOGY: non-focal    ADMISSION SUMMARY  Patient is a 80y old Male who presents with a chief complaint of Chemo (2021 13:08)   
MISAEL HUFFMAN 80y Male  MRN#: [id number]   Hospital Day: 1d    SUBJECTIVE  Patient is a 80y old Male who presents with a chief complaint of Chemo (2021 19:01)  Currently admitted to medicine with the primary diagnosis of Rectal adenocarcinoma      INTERVAL HPI AND OVERNIGHT EVENTS:  Patient was examined and seen at bedside. This morning he is resting comfortably in bed and reports no issues or overnight events. After speaking to the patient, patient still is not agreeable to doing any more chemotherapy for his T4N0M0 rectal adenocarcinoma. Patient states that he did not feel great after his 4 cycles of chemotherapy and states that he is in the hospital only because he wants to get surgery with colostomy bag.     REVIEW OF SYMPTOMS:  CONSTITUTIONAL: No fevers or chills; No headaches  EYES: No visual changes  ENT: No vertigo; No ear pain  NECK: No pain or stiffness  RESPIRATORY: No cough, wheezing; No shortness of breath  CARDIOVASCULAR: No chest pain or palpitations  GASTROINTESTINAL: No abdominal or epigastric pain; No nausea, vomiting  GENITOURINARY: No dysuria, frequency or hematuria  MUSCULOSKELETAL: No joint pain  NEUROLOGICAL: No numbness or weakness  SKIN: No itching or rashes      OBJECTIVE  PAST MEDICAL & SURGICAL HISTORY  Iron deficiency anemia    Osteoarthritis    Amputation of digit of left hand      ALLERGIES:  No Known Allergies    MEDICATIONS:  STANDING MEDICATIONS  enoxaparin Injectable 40 milliGRAM(s) SubCutaneous daily  lidocaine   Patch 1 Patch Transdermal daily  pantoprazole    Tablet 40 milliGRAM(s) Oral before breakfast    PRN MEDICATIONS  aluminum hydroxide/magnesium hydroxide/simethicone Suspension 30 milliLiter(s) Oral every 4 hours PRN  hydrocortisone 1% Cream 1 Application(s) Topical every 4 hours PRN  loperamide 2 milliGRAM(s) Oral two times a day PRN  melatonin 3 milliGRAM(s) Oral at bedtime PRN  ondansetron Injectable 4 milliGRAM(s) IV Push every 8 hours PRN  oxycodone    5 mG/acetaminophen 325 mG 1 Tablet(s) Oral every 6 hours PRN  simethicone 80 milliGRAM(s) Chew two times a day PRN      VITAL SIGNS: Last 24 Hours  T(C): 35.8 (2021 07:56), Max: 37.4 (2021 14:44)  T(F): 96.5 (2021 07:56), Max: 99.3 (2021 14:44)  HR: 75 (2021 07:56) (75 - 92)  BP: 107/57 (2021 07:56) (107/57 - 134/60)  BP(mean): --  RR: 19 (2021 07:56) (16 - 19)  SpO2: 98% (2021 17:41) (98% - 98%)    LABS:                        10.5   13.32 )-----------( 176      ( 2021 07:46 )             34.9         139  |  103  |  11  ----------------------------<  73  4.3   |  28  |  0.5<L>    Ca    9.0      2021 07:46  Mg     1.8         TPro  6.3  /  Alb  3.0<L>  /  TBili  0.4  /  DBili  x   /  AST  14  /  ALT  <5  /  AlkPhos  104        Urinalysis Basic - ( 2021 05:30 )    Color: Yellow / Appearance: Slightly Turbid / S.016 / pH: x  Gluc: x / Ketone: Negative  / Bili: Negative / Urobili: <2 mg/dL   Blood: x / Protein: Trace / Nitrite: Negative   Leuk Esterase: Large / RBC: 2 /HPF /  /HPF   Sq Epi: x / Non Sq Epi: 0 /HPF / Bacteria: Few      RADIOLOGY:      PHYSICAL EXAM:  CONSTITUTIONAL: No acute distress, well-developed  HEAD: Atraumatic, normocephalic  ENT: Supple  PULMONARY: Clear to auscultation bilaterally; no wheezes, rales, or rhonchi  CARDIOVASCULAR: Regular rate and rhythm; no murmurs, rubs, or gallops  GASTROINTESTINAL: Soft, non-tender, non-distended; bowel sounds present  MUSCULOSKELETAL: 2+ peripheral pulses; no edema  NEUROLOGY: non-focal  SKIN: No rashes or lesions; warm and dry  
LENGTH OF HOSPITAL STAY: 07-18-21 (2d)  CHIEF COMPLAINT:   Patient is a 80y old  Male who presents with a chief complaint of Chemo (20 Jul 2021 09:49)      Overnight events/progress:     Patient here for chemo for his locally advanced rectal adenocarcinoma (T4N0M0). Downgraded from 4B. No events overnight. Patient denies any pain, shortness of breath, nausea, vomiting, abdominal pain. Tolerating chemo okay.    HISTORY OF PRESENTING ILLNESS:    HPI:   80 year old male with Advanced rectal adenocarcinoma, Microcytic Anemia, Chronic Osteoarthritis presenting for chemotherapy admission. Patient is to complete another cycle of FOLFOX. Patient is very adamant about not wanting to get another round of chemo. He wants to have surgery instead even if it results in a colostomy bag. Patient denies any fevers, chills, chest pain, sob, nausea, vomiting, diarrhea or sick contacts.     in ED vitals: T 99.3, /60, HR 92, RR 16, SpO2 99% on RA   Labs unremarkable  (18 Jul 2021 19:01)      PHYSICAL EXAM:   GENERAL: well appearing male in no acute distress  NEURO: AOAOx4, no focal deficits  EYES: EOMI, conjunctival clear and sclera white  HENT:  atraumatic, normocephalic, oral mucosa moist  NECK: supple, no JVD  LUNGS: clear to auscultation bilaterally, no wheeze, crackles, or accessory muscle use  HEART: RRR, no murmurs  GI: soft, non-tender, non-distended, bowel sounds present, no rebound or guarding  EXTREMITIES: no edema, cyanosis, clubbing  SKIN: warm and dry    OBJECTIVE DATA:    T(C): 37 (07-21-21 @ 13:13), Max: 37 (07-21-21 @ 13:13)  T(F): 98.6 (07-21-21 @ 13:13), Max: 98.6 (07-21-21 @ 13:13)  HR: 95 (07-21-21 @ 13:13) (59 - 95)  BP: 128/66 (07-21-21 @ 13:13) (110/61 - 128/66)  ABP: --  ABP(mean): --  RR: 18 (07-21-21 @ 05:15) (18 - 18)  SpO2: --      I&O's Summary    20 Jul 2021 07:01  -  21 Jul 2021 07:00  --------------------------------------------------------  IN: 0 mL / OUT: 400 mL / NET: -400 mL    21 Jul 2021 07:01  -  21 Jul 2021 21:56  --------------------------------------------------------  IN: 0 mL / OUT: 200 mL / NET: -200 mL                              9.6    16.79 )-----------( 171      ( 21 Jul 2021 07:03 )             32.6       07-21    139  |  105  |  17  ----------------------------<  120<H>  5.1<H>   |  27  |  0.6<L>    Ca    9.0      21 Jul 2021 07:03  Phos  3.8     07-20  Mg     2.0     07-21    TPro  6.2  /  Alb  3.2<L>  /  TBili  0.3  /  DBili  x   /  AST  24  /  ALT  11  /  AlkPhos  83  07-21                            CAPILLARY BLOOD GLUCOSE              
LENGTH OF HOSPITAL STAY: 21 (2d)  CHIEF COMPLAINT:   Patient is a 80y old  Male who presents with a chief complaint of Chemo (2021 09:49)      Overnight events/progress:     Patient here for chemo for his locally advanced rectal adenocarcinoma (T4N0M0). Downgraded from 4B. No events overnight. Patient denies any pain, shortness of breath, nausea, vomiting, abdominal pain. Receiving chemo another chemo cycle, first round (FOLFOX).     HISTORY OF PRESENTING ILLNESS:    HPI:   80 year old male with Advanced rectal adenocarcinoma, Microcytic Anemia, Chronic Osteoarthritis presenting for chemotherapy admission. Patient is to complete another cycle of FOLFOX. Patient is very adamant about not wanting to get another round of chemo. He wants to have surgery instead even if it results in a colostomy bag. Patient denies any fevers, chills, chest pain, sob, nausea, vomiting, diarrhea or sick contacts.     in ED vitals: T 99.3, /60, HR 92, RR 16, SpO2 99% on RA   Labs unremarkable  (2021 19:01)      PHYSICAL EXAM:   GENERAL: well appearing male in no acute distress  NEURO: AOAOx4, no focal deficits  EYES: EOMI, conjunctival clear and sclera white  HENT:  atraumatic, normocephalic, oral mucosa moist  NECK: supple, no JVD  LUNGS: clear to auscultation bilaterally, no wheeze, crackles, or accessory muscle use  HEART: RRR, no murmurs  GI: soft, non-tender, non-distended, bowel sounds present, no rebound or guarding  EXTREMITIES: no edema, cyanosis, clubbing  SKIN: warm and dry    Vital Signs Last 24 Hrs  T(C): 35.8 (2021 14:42), Max: 36.4 (2021 21:22)  T(F): 96.5 (2021 14:42), Max: 97.5 (2021 21:22)  HR: 75 (2021 14:42) (70 - 75)  BP: 126/66 (2021 14:42) (106/53 - 126/66)  BP(mean): --  RR: 19 (2021 14:42) (18 - 19)  SpO2: 96% (2021 07:44) (96% - 98%)  I&O's Detail    2021 07:01  -  2021 07:00  --------------------------------------------------------  IN:    Oral Fluid: 600 mL  Total IN: 600 mL    OUT:  Total OUT: 0 mL    Total NET: 600 mL          Pertinent labs/Imaging/data:                        10.9   12.50 )-----------( 170      ( 2021 07:27 )             36.3     07-20    139  |  104  |  12  ----------------------------<  154<H>  4.6   |  27  |  0.5<L>    Ca    9.1      2021 07:27  Phos  3.8     07-20  Mg     1.9     -    TPro  6.7  /  Alb  3.2<L>  /  TBili  0.4  /  DBili  x   /  AST  12  /  ALT  <5  /  AlkPhos  100  07-20          Calcium, Total Serum: 9.1 mg/dL (21 @ 07:27)  Magnesium, Serum: 1.9 mg/dL (21 @ 07:27)  Phosphorus Level, Serum: 3.8 mg/dL (21 @ 07:27)  Calcium, Total Serum: 8.8 mg/dL (21 @ 22:44)      Urinalysis Basic - ( 2021 05:30 )    Color: Yellow / Appearance: Slightly Turbid / S.016 / pH: x  Gluc: x / Ketone: Negative  / Bili: Negative / Urobili: <2 mg/dL   Blood: x / Protein: Trace / Nitrite: Negative   Leuk Esterase: Large / RBC: 2 /HPF /  /HPF   Sq Epi: x / Non Sq Epi: 0 /HPF / Bacteria: Few        LIVER FUNCTIONS - ( 2021 07:27 )  Alb: 3.2 g/dL / Pro: 6.7 g/dL / ALK PHOS: 100 U/L / ALT: <5 U/L / AST: 12 U/L / GGT: x             I&O's Summary    2021 07:01  -  2021 07:00  --------------------------------------------------------  IN: 600 mL / OUT: 0 mL / NET: 600 mL              CAPILLARY BLOOD GLUCOSE          ABG        RADIOLOGY & OTHER STUDIES                      
MISAEL HUFFMAN 80y Male  MRN#: [id number]     SUBJECTIVE  Patient is a 80y old Male who presents with a chief complaint of Chemo (20 Jul 2021 16:48)  Today is hospital day 3d, and this morning he is lying in bed without distress. 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 Having BM. Denies fever, chill, SOB, CP, diarrhea, LE edema.   No acute overnight events.     OBJECTIVE  PAST MEDICAL & SURGICAL HISTORY  Iron deficiency anemia    Osteoarthritis    Amputation of digit of left hand      ALLERGIES:  No Known Allergies    MEDICATIONS:  STANDING MEDICATIONS  enoxaparin Injectable 40 milliGRAM(s) SubCutaneous daily  lidocaine   Patch 1 Patch Transdermal daily  pantoprazole    Tablet 40 milliGRAM(s) Oral before breakfast  senna 2 Tablet(s) Oral at bedtime    PRN MEDICATIONS  aluminum hydroxide/magnesium hydroxide/simethicone Suspension 30 milliLiter(s) Oral every 4 hours PRN  hydrocortisone 1% Cream 1 Application(s) Topical every 4 hours PRN  melatonin 3 milliGRAM(s) Oral at bedtime PRN  ondansetron Injectable 4 milliGRAM(s) IV Push every 8 hours PRN  oxycodone    5 mG/acetaminophen 325 mG 1 Tablet(s) Oral every 6 hours PRN    HOME MEDICATIONS  Home Medications:  diphenhydramine/lidocaine/aluminum hydroxide/magnesium hydroxide/simethicone mucous membrane suspension: 1 application mucous membrane once a day (at bedtime), As Needed (18 Jul 2021 19:06)  lidocaine 5% topical film: Apply topically to affected area once (18 Jul 2021 19:06)  loperamide 2 mg oral capsule: 1 cap(s) orally 2 times a day, As needed, Diarrhea (18 Jul 2021 19:06)  oxycodone-acetaminophen 5 mg-325 mg oral tablet: 1 tab(s) orally every 6 hours, As needed, Moderate Pain (4 - 6) (18 Jul 2021 19:06)  pantoprazole 40 mg oral delayed release tablet: 1 tab(s) orally once a day (before a meal) (18 Jul 2021 19:06)  simethicone 80 mg oral tablet, chewable: 1 tab(s) orally 2 times a day, As needed, Gas (18 Jul 2021 19:06)      VITAL SIGNS: Last 24 Hours  T(C): 35.6 (21 Jul 2021 05:15), Max: 35.8 (20 Jul 2021 14:42)  T(F): 96 (21 Jul 2021 05:15), Max: 96.5 (20 Jul 2021 14:42)  HR: 59 (21 Jul 2021 05:15) (59 - 78)  BP: 110/61 (21 Jul 2021 05:15) (110/61 - 141/65)  BP(mean): --  RR: 18 (21 Jul 2021 05:15) (18 - 19)  SpO2: --    07-20-21 @ 07:01  -  07-21-21 @ 07:00  --------------------------------------------------------  IN: 0 mL / OUT: 400 mL / NET: -400 mL        LABS:                        9.6    16.79 )-----------( 171      ( 21 Jul 2021 07:03 )             32.6     07-21    139  |  105  |  17  ----------------------------<  120<H>  5.1<H>   |  27  |  0.6<L>    Ca    9.0      21 Jul 2021 07:03  Phos  3.8     07-20  Mg     2.0     07-21    TPro  6.2  /  Alb  3.2<L>  /  TBili  0.3  /  DBili  x   /  AST  24  /  ALT  11  /  AlkPhos  83  07-21    LIVER FUNCTIONS - ( 21 Jul 2021 07:03 )  Alb: 3.2 g/dL / Pro: 6.2 g/dL / ALK PHOS: 83 U/L / ALT: 11 U/L / AST: 24 U/L / GGT: x           CAPILLARY BLOOD GLUCOSE      RADIOLOGY:          PHYSICAL EXAM:  GENERAL: NAD, AAO x 4, 80y M  HEAD:  Atraumatic, Normocephalic  EYES: EOMI, conjunctiva clear and sclera white  NECK: Supple, No JVD  CHEST/LUNG: Clear to auscultation bilaterally; No wheeze; No crackles; No accessory muscles used  HEART: Regular rate and rhythm; No murmurs;   ABDOMEN: Soft, Nontender, Nondistended; Bowel sounds present; No guarding  EXTREMITIES:  2+ Peripheral Pulses, No cyanosis or edema  NEUROLOGY: non-focal    ADMISSION SUMMARY  Patient is a 80y old Male who presents with a chief complaint of Chemo (20 Jul 2021 16:48)   
MISAEL HUFFMAN 80y Male  MRN#: [id number]     SUBJECTIVE  Patient is a 80y old Male who presents with a chief complaint of Chemo (2021 09:55)    Today is hospital day 1d, and this morning he is lying in bed without distress. Had a long discussion with patient and finally agree to receiving chemotherapy.    No acute overnight events.     OBJECTIVE  PAST MEDICAL & SURGICAL HISTORY  Iron deficiency anemia  Osteoarthritis  Amputation of digit of left hand    ALLERGIES:  No Known Allergies    MEDICATIONS:  STANDING MEDICATIONS  dexAMETHasone  IVPB 12 milliGRAM(s) IV Intermittent once  enoxaparin Injectable 40 milliGRAM(s) SubCutaneous daily  fluorouracil IVPB (eMAR) 2400 milliGRAM(s) IV Intermittent daily  fluorouracil IVPB (eMAR) 800 milliGRAM(s) IV Intermittent once  fosaprepitant IVPB 150 milliGRAM(s) IV Intermittent once  leucovorin IVPB (eMAR) 800 milliGRAM(s) IV Intermittent once  lidocaine   Patch 1 Patch Transdermal daily  ondansetron  IVPB 16 milliGRAM(s) IV Intermittent once  oxaliplatin IVPB (eMAR) 170 milliGRAM(s) IV Intermittent once  pantoprazole    Tablet 40 milliGRAM(s) Oral before breakfast    PRN MEDICATIONS  aluminum hydroxide/magnesium hydroxide/simethicone Suspension 30 milliLiter(s) Oral every 4 hours PRN  hydrocortisone 1% Cream 1 Application(s) Topical every 4 hours PRN  loperamide 2 milliGRAM(s) Oral two times a day PRN  melatonin 3 milliGRAM(s) Oral at bedtime PRN  ondansetron Injectable 4 milliGRAM(s) IV Push every 8 hours PRN  oxycodone    5 mG/acetaminophen 325 mG 1 Tablet(s) Oral every 6 hours PRN  simethicone 80 milliGRAM(s) Chew two times a day PRN    HOME MEDICATIONS  Home Medications:  diphenhydramine/lidocaine/aluminum hydroxide/magnesium hydroxide/simethicone mucous membrane suspension: 1 application mucous membrane once a day (at bedtime), As Needed (2021 19:06)  lidocaine 5% topical film: Apply topically to affected area once (2021 19:06)  loperamide 2 mg oral capsule: 1 cap(s) orally 2 times a day, As needed, Diarrhea (2021 19:06)  oxycodone-acetaminophen 5 mg-325 mg oral tablet: 1 tab(s) orally every 6 hours, As needed, Moderate Pain (4 - 6) (2021 19:06)  pantoprazole 40 mg oral delayed release tablet: 1 tab(s) orally once a day (before a meal) (2021 19:06)  simethicone 80 mg oral tablet, chewable: 1 tab(s) orally 2 times a day, As needed, Gas (2021 19:06)      VITAL SIGNS: Last 24 Hours  T(C): 35.8 (2021 07:56), Max: 37.4 (2021 14:44)  T(F): 96.5 (2021 07:56), Max: 99.3 (2021 14:44)  HR: 75 (2021 07:56) (75 - 92)  BP: 107/57 (2021 07:56) (107/57 - 134/60)  BP(mean): --  RR: 19 (2021 07:56) (16 - 19)  SpO2: 98% (2021 17:41) (98% - 98%)    21 @ 07:01  -  21 @ 07:00  --------------------------------------------------------  IN: 0 mL / OUT: 200 mL / NET: -200 mL    21 @ 07:  -  21 @ 13:08  --------------------------------------------------------  IN: 240 mL / OUT: 0 mL / NET: 240 mL        LABS:                        10.0   14.97 )-----------( 173      ( 2021 11:55 )             33.8         139  |  103  |  11  ----------------------------<  73  4.3   |  28  |  0.5<L>    Ca    9.0      2021 07:46  Mg     1.8     -    TPro  6.3  /  Alb  3.0<L>  /  TBili  0.4  /  DBili  x   /  AST  14  /  ALT  <5  /  AlkPhos  104  -    LIVER FUNCTIONS - ( 2021 07:46 )  Alb: 3.0 g/dL / Pro: 6.3 g/dL / ALK PHOS: 104 U/L / ALT: <5 U/L / AST: 14 U/L / GGT: x             Urinalysis Basic - ( 2021 05:30 )    Color: Yellow / Appearance: Slightly Turbid / S.016 / pH: x  Gluc: x / Ketone: Negative  / Bili: Negative / Urobili: <2 mg/dL   Blood: x / Protein: Trace / Nitrite: Negative   Leuk Esterase: Large / RBC: 2 /HPF /  /HPF   Sq Epi: x / Non Sq Epi: 0 /HPF / Bacteria: Few    CAPILLARY BLOOD GLUCOSE    RADIOLOGY:    < from: Xray Chest 1 View- PORTABLE-Routine (Xray Chest 1 View- PORTABLE-Routine in AM.) (21 @ 06:32) >  Impression:  No radiographic evidence of acute cardiopulmonary disease.  < end of copied text >    PHYSICAL EXAM:  GENERAL: NAD, AAO x 4, 80y M  HEAD:  Atraumatic, Normocephalic  EYES: EOMI, conjunctiva clear and sclera white  NECK: Supple, No JVD  CHEST/LUNG: Chemoport noted R chest wall. Clear to auscultation bilaterally; No wheeze; No crackles; No accessory muscles used  HEART: Regular rate and rhythm; No murmurs;   ABDOMEN: Soft, Nontender, Nondistended; Bowel sounds present; No guarding  EXTREMITIES:  2+ Peripheral Pulses, No cyanosis or edema  NEUROLOGY: non-focal    ADMISSION SUMMARY  Patient is a 80y old Male who presents with a chief complaint of Chemo (2021 09:55)   
MISAEL HUFFMAN 80y Male  MRN#: [id number]     SUBJECTIVE  Patient is a 80y old Male who presents with a chief complaint of Chemo (21 Jul 2021 21:49)  Today is hospital day 4d, and this morning he is lying in bed without distress. Reporting having chronic R knee pain and requesting to get cortisol injection. Tolerated chemo well and have good appetite. Denies fever, chill, SOB, CP, diarrhea/constipation, LE edema.  No acute overnight events.     OBJECTIVE  PAST MEDICAL & SURGICAL HISTORY  Iron deficiency anemia    Osteoarthritis    Amputation of digit of left hand      ALLERGIES:  No Known Allergies    MEDICATIONS:  STANDING MEDICATIONS  enoxaparin Injectable 40 milliGRAM(s) SubCutaneous daily  lidocaine   Patch 1 Patch Transdermal daily  pantoprazole    Tablet 40 milliGRAM(s) Oral before breakfast  senna 2 Tablet(s) Oral at bedtime    PRN MEDICATIONS  aluminum hydroxide/magnesium hydroxide/simethicone Suspension 30 milliLiter(s) Oral every 4 hours PRN  hydrocortisone 1% Cream 1 Application(s) Topical every 4 hours PRN  melatonin 3 milliGRAM(s) Oral at bedtime PRN  ondansetron Injectable 4 milliGRAM(s) IV Push every 8 hours PRN  oxycodone    5 mG/acetaminophen 325 mG 1 Tablet(s) Oral every 6 hours PRN    HOME MEDICATIONS  Home Medications:  lidocaine 5% topical film: Apply topically to affected area once, As Needed for pain.. (22 Jul 2021 09:16)  loperamide 2 mg oral capsule: 1 cap(s) orally 2 times a day, As Needed (22 Jul 2021 09:59)  oxycodone-acetaminophen 5 mg-325 mg oral tablet: 1 tab(s) orally every 6 hours, As needed, Moderate Pain (4 - 6) (18 Jul 2021 19:06)  pantoprazole 40 mg oral delayed release tablet: 1 tab(s) orally once a day (before a meal) (18 Jul 2021 19:06)  senna oral tablet: 2 tab(s) orally once a day (at bedtime) (22 Jul 2021 09:16)      VITAL SIGNS: Last 24 Hours  T(C): 36.1 (22 Jul 2021 05:26), Max: 37 (21 Jul 2021 13:13)  T(F): 96.9 (22 Jul 2021 05:26), Max: 98.6 (21 Jul 2021 13:13)  HR: 68 (22 Jul 2021 05:26) (68 - 95)  BP: 129/60 (22 Jul 2021 05:26) (112/54 - 129/60)  BP(mean): --  RR: 18 (22 Jul 2021 05:26) (18 - 18)  SpO2: --    07-21-21 @ 07:01  -  07-22-21 @ 07:00  --------------------------------------------------------  IN: 0 mL / OUT: 200 mL / NET: -200 mL        LABS:                        10.3   14.40 )-----------( 151      ( 22 Jul 2021 05:05 )             34.9     07-22    142  |  105  |  12  ----------------------------<  77  4.4   |  27  |  0.5<L>    Ca    8.9      22 Jul 2021 05:05  Phos  3.4     07-22  Mg     2.0     07-22    TPro  6.3  /  Alb  3.3<L>  /  TBili  0.3  /  DBili  x   /  AST  27  /  ALT  14  /  AlkPhos  87  07-22    LIVER FUNCTIONS - ( 22 Jul 2021 05:05 )  Alb: 3.3 g/dL / Pro: 6.3 g/dL / ALK PHOS: 87 U/L / ALT: 14 U/L / AST: 27 U/L / GGT: x                         CAPILLARY BLOOD GLUCOSE          RADIOLOGY:    PHYSICAL EXAM:  GENERAL: NAD, AAO, 80y M  HEAD:  Atraumatic, Normocephalic  EYES: EOMI, conjunctiva clear and sclera white  NECK: Supple, No JVD  CHEST/LUNG: Clear to auscultation bilaterally; No wheeze; No crackles; No accessory muscles used  HEART: Regular rate and rhythm; No murmurs;   ABDOMEN: Soft, Nontender, Nondistended; Bowel sounds present; No guarding  EXTREMITIES:  2+ Peripheral Pulses, No cyanosis or edema  NEUROLOGY: non-focal    ADMISSION SUMMARY  Patient is a 80y old Male who presents with a chief complaint of Chemo (21 Jul 2021 21:49)

## 2021-07-22 NOTE — DISCHARGE NOTE NURSING/CASE MANAGEMENT/SOCIAL WORK - PATIENT PORTAL LINK FT
You can access the FollowMyHealth Patient Portal offered by [facility] by registering at the following website: [email or web address]. By joining [employer]’s FollowMyHealth portal, you will also be able to view your health information using other applications (apps) compatible with our system.

## 2021-07-22 NOTE — DISCHARGE NOTE NURSING/CASE MANAGEMENT/SOCIAL WORK - NSDCVIVACCINE_GEN_ALL_CORE_FT
COVID-19 vaccine, vector-nr, rS-Ad26, PF, 0.5 mL (Sveta); 10-May-2021 14:11; India Lal (CINDI); [employer]; [id number] (Exp. Date: 23-Jun-2021); IntraMuscular; Deltoid Left.; 0.5 milliLiter(s);

## 2021-07-22 NOTE — DISCHARGE NOTE PROVIDER - PROVIDER TOKENS
PROVIDER:[TOKEN:[[id number]],FOLLOWUP:[1-3 days],ESTABLISHEDPATIENT:[T]],PROVIDER:[TOKEN:[[id number]],FOLLOWUP:[1 month]]

## 2021-07-22 NOTE — DISCHARGE NOTE PROVIDER - CARE PROVIDER_API CALL
Yamel Tubbs)  Hematology; Internal Medicine; Medical Oncology  [address] [place] [address]  Phone: [phone]  Fax: [phone]  Established Patient  Follow Up Time: 1-3 days    Luis Grier  Orthopedic Surgery  [address] [place] [address]  Phone: [phone]  Fax: [phone]  Follow Up Time: 1 month

## 2021-07-22 NOTE — DISCHARGE NOTE PROVIDER - NSDCCPCAREPLAN_GEN_ALL_CORE_FT
PRINCIPAL DISCHARGE DIAGNOSIS  Diagnosis: Rectal adenocarcinoma  Assessment and Plan of Treatment: Follow up with hematology oncology clinic tomorrow.  Follow up with ortho outpatient in 1 week for injection.  Scheduled for CT on 7/27.  Return to hospital on August 1st for next cycle of chemo.  You have been diagnosed with cancer of the colon and/or rectum. This is also called colorectal cancer. Cancer is the uncontrolled growth of abnormal cells in the colon and rectum. Surgery to remove part or all of the colon (colectomy) is the main treatment for most colorectal cancers. How much of your colon or rectum the surgeon removed depends on where the tumor was growing. Your healthcare provider may also want you to get more treatment after you heal. This may include radiation or chemotherapy. This sheet will help you care for yourself after surgery.  Always follow all instructions you get from your healthcare providers. Contact them if you have any questions.   Activity  Do's and don'ts include:   Get up and move often. Use your pain medicine so you feel good enough to move.  Slowly increase your activity over time. Start by taking short walks on a level surface.  Don’t lift anything heavier than 5 pounds or push anything like a vacuum  or  until your healthcare provider says it’s OK.  Don’t drive until your healthcare provider says it’s OK.  If you ride in a car for long trips, stop often to walk around a bit.  Ask your healthcare provider when you can go back to work. It depends on the kind of work you do. But it should be within 6 to 8 weeks after surgery.  Home care  Once you are home:   Take your medicines exactly as directed.  Don’t take any other medicine, vitamins, supplements, or herbs unless your healthcare provider says it’s OK.  Follow any diet and fluid intake tips you're given in the hospital.  Follow-up  Make a follow-up appointment as directed by our staff.  When to call your healthcare provider  Call your healthcare provider right away if you have any of these:  Blood in your stool  Hard stool  No gas or stool  Fever of 100.4°F (38°C) or higher, or       PRINCIPAL DISCHARGE DIAGNOSIS  Diagnosis: Rectal adenocarcinoma  Assessment and Plan of Treatment: Follow up with hematology oncology clinic tomorrow.  Scheduled for CT on 7/27.  Return to hospital on August 1st for next cycle of chemo.  Follow up with ortho outpatient on August 18th 10:45 AM for evaulation for injection   You have been diagnosed with cancer of the colon and/or rectum. This is also called colorectal cancer. Cancer is the uncontrolled growth of abnormal cells in the colon and rectum. Surgery to remove part or all of the colon (colectomy) is the main treatment for most colorectal cancers. How much of your colon or rectum the surgeon removed depends on where the tumor was growing. Your healthcare provider may also want you to get more treatment after you heal. This may include radiation or chemotherapy. This sheet will help you care for yourself after surgery.  Always follow all instructions you get from your healthcare providers. Contact them if you have any questions.   Activity  Do's and don'ts include:   Get up and move often. Use your pain medicine so you feel good enough to move.  Slowly increase your activity over time. Start by taking short walks on a level surface.  Don’t lift anything heavier than 5 pounds or push anything like a vacuum  or  until your healthcare provider says it’s OK.  Don’t drive until your healthcare provider says it’s OK.  If you ride in a car for long trips, stop often to walk around a bit.  Ask your healthcare provider when you can go back to work. It depends on the kind of work you do. But it should be within 6 to 8 weeks after surgery.  Home care  Once you are home:   Take your medicines exactly as directed.  Don’t take any other medicine, vitamins, supplements, or herbs unless your healthcare provider says it’s OK.  Follow any diet and fluid intake tips you're given in the hospital.  Follow-up  Make a follow-up appointment as directed by our staff.  When to call your healthcare provider  Call your healthcare provider right away if you have any of these:  Blood in your stool  Hard stool  No gas or stool  Fever of

## 2021-07-23 DIAGNOSIS — N39.0 URINARY TRACT INFECTION, SITE NOT SPECIFIED: ICD-10-CM

## 2021-07-23 LAB
-  AMIKACIN: SIGNIFICANT CHANGE UP
-  AZTREONAM: SIGNIFICANT CHANGE UP
-  CEFEPIME: SIGNIFICANT CHANGE UP
-  CEFTAZIDIME: SIGNIFICANT CHANGE UP
-  CIPROFLOXACIN: SIGNIFICANT CHANGE UP
-  GENTAMICIN: SIGNIFICANT CHANGE UP
-  IMIPENEM: SIGNIFICANT CHANGE UP
-  LEVOFLOXACIN: SIGNIFICANT CHANGE UP
-  MEROPENEM: SIGNIFICANT CHANGE UP
-  PIPERACILLIN/TAZOBACTAM: SIGNIFICANT CHANGE UP
-  TOBRAMYCIN: SIGNIFICANT CHANGE UP
CULTURE RESULTS: SIGNIFICANT CHANGE UP
METHOD TYPE: SIGNIFICANT CHANGE UP
ORGANISM # SPEC MICROSCOPIC CNT: SIGNIFICANT CHANGE UP
ORGANISM # SPEC MICROSCOPIC CNT: SIGNIFICANT CHANGE UP
SPECIMEN SOURCE: SIGNIFICANT CHANGE UP

## 2021-07-27 ENCOUNTER — RESULT REVIEW (OUTPATIENT)
Age: 81
End: 2021-07-27

## 2021-07-27 ENCOUNTER — OUTPATIENT (OUTPATIENT)
Dept: OUTPATIENT SERVICES | Facility: HOSPITAL | Age: 81
LOS: 1 days | Discharge: HOME | End: 2021-07-27
Payer: MEDICARE

## 2021-07-27 DIAGNOSIS — C20 MALIGNANT NEOPLASM OF RECTUM: ICD-10-CM

## 2021-07-27 DIAGNOSIS — S68.119A COMPLETE TRAUMATIC METACARPOPHALANGEAL AMPUTATION OF UNSPECIFIED FINGER, INITIAL ENCOUNTER: Chronic | ICD-10-CM

## 2021-07-27 PROCEDURE — 74177 CT ABD & PELVIS W/CONTRAST: CPT | Mod: 26,MH

## 2021-07-27 PROCEDURE — 71260 CT THORAX DX C+: CPT | Mod: 26,MH

## 2021-07-27 NOTE — PHYSICAL THERAPY INITIAL EVALUATION ADULT - ASSISTIVE DEVICE:SIT/SUPINE, REHAB EVAL
Patient calls stating he needs to schedule his \"lung draining.\"    States that he had wanted to wait on the thoracentesis but has changed his mind and feels like he should have it done sooner rather than later.    He saw Dr Vega yesterday, 7/26/2021.      He denies worsening respiratory symptoms or shortness of breath.   He does mention that he would prefer not to have a  \" a tube left in.\" ie pleurex cath.   Dr Duarte: Please advise, thank you.      bed rails

## 2021-07-28 ENCOUNTER — APPOINTMENT (OUTPATIENT)
Dept: INFUSION THERAPY | Facility: CLINIC | Age: 81
End: 2021-07-28

## 2021-07-28 ENCOUNTER — OUTPATIENT (OUTPATIENT)
Dept: OUTPATIENT SERVICES | Facility: HOSPITAL | Age: 81
LOS: 1 days | Discharge: HOME | End: 2021-07-28

## 2021-07-28 DIAGNOSIS — C20 MALIGNANT NEOPLASM OF RECTUM: ICD-10-CM

## 2021-07-28 DIAGNOSIS — S68.119A COMPLETE TRAUMATIC METACARPOPHALANGEAL AMPUTATION OF UNSPECIFIED FINGER, INITIAL ENCOUNTER: Chronic | ICD-10-CM

## 2021-07-28 DIAGNOSIS — C79.89 SECONDARY MALIGNANT NEOPLASM OF OTHER SPECIFIED SITES: ICD-10-CM

## 2021-07-28 DIAGNOSIS — G62.9 POLYNEUROPATHY, UNSPECIFIED: ICD-10-CM

## 2021-07-28 DIAGNOSIS — Z66 DO NOT RESUSCITATE: ICD-10-CM

## 2021-07-28 DIAGNOSIS — Z51.11 ENCOUNTER FOR ANTINEOPLASTIC CHEMOTHERAPY: ICD-10-CM

## 2021-07-28 DIAGNOSIS — E87.5 HYPERKALEMIA: ICD-10-CM

## 2021-07-28 DIAGNOSIS — D50.9 IRON DEFICIENCY ANEMIA, UNSPECIFIED: ICD-10-CM

## 2021-07-28 DIAGNOSIS — B35.1 TINEA UNGUIUM: ICD-10-CM

## 2021-07-28 DIAGNOSIS — M17.10 UNILATERAL PRIMARY OSTEOARTHRITIS, UNSPECIFIED KNEE: ICD-10-CM

## 2021-07-28 RX ORDER — PEGFILGRASTIM-CBQV 6 MG/.6ML
6 INJECTION, SOLUTION SUBCUTANEOUS ONCE
Refills: 0 | Status: COMPLETED | OUTPATIENT
Start: 2021-07-28 | End: 2021-07-28

## 2021-07-28 RX ADMIN — PEGFILGRASTIM-CBQV 6 MILLIGRAM(S): 6 INJECTION, SOLUTION SUBCUTANEOUS at 10:15

## 2021-08-01 ENCOUNTER — INPATIENT (INPATIENT)
Facility: HOSPITAL | Age: 81
LOS: 3 days | Discharge: NURSING, REHAB OR LONG-TERM CARE | End: 2021-08-05
Attending: INTERNAL MEDICINE | Admitting: INTERNAL MEDICINE
Payer: MEDICARE

## 2021-08-01 VITALS
HEART RATE: 76 BPM | OXYGEN SATURATION: 97 % | DIASTOLIC BLOOD PRESSURE: 60 MMHG | SYSTOLIC BLOOD PRESSURE: 140 MMHG | RESPIRATION RATE: 18 BRPM | HEIGHT: 72 IN | TEMPERATURE: 97 F

## 2021-08-01 DIAGNOSIS — S68.119A COMPLETE TRAUMATIC METACARPOPHALANGEAL AMPUTATION OF UNSPECIFIED FINGER, INITIAL ENCOUNTER: Chronic | ICD-10-CM

## 2021-08-01 LAB
ALBUMIN SERPL ELPH-MCNC: 3.2 G/DL — LOW (ref 3.5–5.2)
ALP SERPL-CCNC: 183 U/L — HIGH (ref 30–115)
ALT FLD-CCNC: 6 U/L — SIGNIFICANT CHANGE UP (ref 0–41)
ANION GAP SERPL CALC-SCNC: 9 MMOL/L — SIGNIFICANT CHANGE UP (ref 7–14)
ANISOCYTOSIS BLD QL: SIGNIFICANT CHANGE UP
AST SERPL-CCNC: 18 U/L — SIGNIFICANT CHANGE UP (ref 0–41)
BASOPHILS # BLD AUTO: 0.34 K/UL — HIGH (ref 0–0.2)
BASOPHILS NFR BLD AUTO: 1.7 % — HIGH (ref 0–1)
BILIRUB SERPL-MCNC: 0.3 MG/DL — SIGNIFICANT CHANGE UP (ref 0.2–1.2)
BUN SERPL-MCNC: 7 MG/DL — LOW (ref 10–20)
BURR CELLS BLD QL SMEAR: PRESENT — SIGNIFICANT CHANGE UP
CALCIUM SERPL-MCNC: 9.1 MG/DL — SIGNIFICANT CHANGE UP (ref 8.5–10.1)
CHLORIDE SERPL-SCNC: 109 MMOL/L — SIGNIFICANT CHANGE UP (ref 98–110)
CO2 SERPL-SCNC: 25 MMOL/L — SIGNIFICANT CHANGE UP (ref 17–32)
CREAT SERPL-MCNC: 0.5 MG/DL — LOW (ref 0.7–1.5)
DACRYOCYTES BLD QL SMEAR: SLIGHT — SIGNIFICANT CHANGE UP
ELLIPTOCYTES BLD QL SMEAR: SLIGHT — SIGNIFICANT CHANGE UP
EOSINOPHIL # BLD AUTO: 0.52 K/UL — SIGNIFICANT CHANGE UP (ref 0–0.7)
EOSINOPHIL NFR BLD AUTO: 2.6 % — SIGNIFICANT CHANGE UP (ref 0–8)
GLUCOSE SERPL-MCNC: 82 MG/DL — SIGNIFICANT CHANGE UP (ref 70–99)
HCT VFR BLD CALC: 35.3 % — LOW (ref 42–52)
HGB BLD-MCNC: 10.5 G/DL — LOW (ref 14–18)
LYMPHOCYTES # BLD AUTO: 1.23 K/UL — SIGNIFICANT CHANGE UP (ref 1.2–3.4)
LYMPHOCYTES # BLD AUTO: 6.1 % — LOW (ref 20.5–51.1)
MAGNESIUM SERPL-MCNC: 1.6 MG/DL — LOW (ref 1.8–2.4)
MANUAL SMEAR VERIFICATION: SIGNIFICANT CHANGE UP
MCHC RBC-ENTMCNC: 28.4 PG — SIGNIFICANT CHANGE UP (ref 27–31)
MCHC RBC-ENTMCNC: 29.7 G/DL — LOW (ref 32–37)
MCV RBC AUTO: 95.4 FL — HIGH (ref 80–94)
MICROCYTES BLD QL: SLIGHT — SIGNIFICANT CHANGE UP
MONOCYTES # BLD AUTO: 1.57 K/UL — HIGH (ref 0.1–0.6)
MONOCYTES NFR BLD AUTO: 7.8 % — SIGNIFICANT CHANGE UP (ref 1.7–9.3)
MYELOCYTES NFR BLD: 0.9 % — HIGH (ref 0–0)
NEUTROPHILS # BLD AUTO: 15.78 K/UL — HIGH (ref 1.4–6.5)
NEUTROPHILS NFR BLD AUTO: 77.4 % — HIGH (ref 42.2–75.2)
NEUTS BAND # BLD: 0.9 % — SIGNIFICANT CHANGE UP (ref 0–6)
PLAT MORPH BLD: NORMAL — SIGNIFICANT CHANGE UP
PLATELET # BLD AUTO: 122 K/UL — LOW (ref 130–400)
POIKILOCYTOSIS BLD QL AUTO: SLIGHT — SIGNIFICANT CHANGE UP
POLYCHROMASIA BLD QL SMEAR: SIGNIFICANT CHANGE UP
POTASSIUM SERPL-MCNC: 4.6 MMOL/L — SIGNIFICANT CHANGE UP (ref 3.5–5)
POTASSIUM SERPL-SCNC: 4.6 MMOL/L — SIGNIFICANT CHANGE UP (ref 3.5–5)
PROMYELOCYTES # FLD: 0.9 % — HIGH (ref 0–0)
PROT SERPL-MCNC: 5.9 G/DL — LOW (ref 6–8)
RBC # BLD: 3.7 M/UL — LOW (ref 4.7–6.1)
RBC # FLD: 21.1 % — HIGH (ref 11.5–14.5)
RBC BLD AUTO: ABNORMAL
SARS-COV-2 RNA SPEC QL NAA+PROBE: SIGNIFICANT CHANGE UP
SMUDGE CELLS # BLD: PRESENT — SIGNIFICANT CHANGE UP
SODIUM SERPL-SCNC: 143 MMOL/L — SIGNIFICANT CHANGE UP (ref 135–146)
VARIANT LYMPHS # BLD: 1.7 % — SIGNIFICANT CHANGE UP (ref 0–5)
WBC # BLD: 20.15 K/UL — HIGH (ref 4.8–10.8)
WBC # FLD AUTO: 20.15 K/UL — HIGH (ref 4.8–10.8)

## 2021-08-01 PROCEDURE — 99285 EMERGENCY DEPT VISIT HI MDM: CPT

## 2021-08-01 RX ORDER — PANTOPRAZOLE SODIUM 20 MG/1
40 TABLET, DELAYED RELEASE ORAL
Refills: 0 | Status: DISCONTINUED | OUTPATIENT
Start: 2021-08-01 | End: 2021-08-05

## 2021-08-01 RX ORDER — SALICYLIC ACID 0.5 %
1 CLEANSER (GRAM) TOPICAL
Refills: 0 | Status: DISCONTINUED | OUTPATIENT
Start: 2021-08-01 | End: 2021-08-05

## 2021-08-01 RX ORDER — OXYCODONE AND ACETAMINOPHEN 5; 325 MG/1; MG/1
1 TABLET ORAL EVERY 6 HOURS
Refills: 0 | Status: DISCONTINUED | OUTPATIENT
Start: 2021-08-01 | End: 2021-08-05

## 2021-08-01 RX ORDER — ENOXAPARIN SODIUM 100 MG/ML
40 INJECTION SUBCUTANEOUS DAILY
Refills: 0 | Status: DISCONTINUED | OUTPATIENT
Start: 2021-08-01 | End: 2021-08-05

## 2021-08-01 RX ORDER — MAGNESIUM SULFATE 500 MG/ML
2 VIAL (ML) INJECTION ONCE
Refills: 0 | Status: COMPLETED | OUTPATIENT
Start: 2021-08-01 | End: 2021-08-01

## 2021-08-01 RX ORDER — ACETAMINOPHEN 500 MG
650 TABLET ORAL EVERY 6 HOURS
Refills: 0 | Status: DISCONTINUED | OUTPATIENT
Start: 2021-08-01 | End: 2021-08-05

## 2021-08-01 RX ORDER — CHLORHEXIDINE GLUCONATE 213 G/1000ML
1 SOLUTION TOPICAL
Refills: 0 | Status: DISCONTINUED | OUTPATIENT
Start: 2021-08-01 | End: 2021-08-05

## 2021-08-01 RX ADMIN — Medication 50 GRAM(S): at 19:45

## 2021-08-01 NOTE — ED PROVIDER NOTE - OBJECTIVE STATEMENT
81 y/o male with a PMH of rectal adenocarcinoma (dx in february 2021) on chemotherapy followed by Dr. Leon presents to the ED from [facility] for his next round of chemo due 08/02/2021. pt receives each chemo treatment in the hospital because it is not done at the nursing facility. pt denies fever, chills, cough, abdominal pain, urinary symptoms, back pain, headache, dizziness, chest pain, or sob.

## 2021-08-01 NOTE — H&P ADULT - NSHPPHYSICALEXAM_GEN_ALL_CORE
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

## 2021-08-01 NOTE — ED PROVIDER NOTE - CARE PLAN
Principal Discharge DX:	Admission for chemotherapy  Secondary Diagnosis:	Leukocytosis  Secondary Diagnosis:	Hypomagnesemia

## 2021-08-01 NOTE — ED ADULT NURSE NOTE - CHIEF COMPLAINT QUOTE
pt RAFFAELE from [facility] for chemotherapy pt unsure why he is here this time but A&Ox3 in triage " I feel like a ping pong ball they keep sending me back here"

## 2021-08-01 NOTE — H&P ADULT - HISTORY OF PRESENT ILLNESS
80 year old male with Advanced rectal adenocarcinoma (T4N0M0) diagnosed April 2021 s/p 5 cycles chemo, microcytic anemia, chronic osteoarthritis presents for a planned admission for chemotherapy. 80 year old male with Advanced rectal adenocarcinoma (T4N0M0) diagnosed April 2021 s/p 5 cycles chemo, microcytic anemia, chronic osteoarthritis being admitted for chemotherapy. This is a planned admission--patient has been in his regular state of health, denies any complaints. The patient follows with Dr. Tubbs, received chemo 5/7, 5/27, 6/12, 6/26, and 7/21. He reports having diarrhea at the nursing home that has since resolved. He denies fever, chills, cough, SOB, chest pain, nausea and vomiting. He received Neulasta outpatient on 7/23. Outpatient CTAP on 7/27 showed decrease in the size of rectal mass, no evidence of new metastatic mass.    In the ED, the patient's vitals are wnl, afebrile. Labs significant for WBC 20k, Hgb 10.5, platelets 122k, Magnesium 1.6 (repleted).

## 2021-08-01 NOTE — H&P ADULT - ASSESSMENT
80 year old male with Advanced rectal adenocarcinoma, Microcytic Anemia, Chronic Osteoarthritis admitted for chemotherapy.    #Locally advanced rectal adenocarcinoma (T4N0M0)  - Diagnosed April 2021   - MRI 5/21: 18 cm long polypoidal rectal mass with invasion of the right meso rectal fascia and possibly the prostate gland  - CTAP 7/27: decrease in the size of rectal mass, no evidence of new metastatic mass. Also show foci of gas within the exophytic portion of the lesion possibly 2/2 communication with rectal lumen.  - s/p 5 cycles chemo 5/7, 5/27, 6/12, 6/26, and 7/21; received Neulasta 7/23  - Plan was to have total neoadjuvant chemotherapy followed by chemoRT followed by surgery  - f/u Oncology    #Leukocytosis  - WBC 20k on admission; afebrile, vitals stable; no respiratory, abdominal, or urinary symptoms  - Could be reactive to Neulasta (received 7/23)  - f/u cultures, UA, CXR  - CTAP 7/27 showed foci of gas within exophytic mass possibly 2/2 communication with rectal lumen however no abdominal pain  - Monitor for fevers    #Anemia  - Previously microcytic, now normocytic--likely due to LGIB from cancer  - Hgb stable  - Continue to monitor      DVT PPx: Lovneox  GI PPx: Protonix  Diet: regular    DNR 80 year old male with Advanced rectal adenocarcinoma, Microcytic Anemia, Chronic Osteoarthritis admitted for chemotherapy.    #Locally advanced rectal adenocarcinoma (T4N0M0)  - Diagnosed April 2021   - MRI 5/21: 18 cm long polypoidal rectal mass with invasion of the right meso rectal fascia and possibly the prostate gland  - CTAP 7/27: decrease in the size of rectal mass, no evidence of new metastatic mass. Also show foci of gas within the exophytic portion of the lesion possibly 2/2 communication with rectal lumen.  - s/p 5 cycles chemo 5/7, 5/27, 6/12, 6/26, and 7/21; received Neulasta 7/23  - Plan was to have total neoadjuvant chemotherapy followed by chemoRT followed by surgery  - f/u Oncology    #Leukocytosis  - WBC 20k on admission; afebrile, vitals stable; no respiratory, abdominal, or urinary symptoms  - Could be reactive to Neulasta (received 7/23)  - f/u cultures, UA, CXR  - CTAP 7/27 showed foci of gas within exophytic mass possibly 2/2 communication with rectal lumen however no abdominal pain  - Patient reports diarrhea outpatient however states it has resolved; infectious workup if recurs  - Monitor for fevers    #Anemia  - Previously microcytic, now normocytic--likely due to LGIB from cancer  - Hgb stable  - Continue to monitor      DVT PPx: Lovneox  GI PPx: Protonix  Diet: regular    DNR 80 year old male with Advanced rectal adenocarcinoma, Microcytic Anemia, Chronic Osteoarthritis admitted for chemotherapy.    #Locally advanced rectal adenocarcinoma (T4N0M0)  - Diagnosed April 2021   - MRI 5/21: 18 cm long polypoidal rectal mass with invasion of the right meso rectal fascia and possibly the prostate gland  - CTAP 7/27: decrease in the size of rectal mass, no evidence of new metastatic mass. Also show foci of gas within the exophytic portion of the lesion possibly 2/2 communication with rectal lumen.  - s/p 5 cycles chemo 5/7, 5/27, 6/12, 6/26, and 7/21; received Neulasta 7/23  - Plan was to have total neoadjuvant chemotherapy followed by chemoRT followed by surgery  - f/u Oncology    #Leukocytosis  - WBC 20k on admission; afebrile, vitals stable; no respiratory, abdominal, or urinary symptoms  - Could be reactive to Neulasta (received 7/23)  - f/u cultures, UA, CXR  - CTAP 7/27 showed foci of gas within exophytic mass possibly 2/2 communication with rectal lumen however no abdominal pain  - Patient reports diarrhea outpatient however states it has resolved; infectious workup if recurs  - Monitor for fevers    #Anemia  - Chronic--likely due to LGIB from cancer  - Hgb stable  - Continue to monitor      DVT PPx: Lovneox  GI PPx: Protonix  Diet: regular    DNR Bilobed Flap Text: The defect edges were debeveled with a #15 scalpel blade.  Given the location of the defect and the proximity to free margins a bilobe flap was deemed most appropriate.  Using a sterile surgical marker, an appropriate bilobe flap drawn around the defect.    The area thus outlined was incised deep to adipose tissue with a #15 scalpel blade.  The skin margins were undermined to an appropriate distance in all directions utilizing iris scissors.

## 2021-08-01 NOTE — ED PROVIDER NOTE - CLINICAL SUMMARY MEDICAL DECISION MAKING FREE TEXT BOX
80M with PMH advanced rectal adenocarcinome (T2N0M0, s/p 5 rounds of chemo), followed by Dr. Tubbs, microcytic anemia, who presents for admission for another cycle of FOLFOX. Pt afebrile, not ill appearing. labs, ekg, reviewed. WBC 20 (14 previously), however pt afebrile, no bandemia, and no source for sepsis at this time. BCx sent. Abx held as question dx of sepsis at this time. pt admitted for chemi.

## 2021-08-01 NOTE — ED ADULT TRIAGE NOTE - CHIEF COMPLAINT QUOTE
pt RAFFAELE from [facility] for chemotherapy pt unsure why he is here this time but A&Ox3 in triage " I feel like a ping pong ball they keep sending me back here"

## 2021-08-01 NOTE — ED ADULT NURSE NOTE - OBJECTIVE STATEMENT
Pt send from  [facility] for Chemotherapy ,pt denies no pain no SOB no N/V nodizziness on the bed NAD noted  no fever o chills .

## 2021-08-01 NOTE — ED PROVIDER NOTE - ATTENDING CONTRIBUTION TO CARE
80M with PMH advanced rectal adenocarcinome (T2N0M0, s/p 5 rounds of chemo), followed by Dr. Tubbs, microcytic anemia, who presents for admission for another cycle of FOLFOX. 80M with PMH advanced rectal adenocarcinome (T2N0M0, s/p 5 rounds of chemo), followed by Dr. Tubbs, microcytic anemia, who presents for admission for another cycle of FOLFOX. Patient has no acute complaints; feels strongly about his diet and has many questions for inpatient nutrition team. Denies n/v/d/f/c, cp, sob.     Gen - very pleasant, talkative elderly gentleman sitting in NAD, Head - NCAT, Pharynx - clear, MMM, Heart - RRR, no m/g/r, Lungs - CTAB, no w/c/r, Abdomen - soft, NT, ND, Skin - No rash, Extremities - FROM, no edema, erythema, ecchymosis, brisk cap refill, Neuro - A&O x3, equal strength and sensation, non-focal exam    a/p: admission ekg, labs, cxr obtained. will reassess

## 2021-08-01 NOTE — ED PROVIDER NOTE - PHYSICAL EXAMINATION
Physical Exam    Vital Signs: I have reviewed the initial vital signs.  Constitutional: well-nourished, appears stated age, no acute distress  Eyes: Conjunctiva pink, Sclera clear  Cardiovascular: S1 and S2, regular rate, regular rhythm, well-perfused extremities, radial pulses equal and 2+ b/l.   Respiratory: unlabored respiratory effort, clear to auscultation bilaterally no wheezing, rales and rhonchi. pt is speaking full sentences. no accessory muscle use.   Gastrointestinal: soft, non-tender, nondistended abdomen, no pulsatile mass, normal bowl sounds, no rebound, no guarding, no organomegaly.   Musculoskeletal: supple neck, no lower extremity edema, no calf tenderness, FROM of b/l upper and lower extremities.   Integumentary: warm, dry, no rash  Neurologic: awake, alert, cranial nerves II-XII grossly intact, extremities’ motor and sensory functions grossly intact.   Psychiatric: appropriate mood, appropriate affect

## 2021-08-02 LAB
ALBUMIN SERPL ELPH-MCNC: 3.1 G/DL — LOW (ref 3.5–5.2)
ALP SERPL-CCNC: 180 U/L — HIGH (ref 30–115)
ALT FLD-CCNC: 7 U/L — SIGNIFICANT CHANGE UP (ref 0–41)
ANION GAP SERPL CALC-SCNC: 12 MMOL/L — SIGNIFICANT CHANGE UP (ref 7–14)
APPEARANCE UR: CLEAR — SIGNIFICANT CHANGE UP
AST SERPL-CCNC: 15 U/L — SIGNIFICANT CHANGE UP (ref 0–41)
BASOPHILS # BLD AUTO: 0.23 K/UL — HIGH (ref 0–0.2)
BASOPHILS NFR BLD AUTO: 1.7 % — HIGH (ref 0–1)
BILIRUB SERPL-MCNC: 0.4 MG/DL — SIGNIFICANT CHANGE UP (ref 0.2–1.2)
BILIRUB UR-MCNC: NEGATIVE — SIGNIFICANT CHANGE UP
BUN SERPL-MCNC: 6 MG/DL — LOW (ref 10–20)
CALCIUM SERPL-MCNC: 8.7 MG/DL — SIGNIFICANT CHANGE UP (ref 8.5–10.1)
CHLORIDE SERPL-SCNC: 109 MMOL/L — SIGNIFICANT CHANGE UP (ref 98–110)
CO2 SERPL-SCNC: 25 MMOL/L — SIGNIFICANT CHANGE UP (ref 17–32)
COLOR SPEC: YELLOW — SIGNIFICANT CHANGE UP
COVID-19 SPIKE DOMAIN AB INTERP: POSITIVE
COVID-19 SPIKE DOMAIN ANTIBODY RESULT: 72.5 U/ML — HIGH
CREAT SERPL-MCNC: 0.5 MG/DL — LOW (ref 0.7–1.5)
DIFF PNL FLD: NEGATIVE — SIGNIFICANT CHANGE UP
EOSINOPHIL # BLD AUTO: 0.53 K/UL — SIGNIFICANT CHANGE UP (ref 0–0.7)
EOSINOPHIL NFR BLD AUTO: 3.9 % — SIGNIFICANT CHANGE UP (ref 0–8)
GLUCOSE SERPL-MCNC: 67 MG/DL — LOW (ref 70–99)
GLUCOSE UR QL: NEGATIVE — SIGNIFICANT CHANGE UP
HCT VFR BLD CALC: 35.2 % — LOW (ref 42–52)
HGB BLD-MCNC: 10.7 G/DL — LOW (ref 14–18)
IMM GRANULOCYTES NFR BLD AUTO: 6.3 % — HIGH (ref 0.1–0.3)
KETONES UR-MCNC: NEGATIVE — SIGNIFICANT CHANGE UP
LEUKOCYTE ESTERASE UR-ACNC: NEGATIVE — SIGNIFICANT CHANGE UP
LYMPHOCYTES # BLD AUTO: 1.72 K/UL — SIGNIFICANT CHANGE UP (ref 1.2–3.4)
LYMPHOCYTES # BLD AUTO: 12.7 % — LOW (ref 20.5–51.1)
MAGNESIUM SERPL-MCNC: 1.9 MG/DL — SIGNIFICANT CHANGE UP (ref 1.8–2.4)
MCHC RBC-ENTMCNC: 28.8 PG — SIGNIFICANT CHANGE UP (ref 27–31)
MCHC RBC-ENTMCNC: 30.4 G/DL — LOW (ref 32–37)
MCV RBC AUTO: 94.9 FL — HIGH (ref 80–94)
MONOCYTES # BLD AUTO: 1.63 K/UL — HIGH (ref 0.1–0.6)
MONOCYTES NFR BLD AUTO: 12 % — HIGH (ref 1.7–9.3)
NEUTROPHILS # BLD AUTO: 8.59 K/UL — HIGH (ref 1.4–6.5)
NEUTROPHILS NFR BLD AUTO: 63.4 % — SIGNIFICANT CHANGE UP (ref 42.2–75.2)
NITRITE UR-MCNC: NEGATIVE — SIGNIFICANT CHANGE UP
NRBC # BLD: 0 /100 WBCS — SIGNIFICANT CHANGE UP (ref 0–0)
PH UR: 6 — SIGNIFICANT CHANGE UP (ref 5–8)
PHOSPHATE SERPL-MCNC: 4.1 MG/DL — SIGNIFICANT CHANGE UP (ref 2.1–4.9)
PLATELET # BLD AUTO: 118 K/UL — LOW (ref 130–400)
POTASSIUM SERPL-MCNC: 3.8 MMOL/L — SIGNIFICANT CHANGE UP (ref 3.5–5)
POTASSIUM SERPL-SCNC: 3.8 MMOL/L — SIGNIFICANT CHANGE UP (ref 3.5–5)
PROT SERPL-MCNC: 5.7 G/DL — LOW (ref 6–8)
PROT UR-MCNC: SIGNIFICANT CHANGE UP
RBC # BLD: 3.71 M/UL — LOW (ref 4.7–6.1)
RBC # FLD: 21.1 % — HIGH (ref 11.5–14.5)
SARS-COV-2 IGG+IGM SERPL QL IA: 72.5 U/ML — HIGH
SARS-COV-2 IGG+IGM SERPL QL IA: POSITIVE
SODIUM SERPL-SCNC: 146 MMOL/L — SIGNIFICANT CHANGE UP (ref 135–146)
SP GR SPEC: 1.02 — SIGNIFICANT CHANGE UP (ref 1.01–1.03)
UROBILINOGEN FLD QL: SIGNIFICANT CHANGE UP
WBC # BLD: 13.55 K/UL — HIGH (ref 4.8–10.8)
WBC # FLD AUTO: 13.55 K/UL — HIGH (ref 4.8–10.8)

## 2021-08-02 PROCEDURE — 99223 1ST HOSP IP/OBS HIGH 75: CPT

## 2021-08-02 RX ORDER — DEXAMETHASONE 0.5 MG/5ML
12 ELIXIR ORAL ONCE
Refills: 0 | Status: COMPLETED | OUTPATIENT
Start: 2021-08-02 | End: 2021-08-02

## 2021-08-02 RX ORDER — ONDANSETRON 8 MG/1
16 TABLET, FILM COATED ORAL ONCE
Refills: 0 | Status: COMPLETED | OUTPATIENT
Start: 2021-08-02 | End: 2021-08-02

## 2021-08-02 RX ORDER — FLUOROURACIL 50 MG/ML
2400 INJECTION, SOLUTION INTRAVENOUS EVERY 24 HOURS
Refills: 0 | Status: COMPLETED | OUTPATIENT
Start: 2021-08-02 | End: 2021-08-03

## 2021-08-02 RX ORDER — LEUCOVORIN CALCIUM 5 MG
800 TABLET ORAL ONCE
Refills: 0 | Status: COMPLETED | OUTPATIENT
Start: 2021-08-02 | End: 2021-08-02

## 2021-08-02 RX ORDER — LOPERAMIDE HCL 2 MG
4 TABLET ORAL ONCE
Refills: 0 | Status: COMPLETED | OUTPATIENT
Start: 2021-08-02 | End: 2021-08-02

## 2021-08-02 RX ORDER — FLUOROURACIL 50 MG/ML
800 INJECTION, SOLUTION INTRAVENOUS ONCE
Refills: 0 | Status: COMPLETED | OUTPATIENT
Start: 2021-08-02 | End: 2021-08-02

## 2021-08-02 RX ORDER — OXALIPLATIN 5 MG/ML
170 INJECTION, SOLUTION INTRAVENOUS ONCE
Refills: 0 | Status: COMPLETED | OUTPATIENT
Start: 2021-08-02 | End: 2021-08-02

## 2021-08-02 RX ORDER — LOPERAMIDE HCL 2 MG
2 TABLET ORAL
Refills: 0 | Status: DISCONTINUED | OUTPATIENT
Start: 2021-08-02 | End: 2021-08-03

## 2021-08-02 RX ORDER — FOSAPREPITANT DIMEGLUMINE 150 MG/5ML
150 INJECTION, POWDER, LYOPHILIZED, FOR SOLUTION INTRAVENOUS ONCE
Refills: 0 | Status: COMPLETED | OUTPATIENT
Start: 2021-08-02 | End: 2021-08-02

## 2021-08-02 RX ORDER — LOPERAMIDE HCL 2 MG
TABLET ORAL
Refills: 0 | Status: DISCONTINUED | OUTPATIENT
Start: 2021-08-02 | End: 2021-08-03

## 2021-08-02 RX ADMIN — FLUOROURACIL 198 MILLIGRAM(S): 50 INJECTION, SOLUTION INTRAVENOUS at 20:39

## 2021-08-02 RX ADMIN — Medication 145 MILLIGRAM(S): at 17:08

## 2021-08-02 RX ADMIN — Medication 106 MILLIGRAM(S): at 16:05

## 2021-08-02 RX ADMIN — ONDANSETRON 116 MILLIGRAM(S): 8 TABLET, FILM COATED ORAL at 16:05

## 2021-08-02 RX ADMIN — Medication 1 APPLICATION(S): at 05:43

## 2021-08-02 RX ADMIN — Medication 4 MILLIGRAM(S): at 17:59

## 2021-08-02 RX ADMIN — OXALIPLATIN 142 MILLIGRAM(S): 5 INJECTION, SOLUTION INTRAVENOUS at 17:08

## 2021-08-02 RX ADMIN — FLUOROURACIL 43.67 MILLIGRAM(S): 50 INJECTION, SOLUTION INTRAVENOUS at 21:56

## 2021-08-02 RX ADMIN — FOSAPREPITANT DIMEGLUMINE 300 MILLIGRAM(S): 150 INJECTION, POWDER, LYOPHILIZED, FOR SOLUTION INTRAVENOUS at 16:05

## 2021-08-02 RX ADMIN — CHLORHEXIDINE GLUCONATE 1 APPLICATION(S): 213 SOLUTION TOPICAL at 05:42

## 2021-08-02 RX ADMIN — PANTOPRAZOLE SODIUM 40 MILLIGRAM(S): 20 TABLET, DELAYED RELEASE ORAL at 05:43

## 2021-08-02 RX ADMIN — ENOXAPARIN SODIUM 40 MILLIGRAM(S): 100 INJECTION SUBCUTANEOUS at 11:10

## 2021-08-02 NOTE — PROGRESS NOTE ADULT - SUBJECTIVE AND OBJECTIVE BOX
MISAEL HUFFMAN 80y Male  MRN#: [id number]     SUBJECTIVE  Patient is a 80y old Male who presents with a chief complaint of Chemotherapy (01 Aug 2021 20:31)  Today is hospital day 1d, and this morning he is lying in bed without distress.   No acute overnight events.     OBJECTIVE  PAST MEDICAL & SURGICAL HISTORY  Iron deficiency anemia  Osteoarthritis  Amputation of digit of left hand    ALLERGIES:  No Known Allergies    MEDICATIONS:  STANDING MEDICATIONS  chlorhexidine 4% Liquid 1 Application(s) Topical <User Schedule>  dexAMETHasone  IVPB 12 milliGRAM(s) IV Intermittent once  enoxaparin Injectable 40 milliGRAM(s) SubCutaneous daily  fluorouracil IVPB (eMAR) 800 milliGRAM(s) IV Intermittent once  fluorouracil IVPB (eMAR) 2400 milliGRAM(s) IV Intermittent every 24 hours  fosaprepitant IVPB 150 milliGRAM(s) IV Intermittent once  leucovorin IVPB (eMAR) 800 milliGRAM(s) IV Intermittent once  ondansetron  IVPB 16 milliGRAM(s) IV Intermittent once  oxaliplatin IVPB (eMAR) 170 milliGRAM(s) IV Intermittent once  pantoprazole    Tablet 40 milliGRAM(s) Oral before breakfast  vitamin A &amp; D Ointment 1 Application(s) Topical two times a day    PRN MEDICATIONS  acetaminophen   Tablet .. 650 milliGRAM(s) Oral every 6 hours PRN  oxycodone    5 mG/acetaminophen 325 mG 1 Tablet(s) Oral every 6 hours PRN    HOME MEDICATIONS  Home Medications:  lidocaine 5% topical film: Apply topically to affected area once, As Needed for pain.. (22 Jul 2021 09:16)  loperamide 2 mg oral capsule: 1 cap(s) orally 2 times a day, As Needed (22 Jul 2021 09:59)  oxycodone-acetaminophen 5 mg-325 mg oral tablet: 1 tab(s) orally every 6 hours, As needed, Moderate Pain (4 - 6) (18 Jul 2021 19:06)  pantoprazole 40 mg oral delayed release tablet: 1 tab(s) orally once a day (before a meal) (18 Jul 2021 19:06)  senna oral tablet: 2 tab(s) orally once a day (at bedtime) (22 Jul 2021 09:16)      VITAL SIGNS: Last 24 Hours  T(C): 36.4 (02 Aug 2021 13:48), Max: 36.9 (01 Aug 2021 18:10)  T(F): 97.6 (02 Aug 2021 13:48), Max: 98.4 (01 Aug 2021 18:10)  HR: 82 (02 Aug 2021 13:48) (69 - 82)  BP: 116/56 (02 Aug 2021 13:48) (116/56 - 127/62)  BP(mean): --  RR: 20 (02 Aug 2021 13:48) (17 - 20)  SpO2: 96% (01 Aug 2021 22:19) (96% - 97%)    08-01-21 @ 07:01  -  08-02-21 @ 07:00  --------------------------------------------------------  IN: 0 mL / OUT: 700 mL / NET: -700 mL      LABS:                        10.7   13.55 )-----------( 118      ( 02 Aug 2021 04:30 )             35.2     08-02    146  |  109  |  6<L>  ----------------------------<  67<L>  3.8   |  25  |  0.5<L>    Ca    8.7      02 Aug 2021 04:30  Phos  4.1     08-02  Mg     1.9     08-02    TPro  5.7<L>  /  Alb  3.1<L>  /  TBili  0.4  /  DBili  x   /  AST  15  /  ALT  7   /  AlkPhos  180<H>  08-02    LIVER FUNCTIONS - ( 02 Aug 2021 04:30 )  Alb: 3.1 g/dL / Pro: 5.7 g/dL / ALK PHOS: 180 U/L / ALT: 7 U/L / AST: 15 U/L / GGT: x           CAPILLARY BLOOD GLUCOSE    RADIOLOGY:    < from: CT Abdomen and Pelvis w/ Oral Cont and w/ IV Cont (07.27.21 @ 17:47) >  IMPRESSION:  When compared to 4/22/2021:  Interval decrease in size of rectal wall thickening and rectal mass, inherently limited in evaluation on CT.  The exophytic component which abuts the right posterior prostate gland and seminal vesicles measures approximately 4 x 3 cm (previously 6 x 3 cm). Foci of gas are noted within which may be secondary to communication with the rectal lumen. Limited CT ability to exclude superimposed infection.  Circumferential rectal wall thickening, decreased measuring up to 1 cm (previously up to at least 2 cm).  Pelvic MRI can be utilized for further detail/evaluation.  Redemonstration of perirectal lymph nodes measuring up to 0.7 cm.  No evidence for new or worsening metastatic disease/lymphadenopathy.  --- End of Report ---  < end of copied text >    < from: CT Chest w/ IV Cont (07.27.21 @ 17:47) >  Impression:  Interval growth of right lung pulmonary nodule, now measuring 8 mm.  Interval development of adjacent 5 mm nodule.  Nonspecific mediastinal lymph nodes.  < end of copied text >      PHYSICAL EXAM:  GENERAL: NAD, AAO x 4, 80y M  HEAD:  Atraumatic, Normocephalic  EYES: EOMI, conjunctiva clear and sclera white  NECK: Supple, No JVD  CHEST/LUNG: Clear to auscultation bilaterally; No wheeze; No crackles; No accessory muscles used  HEART: Regular rate and rhythm; No murmurs;   ABDOMEN: Soft, Nontender, Nondistended; Bowel sounds present; No guarding  EXTREMITIES:  2+ Peripheral Pulses, No cyanosis or edema  NEUROLOGY: non-focal    ADMISSION SUMMARY  Patient is a 80y old Male who presents with a chief complaint of Chemotherapy (01 Aug 2021 20:31)

## 2021-08-02 NOTE — PROGRESS NOTE ADULT - ASSESSMENT
· Assessment	  80 year old male with Advanced rectal adenocarcinoma, Microcytic Anemia, Chronic Osteoarthritis who presented from [facility] for in-hospital chemotherapy treatment    # Locally advanced rectal adenocarcinoma (T4N0M0) s/p 5 cycles  - Diagnosed late April 2021 - MRI: 18 cm long polypoidal rectal mass with invasion of the right meso rectal fascia and possibly the prostate gland. Additional involvement of the anal sphincter complex--T4(a or b)N0M0  - repeat CT on 7/28 shows tumor shrinkage indicating good response to the chemo  - plan is to have total neoadjuvant chemotherapy followed by chemoRT followed by surgery   - consult radon for plan for chemoRT, need  involved for pt's Xeloda when starting chemoRT   -->8/2 Rad onc consult placed  - last CEA on 6/28 was 10 (downtrending)  -8/2  readmitted for 6th cycle of FOLFOX, due for C6D1 today     #Diarrhea  -8/2 C/o liquid stool, with undigested bits of food, Rx'd loperamide 4mg then 2mg after each stool will f/u     # Iron deficiency anemia, stable  - likely secondary to LGIB from cancer   - Hgb at baseline (~10), currently 10.7 on current admission    # Leukocytosis likely related to filgrastim use  - doubt infection given no fever, tachycardia, chill  - received filgrastim on 7/28/21  - trend CBC   - panculture if fever develops    # Mycotic toenails - podiatry outpatient f/u     #Knee pain, patient reports RA history  - Lidocaine patch PRN  - Outpatient followup, pt was scheduled an appointment w/ ortho on 8/18    # R lung pulmonary nodules   # Pancreatic hypodensity  - c/w surveillance of the pulmonary nodules (too small to biopsy)  - MRI of pancreas to better assess the pancreatic hypodensity (which can be done outpatient)    Diet: Regular diet  GI ppx: Protonix  DVT ppx: Lovenox 40 mg qD  Activity: Increase as tolerated  Disposition: acute, pending chemo tx, rad onc consult

## 2021-08-02 NOTE — PROGRESS NOTE ADULT - SUBJECTIVE AND OBJECTIVE BOX
MISAEL HUFFMAN   [id number]  80y   Male   SUBJECTIVE:  Patient is a 80y old Male who presents with a chief complaint of Chemotherapy for rectal adenocarcinoma (02 Aug 2021 15:44)    Today is hospital day 1d. This morning he is resting comfortably in bed and reports no new issues or overnight events. During history taking he had circumstantial thought process. Difficult to get a short concise answer from him. He had no complaints. Pt is here for planned chemotherapy, this will be his last cycle before starting the chemoradiation therapy portion of his cancer treatment.     Later in day was told he was having diarrhea prescribed loperamide, will f/u   Currently admitted to medicine with the primary diagnosis of Admission for chemotherapy       PAST MEDICAL & SURGICAL HISTORY  Iron deficiency anemia    Osteoarthritis    Amputation of digit of left hand      FAMILY HISTORY:  No pertinent family history in first degree relatives      SOCIAL HISTORY:  Marital Status:  Living Situation:  Occupation:  Tobacco Use:  Alcohol Use:  Drug Use:  Sexual History:    ALLERGIES:  No Known Allergies    MEDICATIONS:  STANDING MEDICATIONS  chlorhexidine 4% Liquid 1 Application(s) Topical <User Schedule>  enoxaparin Injectable 40 milliGRAM(s) SubCutaneous daily  fluorouracil IVPB (eMAR) 800 milliGRAM(s) IV Intermittent once  fluorouracil IVPB (eMAR) 2400 milliGRAM(s) IV Intermittent every 24 hours  loperamide      loperamide 2 milliGRAM(s) Oral four times a day  pantoprazole    Tablet 40 milliGRAM(s) Oral before breakfast  vitamin A &amp; D Ointment 1 Application(s) Topical two times a day    PRN MEDICATIONS  acetaminophen   Tablet .. 650 milliGRAM(s) Oral every 6 hours PRN  oxycodone    5 mG/acetaminophen 325 mG 1 Tablet(s) Oral every 6 hours PRN    VITALS:   T(F): 97.2 (08-02-21 @ 16:54)  HR: 81 (08-02-21 @ 16:54)  BP: 115/54 (08-02-21 @ 16:54)  BP(mean): --  RR: 20 (08-02-21 @ 13:48)  SpO2: 96% (08-02-21 @ 16:54)    LABS:                        10.7   13.55 )-----------( 118      ( 02 Aug 2021 04:30 )             35.2     Hemoglobin: 10.7 g/dL (08-02 @ 04:30)  Hemoglobin: 10.5 g/dL (08-01 @ 16:25)    08-02    146  |  109  |  6<L>  ----------------------------<  67<L>  3.8   |  25  |  0.5<L>    Ca    8.7      02 Aug 2021 04:30  Phos  4.1     08-02  Mg     1.9     08-02    TPro  5.7<L>  /  Alb  3.1<L>  /  TBili  0.4  /  DBili  x   /  AST  15  /  ALT  7   /  AlkPhos  180<H>  08-02    Potassium Trend: 3.8<--, 4.6<--  SODIUM TREND:  Sodium 146 [08-02 @ 04:30]  Sodium 143 [08-01 @ 16:25]  Sodium 142 [07-22 @ 05:05]  Sodium 139 [07-21 @ 07:03]  Sodium 139 [07-20 @ 07:27]  Sodium 138 [07-19 @ 22:44]  Sodium 138 [07-19 @ 11:55]  Sodium 139 [07-19 @ 07:46]  Sodium 139 [07-18 @ 16:00]    Creatinine Trend: 0.5<--, 0.5<--, 0.5<--, 0.6<--, 0.5<--, 0.5<--        Calcium, Total Serum: 8.7 mg/dL (08-02-21 @ 04:30)  Magnesium, Serum: 1.9 mg/dL (08-02-21 @ 04:30)  Phosphorus Level, Serum: 4.1 mg/dL (08-02-21 @ 04:30)          COVID  08-01-21 @ 14:35  COVID -   NotDetec  07-18-21 @ 16:25  COVID -   NotDetec  06-28-21 @ 17:23  COVID -   NotDetec  06-10-21 @ 15:03  COVID -   NotDetec  06-01-21 @ 14:25  COVID -   NotDetec  05-17-21 @ 11:21  COVID -   NotDetec  05-14-21 @ 06:50  COVID -   NotDetec  05-05-21 @ 14:10  COVID -   NotDetec  05-01-21 @ 09:45  COVID -   NotDetec  04-29-21 @ 14:33  COVID -   NotDetec  04-26-21 @ 13:47  COVID -   NotDetec  04-23-21 @ 16:53  COVID -   NotDetec      08-01-21 @ 07:01  -  08-02-21 @ 07:00  --------------------------------------------------------  IN: 0 mL / OUT: 700 mL / NET: -700 mL      RADIOLOGY:  < from: CT Abdomen and Pelvis w/ Oral Cont and w/ IV Cont (07.27.21 @ 17:47) >  IMPRESSION:    When compared to 4/22/2021:    Interval decrease in size of rectal wall thickening and rectal mass, inherently limited in evaluation on CT.  The exophytic component which abuts the right posterior prostate gland and seminal vesicles measures approximately 4 x 3 cm (previously 6 x 3 cm). Foci of gas are noted within which may be secondary to communication with the rectal lumen. Limited CT ability to exclude superimposed infection.  Circumferential rectal wall thickening, decreased measuring up to 1 cm (previously up to at least 2 cm).  Pelvic MRI can be utilized for further detail/evaluation.    Redemonstration of perirectal lymph nodes measuring up to 0.7 cm.    No evidence for new or worsening metastatic disease/lymphadenopathy.    < end of copied text >      PHYSICAL EXAM:  GEN: No acute distress  HEENT: normocephalic, atraumatic, aniceteric  LUNGS: Clear to auscultation bilaterally, no rales/wheezing/ rhonchi  HEART: S1/S2 present. RRR, no murmurs  ABD: Soft, non-tender, non-distended. Bowel sounds present`  EXT: Warm, well perfused, skin color appropriate for ethnicity, +distal pulses, +motor/sensory fnxn intact x all 4 ext's.   NEURO: AAOX4, normal affect

## 2021-08-02 NOTE — PROGRESS NOTE ADULT - ASSESSMENT
80 year old male with Advanced rectal adenocarcinoma, Microcytic Anemia, Chronic Osteoarthritis who presented from [facility] for chemotherapy.    # Locally advanced rectal adenocarcinoma (T4N0M0) s/p 4 cycles  - Diagnosed late April 2021 - MRI: 18 cm long polypoidal rectal mass with invasion of the right meso rectal fascia and possibly the prostate gland. Additional involvement of the anal sphincter complex--T4(a or b)N0M0  - repeat CT on 7/28 shows tumor shrinkage indicating good response to the chemo  - had extensive discussion with pt about the importance of getting chemo prior to getting surgery   - plan was to have total neoadjuvant chemotherapy followed by chemoRT followed by surgery   - consult [facility] for plan for chemoRT, need  involved for pt's Xeloda when starting chemoRT  - s/p 5 cycles of chemo s/p port placement (5/7, 5/27, 6/12, 6/26, 7/21)  - last CEA on 6/28 was 10 (downtrending)  - readmitted for 6th cycle of FOLFOX, due for C6D1 today     # Iron deficiency anemia, stable  - likely secondary to LGIB from cancer   - Hgb at baseline (~10)    # Leukocytosis likely related to filgrastim use  - doubt infection given no fever, tachycardia, chill  - received filgrastim on 7/28/21  - trend CBC   - panculture if fever develops    # Mycotic toenails - podiatry outpatient f/u     #Knee pain, patient reports RA history  - Lidocaine patch PRN  - Outpatient followup, pt was scheduled an appointment w/ ortho on 8/18    # R lung pulmonary nodules   # Pancreatic hypodensity  - c/w surveillance of the pulmonary nodules (too small to biopsy)  - MRI of pancreas to better assess the pancreatic hypodensity (which can be done outpatient)    Diet: Regular diet  GI ppx: Protonix  DVT ppx: Lovenox 40 mg qD  Activity: Increase as tolerated  Disposition: plan for d/c to SNF today

## 2021-08-03 LAB
ALBUMIN SERPL ELPH-MCNC: 3 G/DL — LOW (ref 3.5–5.2)
ALP SERPL-CCNC: 202 U/L — HIGH (ref 30–115)
ALT FLD-CCNC: 8 U/L — SIGNIFICANT CHANGE UP (ref 0–41)
ANION GAP SERPL CALC-SCNC: 10 MMOL/L — SIGNIFICANT CHANGE UP (ref 7–14)
APPEARANCE UR: CLEAR — SIGNIFICANT CHANGE UP
AST SERPL-CCNC: 20 U/L — SIGNIFICANT CHANGE UP (ref 0–41)
BASOPHILS # BLD AUTO: 0.07 K/UL — SIGNIFICANT CHANGE UP (ref 0–0.2)
BASOPHILS NFR BLD AUTO: 0.3 % — SIGNIFICANT CHANGE UP (ref 0–1)
BILIRUB SERPL-MCNC: 0.3 MG/DL — SIGNIFICANT CHANGE UP (ref 0.2–1.2)
BILIRUB UR-MCNC: NEGATIVE — SIGNIFICANT CHANGE UP
BUN SERPL-MCNC: 10 MG/DL — SIGNIFICANT CHANGE UP (ref 10–20)
C DIFF BY PCR RESULT: NEGATIVE — SIGNIFICANT CHANGE UP
C DIFF TOX GENS STL QL NAA+PROBE: SIGNIFICANT CHANGE UP
CALCIUM SERPL-MCNC: 8.8 MG/DL — SIGNIFICANT CHANGE UP (ref 8.5–10.1)
CHLORIDE SERPL-SCNC: 106 MMOL/L — SIGNIFICANT CHANGE UP (ref 98–110)
CO2 SERPL-SCNC: 24 MMOL/L — SIGNIFICANT CHANGE UP (ref 17–32)
COLOR SPEC: SIGNIFICANT CHANGE UP
CREAT SERPL-MCNC: 0.5 MG/DL — LOW (ref 0.7–1.5)
DIFF PNL FLD: NEGATIVE — SIGNIFICANT CHANGE UP
EOSINOPHIL # BLD AUTO: 0.01 K/UL — SIGNIFICANT CHANGE UP (ref 0–0.7)
EOSINOPHIL NFR BLD AUTO: 0 % — SIGNIFICANT CHANGE UP (ref 0–8)
GLUCOSE SERPL-MCNC: 170 MG/DL — HIGH (ref 70–99)
GLUCOSE UR QL: SIGNIFICANT CHANGE UP
HCT VFR BLD CALC: 37.4 % — LOW (ref 42–52)
HGB BLD-MCNC: 11.1 G/DL — LOW (ref 14–18)
IMM GRANULOCYTES NFR BLD AUTO: 7.7 % — HIGH (ref 0.1–0.3)
KETONES UR-MCNC: NEGATIVE — SIGNIFICANT CHANGE UP
LEUKOCYTE ESTERASE UR-ACNC: NEGATIVE — SIGNIFICANT CHANGE UP
LYMPHOCYTES # BLD AUTO: 1.07 K/UL — LOW (ref 1.2–3.4)
LYMPHOCYTES # BLD AUTO: 4.5 % — LOW (ref 20.5–51.1)
MAGNESIUM SERPL-MCNC: 1.5 MG/DL — LOW (ref 1.8–2.4)
MCHC RBC-ENTMCNC: 28.4 PG — SIGNIFICANT CHANGE UP (ref 27–31)
MCHC RBC-ENTMCNC: 29.7 G/DL — LOW (ref 32–37)
MCV RBC AUTO: 95.7 FL — HIGH (ref 80–94)
MONOCYTES # BLD AUTO: 1.28 K/UL — HIGH (ref 0.1–0.6)
MONOCYTES NFR BLD AUTO: 5.4 % — SIGNIFICANT CHANGE UP (ref 1.7–9.3)
NEUTROPHILS # BLD AUTO: 19.42 K/UL — HIGH (ref 1.4–6.5)
NEUTROPHILS NFR BLD AUTO: 82.1 % — HIGH (ref 42.2–75.2)
NITRITE UR-MCNC: NEGATIVE — SIGNIFICANT CHANGE UP
NRBC # BLD: 0 /100 WBCS — SIGNIFICANT CHANGE UP (ref 0–0)
PH UR: 6 — SIGNIFICANT CHANGE UP (ref 5–8)
PLATELET # BLD AUTO: 134 K/UL — SIGNIFICANT CHANGE UP (ref 130–400)
POTASSIUM SERPL-MCNC: 4.3 MMOL/L — SIGNIFICANT CHANGE UP (ref 3.5–5)
POTASSIUM SERPL-SCNC: 4.3 MMOL/L — SIGNIFICANT CHANGE UP (ref 3.5–5)
PROT SERPL-MCNC: 5.8 G/DL — LOW (ref 6–8)
PROT UR-MCNC: NEGATIVE — SIGNIFICANT CHANGE UP
RBC # BLD: 3.91 M/UL — LOW (ref 4.7–6.1)
RBC # FLD: 20.3 % — HIGH (ref 11.5–14.5)
SODIUM SERPL-SCNC: 140 MMOL/L — SIGNIFICANT CHANGE UP (ref 135–146)
SP GR SPEC: 1.01 — SIGNIFICANT CHANGE UP (ref 1.01–1.03)
UROBILINOGEN FLD QL: SIGNIFICANT CHANGE UP
WBC # BLD: 23.68 K/UL — HIGH (ref 4.8–10.8)
WBC # FLD AUTO: 23.68 K/UL — HIGH (ref 4.8–10.8)

## 2021-08-03 PROCEDURE — 99233 SBSQ HOSP IP/OBS HIGH 50: CPT

## 2021-08-03 PROCEDURE — 99231 SBSQ HOSP IP/OBS SF/LOW 25: CPT

## 2021-08-03 RX ORDER — MAGNESIUM SULFATE 500 MG/ML
2 VIAL (ML) INJECTION EVERY 4 HOURS
Refills: 0 | Status: COMPLETED | OUTPATIENT
Start: 2021-08-03 | End: 2021-08-03

## 2021-08-03 RX ADMIN — Medication 1 APPLICATION(S): at 17:00

## 2021-08-03 RX ADMIN — CHLORHEXIDINE GLUCONATE 1 APPLICATION(S): 213 SOLUTION TOPICAL at 04:49

## 2021-08-03 RX ADMIN — Medication 1 APPLICATION(S): at 05:17

## 2021-08-03 RX ADMIN — FLUOROURACIL 43.67 MILLIGRAM(S): 50 INJECTION, SOLUTION INTRAVENOUS at 22:38

## 2021-08-03 RX ADMIN — ENOXAPARIN SODIUM 40 MILLIGRAM(S): 100 INJECTION SUBCUTANEOUS at 11:09

## 2021-08-03 RX ADMIN — Medication 50 GRAM(S): at 15:52

## 2021-08-03 RX ADMIN — Medication 50 GRAM(S): at 11:09

## 2021-08-03 RX ADMIN — PANTOPRAZOLE SODIUM 40 MILLIGRAM(S): 20 TABLET, DELAYED RELEASE ORAL at 05:16

## 2021-08-03 NOTE — PROGRESS NOTE ADULT - TIME BILLING
RECOMMENDATIONS: I spoke with Dr. Tubbs who would like to continue with chemo/radiation at this time.  For the patient the issue is transportation for out-patient treatment.  To that end, a shorter radiation course ( 5 x 500 – the RAPIDO trial) is being considered.  This protocol would offer sequential radiation first followed by more chemotherapy.  We will proceed with treatment tomorrow while he is in-patient.  We can then finalize the treatment plan with above the RAPIDO vs 5 weeks of chemo/RT,

## 2021-08-03 NOTE — PROGRESS NOTE ADULT - SUBJECTIVE AND OBJECTIVE BOX
Addended by: THERESE PARTIDA on: 9/23/2020 03:12 PM     Modules accepted: Orders     MISAEL HUFFMAN 80y Male  MRN#: [id number]     SUBJECTIVE  Patient is a 80y old Male who presents with a chief complaint of Chemotherapy (02 Aug 2021 19:16)  Today is hospital day 2d, and this morning he is lying in bed without distress.   No acute overnight events.     OBJECTIVE  PAST MEDICAL & SURGICAL HISTORY  Iron deficiency anemia  Osteoarthritis  Amputation of digit of left hand    ALLERGIES:  No Known Allergies    MEDICATIONS:  STANDING MEDICATIONS  chlorhexidine 4% Liquid 1 Application(s) Topical <User Schedule>  enoxaparin Injectable 40 milliGRAM(s) SubCutaneous daily  fluorouracil IVPB (eMAR) 2400 milliGRAM(s) IV Intermittent every 24 hours  magnesium sulfate  IVPB 2 Gram(s) IV Intermittent every 4 hours  pantoprazole    Tablet 40 milliGRAM(s) Oral before breakfast  vitamin A &amp; D Ointment 1 Application(s) Topical two times a day    PRN MEDICATIONS  acetaminophen   Tablet .. 650 milliGRAM(s) Oral every 6 hours PRN  oxycodone    5 mG/acetaminophen 325 mG 1 Tablet(s) Oral every 6 hours PRN    HOME MEDICATIONS  Home Medications:  lidocaine 5% topical film: Apply topically to affected area once, As Needed for pain.. (2021 09:16)  loperamide 2 mg oral capsule: 1 cap(s) orally 2 times a day, As Needed (2021 09:59)  oxycodone-acetaminophen 5 mg-325 mg oral tablet: 1 tab(s) orally every 6 hours, As needed, Moderate Pain (4 - 6) (2021 19:06)  pantoprazole 40 mg oral delayed release tablet: 1 tab(s) orally once a day (before a meal) (2021 19:06)  senna oral tablet: 2 tab(s) orally once a day (at bedtime) (2021 09:16)      VITAL SIGNS: Last 24 Hours  T(C): 36.3 (03 Aug 2021 05:01), Max: 36.4 (02 Aug 2021 13:48)  T(F): 97.3 (03 Aug 2021 05:01), Max: 97.6 (02 Aug 2021 13:48)  HR: 61 (03 Aug 2021 05:01) (61 - 82)  BP: 117/58 (03 Aug 2021 05:01) (115/54 - 123/59)  BP(mean): --  RR: 20 (03 Aug 2021 05:01) (20 - 20)  SpO2: 97% (02 Aug 2021 19:55) (96% - 97%)      LABS:                        11.1   23.68 )-----------( 134      ( 03 Aug 2021 06:43 )             37.4     08-03    140  |  106  |  10  ----------------------------<  170<H>  4.3   |  24  |  0.5<L>    Ca    8.8      03 Aug 2021 06:43  Phos  4.1     08-02  Mg     1.5     08-03    TPro  5.8<L>  /  Alb  3.0<L>  /  TBili  0.3  /  DBili  x   /  AST  20  /  ALT  8   /  AlkPhos  202<H>  08-03    LIVER FUNCTIONS - ( 03 Aug 2021 06:43 )  Alb: 3.0 g/dL / Pro: 5.8 g/dL / ALK PHOS: 202 U/L / ALT: 8 U/L / AST: 20 U/L / GGT: x             Urinalysis Basic - ( 03 Aug 2021 05:55 )    Color: Light Yellow / Appearance: Clear / S.013 / pH: x  Gluc: x / Ketone: Negative  / Bili: Negative / Urobili: <2 mg/dL   Blood: x / Protein: Negative / Nitrite: Negative   Leuk Esterase: Negative / RBC: x / WBC x   Sq Epi: x / Non Sq Epi: x / Bacteria: x    CAPILLARY BLOOD GLUCOSE          RADIOLOGY:    PHYSICAL EXAM:  GENERAL: NAD, AAO x 4, 80y M  HEAD:  Atraumatic, Normocephalic  EYES: EOMI, conjunctiva clear and sclera white  NECK: Supple, No JVD  CHEST/LUNG: Clear to auscultation bilaterally; No wheeze; No crackles; No accessory muscles used  HEART: Regular rate and rhythm; No murmurs;   ABDOMEN: Soft, Nontender, Nondistended; Bowel sounds present; No guarding  EXTREMITIES:  2+ Peripheral Pulses, No cyanosis or edema  NEUROLOGY: non-focal    ADMISSION SUMMARY  Patient is a 80y old Male who presents with a chief complaint of Chemotherapy (02 Aug 2021 19:16)

## 2021-08-03 NOTE — PROGRESS NOTE ADULT - ASSESSMENT
80 year old male with Advanced rectal adenocarcinoma, Microcytic Anemia, Chronic Osteoarthritis who presented from [facility] for chemotherapy.    # Locally advanced rectal adenocarcinoma (T4N0M0) s/p 4 cycles  - Diagnosed late April 2021 - MRI: 18 cm long polypoidal rectal mass with invasion of the right meso rectal fascia and possibly the prostate gland. Additional involvement of the anal sphincter complex--T4(a or b)N0M0  - repeat CT on 7/28 shows tumor shrinkage indicating good response to the chemo  - had extensive discussion with pt about the importance of getting chemo prior to getting surgery   - plan was to have total neoadjuvant therapy (chemotherapy followed by chemoRT followed by surgery )  - consulted [facility] for plan for chemoRT  -  is aware pt will need Xeloda when starting chemoRT, likely difficult   - when starting radiation, pt will need Xeloda 1650 mg PO BID on D1-5 weekly for a 5 week cycles   - s/p 5 cycles of chemo s/p port placement (5/7, 5/27, 6/12, 6/26, 7/21)  - last CEA on 6/28 was 10 (downtrending)  - readmitted for 6th cycle of FOLFOX, due for C6D2 today     # Iron deficiency anemia, stable  - likely secondary to LGIB from cancer   - Hgb at baseline (~10)    # Leukocytosis likely related to filgrastim use  - doubt infection given no fever, tachycardia, chill  - received filgrastim on 7/28/21  - trend CBC   - panculture if fever develops  - C. diff negative     # Mycotic toenails - podiatry outpatient f/u     #Knee pain, patient reports RA history  - Lidocaine patch PRN  - Outpatient followup, pt was scheduled an appointment w/ ortho on 8/18    # R lung pulmonary nodules   # Pancreatic hypodensity  - c/w surveillance of the pulmonary nodules (too small to biopsy)  - MRI of pancreas to better assess the pancreatic hypodensity (which can be done outpatient)    Diet: Regular diet  GI ppx: Protonix  DVT ppx: Lovenox 40 mg qD  Activity: Increase as tolerated  Disposition: plan for d/c tomorrow  80 year old male with Advanced rectal adenocarcinoma, Microcytic Anemia, Chronic Osteoarthritis who presented from [facility] for chemotherapy.    # Locally advanced rectal adenocarcinoma (T4N0M0) s/p 4 cycles  - Diagnosed late April 2021 - MRI: 18 cm long polypoidal rectal mass with invasion of the right meso rectal fascia and possibly the prostate gland. Additional involvement of the anal sphincter complex--T4(a or b)N0M0  - repeat CT on 7/28 shows tumor shrinkage indicating good response to the chemo  - had extensive discussion with pt about the importance of getting chemo prior to getting surgery   - plan was to have total neoadjuvant therapy (chemotherapy followed by chemoRT followed by surgery )  - consulted [facility] for plan for chemoRT  -  is aware      - s/p 5 cycles of chemo s/p port placement (5/7, 5/27, 6/12, 6/26, 7/21)  - last CEA on 6/28 was 10 (downtrending)  - readmitted for 6th cycle of FOLFOX, due for C6D2 today     # Iron deficiency anemia, stable  - likely secondary to LGIB from cancer   - Hgb at baseline (~10)    # Leukocytosis likely related to filgrastim use  - doubt infection given no fever, tachycardia, chill  - received filgrastim on 7/28/21  - trend CBC   - panculture if fever develops  - C. diff negative     # Mycotic toenails - podiatry outpatient f/u     #Knee pain, patient reports RA history  - Lidocaine patch PRN  - Outpatient followup, pt was scheduled an appointment w/ ortho on 8/18    # R lung pulmonary nodules   # Pancreatic hypodensity  - c/w surveillance of the pulmonary nodules (too small to biopsy)  - MRI of pancreas to better assess the pancreatic hypodensity (which can be done outpatient)    Diet: Regular diet  GI ppx: Protonix  DVT ppx: Lovenox 40 mg qD  Activity: Increase as tolerated  Disposition: plan for d/c tomorrow

## 2021-08-03 NOTE — PROGRESS NOTE ADULT - ASSESSMENT
· Assessment	  80 year old male with Advanced rectal adenocarcinoma, Microcytic Anemia, Chronic Osteoarthritis who presented from [facility] for in-hospital chemotherapy treatment    # Locally advanced rectal adenocarcinoma (T4N0M0) s/p 5 cycles  - Diagnosed late April 2021 - MRI: 18 cm long polypoidal rectal mass with invasion of the right meso rectal fascia and possibly the prostate gland. Additional involvement of the anal sphincter complex--T4(a or b)N0M0  - repeat CT on 7/28 shows tumor shrinkage indicating good response to the chemo  - plan is to have total neoadjuvant chemotherapy followed by chemoRT followed by surgery   - consult [facility] for plan for chemoRT, need  involved for pt's Xeloda when starting chemoRT   -->8/2 Rad onc consult placed  - last CEA on 6/28 was 10 (downtrending)  -8/2  readmitted for 6th cycle of FOLFOX, due for C6D1 today     # Leukocytosis likely related to filgrastim use  - doubt infection given no fever, tachycardia, chill  - received filgrastim on 7/28/21  - trend CBC   8/3 - wbc 23k, afebrile, vitals stable  - panculture if fever develops    #Diarrhea  -8/2 C/o liquid stool, with undigested bits of food, Rx'd loperamide 4mg then 2mg after each stool will f/u   8/3 - symptoms resolved, UA negative, pending blood cultures, Urine culture, chest xray refused    # Iron deficiency anemia, stable  - likely secondary to LGIB from cancer   - Hgb at baseline (~10), currently 10.7 on current admission    # Mycotic toenails - podiatry outpatient f/u     #Knee pain, patient reports RA history  - Lidocaine patch PRN  - Outpatient followup, pt was scheduled an appointment w/ ortho on 8/18    # R lung pulmonary nodules   # Pancreatic hypodensity  - c/w surveillance of the pulmonary nodules (too small to biopsy)  - MRI of pancreas to better assess the pancreatic hypodensity (which can be done outpatient)    Diet: Regular diet  GI ppx: Protonix  DVT ppx: Lovenox 40 mg qD  Activity: Increase as tolerated  Disposition: acute, pending chemo tx, rad onc consult   · Assessment	  80 year old male with Advanced rectal adenocarcinoma, Microcytic Anemia, Chronic Osteoarthritis who presented from [facility] for in-hospital chemotherapy treatment    # Locally advanced rectal adenocarcinoma (T4N0M0) s/p 5 cycles  - Diagnosed late April 2021 - MRI: 18 cm long polypoidal rectal mass with invasion of the right meso rectal fascia and possibly the prostate gland. Additional involvement of the anal sphincter complex--T4(a or b)N0M0  - repeat CT on 7/28 shows tumor shrinkage indicating good response to the chemo  - plan is to have total neoadjuvant chemotherapy followed by chemoRT followed by surgery   - consult [facility] for plan for chemoRT, need  involved for pt's Xeloda when starting chemoRT   -->8/2 Rad onc consult placed  - last CEA on 6/28 was 10 (downtrending)  -8/2  readmitted for 6th cycle of FOLFOX, due for C6D1 today     # Leukocytosis likely related to filgrastim use  - doubt infection given no fever, tachycardia, chill  - received filgrastim on 7/28/21  - trend CBC   8/3 - wbc 23k, afebrile, vitals stable  - panculture if fever develops    #Diarrhea  -8/2 C/o liquid stool, with undigested bits of food, Rx'd loperamide 4mg then 2mg after each stool will f/u   8/3 - symptoms resolved, UA negative, pending blood cultures, Urine culture, chest xray refused    # Iron deficiency anemia, stable  - likely secondary to LGIB from cancer   - Hgb at baseline (~10), currently 10.7 on current admission    # Mycotic toenails - podiatry outpatient f/u     #Knee pain, patient reports RA history  - Lidocaine patch PRN  - Outpatient followup, pt was scheduled an appointment w/ ortho on 8/18    # R lung pulmonary nodules   # Pancreatic hypodensity  - c/w surveillance of the pulmonary nodules (too small to biopsy)  - MRI of pancreas to better assess the pancreatic hypodensity (which can be done outpatient)    Diet: Regular diet  GI ppx: Protonix  DVT ppx: Lovenox 40 mg qD  Activity: Increase as tolerated  Disposition: possible discharge tomorrow, pending rad onc consult

## 2021-08-03 NOTE — PROGRESS NOTE ADULT - SUBJECTIVE AND OBJECTIVE BOX
RADIATION MEDICINE FOLLOW-UP    Everett Orellana was admitted for his last chemotherapy.  Recent CT’s show an ongoing response to treatment, although nothing near a complete response.  It still appears that there is prostate invasion.  There is a question about an 8 mm pulmonary nodule which is larger.      EXAMINATION:    He is resting comfortably in bed.   There are no obvious external nodes.            Electronically proofread and signed by:  Zaid Mcdaniel, DO

## 2021-08-03 NOTE — PROGRESS NOTE ADULT - SUBJECTIVE AND OBJECTIVE BOX
MISAEL HUFFMAN   [id number]  80y   Male   SUBJECTIVE:  Patient is a 80y old Male who presents with a chief complaint of Chemotherapy (03 Aug 2021 10:46)    Today is hospital day 2d. This morning he is resting comfortably in bed and reports no new issues or overnight events. Says his diarrhea has resolved. Pt refused chest xray ordered in infectious work up of diarrhea   Currently admitted to medicine with the primary diagnosis of Admission for chemotherapy       PAST MEDICAL & SURGICAL HISTORY  Iron deficiency anemia    Osteoarthritis    Amputation of digit of left hand      FAMILY HISTORY:  No pertinent family history in first degree relatives      SOCIAL HISTORY:  Marital Status:  Living Situation:  Occupation:  Tobacco Use:  Alcohol Use:  Drug Use:  Sexual History:    ALLERGIES:  No Known Allergies    MEDICATIONS:  STANDING MEDICATIONS  chlorhexidine 4% Liquid 1 Application(s) Topical <User Schedule>  enoxaparin Injectable 40 milliGRAM(s) SubCutaneous daily  fluorouracil IVPB (eMAR) 2400 milliGRAM(s) IV Intermittent every 24 hours  magnesium sulfate  IVPB 2 Gram(s) IV Intermittent every 4 hours  pantoprazole    Tablet 40 milliGRAM(s) Oral before breakfast  vitamin A &amp; D Ointment 1 Application(s) Topical two times a day    PRN MEDICATIONS  acetaminophen   Tablet .. 650 milliGRAM(s) Oral every 6 hours PRN  oxycodone    5 mG/acetaminophen 325 mG 1 Tablet(s) Oral every 6 hours PRN    VITALS:   T(F): 97.3 (21 @ 05:01)  HR: 61 (21 @ 05:01)  BP: 117/58 (21 @ 05:01)  BP(mean): --  RR: 20 (21 @ 05:01)  SpO2: 97% (21 @ 19:55)    LABS:                        11.1   23.68 )-----------( 134      ( 03 Aug 2021 06:43 )             37.4     Hemoglobin: 11.1 g/dL ( @ 06:43)  Hemoglobin: 10.7 g/dL ( @ 04:30)  Hemoglobin: 10.5 g/dL ( @ 16:25)        140  |  106  |  10  ----------------------------<  170<H>  4.3   |  24  |  0.5<L>    Ca    8.8      03 Aug 2021 06:43  Phos  4.1       Mg     1.5         TPro  5.8<L>  /  Alb  3.0<L>  /  TBili  0.3  /  DBili  x   /  AST  20  /  ALT  8   /  AlkPhos  202<H>      Potassium Trend: 4.3<--, 3.8<--, 4.6<--  SODIUM TREND:  Sodium 140 [ 06:43]  Sodium 146 [ @ 04:30]  Sodium 143 [ @ 16:25]  Sodium 142 [ @ 05:05]  Sodium 139 [ @ 07:03]  Sodium 139 [ @ 07:27]  Sodium 138 [ @ 22:44]  Sodium 138 [ @ 11:55]  Sodium 139 [ @ 07:46]  Sodium 139 [ @ 16:00]    Creatinine Trend: 0.5<--, 0.5<--, 0.5<--, 0.5<--, 0.6<--, 0.5<--    Urinalysis Basic - ( 03 Aug 2021 05:55 )    Color: Light Yellow / Appearance: Clear / S.013 / pH: x  Gluc: x / Ketone: Negative  / Bili: Negative / Urobili: <2 mg/dL   Blood: x / Protein: Negative / Nitrite: Negative   Leuk Esterase: Negative / RBC: x / WBC x   Sq Epi: x / Non Sq Epi: x / Bacteria: x        Calcium, Total Serum: 8.8 mg/dL (21 @ 06:43)  Magnesium, Serum: 1.5 mg/dL *L* (21 @ 06:43)          COVID  21 @ 14:35  COVID -   NotDetec  21 @ 16:25  COVID -   NotDetec  21 @ 17:23  COVID -   NotDetec  06-10-21 @ 15:03  COVID -   NotDetec  21 @ 14:25  COVID -   NotDetec  21 @ 11:21  COVID -   NotDetec  21 @ 06:50  COVID -   NotDetec  21 @ 14:10  COVID -   NotDetec  21 @ 09:45  COVID -   NotDetec  21 @ 14:33  COVID -   NotDetec  21 @ 13:47  COVID -   NotDetec  21 @ 16:53  COVID -   NotDete      RADIOLOGY:    < from: CT Abdomen and Pelvis w/ Oral Cont and w/ IV Cont (21 @ 17:47) >  IMPRESSION:    When compared to 2021:    Interval decrease in size of rectal wall thickening and rectal mass, inherently limited in evaluation on CT.  The exophytic component which abuts the right posterior prostate gland and seminal vesicles measures approximately 4 x 3 cm (previously 6 x 3 cm). Foci of gas are noted within which may be secondary to communication with the rectal lumen. Limited CT ability to exclude superimposed infection.  Circumferential rectal wall thickening, decreased measuring up to 1 cm (previously up to at least 2 cm).  Pelvic MRI can be utilized for further detail/evaluation.    Redemonstration of perirectal lymph nodes measuring up to 0.7 cm.    No evidence for new or worsening metastatic disease/lymphadenopathy.    --- End of Report ---    < end of copied text >      PHYSICAL EXAM:  GEN: No acute distress  HEENT: normocephalic, atraumatic, aniceteric  LUNGS: Clear to auscultation bilaterally, no rales/wheezing/ rhonchi  HEART: S1/S2 present. RRR, no murmurs  ABD: Soft, non-tender, non-distended. Bowel sounds present  EXT: Warm, well perfused, skin color appropriate for ethnicity, +distal pulses, +motor/sensory fnxn intact x all 4 ext's.   NEURO: AAOX3, normal affect     MISAEL HUFFMAN   [id number]  80y   Male   SUBJECTIVE:  Patient is a 80y old Male who presents with a chief complaint of Chemotherapy (03 Aug 2021 10:46)    Today is hospital day 2d. This morning he is resting comfortably in bed and reports no new issues or overnight events. Says his diarrhea has resolved. Pt refused chest xray ordered in infectious work up of diarrhea   Currently admitted to medicine with the primary diagnosis of Admission for chemotherapy       PAST MEDICAL & SURGICAL HISTORY  Iron deficiency anemia    Osteoarthritis    Amputation of digit of left hand      FAMILY HISTORY:  No pertinent family history in first degree relatives      SOCIAL HISTORY:  Marital Status:  Living Situation:  Occupation:  Tobacco Use:  Alcohol Use:  Drug Use:  Sexual History:    ALLERGIES:  No Known Allergies    MEDICATIONS:  STANDING MEDICATIONS  chlorhexidine 4% Liquid 1 Application(s) Topical <User Schedule>  enoxaparin Injectable 40 milliGRAM(s) SubCutaneous daily  fluorouracil IVPB (eMAR) 2400 milliGRAM(s) IV Intermittent every 24 hours  magnesium sulfate  IVPB 2 Gram(s) IV Intermittent every 4 hours  pantoprazole    Tablet 40 milliGRAM(s) Oral before breakfast  vitamin A &amp; D Ointment 1 Application(s) Topical two times a day    PRN MEDICATIONS  acetaminophen   Tablet .. 650 milliGRAM(s) Oral every 6 hours PRN  oxycodone    5 mG/acetaminophen 325 mG 1 Tablet(s) Oral every 6 hours PRN    VITALS:   T(F): 97.3 (21 @ 05:01)  HR: 61 (21 @ 05:01)  BP: 117/58 (21 @ 05:01)  BP(mean): --  RR: 20 (21 @ 05:01)  SpO2: 97% (21 @ 19:55)    LABS:                        11.1   23.68 )-----------( 134      ( 03 Aug 2021 06:43 )             37.4     Hemoglobin: 11.1 g/dL ( @ 06:43)  Hemoglobin: 10.7 g/dL ( @ 04:30)  Hemoglobin: 10.5 g/dL ( @ 16:25)        140  |  106  |  10  ----------------------------<  170<H>  4.3   |  24  |  0.5<L>    Ca    8.8      03 Aug 2021 06:43  Phos  4.1       Mg     1.5         TPro  5.8<L>  /  Alb  3.0<L>  /  TBili  0.3  /  DBili  x   /  AST  20  /  ALT  8   /  AlkPhos  202<H>      Potassium Trend: 4.3<--, 3.8<--, 4.6<--  SODIUM TREND:  Sodium 140 [ 06:43]  Sodium 146 [ @ 04:30]  Sodium 143 [ @ 16:25]  Sodium 142 [ @ 05:05]  Sodium 139 [ @ 07:03]  Sodium 139 [ @ 07:27]  Sodium 138 [ @ 22:44]  Sodium 138 [ @ 11:55]  Sodium 139 [ @ 07:46]  Sodium 139 [ @ 16:00]    Creatinine Trend: 0.5<--, 0.5<--, 0.5<--, 0.5<--, 0.6<--, 0.5<--    Urinalysis Basic - ( 03 Aug 2021 05:55 )    Color: Light Yellow / Appearance: Clear / S.013 / pH: x  Gluc: x / Ketone: Negative  / Bili: Negative / Urobili: <2 mg/dL   Blood: x / Protein: Negative / Nitrite: Negative   Leuk Esterase: Negative / RBC: x / WBC x   Sq Epi: x / Non Sq Epi: x / Bacteria: x        Calcium, Total Serum: 8.8 mg/dL (21 @ 06:43)  Magnesium, Serum: 1.5 mg/dL *L* (21 @ 06:43)          COVID  21 @ 14:35  COVID -   NotDetec  21 @ 16:25  COVID -   NotDetec  21 @ 17:23  COVID -   NotDetec  06-10-21 @ 15:03  COVID -   NotDetec  21 @ 14:25  COVID -   NotDetec  21 @ 11:21  COVID -   NotDetec  21 @ 06:50  COVID -   NotDetec  21 @ 14:10  COVID -   NotDetec  21 @ 09:45  COVID -   NotDetec  21 @ 14:33  COVID -   NotDetec  21 @ 13:47  COVID -   NotDetec  21 @ 16:53  COVID -   NotDete      RADIOLOGY:    < from: CT Abdomen and Pelvis w/ Oral Cont and w/ IV Cont (21 @ 17:47) >  IMPRESSION:    When compared to 2021:    Interval decrease in size of rectal wall thickening and rectal mass, inherently limited in evaluation on CT.  The exophytic component which abuts the right posterior prostate gland and seminal vesicles measures approximately 4 x 3 cm (previously 6 x 3 cm). Foci of gas are noted within which may be secondary to communication with the rectal lumen. Limited CT ability to exclude superimposed infection.  Circumferential rectal wall thickening, decreased measuring up to 1 cm (previously up to at least 2 cm).  Pelvic MRI can be utilized for further detail/evaluation.    Redemonstration of perirectal lymph nodes measuring up to 0.7 cm.    No evidence for new or worsening metastatic disease/lymphadenopathy.    --- End of Report ---    < end of copied text >      PHYSICAL EXAM:  GEN: No acute distress  HEENT: normocephalic, atraumatic, aniceteric  LUNGS: Clear to auscultation bilaterally, no rales/wheezing/ rhonchi  HEART: S1/S2 present. RRR, no murmurs  ABD: Soft, non-tender, non-distended. Bowel sounds present, no tenderness on exam  EXT: Warm, well perfused, skin color appropriate for ethnicity, +distal pulses, +motor/sensory fnxn intact x all 4 ext's.   NEURO: AAOX4, normal affect,   psych: circumstantial speech

## 2021-08-04 ENCOUNTER — TRANSCRIPTION ENCOUNTER (OUTPATIENT)
Age: 81
End: 2021-08-04

## 2021-08-04 LAB
ALBUMIN SERPL ELPH-MCNC: 3.3 G/DL — LOW (ref 3.5–5.2)
ALP SERPL-CCNC: 207 U/L — HIGH (ref 30–115)
ALT FLD-CCNC: 28 U/L — SIGNIFICANT CHANGE UP (ref 0–41)
ANION GAP SERPL CALC-SCNC: 11 MMOL/L — SIGNIFICANT CHANGE UP (ref 7–14)
AST SERPL-CCNC: 53 U/L — HIGH (ref 0–41)
BASOPHILS # BLD AUTO: 0.13 K/UL — SIGNIFICANT CHANGE UP (ref 0–0.2)
BASOPHILS NFR BLD AUTO: 0.5 % — SIGNIFICANT CHANGE UP (ref 0–1)
BILIRUB SERPL-MCNC: 0.3 MG/DL — SIGNIFICANT CHANGE UP (ref 0.2–1.2)
BUN SERPL-MCNC: 13 MG/DL — SIGNIFICANT CHANGE UP (ref 10–20)
CALCIUM SERPL-MCNC: 8.9 MG/DL — SIGNIFICANT CHANGE UP (ref 8.5–10.1)
CHLORIDE SERPL-SCNC: 106 MMOL/L — SIGNIFICANT CHANGE UP (ref 98–110)
CO2 SERPL-SCNC: 28 MMOL/L — SIGNIFICANT CHANGE UP (ref 17–32)
CREAT SERPL-MCNC: 0.6 MG/DL — LOW (ref 0.7–1.5)
CULTURE RESULTS: SIGNIFICANT CHANGE UP
CULTURE RESULTS: SIGNIFICANT CHANGE UP
EOSINOPHIL # BLD AUTO: 0.09 K/UL — SIGNIFICANT CHANGE UP (ref 0–0.7)
EOSINOPHIL NFR BLD AUTO: 0.3 % — SIGNIFICANT CHANGE UP (ref 0–8)
GLUCOSE SERPL-MCNC: 95 MG/DL — SIGNIFICANT CHANGE UP (ref 70–99)
HCT VFR BLD CALC: 38.3 % — LOW (ref 42–52)
HGB BLD-MCNC: 11.6 G/DL — LOW (ref 14–18)
IMM GRANULOCYTES NFR BLD AUTO: 3.7 % — HIGH (ref 0.1–0.3)
LYMPHOCYTES # BLD AUTO: 1.17 K/UL — LOW (ref 1.2–3.4)
LYMPHOCYTES # BLD AUTO: 4.1 % — LOW (ref 20.5–51.1)
MAGNESIUM SERPL-MCNC: 2.2 MG/DL — SIGNIFICANT CHANGE UP (ref 1.8–2.4)
MCHC RBC-ENTMCNC: 29.4 PG — SIGNIFICANT CHANGE UP (ref 27–31)
MCHC RBC-ENTMCNC: 30.3 G/DL — LOW (ref 32–37)
MCV RBC AUTO: 97 FL — HIGH (ref 80–94)
MONOCYTES # BLD AUTO: 1.35 K/UL — HIGH (ref 0.1–0.6)
MONOCYTES NFR BLD AUTO: 4.8 % — SIGNIFICANT CHANGE UP (ref 1.7–9.3)
NEUTROPHILS # BLD AUTO: 24.52 K/UL — HIGH (ref 1.4–6.5)
NEUTROPHILS NFR BLD AUTO: 86.6 % — HIGH (ref 42.2–75.2)
NRBC # BLD: 0 /100 WBCS — SIGNIFICANT CHANGE UP (ref 0–0)
PLATELET # BLD AUTO: 151 K/UL — SIGNIFICANT CHANGE UP (ref 130–400)
POTASSIUM SERPL-MCNC: 4.4 MMOL/L — SIGNIFICANT CHANGE UP (ref 3.5–5)
POTASSIUM SERPL-SCNC: 4.4 MMOL/L — SIGNIFICANT CHANGE UP (ref 3.5–5)
PROT SERPL-MCNC: 6.1 G/DL — SIGNIFICANT CHANGE UP (ref 6–8)
RBC # BLD: 3.95 M/UL — LOW (ref 4.7–6.1)
RBC # FLD: 20.4 % — HIGH (ref 11.5–14.5)
SODIUM SERPL-SCNC: 145 MMOL/L — SIGNIFICANT CHANGE UP (ref 135–146)
SPECIMEN SOURCE: SIGNIFICANT CHANGE UP
SPECIMEN SOURCE: SIGNIFICANT CHANGE UP
WBC # BLD: 28.32 K/UL — HIGH (ref 4.8–10.8)
WBC # FLD AUTO: 28.32 K/UL — HIGH (ref 4.8–10.8)

## 2021-08-04 PROCEDURE — 99233 SBSQ HOSP IP/OBS HIGH 50: CPT

## 2021-08-04 RX ORDER — HYDROCORTISONE 1 %
1 OINTMENT (GRAM) TOPICAL
Refills: 0 | Status: DISCONTINUED | OUTPATIENT
Start: 2021-08-04 | End: 2021-08-05

## 2021-08-04 RX ORDER — LOPERAMIDE HCL 2 MG
2 TABLET ORAL
Refills: 0 | Status: DISCONTINUED | OUTPATIENT
Start: 2021-08-04 | End: 2021-08-04

## 2021-08-04 RX ORDER — LOPERAMIDE HCL 2 MG
2 TABLET ORAL THREE TIMES A DAY
Refills: 0 | Status: DISCONTINUED | OUTPATIENT
Start: 2021-08-04 | End: 2021-08-05

## 2021-08-04 RX ADMIN — PANTOPRAZOLE SODIUM 40 MILLIGRAM(S): 20 TABLET, DELAYED RELEASE ORAL at 05:45

## 2021-08-04 RX ADMIN — Medication 1 APPLICATION(S): at 17:00

## 2021-08-04 RX ADMIN — Medication 2 MILLIGRAM(S): at 21:08

## 2021-08-04 RX ADMIN — Medication 2 MILLIGRAM(S): at 10:16

## 2021-08-04 RX ADMIN — CHLORHEXIDINE GLUCONATE 1 APPLICATION(S): 213 SOLUTION TOPICAL at 05:45

## 2021-08-04 RX ADMIN — Medication 1 APPLICATION(S): at 05:45

## 2021-08-04 RX ADMIN — ENOXAPARIN SODIUM 40 MILLIGRAM(S): 100 INJECTION SUBCUTANEOUS at 11:16

## 2021-08-04 NOTE — DISCHARGE NOTE PROVIDER - NSDCMRMEDTOKEN_GEN_ALL_CORE_FT
lidocaine 5% topical film: Apply topically to affected area once, As Needed for pain..  loperamide 2 mg oral capsule: 1 cap(s) orally 2 times a day, As Needed  oxycodone-acetaminophen 5 mg-325 mg oral tablet: 1 tab(s) orally every 6 hours, As needed, Moderate Pain (4 - 6)  pantoprazole 40 mg oral delayed release tablet: 1 tab(s) orally once a day (before a meal)  senna oral tablet: 2 tab(s) orally once a day (at bedtime)   lidocaine 5% topical film: Apply topically to affected area once, As Needed for pain..  loperamide 2 mg oral capsule: 1 cap(s) orally 2 times a day, As Needed  oxycodone-acetaminophen 5 mg-325 mg oral tablet: 1 tab(s) orally every 6 hours, As needed, Moderate Pain (4 - 6)  pantoprazole 40 mg oral delayed release tablet: 1 tab(s) orally once a day (before a meal)  senna oral tablet: 2 tab(s) orally once a day (at bedtime)  vitamin A &amp; D topical ointment: 1 application topically 2 times a day

## 2021-08-04 NOTE — DISCHARGE NOTE PROVIDER - NSDCFUADDINST_GEN_ALL_CORE_FT
Please take all your medications as prescribed.  Please follow up with all your scheduled appointments.   Attention: Social Workers/Case Workers:    Radiation Therapy will be started on 8/23. There are two options: 1) can completed RT outpatient Monday to Friday (8/23-8/27) if transportation can be arranged.     2) Admit pt on 8/22 and give the course of radiation from 8/23 - 8/27 over 5 days. Since no chemo is needed during RT pt can be admitted under hospitalist service during that period.

## 2021-08-04 NOTE — PROGRESS NOTE ADULT - ASSESSMENT
80 year old male with Advanced rectal adenocarcinoma, Microcytic Anemia, Chronic Osteoarthritis who presented from [facility] for chemotherapy.    # Locally advanced rectal adenocarcinoma (T4N0M0) s/p 4 cycles  - Diagnosed late April 2021 - MRI: 18 cm long polypoidal rectal mass with invasion of the right meso rectal fascia and possibly the prostate gland. Additional involvement of the anal sphincter complex--T4(a or b)N0M0  - repeat CT on 7/28 shows tumor shrinkage indicating good response to the chemo  - had extensive discussion with pt about the importance of getting chemo prior to getting surgery   - plan was to have total neoadjuvant therapy (chemotherapy followed by chemoRT followed by surgery )  - due to pt from [facility] and difficulty of organizing transportation as well as starting Xeloda, will do 5 sessions of RT   - radon consult appreciated  - RT will be started on 8/23. There are two options: 1) can completed RT outpatient Monday to Friday (8/23-8/27) if transportation can be arranged. 2) Admit pt on 8/22 and give the course of radiation from 8/23 - 8/27 over 5 days. Since no chemo is needed during RT pt can be admitted under hospitalist service during that period.  -  is aware of new plan  - s/p 5 cycles of chemo s/p port placement (5/7, 5/27, 6/12, 6/26, 7/21)  - last CEA on 6/28 was 10 (downtrending)  - readmitted for 6th cycle of FOLFOX, completing C6D2 today   - CT simulation due today    # Iron deficiency anemia, stable  - likely secondary to LGIB from cancer   - Hgb at baseline (~10)    # Leukocytosis likely related to filgrastim use  - doubt infection given no fever, tachycardia, chill  - received filgrastim on 7/28/21  - trend CBC   - panculture if fever develops  - C. diff negative     # Mycotic toenails - podiatry outpatient f/u     #Knee pain, patient reports RA history  - Lidocaine patch PRN  - Outpatient followup, pt was scheduled an appointment w/ ortho on 8/18    # R lung pulmonary nodules   # Pancreatic hypodensity  - c/w surveillance of the pulmonary nodules (too small to biopsy)  - MRI of pancreas to better assess the pancreatic hypodensity (which can be done outpatient)    Diet: Regular diet  GI ppx: Protonix  DVT ppx: Lovenox 40 mg qD  Activity: Increase as tolerated  Disposition: plan for d/c tomorrow  80 year old male with Advanced rectal adenocarcinoma, Microcytic Anemia, Chronic Osteoarthritis who presented from [facility] for chemotherapy.    # Locally advanced rectal adenocarcinoma (T4N0M0) s/p 4 cycles  - Diagnosed late April 2021 - MRI: 18 cm long polypoidal rectal mass with invasion of the right meso rectal fascia and possibly the prostate gland. Additional involvement of the anal sphincter complex--T4(a or b)N0M0  - repeat CT on 7/28 shows tumor shrinkage indicating good response to the chemo  - had extensive discussion with pt about the importance of getting chemo prior to getting surgery   - plan was to have total neoadjuvant therapy (chemotherapy followed by chemoRT followed by surgery )  - due to pt from [facility] and difficulty of organizing transportation as well as starting Xeloda, will do 5 sessions of RT   - radon consult appreciated  - RT will be started on 8/23. There are two options: 1) can completed RT outpatient Monday to Friday (8/23-8/27) if transportation can be arranged.     2) Admit pt on 8/22 and give the course of radiation from 8/23 - 8/27 over 5 days. Since no chemo is needed during RT pt can be admitted under hospitalist service during that period.  -  is aware of new plan  - s/p 5 cycles of chemo s/p port placement (5/7, 5/27, 6/12, 6/26, 7/21)  - last CEA on 6/28 was 10 (downtrending)  - readmitted for 6th cycle of FOLFOX, completing C6D2 today   - CT simulation due today    # Iron deficiency anemia, stable  - likely secondary to LGIB from cancer   - Hgb at baseline (~10)    # Leukocytosis likely related to filgrastim use  - doubt infection given no fever, tachycardia, chill  - received filgrastim on 7/28/21  - trend CBC   - panculture if fever develops  - C. diff negative     # Mycotic toenails - podiatry outpatient f/u     #Knee pain, patient reports RA history  - Lidocaine patch PRN  - Outpatient followup, pt was scheduled an appointment w/ ortho on 8/18    # R lung pulmonary nodules   # Pancreatic hypodensity  - c/w surveillance of the pulmonary nodules (too small to biopsy)  - MRI of pancreas to better assess the pancreatic hypodensity (which can be done outpatient)    Diet: Regular diet  GI ppx: Protonix  DVT ppx: Lovenox 40 mg qD  Activity: Increase as tolerated  Disposition: plan for d/c tomorrow

## 2021-08-04 NOTE — DISCHARGE NOTE PROVIDER - NSDCCPCAREPLAN_GEN_ALL_CORE_FT
PRINCIPAL DISCHARGE DIAGNOSIS  Diagnosis: Admission for chemotherapy  Assessment and Plan of Treatment: Chemotherapy      SECONDARY DISCHARGE DIAGNOSES  Diagnosis: Leukocytosis  Assessment and Plan of Treatment:     Diagnosis: Hypomagnesemia  Assessment and Plan of Treatment:      PRINCIPAL DISCHARGE DIAGNOSIS  Diagnosis: Admission for chemotherapy  Assessment and Plan of Treatment: Chemotherapy  RT will be started on 8/23. There are two options: 1) can completed RT outpatient Monday to Friday (8/23-8/27) if transportation can be arranged.   2) Admit pt on 8/22 and give the course of radiation from 8/23 - 8/27 over 5 days. Since no chemo is needed during RT pt can be admitted under hospitalist service during that period.        SECONDARY DISCHARGE DIAGNOSES  Diagnosis: Leukocytosis  Assessment and Plan of Treatment: Your white blood cell count is elevated. It is likley in reaction to your medications and chemotherapy. You do not have any signs of an infections such as high fever or urinary pain or cough etc.   Your primary care doctor will monitor your white blood cell count. Please see a doctor if you develop chills, fever, muscle pains.      Diagnosis: Hypomagnesemia  Assessment and Plan of Treatment: You have low magnesium, likely due to episodes of diarrhea. It was repleted in the hospital. Your magnesium levels will be monitored by your primary care doctor.

## 2021-08-04 NOTE — DISCHARGE NOTE PROVIDER - PROVIDER TOKENS
PROVIDER:[TOKEN:[[id number]],FOLLOWUP:[2 weeks]] PROVIDER:[TOKEN:[[id number]],FOLLOWUP:[2 weeks]],FREE:[LAST:[Radiation],FIRST:[Therapy],PHONE:[phone]],FAX:[(   [phone]    -],ADDRESS:[[facility]    Please arrive on the 22nd if you intend to be admitted to the hospital for treatment.],SCHEDULEDAPPT:[08/23/2021]]

## 2021-08-04 NOTE — PROGRESS NOTE ADULT - SUBJECTIVE AND OBJECTIVE BOX
MISAEL HUFFMAN   [id number]  80y   Male   SUBJECTIVE:  Patient is a 80y old Male who presents with a chief complaint of Chemotherapy (04 Aug 2021 15:15)    Today is hospital day 3d. This morning he is resting comfortably in bed and reports no new issues or overnight events. Endorses diahrrea has returned. Pt's loperamide was d/c'd due to cdif pcr, cdiff negative, loperamide reinstated. will monitor  Currently admitted to medicine with the primary diagnosis of Admission for chemotherapy       PAST MEDICAL & SURGICAL HISTORY  Iron deficiency anemia    Osteoarthritis    Amputation of digit of left hand      FAMILY HISTORY:  No pertinent family history in first degree relatives      SOCIAL HISTORY:  Marital Status:  Living Situation:  Occupation:  Tobacco Use:  Alcohol Use:  Drug Use:  Sexual History:    ALLERGIES:  No Known Allergies    MEDICATIONS:  STANDING MEDICATIONS  chlorhexidine 4% Liquid 1 Application(s) Topical <User Schedule>  enoxaparin Injectable 40 milliGRAM(s) SubCutaneous daily  pantoprazole    Tablet 40 milliGRAM(s) Oral before breakfast  vitamin A &amp; D Ointment 1 Application(s) Topical two times a day    PRN MEDICATIONS  acetaminophen   Tablet .. 650 milliGRAM(s) Oral every 6 hours PRN  hydrocortisone 2.5% Ointment 1 Application(s) Topical two times a day PRN  loperamide 2 milliGRAM(s) Oral three times a day PRN  oxycodone    5 mG/acetaminophen 325 mG 1 Tablet(s) Oral every 6 hours PRN    VITALS:   T(F): 97.6 (21 @ 14:37)  HR: 68 (21 @ 14:37)  BP: 115/75 (21 @ 14:37)  BP(mean): --  RR: 19 (21 @ 14:37)  SpO2: --    LABS:                        11.6   28.32 )-----------( 151      ( 04 Aug 2021 07:05 )             38.3     Hemoglobin: 11.6 g/dL ( @ 07:05)  Hemoglobin: 11.1 g/dL ( @ 06:43)  Hemoglobin: 10.7 g/dL ( @ 04:30)  Hemoglobin: 10.5 g/dL ( @ 16:25)        145  |  106  |  13  ----------------------------<  95  4.4   |  28  |  0.6<L>    Ca    8.9      04 Aug 2021 07:05  Mg     2.2         TPro  6.1  /  Alb  3.3<L>  /  TBili  0.3  /  DBili  x   /  AST  53<H>  /  ALT  28  /  AlkPhos  207<H>      Potassium Trend: 4.4<--, 4.3<--, 3.8<--, 4.6<--  SODIUM TREND:  Sodium 145 [ @ 07:05]  Sodium 140 [ @ 06:43]  Sodium 146 [ 04:30]  Sodium 143 [ @ 16:25]  Sodium 142 [ @ 05:05]  Sodium 139 [ @ 07:03]  Sodium 139 [ @ 07:27]  Sodium 138 [ @ 22:44]  Sodium 138 [ @ 11:55]  Sodium 139 [ @ 07:46]    Creatinine Trend: 0.6<--, 0.5<--, 0.5<--, 0.5<--, 0.5<--, 0.6<--    Urinalysis Basic - ( 03 Aug 2021 05:55 )    Color: Light Yellow / Appearance: Clear / S.013 / pH: x  Gluc: x / Ketone: Negative  / Bili: Negative / Urobili: <2 mg/dL   Blood: x / Protein: Negative / Nitrite: Negative   Leuk Esterase: Negative / RBC: x / WBC x   Sq Epi: x / Non Sq Epi: x / Bacteria: x        Calcium, Total Serum: 8.9 mg/dL (21 @ 07:05)  Magnesium, Serum: 2.2 mg/dL (21 @ 07:05)      Culture - Urine (collected 03 Aug 2021 05:55)  Source: Clean Catch Clean Catch (Midstream)  Final Report (04 Aug 2021 15:27):    <10,000 CFU/mL Normal Urogenital Codie    Culture - Urine (collected 02 Aug 2021 16:34)  Source: Clean Catch Clean Catch (Midstream)  Final Report (04 Aug 2021 07:24):    <10,000 CFU/mL Normal Urogenital Codie          COVID  21 @ 14:35  COVID -   NotDetec  21 @ 16:25  COVID -   NotDetec  21 @ 17:23  COVID -   NotDetec  06-10-21 @ 15:03  COVID -   NotDetec  21 @ 14:25  COVID -   NotDetec  21 @ 11:21  COVID -   NotDetec  21 @ 06:50  COVID -   NotDetec  21 @ 14:10  COVID -   NotDetec  21 @ 09:45  COVID -   NotDetec  21 @ 14:33  COVID -   NotDetec  21 @ 13:47  COVID -   NotDetec  21 @ 16:53  COVID -   NotDetec      21 @ 07:01  -  21 @ 07:00  --------------------------------------------------------  IN: 0 mL / OUT: 700 mL / NET: -700 mL      RADIOLOGY:          PHYSICAL EXAM:  GEN: No acute distress  HEENT: normocephalic, atraumatic, aniceteric  LUNGS: Clear to auscultation bilaterally, no rales/wheezing/ rhonchi  HEART: S1/S2 present.  ABD: Soft, non-tender, non-distended. Bowel sounds present  EXT:  2+ Peripheral Pulses, No cyanosis or edema  NEURO: AAOX3, normal affect  psych: circumstantial speech

## 2021-08-04 NOTE — DISCHARGE NOTE PROVIDER - INSTRUCTIONS
Eat small, frequent meals throughout the day. Eating frequent small meals will ensure your body is getting enough calories, protein, and nutrients to tolerate treatment. Smaller meals may also help to reduce treatment-related side effects such as nausea. Try eating 5-6 small meals or “mini” meals about every three hours.

## 2021-08-04 NOTE — PROGRESS NOTE ADULT - ASSESSMENT
· Assessment	  80 year old male with Advanced rectal adenocarcinoma, Microcytic Anemia, Chronic Osteoarthritis who presented from [facility] for in-hospital chemotherapy treatment    # Locally advanced rectal adenocarcinoma (T4N0M0) s/p 5 cycles  - Diagnosed late April 2021 - MRI: 18 cm long polypoidal rectal mass with invasion of the right meso rectal fascia and possibly the prostate gland. Additional involvement of the anal sphincter complex--T4(a or b)N0M0  - repeat CT on 7/28 shows tumor shrinkage indicating good response to the chemo  - plan is to have total neoadjuvant chemotherapy followed by chemoRT followed by surgery   - consult [facility] for plan for chemoRT, need  involved for pt's Xeloda when starting chemoRT   -->8/2 Rad onc consult placed  --->8/4 Rad onc would like to do Radiation simulation, not able to do due today due to chemo infusion, likely to be done 8/5 morning  - last CEA on 6/28 was 10 (downtrending)  -8/2  readmitted for 6th cycle of FOLFOX, due for C6D1 today  -->8/4 chemo infusion until 11pm    # Leukocytosis likely related to filgrastim use  - doubt infection given no fever, tachycardia, chill  - received filgrastim on 7/28/21  - trend CBC   8/3 - wbc 23k, afebrile, vitals stable  - panculture if fever develops    #Diarrhea  -8/2 C/o liquid stool, with undigested bits of food, Rx'd loperamide 4mg then 2mg after each stool will f/u   8/3 - symptoms resolved, UA negative, pending blood cultures, Urine culture, chest xray refused  8/4- diarrhea returned, reinstated loperamide    # Iron deficiency anemia, stable  - likely secondary to LGIB from cancer   - Hgb at baseline (~10), currently 10.7 on current admission; currently 11.6    # Mycotic toenails - podiatry outpatient f/u     #Knee pain, patient reports RA history  - Lidocaine patch PRN  - Outpatient followup, pt was scheduled an appointment w/ ortho on 8/18    # R lung pulmonary nodules   # Pancreatic hypodensity  - c/w surveillance of the pulmonary nodules (too small to biopsy)  - MRI of pancreas to better assess the pancreatic hypodensity (which can be done outpatient)    Diet: Regular diet  GI ppx: Protonix  DVT ppx: Lovenox 40 mg qD  Activity: Increase as tolerated  Disposition: likely discharge tomorrow 8/5

## 2021-08-04 NOTE — PROGRESS NOTE ADULT - SUBJECTIVE AND OBJECTIVE BOX
MISAEL HUFFMAN 80y Male  MRN#: [id number]     SUBJECTIVE  Patient is a 80y old Male who presents with a chief complaint of Chemotherapy (03 Aug 2021 16:34)  Today is hospital day 3d, and this morning he is lying in bed without distress.   No acute overnight events.     OBJECTIVE  PAST MEDICAL & SURGICAL HISTORY  Iron deficiency anemia  Osteoarthritis  Amputation of digit of left hand      ALLERGIES:  No Known Allergies    MEDICATIONS:  STANDING MEDICATIONS  chlorhexidine 4% Liquid 1 Application(s) Topical <User Schedule>  enoxaparin Injectable 40 milliGRAM(s) SubCutaneous daily  pantoprazole    Tablet 40 milliGRAM(s) Oral before breakfast  vitamin A &amp; D Ointment 1 Application(s) Topical two times a day    PRN MEDICATIONS  acetaminophen   Tablet .. 650 milliGRAM(s) Oral every 6 hours PRN  hydrocortisone 2.5% Ointment 1 Application(s) Topical two times a day PRN  loperamide 2 milliGRAM(s) Oral three times a day PRN  oxycodone    5 mG/acetaminophen 325 mG 1 Tablet(s) Oral every 6 hours PRN    HOME MEDICATIONS  Home Medications:  lidocaine 5% topical film: Apply topically to affected area once, As Needed for pain.. (2021 09:16)  loperamide 2 mg oral capsule: 1 cap(s) orally 2 times a day, As Needed (2021 09:59)  oxycodone-acetaminophen 5 mg-325 mg oral tablet: 1 tab(s) orally every 6 hours, As needed, Moderate Pain (4 - 6) (2021 19:06)  pantoprazole 40 mg oral delayed release tablet: 1 tab(s) orally once a day (before a meal) (2021 19:06)  senna oral tablet: 2 tab(s) orally once a day (at bedtime) (2021 09:16)      VITAL SIGNS: Last 24 Hours  T(C): 36.4 (04 Aug 2021 14:37), Max: 36.6 (03 Aug 2021 19:17)  T(F): 97.6 (04 Aug 2021 14:37), Max: 97.8 (03 Aug 2021 19:17)  HR: 68 (04 Aug 2021 14:37) (57 - 68)  BP: 115/75 (04 Aug 2021 14:37) (101/56 - 119/60)  BP(mean): --  RR: 19 (04 Aug 2021 14:37) (19 - 20)  SpO2: --    21 @ 07:01  -  21 @ 07:00  --------------------------------------------------------  IN: 0 mL / OUT: 700 mL / NET: -700 mL        LABS:                        11.6   28.32 )-----------( 151      ( 04 Aug 2021 07:05 )             38.3     08-04    145  |  106  |  13  ----------------------------<  95  4.4   |  28  |  0.6<L>    Ca    8.9      04 Aug 2021 07:05  Mg     2.2     08-    TPro  6.1  /  Alb  3.3<L>  /  TBili  0.3  /  DBili  x   /  AST  53<H>  /  ALT  28  /  AlkPhos  207<H>  08-    LIVER FUNCTIONS - ( 04 Aug 2021 07:05 )  Alb: 3.3 g/dL / Pro: 6.1 g/dL / ALK PHOS: 207 U/L / ALT: 28 U/L / AST: 53 U/L / GGT: x             Urinalysis Basic - ( 03 Aug 2021 05:55 )    Color: Light Yellow / Appearance: Clear / S.013 / pH: x  Gluc: x / Ketone: Negative  / Bili: Negative / Urobili: <2 mg/dL   Blood: x / Protein: Negative / Nitrite: Negative   Leuk Esterase: Negative / RBC: x / WBC x   Sq Epi: x / Non Sq Epi: x / Bacteria: x    Culture - Urine (collected 02 Aug 2021 16:34)  Source: Clean Catch Clean Catch (Midstream)  Final Report (04 Aug 2021 07:24):    <10,000 CFU/mL Normal Urogenital Codie    CAPILLARY BLOOD GLUCOSE      RADIOLOGY:        PHYSICAL EXAM:  GENERAL: NAD, AAO x 4, 80y M  HEAD:  Atraumatic, Normocephalic  EYES: EOMI, conjunctiva clear and sclera white  NECK: Supple, No JVD  CHEST/LUNG: Clear to auscultation bilaterally; No wheeze; No crackles; No accessory muscles used  HEART: Regular rate and rhythm; No murmurs;   ABDOMEN: Soft, Nontender, Nondistended; Bowel sounds present; No guarding  EXTREMITIES:  2+ Peripheral Pulses, No cyanosis or edema  NEUROLOGY: non-focal    ADMISSION SUMMARY  Patient is a 80y old Male who presents with a chief complaint of Chemotherapy (03 Aug 2021 16:34)

## 2021-08-04 NOTE — DISCHARGE NOTE PROVIDER - HOSPITAL COURSE
HPI:  80 year old male with Advanced rectal adenocarcinoma (T4N0M0) diagnosed April 2021 s/p 5 cycles chemo, microcytic anemia, chronic osteoarthritis being admitted for chemotherapy. This is a planned admission--patient has been in his regular state of health, denies any complaints. The patient follows with Dr. Tubbs, received chemo 5/7, 5/27, 6/12, 6/26, and 7/21. He reports having diarrhea at the nursing home that has since resolved. He denies fever, chills, cough, SOB, chest pain, nausea and vomiting. He received Neulasta outpatient on 7/23. Outpatient CTAP on 7/27 showed decrease in the size of rectal mass, no evidence of new metastatic mass.    In the ED, the patient's vitals are wnl, afebrile. Labs significant for WBC 20k, Hgb 10.5, platelets 122k, Magnesium 1.6 (repleted).  (01 Aug 2021 20:31)    Hospital course  Pt was admitted to the floors for folfox treatment, this is his 6th cycle. Pt tolerated well. Developed episodes of diarrhea that was resolved with loperamide 2mg PRN. Pt was cdiff negative. Currently still has leukocytosis of 20K. Pt will do radiation simulation before discharge on 8/5. HPI:  80 year old male with Advanced rectal adenocarcinoma (T4N0M0) diagnosed April 2021 s/p 5 cycles chemo, microcytic anemia, chronic osteoarthritis being admitted for chemotherapy. This is a planned admission--patient has been in his regular state of health, denies any complaints. The patient follows with Dr. Tubbs, received chemo 5/7, 5/27, 6/12, 6/26, and 7/21. He reports having diarrhea at the nursing home that has since resolved. He denies fever, chills, cough, SOB, chest pain, nausea and vomiting. He received Neulasta outpatient on 7/23. Outpatient CTAP on 7/27 showed decrease in the size of rectal mass, no evidence of new metastatic mass.    In the ED, the patient's vitals are wnl, afebrile. Labs significant for WBC 20k, Hgb 10.5, platelets 122k, Magnesium 1.6 (repleted).  (01 Aug 2021 20:31)    Hospital course  Pt was admitted to the floors for folfox treatment, this is his 6th cycle. Pt tolerated well. Developed episodes of diarrhea that was resolved with loperamide 2mg PRN. Pt was cdiff negative. Currently still has leukocytosis of 20K. Pt will do radiation simulation before discharge on 8/5. Mag at 1.8, WBC down to 19. Pt will return for RT that will be started on 8/23. There are two options: 1) can completed RT outpatient Monday to Friday (8/23-8/27) if transportation can be arranged.     2) Admit pt on 8/22 and give the course of radiation from 8/23 - 8/27 over 5 days. Since no chemo is needed during RT pt can be admitted under hospitalist service during that period.

## 2021-08-05 ENCOUNTER — TRANSCRIPTION ENCOUNTER (OUTPATIENT)
Age: 81
End: 2021-08-05

## 2021-08-05 VITALS
DIASTOLIC BLOOD PRESSURE: 59 MMHG | SYSTOLIC BLOOD PRESSURE: 108 MMHG | RESPIRATION RATE: 18 BRPM | TEMPERATURE: 98 F | HEART RATE: 89 BPM

## 2021-08-05 LAB
ALBUMIN SERPL ELPH-MCNC: 3 G/DL — LOW (ref 3.5–5.2)
ALP SERPL-CCNC: 175 U/L — HIGH (ref 30–115)
ALT FLD-CCNC: 30 U/L — SIGNIFICANT CHANGE UP (ref 0–41)
ANION GAP SERPL CALC-SCNC: 11 MMOL/L — SIGNIFICANT CHANGE UP (ref 7–14)
AST SERPL-CCNC: 50 U/L — HIGH (ref 0–41)
BASOPHILS # BLD AUTO: 0.1 K/UL — SIGNIFICANT CHANGE UP (ref 0–0.2)
BASOPHILS NFR BLD AUTO: 0.5 % — SIGNIFICANT CHANGE UP (ref 0–1)
BILIRUB SERPL-MCNC: 0.2 MG/DL — SIGNIFICANT CHANGE UP (ref 0.2–1.2)
BUN SERPL-MCNC: 14 MG/DL — SIGNIFICANT CHANGE UP (ref 10–20)
CALCIUM SERPL-MCNC: 8.5 MG/DL — SIGNIFICANT CHANGE UP (ref 8.5–10.1)
CHLORIDE SERPL-SCNC: 107 MMOL/L — SIGNIFICANT CHANGE UP (ref 98–110)
CO2 SERPL-SCNC: 23 MMOL/L — SIGNIFICANT CHANGE UP (ref 17–32)
CREAT SERPL-MCNC: 0.5 MG/DL — LOW (ref 0.7–1.5)
EOSINOPHIL # BLD AUTO: 0.29 K/UL — SIGNIFICANT CHANGE UP (ref 0–0.7)
EOSINOPHIL NFR BLD AUTO: 1.5 % — SIGNIFICANT CHANGE UP (ref 0–8)
GLUCOSE SERPL-MCNC: 71 MG/DL — SIGNIFICANT CHANGE UP (ref 70–99)
HCT VFR BLD CALC: 35.4 % — LOW (ref 42–52)
HGB BLD-MCNC: 10.4 G/DL — LOW (ref 14–18)
IMM GRANULOCYTES NFR BLD AUTO: 1.7 % — HIGH (ref 0.1–0.3)
LYMPHOCYTES # BLD AUTO: 1.76 K/UL — SIGNIFICANT CHANGE UP (ref 1.2–3.4)
LYMPHOCYTES # BLD AUTO: 9 % — LOW (ref 20.5–51.1)
MAGNESIUM SERPL-MCNC: 1.8 MG/DL — SIGNIFICANT CHANGE UP (ref 1.8–2.4)
MCHC RBC-ENTMCNC: 28.7 PG — SIGNIFICANT CHANGE UP (ref 27–31)
MCHC RBC-ENTMCNC: 29.4 G/DL — LOW (ref 32–37)
MCV RBC AUTO: 97.8 FL — HIGH (ref 80–94)
MONOCYTES # BLD AUTO: 0.32 K/UL — SIGNIFICANT CHANGE UP (ref 0.1–0.6)
MONOCYTES NFR BLD AUTO: 1.6 % — LOW (ref 1.7–9.3)
NEUTROPHILS # BLD AUTO: 16.8 K/UL — HIGH (ref 1.4–6.5)
NEUTROPHILS NFR BLD AUTO: 85.7 % — HIGH (ref 42.2–75.2)
NRBC # BLD: 0 /100 WBCS — SIGNIFICANT CHANGE UP (ref 0–0)
PLATELET # BLD AUTO: 130 K/UL — SIGNIFICANT CHANGE UP (ref 130–400)
POTASSIUM SERPL-MCNC: 4.4 MMOL/L — SIGNIFICANT CHANGE UP (ref 3.5–5)
POTASSIUM SERPL-SCNC: 4.4 MMOL/L — SIGNIFICANT CHANGE UP (ref 3.5–5)
PROT SERPL-MCNC: 5.5 G/DL — LOW (ref 6–8)
RBC # BLD: 3.62 M/UL — LOW (ref 4.7–6.1)
RBC # FLD: 20.1 % — HIGH (ref 11.5–14.5)
SODIUM SERPL-SCNC: 141 MMOL/L — SIGNIFICANT CHANGE UP (ref 135–146)
WBC # BLD: 19.6 K/UL — HIGH (ref 4.8–10.8)
WBC # FLD AUTO: 19.6 K/UL — HIGH (ref 4.8–10.8)

## 2021-08-05 PROCEDURE — 99239 HOSP IP/OBS DSCHRG MGMT >30: CPT

## 2021-08-05 RX ADMIN — CHLORHEXIDINE GLUCONATE 1 APPLICATION(S): 213 SOLUTION TOPICAL at 05:38

## 2021-08-05 RX ADMIN — PANTOPRAZOLE SODIUM 40 MILLIGRAM(S): 20 TABLET, DELAYED RELEASE ORAL at 05:38

## 2021-08-05 RX ADMIN — ENOXAPARIN SODIUM 40 MILLIGRAM(S): 100 INJECTION SUBCUTANEOUS at 11:09

## 2021-08-05 RX ADMIN — Medication 1 APPLICATION(S): at 17:06

## 2021-08-05 RX ADMIN — Medication 1 APPLICATION(S): at 05:38

## 2021-08-05 RX ADMIN — Medication 2 MILLIGRAM(S): at 05:45

## 2021-08-05 NOTE — PROGRESS NOTE ADULT - SUBJECTIVE AND OBJECTIVE BOX
MISAEL HUFFMAN 80y Male  MRN#: [id number]     SUBJECTIVE  Patient is a 80y old Male who presents with a chief complaint of Chemotherapy (04 Aug 2021 19:41)  Today is hospital day 4d, and this morning he is lying in bed without distress.   No acute overnight events.     OBJECTIVE  PAST MEDICAL & SURGICAL HISTORY  Iron deficiency anemia  Osteoarthritis  Amputation of digit of left hand      ALLERGIES:  No Known Allergies    MEDICATIONS:  STANDING MEDICATIONS  chlorhexidine 4% Liquid 1 Application(s) Topical <User Schedule>  enoxaparin Injectable 40 milliGRAM(s) SubCutaneous daily  pantoprazole    Tablet 40 milliGRAM(s) Oral before breakfast  vitamin A &amp; D Ointment 1 Application(s) Topical two times a day    PRN MEDICATIONS  acetaminophen   Tablet .. 650 milliGRAM(s) Oral every 6 hours PRN  hydrocortisone 2.5% Ointment 1 Application(s) Topical two times a day PRN  loperamide 2 milliGRAM(s) Oral three times a day PRN  oxycodone    5 mG/acetaminophen 325 mG 1 Tablet(s) Oral every 6 hours PRN    HOME MEDICATIONS  Home Medications:  lidocaine 5% topical film: Apply topically to affected area once, As Needed for pain.. (22 Jul 2021 09:16)  loperamide 2 mg oral capsule: 1 cap(s) orally 2 times a day, As Needed (22 Jul 2021 09:59)  oxycodone-acetaminophen 5 mg-325 mg oral tablet: 1 tab(s) orally every 6 hours, As needed, Moderate Pain (4 - 6) (18 Jul 2021 19:06)  pantoprazole 40 mg oral delayed release tablet: 1 tab(s) orally once a day (before a meal) (18 Jul 2021 19:06)  senna oral tablet: 2 tab(s) orally once a day (at bedtime) (22 Jul 2021 09:16)  vitamin A &amp; D topical ointment: 1 application topically 2 times a day (05 Aug 2021 11:12)      VITAL SIGNS: Last 24 Hours  T(C): 36.1 (05 Aug 2021 05:34), Max: 36.4 (04 Aug 2021 14:37)  T(F): 97 (05 Aug 2021 05:34), Max: 97.6 (04 Aug 2021 14:37)  HR: 67 (05 Aug 2021 05:34) (67 - 72)  BP: 109/59 (05 Aug 2021 05:34) (109/59 - 122/59)  BP(mean): --  RR: 20 (05 Aug 2021 05:34) (19 - 20)  SpO2: --    08-04-21 @ 07:01  -  08-05-21 @ 07:00  --------------------------------------------------------  IN: 0 mL / OUT: 2500 mL / NET: -2500 mL        LABS:                        10.4   19.60 )-----------( 130      ( 05 Aug 2021 05:10 )             35.4     08-05    141  |  107  |  14  ----------------------------<  71  4.4   |  23  |  0.5<L>    Ca    8.5      05 Aug 2021 05:10  Mg     1.8     08-05    TPro  5.5<L>  /  Alb  3.0<L>  /  TBili  0.2  /  DBili  x   /  AST  50<H>  /  ALT  30  /  AlkPhos  175<H>  08-05    LIVER FUNCTIONS - ( 05 Aug 2021 05:10 )  Alb: 3.0 g/dL / Pro: 5.5 g/dL / ALK PHOS: 175 U/L / ALT: 30 U/L / AST: 50 U/L / GGT: x           Culture - Urine (collected 03 Aug 2021 05:55)  Source: Clean Catch Clean Catch (Midstream)  Final Report (04 Aug 2021 15:27):    <10,000 CFU/mL Normal Urogenital Codie    Culture - Urine (collected 02 Aug 2021 16:34)  Source: Clean Catch Clean Catch (Midstream)  Final Report (04 Aug 2021 07:24):    <10,000 CFU/mL Normal Urogenital Codie    CAPILLARY BLOOD GLUCOSE    RADIOLOGY:    PHYSICAL EXAM:  GENERAL: NAD, AAO x 4, 80y M  HEAD:  Atraumatic, Normocephalic  EYES: EOMI, conjunctiva clear and sclera white  NECK: Supple, No JVD  CHEST/LUNG: Clear to auscultation bilaterally; No wheeze; No crackles; No accessory muscles used  HEART: Regular rate and rhythm; No murmurs;   ABDOMEN: Soft, Nontender, Nondistended; Bowel sounds present; No guarding  EXTREMITIES:  2+ Peripheral Pulses, No cyanosis or edema  NEUROLOGY: non-focal    ADMISSION SUMMARY  Patient is a 80y old Male who presents with a chief complaint of Chemotherapy (04 Aug 2021 19:41)

## 2021-08-05 NOTE — PROGRESS NOTE ADULT - ASSESSMENT
80 year old male with Advanced rectal adenocarcinoma, Microcytic Anemia, Chronic Osteoarthritis who presented from [facility] for chemotherapy.    # Locally advanced rectal adenocarcinoma (T4N0M0) s/p 4 cycles  - Diagnosed late April 2021 - MRI: 18 cm long polypoidal rectal mass with invasion of the right meso rectal fascia and possibly the prostate gland. Additional involvement of the anal sphincter complex--T4(a or b)N0M0  - repeat CT on 7/28 shows tumor shrinkage indicating good response to the chemo  - had extensive discussion with pt about the importance of getting chemo prior to getting surgery   - plan was to have total neoadjuvant therapy (chemotherapy followed by chemoRT followed by surgery )  - due to pt from [facility] and difficulty of organizing transportation as well as starting Xeloda, will do 5 sessions of RT   - radon consult appreciated  - RT will be started on 8/23.   There are two options:   1) can completed RT outpatient Monday to Friday (8/23-8/27) if transportation can be arranged.  2) Admit pt on 8/22 and give the course of radiation from 8/23 - 8/27 over 5 days. Since no chemo is needed during RT pt can be admitted under hospitalist service during that period.  -  is aware of new plan and noted in discharge note  - s/p 6 cycles of chemo s/p port placement (5/7, 5/27, 6/12, 6/26, 7/21, 8/4)  - last CEA on 6/28 was 10 (downtrending)  - CT simulation completed  - schedule pt to return to clinic for filgrastim     # Iron deficiency anemia, stable  - likely secondary to LGIB from cancer   - Hgb at baseline (~10)    # Leukocytosis likely related to filgrastim use  - doubt infection given no fever, tachycardia, chill  - received filgrastim on 7/28/21  - trend CBC   - panculture if fever develops  - C. diff negative     # Mycotic toenails - podiatry outpatient f/u     #Knee pain, patient reports RA history  - Lidocaine patch PRN  - Outpatient followup, pt was scheduled an appointment w/ ortho on 8/18    # R lung pulmonary nodules   # Pancreatic hypodensity  - c/w surveillance of the pulmonary nodules (too small to biopsy)  - MRI of pancreas to better assess the pancreatic hypodensity (which can be done outpatient)    Diet: Regular diet  GI ppx: Protonix  DVT ppx: Lovenox 40 mg qD  Activity: Increase as tolerated  Disposition: d/c today 80 year old male with Advanced rectal adenocarcinoma, Microcytic Anemia, Chronic Osteoarthritis who presented from [facility] for chemotherapy.    # Locally advanced rectal adenocarcinoma (T4N0M0) s/p 4 cycles  - Diagnosed late April 2021 - MRI: 18 cm long polypoidal rectal mass with invasion of the right meso rectal fascia and possibly the prostate gland. Additional involvement of the anal sphincter complex--T4(a or b)N0M0  - repeat CT on 7/28 shows tumor shrinkage indicating good response to the chemo  - had extensive discussion with pt about the importance of getting chemo prior to getting surgery   - plan was to have total neoadjuvant therapy (chemotherapy followed by chemoRT followed by surgery )  - due to pt from [facility] and difficulty of organizing transportation as well as starting Xeloda, will do 5 sessions of RT   - radon consult appreciated  - RT will be started on 8/23.   There are two options:   1) can complete RT outpatient Monday to Friday (8/23-8/27) if transportation can be arranged.  2) Admit pt on 8/22 and give the course of radiation from 8/23 - 8/27 over 5 days. Since no chemo is needed during RT pt can be admitted under hospitalist service during that period.  -  is aware of new plan and noted in discharge note  - s/p 6 cycles of chemo s/p port placement (5/7, 5/27, 6/12, 6/26, 7/21, 8/4)  - last CEA on 6/28 was 10 (downtrending)  - CT simulation completed  - schedule pt to return to clinic for filgrastim     # Iron deficiency anemia, stable  - likely secondary to LGIB from cancer   - Hgb at baseline (~10)    # Leukocytosis likely related to filgrastim use  - doubt infection given no fever, tachycardia, chill  - received filgrastim on 7/28/21  - trend CBC   - panculture if fever develops  - C. diff negative     # Mycotic toenails - podiatry outpatient f/u     #Knee pain, patient reports RA history  - Lidocaine patch PRN  - Outpatient followup, pt was scheduled an appointment w/ ortho on 8/18    # R lung pulmonary nodules   # Pancreatic hypodensity  - c/w surveillance of the pulmonary nodules (too small to biopsy)  - MRI of pancreas to better assess the pancreatic hypodensity (which can be done outpatient)    Diet: Regular diet  GI ppx: Protonix  DVT ppx: Lovenox 40 mg qD  Activity: Increase as tolerated  Disposition: d/c today

## 2021-08-05 NOTE — PROGRESS NOTE ADULT - REASON FOR ADMISSION
Chemotherapy

## 2021-08-05 NOTE — DISCHARGE NOTE NURSING/CASE MANAGEMENT/SOCIAL WORK - PATIENT PORTAL LINK FT
You can access the FollowMyHealth Patient Portal offered by [facility] by registering at the following website: [email or web address]. By joining [employer]’s FollowMyHealth portal, you will also be able to view your health information using other applications (apps) compatible with our system.

## 2021-08-05 NOTE — PROGRESS NOTE ADULT - ATTENDING COMMENTS
Agree with above A/p
Agree with above A/P.  Please arrange follow up as noted above   DC home tomorrow
Agree with above A/P.  Pt will complete 6th cycle of FOLFOX tomorrow. Tolerating well with good response.  Can be discharged to SNF tomorrow after completion of chemo.  Will come to [facility] on 8/5/21 for Neulasta.    Discussed case with Rad/Onc Dr Mcdaniel.  Plan to do short course RT with 5 fractions over 1 week.  Therefore no concurrent chemo needed during RT>  Pt will go for CT simulation tomorrow.    RT will be started in 2 weeks on 8/23 and can be completed MOnday thru Friday ( 8/23-8/27 ) as outpt if transportation can be arranged.    Or another option  is to admit pt on 8/22 and give the course of radiation from 8/23 - 8/27 over 5 days. Since no chemo is needed during RT pt can be admitted to any floor and can be under hospitalist service during that period.    Please have the  check with the SNF if transportation can be arranged. If not arrange admission on 8/22 thru bed management. Covid testing will be needed prior to admission.    NO NEED TO ARRANGE XELODA
Pt seen and examined.  Agree with above A/P.  Start FOLFOX chemo. Pt aware of SE.  Elevated WBC d/t Neulasta.  Please get Rad/onc Consult as the next step in pt's care will be CRT.  Repeat UA and Uc/S pt recently treated for UTI.

## 2021-08-05 NOTE — DISCHARGE NOTE NURSING/CASE MANAGEMENT/SOCIAL WORK - NSDCVIVACCINE_GEN_ALL_CORE_FT
COVID-19 vaccine, vector-nr, rS-Ad26, PF, 0.5 mL (Sveta); 10-May-2021 14:11; [place] [employer] (CINDI); [facility]; [id number] (Exp. Date: 23-Jun-2021); IntraMuscular; Deltoid Left.; 0.5 milliLiter(s);

## 2021-08-06 ENCOUNTER — APPOINTMENT (OUTPATIENT)
Dept: INFUSION THERAPY | Facility: CLINIC | Age: 81
End: 2021-08-06

## 2021-08-11 DIAGNOSIS — Z51.11 ENCOUNTER FOR ANTINEOPLASTIC CHEMOTHERAPY: ICD-10-CM

## 2021-08-11 DIAGNOSIS — C79.89 SECONDARY MALIGNANT NEOPLASM OF OTHER SPECIFIED SITES: ICD-10-CM

## 2021-08-11 DIAGNOSIS — R91.8 OTHER NONSPECIFIC ABNORMAL FINDING OF LUNG FIELD: ICD-10-CM

## 2021-08-11 DIAGNOSIS — T45.8X5A ADVERSE EFFECT OF OTHER PRIMARILY SYSTEMIC AND HEMATOLOGICAL AGENTS, INITIAL ENCOUNTER: ICD-10-CM

## 2021-08-11 DIAGNOSIS — D72.829 ELEVATED WHITE BLOOD CELL COUNT, UNSPECIFIED: ICD-10-CM

## 2021-08-11 DIAGNOSIS — D50.0 IRON DEFICIENCY ANEMIA SECONDARY TO BLOOD LOSS (CHRONIC): ICD-10-CM

## 2021-08-11 DIAGNOSIS — B35.1 TINEA UNGUIUM: ICD-10-CM

## 2021-08-11 DIAGNOSIS — M06.9 RHEUMATOID ARTHRITIS, UNSPECIFIED: ICD-10-CM

## 2021-08-11 DIAGNOSIS — C20 MALIGNANT NEOPLASM OF RECTUM: ICD-10-CM

## 2021-08-11 DIAGNOSIS — M17.10 UNILATERAL PRIMARY OSTEOARTHRITIS, UNSPECIFIED KNEE: ICD-10-CM

## 2021-08-11 DIAGNOSIS — R19.7 DIARRHEA, UNSPECIFIED: ICD-10-CM

## 2021-08-11 DIAGNOSIS — Z66 DO NOT RESUSCITATE: ICD-10-CM

## 2021-08-11 DIAGNOSIS — E83.42 HYPOMAGNESEMIA: ICD-10-CM

## 2021-08-18 ENCOUNTER — APPOINTMENT (OUTPATIENT)
Dept: ORTHOPEDIC SURGERY | Facility: CLINIC | Age: 81
End: 2021-08-18

## 2021-08-22 ENCOUNTER — INPATIENT (INPATIENT)
Facility: HOSPITAL | Age: 81
LOS: 2 days | Discharge: NURSING, REHAB OR LONG-TERM CARE | End: 2021-08-25
Attending: INTERNAL MEDICINE | Admitting: INTERNAL MEDICINE
Payer: MEDICARE

## 2021-08-22 VITALS
HEART RATE: 81 BPM | OXYGEN SATURATION: 99 % | HEIGHT: 72 IN | DIASTOLIC BLOOD PRESSURE: 63 MMHG | TEMPERATURE: 97 F | SYSTOLIC BLOOD PRESSURE: 144 MMHG | RESPIRATION RATE: 16 BRPM

## 2021-08-22 DIAGNOSIS — M06.869: ICD-10-CM

## 2021-08-22 DIAGNOSIS — S68.119A COMPLETE TRAUMATIC METACARPOPHALANGEAL AMPUTATION OF UNSPECIFIED FINGER, INITIAL ENCOUNTER: Chronic | ICD-10-CM

## 2021-08-22 DIAGNOSIS — C20 MALIGNANT NEOPLASM OF RECTUM: ICD-10-CM

## 2021-08-22 DIAGNOSIS — D50.9 IRON DEFICIENCY ANEMIA, UNSPECIFIED: ICD-10-CM

## 2021-08-22 DIAGNOSIS — R91.1 SOLITARY PULMONARY NODULE: ICD-10-CM

## 2021-08-22 DIAGNOSIS — Z53.8 PROCEDURE AND TREATMENT NOT CARRIED OUT FOR OTHER REASONS: ICD-10-CM

## 2021-08-22 DIAGNOSIS — B35.1 TINEA UNGUIUM: ICD-10-CM

## 2021-08-22 DIAGNOSIS — Z51.0 ENCOUNTER FOR ANTINEOPLASTIC RADIATION THERAPY: ICD-10-CM

## 2021-08-22 DIAGNOSIS — K86.9 DISEASE OF PANCREAS, UNSPECIFIED: ICD-10-CM

## 2021-08-22 DIAGNOSIS — M19.90 UNSPECIFIED OSTEOARTHRITIS, UNSPECIFIED SITE: ICD-10-CM

## 2021-08-22 DIAGNOSIS — Z89.022 ACQUIRED ABSENCE OF LEFT FINGER(S): ICD-10-CM

## 2021-08-22 DIAGNOSIS — Z79.899 OTHER LONG TERM (CURRENT) DRUG THERAPY: ICD-10-CM

## 2021-08-22 LAB
ALBUMIN SERPL ELPH-MCNC: 3 G/DL — LOW (ref 3.5–5.2)
ALP SERPL-CCNC: 74 U/L — SIGNIFICANT CHANGE UP (ref 30–115)
ALT FLD-CCNC: 8 U/L — SIGNIFICANT CHANGE UP (ref 0–41)
ANION GAP SERPL CALC-SCNC: 10 MMOL/L — SIGNIFICANT CHANGE UP (ref 7–14)
ANISOCYTOSIS BLD QL: SLIGHT — SIGNIFICANT CHANGE UP
APTT BLD: 36.2 SEC — SIGNIFICANT CHANGE UP (ref 27–39.2)
AST SERPL-CCNC: 42 U/L — HIGH (ref 0–41)
BASOPHILS # BLD AUTO: 0.07 K/UL — SIGNIFICANT CHANGE UP (ref 0–0.2)
BASOPHILS NFR BLD AUTO: 0.9 % — SIGNIFICANT CHANGE UP (ref 0–1)
BILIRUB SERPL-MCNC: 0.2 MG/DL — SIGNIFICANT CHANGE UP (ref 0.2–1.2)
BLD GP AB SCN SERPL QL: SIGNIFICANT CHANGE UP
BUN SERPL-MCNC: 7 MG/DL — LOW (ref 10–20)
CALCIUM SERPL-MCNC: 8.6 MG/DL — SIGNIFICANT CHANGE UP (ref 8.5–10.1)
CHLORIDE SERPL-SCNC: 105 MMOL/L — SIGNIFICANT CHANGE UP (ref 98–110)
CO2 SERPL-SCNC: 23 MMOL/L — SIGNIFICANT CHANGE UP (ref 17–32)
CREAT SERPL-MCNC: 0.5 MG/DL — LOW (ref 0.7–1.5)
EOSINOPHIL # BLD AUTO: 0.07 K/UL — SIGNIFICANT CHANGE UP (ref 0–0.7)
EOSINOPHIL NFR BLD AUTO: 0.9 % — SIGNIFICANT CHANGE UP (ref 0–8)
GLUCOSE SERPL-MCNC: 94 MG/DL — SIGNIFICANT CHANGE UP (ref 70–99)
HCT VFR BLD CALC: 36.9 % — LOW (ref 42–52)
HGB BLD-MCNC: 11.2 G/DL — LOW (ref 14–18)
INR BLD: 0.98 RATIO — SIGNIFICANT CHANGE UP (ref 0.65–1.3)
LYMPHOCYTES # BLD AUTO: 1.96 K/UL — SIGNIFICANT CHANGE UP (ref 1.2–3.4)
LYMPHOCYTES # BLD AUTO: 26.3 % — SIGNIFICANT CHANGE UP (ref 20.5–51.1)
MCHC RBC-ENTMCNC: 28.2 PG — SIGNIFICANT CHANGE UP (ref 27–31)
MCHC RBC-ENTMCNC: 30.4 G/DL — LOW (ref 32–37)
MCV RBC AUTO: 92.9 FL — SIGNIFICANT CHANGE UP (ref 80–94)
MICROCYTES BLD QL: SLIGHT — SIGNIFICANT CHANGE UP
MONOCYTES # BLD AUTO: 1.18 K/UL — HIGH (ref 0.1–0.6)
MONOCYTES NFR BLD AUTO: 15.8 % — HIGH (ref 1.7–9.3)
MYELOCYTES NFR BLD: 0.9 % — HIGH (ref 0–0)
NEUTROPHILS # BLD AUTO: 3.93 K/UL — SIGNIFICANT CHANGE UP (ref 1.4–6.5)
NEUTROPHILS NFR BLD AUTO: 52.6 % — SIGNIFICANT CHANGE UP (ref 42.2–75.2)
PLAT MORPH BLD: SIGNIFICANT CHANGE UP
PLATELET # BLD AUTO: 193 K/UL — SIGNIFICANT CHANGE UP (ref 130–400)
POTASSIUM SERPL-MCNC: 4.8 MMOL/L — SIGNIFICANT CHANGE UP (ref 3.5–5)
POTASSIUM SERPL-SCNC: 4.8 MMOL/L — SIGNIFICANT CHANGE UP (ref 3.5–5)
PROMYELOCYTES # FLD: 0.9 % — HIGH (ref 0–0)
PROT SERPL-MCNC: 6.2 G/DL — SIGNIFICANT CHANGE UP (ref 6–8)
PROTHROM AB SERPL-ACNC: 11.3 SEC — SIGNIFICANT CHANGE UP (ref 9.95–12.87)
RBC # BLD: 3.97 M/UL — LOW (ref 4.7–6.1)
RBC # FLD: 17.2 % — HIGH (ref 11.5–14.5)
RBC BLD AUTO: NORMAL — SIGNIFICANT CHANGE UP
SARS-COV-2 RNA SPEC QL NAA+PROBE: SIGNIFICANT CHANGE UP
SODIUM SERPL-SCNC: 138 MMOL/L — SIGNIFICANT CHANGE UP (ref 135–146)
VARIANT LYMPHS # BLD: 1.7 % — SIGNIFICANT CHANGE UP (ref 0–5)
WBC # BLD: 7.47 K/UL — SIGNIFICANT CHANGE UP (ref 4.8–10.8)
WBC # FLD AUTO: 7.47 K/UL — SIGNIFICANT CHANGE UP (ref 4.8–10.8)

## 2021-08-22 PROCEDURE — 99285 EMERGENCY DEPT VISIT HI MDM: CPT

## 2021-08-22 RX ORDER — ONDANSETRON 8 MG/1
4 TABLET, FILM COATED ORAL EVERY 8 HOURS
Refills: 0 | Status: DISCONTINUED | OUTPATIENT
Start: 2021-08-22 | End: 2021-08-25

## 2021-08-22 RX ORDER — SENNA PLUS 8.6 MG/1
2 TABLET ORAL AT BEDTIME
Refills: 0 | Status: DISCONTINUED | OUTPATIENT
Start: 2021-08-22 | End: 2021-08-25

## 2021-08-22 RX ORDER — ENOXAPARIN SODIUM 100 MG/ML
40 INJECTION SUBCUTANEOUS DAILY
Refills: 0 | Status: DISCONTINUED | OUTPATIENT
Start: 2021-08-22 | End: 2021-08-25

## 2021-08-22 RX ORDER — LANOLIN ALCOHOL/MO/W.PET/CERES
3 CREAM (GRAM) TOPICAL AT BEDTIME
Refills: 0 | Status: DISCONTINUED | OUTPATIENT
Start: 2021-08-22 | End: 2021-08-25

## 2021-08-22 RX ORDER — OXYCODONE AND ACETAMINOPHEN 5; 325 MG/1; MG/1
1 TABLET ORAL EVERY 6 HOURS
Refills: 0 | Status: DISCONTINUED | OUTPATIENT
Start: 2021-08-22 | End: 2021-08-25

## 2021-08-22 RX ORDER — LOPERAMIDE HCL 2 MG
2 TABLET ORAL
Refills: 0 | Status: DISCONTINUED | OUTPATIENT
Start: 2021-08-22 | End: 2021-08-25

## 2021-08-22 RX ORDER — PANTOPRAZOLE SODIUM 20 MG/1
40 TABLET, DELAYED RELEASE ORAL
Refills: 0 | Status: DISCONTINUED | OUTPATIENT
Start: 2021-08-22 | End: 2021-08-25

## 2021-08-22 NOTE — ED PROVIDER NOTE - ATTENDING CONTRIBUTION TO CARE
80yoM with h/o "Locally advanced rectal adenocarcinoma (T4N0M0)" (from hem/onc note 8/5) s/p chemo, presents for radiation per hem/onc note to begin tomorrow. Pt confirms here for radiation. Denies all acute symptoms including abd pain, v/d/c, hematochezia, fever. On exam, afebrile, hemodynamically stable, saturating well, NAD, well appearing, sitting comfortably in bed, head NCAT, EOMI grossly, anicteric, RRR, nml S1/S2, no m/r/g, lungs CTAB, no w/r/r, abd soft, NT, ND, nml BS, no rebound or guarding, AAO, CN's 3-12 grossly intact, ANDERSON spontaneously, no leg cyanosis or edema, skin warm, well perfused. Reviewed past note. Patient well appearing, hemodynamically stable. Admitted to internal medicine for further monitoring, w/u, and care.

## 2021-08-22 NOTE — H&P ADULT - ASSESSMENT
80 year old male with Advanced rectal adenocarcinoma (T4N0M0) diagnosed April 2021 s/p 5 cycles chemo, microcytic anemia, chronic osteoarthritis being admitted for Wenceslao-adjuvant radiotherapy from a SNF.     # Locally advanced rectal adenocarcinoma (T4N0M0) s/p 4 cycles  - Diagnosed late April 2021 - MRI: 18 cm long polypoidal rectal mass with invasion of the right meso rectal fascia and possibly the prostate gland. Additional involvement of the anal sphincter complex.  - repeat CT on 7/28 showed good tumor response to Chemo  - plan to have total neoadjuvant therapy (chemotherapy followed by chemoRT followed by surgery )  - radonc consult placed  - s/p 6 cycles of chemo s/p port placement  - last CEA on 6/28 was 10 (downtrending). Antonino cadena    # Mycotic toenails - podiatry outpatient f/u     #Knee pain, patient reports RA history  - Lidocaine patch PRN  - Outpatient followup, pt was scheduled an appointment w/ ortho on 8/18    # R lung pulmonary nodules   # Pancreatic hypodensity  - c/w surveillance of the pulmonary nodules (too small to biopsy)  - MRI of pancreas to better assess the pancreatic hypodensity (which can be done outpatient)    Diet: Regular diet  GI ppx: Protonix  DVT ppx: Lovenox 40 mg qD  Activity: Increase as tolerated     80 year old male with Advanced rectal adenocarcinoma (T4N0M0) diagnosed April 2021 s/p 5 cycles chemo, microcytic anemia, chronic osteoarthritis being admitted for Wenceslao-adjuvant radiotherapy from a SNF.     # Locally advanced rectal adenocarcinoma (T4N0M0) s/p 4 cycles  - Diagnosed late April 2021 - MRI: 18 cm long polypoidal rectal mass with invasion of the right meso rectal fascia and possibly the prostate gland. Additional involvement of the anal sphincter complex.  - repeat CT on 7/28 showed good tumor response to Chemo  - plan to have total neoadjuvant therapy (chemotherapy followed by chemoRT followed by surgery )  - radon consult placed  - s/p 6 cycles of chemo s/p port placement. Follows with Dr. Tubbs.  - last CEA on 6/28 was 10 (downtrending). Will repeat.    # Mycotic toenails - podiatry outpatient f/u     #Knee pain, patient reports RA history  - Lidocaine patch PRN  - Outpatient followup, pt was scheduled an appointment w/ ortho on 8/18    # R lung pulmonary nodules   # Pancreatic hypodensity  - c/w surveillance of the pulmonary nodules (too small to biopsy)  - MRI of pancreas to better assess the pancreatic hypodensity (which can be done outpatient)    Diet: Regular diet  GI ppx: Protonix  DVT ppx: Lovenox 40 mg qD  Activity: Increase as tolerated     80 year old male with Advanced rectal adenocarcinoma (T4N0M0) diagnosed April 2021 s/p 5 cycles chemo, microcytic anemia, chronic osteoarthritis being admitted for Wenceslao-adjuvant radiotherapy from a SNF.     # Locally advanced rectal adenocarcinoma (T4N0M0) s/p 4 cycles  - Diagnosed late April 2021 - MRI: 18 cm long polypoidal rectal mass with invasion of the right meso rectal fascia and possibly the prostate gland. Additional involvement of the anal sphincter complex.  - repeat CT on 7/28 showed good tumor response to Chemo  - plan to have total neoadjuvant therapy (chemotherapy followed by chemoRT followed by surgery )  - radon consult placed  - s/p 6 cycles of chemo s/p port placement. Follows with Dr. Tubbs.  - last CEA on 6/28 was 10 (downtrending). Will repeat.    # Mycotic toenails - podiatry outpatient f/u     #Knee pain, patient reports RA history  - Lidocaine patch PRN    # R lung pulmonary nodules   # Pancreatic hypodensity  - c/w surveillance of the pulmonary nodules (too small to biopsy)  - MRI of pancreas to better assess the pancreatic hypodensity (which can be done outpatient)    Diet: Regular diet  GI ppx: Protonix  DVT ppx: Lovenox 40 mg qD  Activity: Increase as tolerated

## 2021-08-22 NOTE — H&P ADULT - NSHPLABSRESULTS_GEN_ALL_CORE
< from: CT Abdomen and Pelvis w/ Oral Cont and w/ IV Cont (07.27.21 @ 17:47) >      IMPRESSION:    When compared to 4/22/2021:    Interval decrease in size of rectal wall thickening and rectal mass, inherently limited in evaluation on CT.  The exophytic component which abuts the right posterior prostate gland and seminal vesicles measures approximately 4 x 3 cm (previously 6 x 3 cm). Foci of gas are noted within which may be secondary to communication with the rectal lumen. Limited CT ability to exclude superimposed infection.  Circumferential rectal wall thickening, decreased measuring up to 1 cm (previously up to at least 2 cm).  Pelvic MRI can be utilized for further detail/evaluation.    Redemonstration of perirectal lymph nodes measuring up to 0.7 cm.    No evidence for new or worsening metastatic disease/lymphadenopathy.      < end of copied text >

## 2021-08-22 NOTE — ED PROVIDER NOTE - PHYSICAL EXAMINATION
CONST: NAD  EYES: Sclera and conjunctiva clear.   ENT: No nasal discharge. Oropharynx normal appearing  NECK: Non-tender, no meningeal signs. normal ROM. supple   CARD: S1 S2; No jvd  RESP: Equal BS B/L, No wheezes, rhonchi or rales. No distress  GI: Soft, non-tender, non-distended. no cva tenderness. normal BS  MS: Normal ROM in all extremities. pulses 2 +. no calf tenderness or swelling  SKIN: Warm, dry, no acute rashes. Good turgor  NEURO: A&Ox2, No focal deficits. Strength 5/5 with no sensory deficits.

## 2021-08-22 NOTE — ED PROVIDER NOTE - OBJECTIVE STATEMENT
80y M pmh HUSEYIN, OA, Advanced Rectal Adenocarcinoma followed by Dr. Tubbs presents for RT. Pt sent from NH for RT. Denies any medical complaints at this time. Denies fever, ha, cp, sob, weakness, numbness, dysuria, hematuria, n/v/d/c

## 2021-08-22 NOTE — ED ADULT NURSE NOTE - CHIEF COMPLAINT QUOTE
Patient BIBA from [facility] for admission for chemo. DNR/DNI.

## 2021-08-22 NOTE — ED ADULT TRIAGE NOTE - CHIEF COMPLAINT QUOTE
Patient BIBA from [facility] for admission for chemo. DNR/DNI.

## 2021-08-22 NOTE — H&P ADULT - HISTORY OF PRESENT ILLNESS
80 year old male with Advanced rectal adenocarcinoma (T4N0M0) diagnosed April 2021 s/p 5 cycles chemo, microcytic anemia, chronic osteoarthritis being admitted for Wenceslao-adjuvant radiotherapy from a SNF.     This is a planned admission--patient has been in his regular state of health, denies any complaints. The patient follows with Dr. Tubbs, received chemo 6 cycles of chemo. Plan is to have total neoadjuvant therapy (chemotherapy followed by chemoRT followed by surgery ). Since pt is from [facility] and difficulty of organizing transportation planned for 5 sessions of RT as inpatient from (8/23 - 8/27 over 5 days).  . He denies fever, chills, cough, SOB, chest pain, nausea and vomiting.     In the ED, the patient's vitals are wnl, afebrile. Labs grossly normal except mild anemia. 80 year old male with Advanced rectal adenocarcinoma (T4N0M0) diagnosed April 2021 s/p 6 cycles chemo, microcytic anemia, chronic osteoarthritis being admitted for Wenceslao-adjuvant radiotherapy from a SNF.     This is a planned admission--patient has been in his regular state of health, denies any complaints. The patient follows with Dr. Tubbs, received chemo 6 cycles of chemo with good response. Plan is to have total neoadjuvant therapy (chemotherapy followed by chemoRT followed by surgery ). Since pt is from [facility] and difficulty of organizing transportation planned for 5 sessions of RT as inpatient from (8/23 - 8/27 over 5 days).  . He denies fever, chills, cough, SOB, chest pain, nausea and vomiting.     In the ED, the patient's vitals are wnl, afebrile. Labs grossly normal except mild anemia.

## 2021-08-23 ENCOUNTER — TRANSCRIPTION ENCOUNTER (OUTPATIENT)
Age: 81
End: 2021-08-23

## 2021-08-23 LAB
ALBUMIN SERPL ELPH-MCNC: 2.9 G/DL — LOW (ref 3.5–5.2)
ALP SERPL-CCNC: 73 U/L — SIGNIFICANT CHANGE UP (ref 30–115)
ALT FLD-CCNC: 6 U/L — SIGNIFICANT CHANGE UP (ref 0–41)
ANION GAP SERPL CALC-SCNC: 10 MMOL/L — SIGNIFICANT CHANGE UP (ref 7–14)
AST SERPL-CCNC: 14 U/L — SIGNIFICANT CHANGE UP (ref 0–41)
BASOPHILS # BLD AUTO: 0.07 K/UL — SIGNIFICANT CHANGE UP (ref 0–0.2)
BASOPHILS NFR BLD AUTO: 0.9 % — SIGNIFICANT CHANGE UP (ref 0–1)
BILIRUB SERPL-MCNC: 0.3 MG/DL — SIGNIFICANT CHANGE UP (ref 0.2–1.2)
BUN SERPL-MCNC: 8 MG/DL — LOW (ref 10–20)
CALCIUM SERPL-MCNC: 8.7 MG/DL — SIGNIFICANT CHANGE UP (ref 8.5–10.1)
CHLORIDE SERPL-SCNC: 107 MMOL/L — SIGNIFICANT CHANGE UP (ref 98–110)
CO2 SERPL-SCNC: 24 MMOL/L — SIGNIFICANT CHANGE UP (ref 17–32)
CREAT SERPL-MCNC: 0.5 MG/DL — LOW (ref 0.7–1.5)
EOSINOPHIL # BLD AUTO: 0.16 K/UL — SIGNIFICANT CHANGE UP (ref 0–0.7)
EOSINOPHIL NFR BLD AUTO: 2 % — SIGNIFICANT CHANGE UP (ref 0–8)
GLUCOSE SERPL-MCNC: 80 MG/DL — SIGNIFICANT CHANGE UP (ref 70–99)
HCT VFR BLD CALC: 33.7 % — LOW (ref 42–52)
HGB BLD-MCNC: 10.2 G/DL — LOW (ref 14–18)
IMM GRANULOCYTES NFR BLD AUTO: 5.7 % — HIGH (ref 0.1–0.3)
INR BLD: 1.04 RATIO — SIGNIFICANT CHANGE UP (ref 0.65–1.3)
LYMPHOCYTES # BLD AUTO: 1.51 K/UL — SIGNIFICANT CHANGE UP (ref 1.2–3.4)
LYMPHOCYTES # BLD AUTO: 19.1 % — LOW (ref 20.5–51.1)
MCHC RBC-ENTMCNC: 28.3 PG — SIGNIFICANT CHANGE UP (ref 27–31)
MCHC RBC-ENTMCNC: 30.3 G/DL — LOW (ref 32–37)
MCV RBC AUTO: 93.6 FL — SIGNIFICANT CHANGE UP (ref 80–94)
MONOCYTES # BLD AUTO: 1.12 K/UL — HIGH (ref 0.1–0.6)
MONOCYTES NFR BLD AUTO: 14.2 % — HIGH (ref 1.7–9.3)
NEUTROPHILS # BLD AUTO: 4.58 K/UL — SIGNIFICANT CHANGE UP (ref 1.4–6.5)
NEUTROPHILS NFR BLD AUTO: 58.1 % — SIGNIFICANT CHANGE UP (ref 42.2–75.2)
NRBC # BLD: 0 /100 WBCS — SIGNIFICANT CHANGE UP (ref 0–0)
PLATELET # BLD AUTO: 225 K/UL — SIGNIFICANT CHANGE UP (ref 130–400)
POTASSIUM SERPL-MCNC: 4.1 MMOL/L — SIGNIFICANT CHANGE UP (ref 3.5–5)
POTASSIUM SERPL-SCNC: 4.1 MMOL/L — SIGNIFICANT CHANGE UP (ref 3.5–5)
PROT SERPL-MCNC: 5.6 G/DL — LOW (ref 6–8)
PROTHROM AB SERPL-ACNC: 12 SEC — SIGNIFICANT CHANGE UP (ref 9.95–12.87)
RBC # BLD: 3.6 M/UL — LOW (ref 4.7–6.1)
RBC # FLD: 17.2 % — HIGH (ref 11.5–14.5)
SODIUM SERPL-SCNC: 141 MMOL/L — SIGNIFICANT CHANGE UP (ref 135–146)
WBC # BLD: 7.89 K/UL — SIGNIFICANT CHANGE UP (ref 4.8–10.8)
WBC # FLD AUTO: 7.89 K/UL — SIGNIFICANT CHANGE UP (ref 4.8–10.8)

## 2021-08-23 PROCEDURE — 99231 SBSQ HOSP IP/OBS SF/LOW 25: CPT

## 2021-08-23 RX ADMIN — SENNA PLUS 2 TABLET(S): 8.6 TABLET ORAL at 21:09

## 2021-08-23 RX ADMIN — PANTOPRAZOLE SODIUM 40 MILLIGRAM(S): 20 TABLET, DELAYED RELEASE ORAL at 05:32

## 2021-08-23 RX ADMIN — ENOXAPARIN SODIUM 40 MILLIGRAM(S): 100 INJECTION SUBCUTANEOUS at 12:51

## 2021-08-23 NOTE — PROGRESS NOTE ADULT - ATTENDING COMMENTS
80 year old male with Advanced rectal adenocarcinoma (T4N0M0) diagnosed April 2021 s/p 5 cycles chemo, microcytic anemia, chronic osteoarthritis being admitted for planned Wenceslao-adjuvant radiotherapy from a SNF.     # Locally advanced rectal adenocarcinoma (T4N0M0)  - Diagnosed late April 2021 , currently with good response to chemotherapy   - plan to have total neoadjuvant therapy (chemotherapy followed by chemoRT followed by surgery ). This was intended to be a planned admission for radiation therapy, however per Rad Onc/Heme Onc physicians this will need to be delayed until next week.   - coordinate with NH for another planned readmission- CM aware    #Knee pain- Lidocaine patch     # R lung pulmonary nodules   # Pancreatic hypodensity  - routine outpatient surveillance and Onc follow up       DVT ppx: Lovenox 40 mg qD    #Progress Note Handoff:  Pending (specify):  Patient lost his bed at his LTC facility since he was expected to be gone for 5 days, will need to wait for bed availability  Family discussion: d/w pt at bedside and updated daughter Sally on the phone who is understanding of the situation.   Disposition: Home___/SNF___/Other________/Unknown at this time______x__      Kristin Rebolledo, DO 80 year old male with Advanced rectal adenocarcinoma (T4N0M0) diagnosed April 2021 s/p 5 cycles chemo, microcytic anemia, chronic osteoarthritis being admitted for planned [place]-adjuvant radiotherapy from a SNF.     # Locally advanced rectal adenocarcinoma (T4N0M0)  - Diagnosed late April 2021 , currently with good response to chemotherapy   - plan to have total neoadjuvant therapy (chemotherapy followed by chemoRT followed by surgery ). This was intended to be a planned admission for radiation therapy, however per Rad Onc/Heme Onc physicians this will need to be delayed until next week.   - coordinate with NH for another planned readmission- CM aware    #Knee pain- Lidocaine patch     # R lung pulmonary nodules   # Pancreatic hypodensity  - routine outpatient surveillance and Onc follow up     #Right Back Shoulder Lesion- according to daughter pt has possible abscess treated w/abx at NH? Will evaluate, although no s/s of sepsis at this time.       DVT ppx: Lovenox 40 mg qD    #Progress Note Handoff:  Pending (specify):  Patient lost his bed at his LTC facility since he was expected to be gone for 5 days, will need to wait for bed availability  Family discussion: d/w pt at bedside and updated daughter Sally on the phone who is understanding of the situation.   Disposition: Home___/SNF___/Other________/Unknown at this time______x__      Kristin Rebolledo, DO

## 2021-08-23 NOTE — DISCHARGE NOTE PROVIDER - HOSPITAL COURSE
80 year old male with Advanced rectal adenocarcinoma (T4N0M0) diagnosed April 2021 s/p 6 cycles chemo, microcytic anemia, chronic osteoarthritis being admitted for Wenceslao-adjuvant radiotherapy from a SNF.     This is a planned admission--patient has been in his regular state of health, denies any complaints. The patient follows with Dr. Tubbs, received chemo 6 cycles of chemo with good response. Plan is to have total neoadjuvant therapy (chemotherapy followed by chemoRT followed by surgery ). Since pt is from [facility] and difficulty of organizing transportation planned for 5 sessions of RT as inpatient from (8/23 - 8/27 over 5 days). He denies fever, chills, cough, SOB, chest pain, nausea and vomiting.     In the ED, the patient's vitals were wnl, afebrile. Labs grossly normal except mild anemia.    Patient transferred to medicine floor.     Itemized hospital course below:     80 year old male with Advanced rectal adenocarcinoma (T4N0M0) diagnosed April 2021 s/p 5 cycles chemo, microcytic anemia, chronic osteoarthritis being admitted for Wenceslao-adjuvant radiotherapy from a SNF.     # Locally advanced rectal adenocarcinoma (T4N0M0)  - Diagnosed late April 2021 - MRI: 18 cm long polypoidal rectal mass with invasion of the right meso rectal fascia and possibly the prostate gland. Additional involvement of the anal sphincter complex.  - repeat CT on 7/28 showed good tumor response to Chemo  - plan to have total neoadjuvant therapy (chemotherapy followed by chemoRT followed by surgery )  - patient not ready for radiation yet per radiation therapy b/c last folfox tx was 2 weeks ago and pt needs 4 weeks post folfox prior to radiation tx  - s/p 6 cycles of chemo s/p port placement. Follows with Dr. Tubbs.  - last CEA on 6/28 was 10 (downtrending). Will repeat.    # Mycotic toenails   - podiatry outpatient f/u     #Knee pain, patient reports RA history  - Lidocaine patch PRN    # R lung pulmonary nodules   # Pancreatic hypodensity  - c/w surveillance of the pulmonary nodules (too small to biopsy)  - MRI of pancreas to better assess the pancreatic hypodensity (which can be done outpatient)    Diet: Regular diet  GI ppx: Protonix  DVT ppx: Lovenox 40 mg qD  Activity: Increase as tolerated   80 year old male with Advanced rectal adenocarcinoma (T4N0M0) diagnosed April 2021 s/p 6 cycles chemo, microcytic anemia, chronic osteoarthritis being admitted for Wenceslao-adjuvant radiotherapy from a SNF.     This is a planned admission--patient has been in his regular state of health, denies any complaints. The patient follows with Dr. Tubbs, received chemo 6 cycles of chemo with good response. Plan is to have total neoadjuvant therapy (chemotherapy followed by chemoRT followed by surgery ). Since pt is from [facility] and difficulty of organizing transportation planned for 5 sessions of RT as inpatient from (8/23 - 8/27 over 5 days). He denies fever, chills, cough, SOB, chest pain, nausea and vomiting.     In the ED, the patient's vitals were wnl, afebrile. Labs grossly normal except mild anemia.    Patient transferred to medicine floor.     Itemized hospital course below:     80 year old male with Advanced rectal adenocarcinoma (T4N0M0) diagnosed April 2021 s/p 5 cycles chemo, microcytic anemia, chronic osteoarthritis being admitted for Wenceslao-adjuvant radiotherapy from a SNF.     # Locally advanced rectal adenocarcinoma (T4N0M0)  - Diagnosed late April 2021 - MRI: 18 cm long polypoidal rectal mass with invasion of the right meso rectal fascia and possibly the prostate gland. Additional involvement of the anal sphincter complex.  - repeat CT on 7/28 showed good tumor response to Chemo  - plan to have total neoadjuvant therapy (chemotherapy followed by chemoRT followed by surgery )  - patient not ready for radiation yet per radiation therapy b/c last folfox tx was 2 weeks ago and pt needs 4 weeks post folfox prior to radiation tx  - s/p 6 cycles of chemo s/p port placement. Follows with Dr. Tubbs.  - last CEA on 6/28 was 10 (downtrending). Will repeat.    # Mycotic toenails   - podiatry outpatient f/u     #Knee pain, patient reports RA history  - Lidocaine patch PRN    # R lung pulmonary nodules   # Pancreatic hypodensity  - c/w surveillance of the pulmonary nodules (too small to biopsy)  - MRI of pancreas to better assess the pancreatic hypodensity (which can be done outpatient)    Patient to be discharged to the hospital to follow up with Rad-Onc OP. 80 year old male with Advanced rectal adenocarcinoma (T4N0M0) diagnosed April 2021 s/p 6 cycles chemo, microcytic anemia, chronic osteoarthritis being admitted for Wenceslao-adjuvant radiotherapy from a SNF.     This is a planned admission--patient has been in his regular state of health, denies any complaints. The patient follows with Dr. Tubbs, received chemo 6 cycles of chemo with good response. Plan is to have total neoadjuvant therapy (chemotherapy followed by chemoRT followed by surgery ). Since pt is from SNF and difficulty of organizing transportation planned for 5 sessions of RT as inpatient from (8/23 - 8/27 over 5 days). He denies fever, chills, cough, SOB, chest pain, nausea and vomiting.     In the ED, the patient's vitals were wnl, afebrile. Labs grossly normal except mild anemia.    Patient transferred to medicine floor.     Itemized hospital course below:     # Locally advanced rectal adenocarcinoma (T4N0M0)  - Diagnosed late April 2021 - MRI: 18 cm long polypoidal rectal mass with invasion of the right meso rectal fascia and possibly the prostate gland. Additional involvement of the anal sphincter complex.  - repeat CT on 7/28 showed good tumor response to Chemo  - plan to have total neoadjuvant therapy (chemotherapy followed by chemoRT followed by surgery )  - patient not ready for radiation yet per radiation therapy b/c last folfox tx was 2 weeks ago and pt needs 4 weeks post folfox prior to radiation tx  - s/p 6 cycles of chemo s/p port placement. Follows with Dr. Tubbs.  - last CEA on 6/28 was 10 (downtrending). Will repeat.  - Plan fore readmission next week for RT    # Mycotic toenails   - podiatry outpatient f/u     #Knee pain, patient reports RA history  - Lidocaine patch PRN    # R lung pulmonary nodules   # Pancreatic hypodensity  - c/w surveillance of the pulmonary nodules (too small to biopsy)  - MRI of pancreas to better assess the pancreatic hypodensity (which can be done outpatient)    Patient to be discharged to the hospital to follow up with Rad-Onc next week.     Attending Attestation:   Patient seen and examined today, frustrated that RT will not be happening this admission as scheduled, otherwise feeling well.     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   Kristin Rebolledo, DO      80 year old male with Advanced rectal adenocarcinoma (T4N0M0) diagnosed April 2021 s/p 6 cycles chemo, microcytic anemia, chronic osteoarthritis being admitted for Wenceslao-adjuvant radiotherapy from a SNF.     This is a planned admission--patient has been in his regular state of health, denies any complaints. The patient follows with Dr. Tubbs, received chemo 6 cycles of chemo with good response. Plan is to have total neoadjuvant therapy (chemotherapy followed by chemoRT followed by surgery ). Since pt is from SNF and difficulty of organizing transportation planned for 5 sessions of RT as inpatient from (8/23 - 8/27 over 5 days). He denies fever, chills, cough, SOB, chest pain, nausea and vomiting.     In the ED, the patient's vitals were wnl, afebrile. Labs grossly normal except mild anemia.    Patient transferred to medicine floor.     Itemized hospital course below:     # Locally advanced rectal adenocarcinoma (T4N0M0)  - Diagnosed late April 2021 - MRI: 18 cm long polypoidal rectal mass with invasion of the right meso rectal fascia and possibly the prostate gland. Additional involvement of the anal sphincter complex.  - repeat CT on 7/28 showed good tumor response to Chemo  - plan to have total neoadjuvant therapy (chemotherapy followed by chemoRT followed by surgery )  - patient not ready for radiation yet per radiation therapy b/c last folfox tx was 2 weeks ago and pt needs 4 weeks post folfox prior to radiation tx  - s/p 6 cycles of chemo s/p port placement. Follows with Dr. Tubbs.  - last CEA on 6/28 was 10 (downtrending). Will repeat.  - Plan fore readmission next week for RT    # Mycotic toenails   - podiatry outpatient f/u     #Knee pain, patient reports RA history  - Lidocaine patch PRN    # R lung pulmonary nodules   # Pancreatic hypodensity  - c/w surveillance of the pulmonary nodules (too small to biopsy)  - MRI of pancreas to better assess the pancreatic hypodensity (which can be done outpatient)    Patient to be discharged to the hospital to follow up with Rad-Onc next week.     Attending Attestation:   Patient seen and examined today, frustrated that RT will not be happening this admission as scheduled, otherwise feeling well.     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   Kristin Rebolledo, DO

## 2021-08-23 NOTE — PROGRESS NOTE ADULT - SUBJECTIVE AND OBJECTIVE BOX
Hospital Day:  1d    Subjective:    No acute events overnight.    Chart reviewed, patient seen and examined at bedside in AM. Patient appears comfortable while at rest in bed. No other complaints.     Denies headaches, changes in vision, chest pains, SOB, n/v/d, abd pain, constipation, changes in urination, pain on urination, weakness, fatigue, joint pain or muscle pain.       Past Medical Hx:   No pertinent past medical history    Iron deficiency anemia    Osteoarthritis      Past Sx:  Amputation of digit of left hand      Allergies:  No Known Allergies    Current Meds:   Standng Meds:  enoxaparin Injectable 40 milliGRAM(s) SubCutaneous daily  pantoprazole    Tablet 40 milliGRAM(s) Oral before breakfast  senna 2 Tablet(s) Oral at bedtime    PRN Meds:  aluminum hydroxide/magnesium hydroxide/simethicone Suspension 30 milliLiter(s) Oral every 4 hours PRN Dyspepsia  loperamide 2 milliGRAM(s) Oral two times a day PRN Diarrhea  melatonin 3 milliGRAM(s) Oral at bedtime PRN Insomnia  ondansetron Injectable 4 milliGRAM(s) IV Push every 8 hours PRN Nausea and/or Vomiting  oxycodone    5 mG/acetaminophen 325 mG 1 Tablet(s) Oral every 6 hours PRN Moderate Pain (4 - 6)    HOME MEDICATIONS:  lidocaine 5% topical film: Apply topically to affected area once, As Needed for pain..  loperamide 2 mg oral capsule: 1 cap(s) orally 2 times a day, As Needed  oxycodone-acetaminophen 5 mg-325 mg oral tablet: 1 tab(s) orally every 6 hours, As needed, Moderate Pain (4 - 6)  pantoprazole 40 mg oral delayed release tablet: 1 tab(s) orally once a day (before a meal)  senna oral tablet: 2 tab(s) orally once a day (at bedtime)  vitamin A &amp; D topical ointment: 1 application topically 2 times a day      Vital Signs:   T(F): 97.8 (08-23-21 @ 00:00), Max: 97.8 (08-22-21 @ 15:14)  HR: 77 (08-23-21 @ 00:00) (64 - 81)  BP: 131/60 (08-23-21 @ 00:00) (124/58 - 146/67)  RR: 18 (08-23-21 @ 00:00) (16 - 18)  SpO2: 100% (08-22-21 @ 15:14) (99% - 100%)        Physical Exam:   CONSTITUTIONAL: Well-developed; well-nourished; in no acute distress, speaking in full sentences  HEAD: Normocephalic; atraumatic  EYES: PERRL, EOMI, no conjunctival erythema  NECK: Supple; non tender, FROM  CARD: +S1, S2 Regular rate and rhythm.  RESP: CTABL  ABD: soft nontender, nondistended, no rebound, no guarding, no rigidity  EXT: moves all extremities,  No clubbing, cyanosis or edema.   NEURO: Alert, oriented, grossly unremarkable, no focal deficits, cn ii-xii grossly intact  PSYCH: Cooperative, appropriate         Labs:                         11.2   7.47  )-----------( 193      ( 22 Aug 2021 14:04 )             36.9     Neutophil% 52.6, Lymphocyte% 26.3, Monocyte% 15.8, Bands% -- 08-22-21 @ 14:04    22 Aug 2021 14:04    138    |  105    |  7      ----------------------------<  94     4.8     |  23     |  0.5      Ca    8.6        22 Aug 2021 14:04    TPro  6.2    /  Alb  3.0    /  TBili  0.2    /  DBili  x      /  AST  42     /  ALT  8      /  AlkPhos  74     22 Aug 2021 14:04       pTT    36.2             ----< 0.98 INR  (08-22-21 @ 14:04)    11.30        PT          Radiology:   IMAGING:             Hospital Day:  1d    Subjective:    No acute events overnight.    Chart reviewed, patient seen and examined at bedside in AM. Patient appears comfortable while at rest in bed. No other complaints.     Denies headaches, changes in vision, chest pains, SOB, n/v/d, abd pain, constipation, changes in urination, pain on urination, weakness, fatigue, joint pain or muscle pain.       Past Medical Hx:   No pertinent past medical history    Iron deficiency anemia    Osteoarthritis      Past Sx:  Amputation of digit of left hand      Allergies:  No Known Allergies    Current Meds:   Standng Meds:  enoxaparin Injectable 40 milliGRAM(s) SubCutaneous daily  pantoprazole    Tablet 40 milliGRAM(s) Oral before breakfast  senna 2 Tablet(s) Oral at bedtime    PRN Meds:  aluminum hydroxide/magnesium hydroxide/simethicone Suspension 30 milliLiter(s) Oral every 4 hours PRN Dyspepsia  loperamide 2 milliGRAM(s) Oral two times a day PRN Diarrhea  melatonin 3 milliGRAM(s) Oral at bedtime PRN Insomnia  ondansetron Injectable 4 milliGRAM(s) IV Push every 8 hours PRN Nausea and/or Vomiting  oxycodone    5 mG/acetaminophen 325 mG 1 Tablet(s) Oral every 6 hours PRN Moderate Pain (4 - 6)    HOME MEDICATIONS:  lidocaine 5% topical film: Apply topically to affected area once, As Needed for pain..  loperamide 2 mg oral capsule: 1 cap(s) orally 2 times a day, As Needed  oxycodone-acetaminophen 5 mg-325 mg oral tablet: 1 tab(s) orally every 6 hours, As needed, Moderate Pain (4 - 6)  pantoprazole 40 mg oral delayed release tablet: 1 tab(s) orally once a day (before a meal)  senna oral tablet: 2 tab(s) orally once a day (at bedtime)  vitamin A &amp; D topical ointment: 1 application topically 2 times a day      Vital Signs:   T(F): 97.8 (08-23-21 @ 00:00), Max: 97.8 (08-22-21 @ 15:14)  HR: 77 (08-23-21 @ 00:00) (64 - 81)  BP: 131/60 (08-23-21 @ 00:00) (124/58 - 146/67)  RR: 18 (08-23-21 @ 00:00) (16 - 18)  SpO2: 100% (08-22-21 @ 15:14) (99% - 100%)        Physical Exam:   GENERAL: NAD, lying in bed comfortably  HEAD:  Atraumatic, Normocephalic  EYES: EOMI  ENT: Moist mucous membranes  NECK: No JVD  CHEST/LUNG: Clear to auscultation bilaterally; No rales, rhonchi, wheezing  HEART: Regular rate and rhythm; No murmurs, rubs, or gallops  ABDOMEN: BS +, abdomen firm, nontender, nondistended   EXTREMITIES:  Left hand 2nd, 3rd digits distal phalanges amputation, left 1st toe dehiscence of nail with discoloration (crush injury); no edema or cyanosis  NERVOUS SYSTEM:  Alert & Oriented X3, rambling speech        Labs:                         11.2   7.47  )-----------( 193      ( 22 Aug 2021 14:04 )             36.9     WBC Count: 7.47 K/uL (08-22 @ 14:04)  WBC Count: 19.60 K/uL (08-05 @ 05:10)  WBC Count: 28.32 K/uL (08-04 @ 07:05)      Neutophil% 52.6, Lymphocyte% 26.3, Monocyte% 15.8, Bands% -- 08-22-21 @ 14:04    22 Aug 2021 14:04    138    |  105    |  7      ----------------------------<  94     4.8     |  23     |  0.5      Potassium, Serum: 4.8 mmol/L (08-22-21 @ 14:04)  Potassium, Serum: 4.4 mmol/L (08-05-21 @ 05:10)  Potassium, Serum: 4.4 mmol/L (08-04-21 @ 07:05)  Potassium, Serum: 4.3 mmol/L (08-03-21 @ 06:43)  Potassium, Serum: 3.8 mmol/L (08-02-21 @ 04:30)  Potassium, Serum: 4.6 mmol/L (08-01-21 @ 16:25)    Ca    8.6        22 Aug 2021 14:04    TPro  6.2    /  Alb  3.0    /  TBili  0.2    /  DBili  x      /  AST  42     /  ALT  8      /  AlkPhos  74     22 Aug 2021 14:04       pTT    36.2             ----< 0.98 INR  (08-22-21 @ 14:04)    11.30        PT          Radiology:   IMAGING:             Hospital Day:  1d    Subjective:    No acute events overnight.    Chart reviewed, patient seen and examined at bedside in AM. Patient appears comfortable while at rest in bed. No other complaints.     Denies headaches, changes in vision, chest pains, SOB, n/v/d, abd pain, constipation, changes in urination, pain on urination, weakness, fatigue, joint pain or muscle pain.       Past Medical Hx:   No pertinent past medical history    Iron deficiency anemia    Osteoarthritis      Past Sx:  Amputation of digit of left hand      Allergies:  No Known Allergies    Current Meds:   Standng Meds:  enoxaparin Injectable 40 milliGRAM(s) SubCutaneous daily  pantoprazole    Tablet 40 milliGRAM(s) Oral before breakfast  senna 2 Tablet(s) Oral at bedtime    PRN Meds:  aluminum hydroxide/magnesium hydroxide/simethicone Suspension 30 milliLiter(s) Oral every 4 hours PRN Dyspepsia  loperamide 2 milliGRAM(s) Oral two times a day PRN Diarrhea  melatonin 3 milliGRAM(s) Oral at bedtime PRN Insomnia  ondansetron Injectable 4 milliGRAM(s) IV Push every 8 hours PRN Nausea and/or Vomiting  oxycodone    5 mG/acetaminophen 325 mG 1 Tablet(s) Oral every 6 hours PRN Moderate Pain (4 - 6)    HOME MEDICATIONS:  lidocaine 5% topical film: Apply topically to affected area once, As Needed for pain..  loperamide 2 mg oral capsule: 1 cap(s) orally 2 times a day, As Needed  oxycodone-acetaminophen 5 mg-325 mg oral tablet: 1 tab(s) orally every 6 hours, As needed, Moderate Pain (4 - 6)  pantoprazole 40 mg oral delayed release tablet: 1 tab(s) orally once a day (before a meal)  senna oral tablet: 2 tab(s) orally once a day (at bedtime)  vitamin A &amp; D topical ointment: 1 application topically 2 times a day      Vital Signs:   T(F): 97.8 (08-23-21 @ 00:00), Max: 97.8 (08-22-21 @ 15:14)  HR: 77 (08-23-21 @ 00:00) (64 - 81)  BP: 131/60 (08-23-21 @ 00:00) (124/58 - 146/67)  RR: 18 (08-23-21 @ 00:00) (16 - 18)  SpO2: 100% (08-22-21 @ 15:14) (99% - 100%)        Physical Exam:   GENERAL: NAD, lying in bed comfortably  HEAD:  Atraumatic, Normocephalic  EYES: EOMI  ENT: Moist mucous membranes  NECK: No JVD  CHEST/LUNG: Clear to auscultation bilaterally; No rales, rhonchi, wheezing  HEART: Regular rate and rhythm; No murmurs, rubs, or gallops  ABDOMEN: BS +, abdomen firm, nontender, nondistended   EXTREMITIES:  Left hand 2nd, 3rd digits distal phalanges amputation, left 1st toe dehiscence of nail with discoloration (crush injury); no edema or cyanosis  NERVOUS SYSTEM:  Alert & Oriented X3, rambling speech        Labs:                         11.2   7.47  )-----------( 193      ( 22 Aug 2021 14:04 )             36.9     WBC Count: 7.47 K/uL (08-22 @ 14:04)  WBC Count: 19.60 K/uL (08-05 @ 05:10)  WBC Count: 28.32 K/uL (08-04 @ 07:05)      Neutophil% 52.6, Lymphocyte% 26.3, Monocyte% 15.8, Bands% -- 08-22-21 @ 14:04    22 Aug 2021 14:04    138    |  105    |  7      ----------------------------<  94     4.8     |  23     |  0.5      Potassium, Serum: 4.8 mmol/L (08-22-21 @ 14:04)  Potassium, Serum: 4.4 mmol/L (08-05-21 @ 05:10)  Potassium, Serum: 4.4 mmol/L (08-04-21 @ 07:05)  Potassium, Serum: 4.3 mmol/L (08-03-21 @ 06:43)  Potassium, Serum: 3.8 mmol/L (08-02-21 @ 04:30)  Potassium, Serum: 4.6 mmol/L (08-01-21 @ 16:25)    Ca    8.6        22 Aug 2021 14:04    TPro  6.2    /  Alb  3.0    /  TBili  0.2    /  DBili  x      /  AST  42     /  ALT  8      /  AlkPhos  74     22 Aug 2021 14:04       pTT    36.2             ----< 0.98 INR  (08-22-21 @ 14:04)    11.30        PT          Radiology:   IMAGING:  Cont and w/ IV Cont (07.27.21 @ 17:47) >      IMPRESSION:    When compared to 4/22/2021:    Interval decrease in size of rectal wall thickening and rectal mass, inherently limited in evaluation on CT.  The exophytic component which abuts the right posterior prostate gland and seminal vesicles measures approximately 4 x 3 cm (previously 6 x 3 cm). Foci of gas are noted within which may be secondary to communication with the rectal lumen. Limited CT ability to exclude superimposed infection.  Circumferential rectal wall thickening, decreased measuring up to 1 cm (previously up to at least 2 cm).  Pelvic MRI can be utilized for further detail/evaluation.    Redemonstration of perirectal lymph nodes measuring up to 0.7 cm.    No evidence for new or worsening metastatic disease/lymphadenopathy.      < end of copied text >           Hospital Day:  1d    Subjective:    No acute events overnight.    Chart reviewed, patient seen and examined at bedside in AM. Patient appears comfortable while at rest in bed. No complaints. ROS negative.       Past Medical Hx:   No pertinent past medical history    Iron deficiency anemia    Osteoarthritis      Past Sx:  Amputation of digit of left hand      Allergies:  No Known Allergies    Current Meds:   Standng Meds:  enoxaparin Injectable 40 milliGRAM(s) SubCutaneous daily  pantoprazole    Tablet 40 milliGRAM(s) Oral before breakfast  senna 2 Tablet(s) Oral at bedtime    PRN Meds:  aluminum hydroxide/magnesium hydroxide/simethicone Suspension 30 milliLiter(s) Oral every 4 hours PRN Dyspepsia  loperamide 2 milliGRAM(s) Oral two times a day PRN Diarrhea  melatonin 3 milliGRAM(s) Oral at bedtime PRN Insomnia  ondansetron Injectable 4 milliGRAM(s) IV Push every 8 hours PRN Nausea and/or Vomiting  oxycodone    5 mG/acetaminophen 325 mG 1 Tablet(s) Oral every 6 hours PRN Moderate Pain (4 - 6)    HOME MEDICATIONS:  lidocaine 5% topical film: Apply topically to affected area once, As Needed for pain..  loperamide 2 mg oral capsule: 1 cap(s) orally 2 times a day, As Needed  oxycodone-acetaminophen 5 mg-325 mg oral tablet: 1 tab(s) orally every 6 hours, As needed, Moderate Pain (4 - 6)  pantoprazole 40 mg oral delayed release tablet: 1 tab(s) orally once a day (before a meal)  senna oral tablet: 2 tab(s) orally once a day (at bedtime)  vitamin A &amp; D topical ointment: 1 application topically 2 times a day      Vital Signs:   T(F): 97.8 (08-23-21 @ 00:00), Max: 97.8 (08-22-21 @ 15:14)  HR: 77 (08-23-21 @ 00:00) (64 - 81)  BP: 131/60 (08-23-21 @ 00:00) (124/58 - 146/67)  RR: 18 (08-23-21 @ 00:00) (16 - 18)  SpO2: 100% (08-22-21 @ 15:14) (99% - 100%)      Physical Exam:   GENERAL: NAD, lying in bed comfortably  HEAD:  Atraumatic, Normocephalic  EYES: EOMI  ENT: Moist mucous membranes  NECK: No JVD  CHEST/LUNG: Clear to auscultation bilaterally; No rales, rhonchi, wheezing  HEART: Regular rate and rhythm; No murmurs, rubs, or gallops  ABDOMEN: BS +, abdomen firm, nontender, nondistended   EXTREMITIES:  Left hand 2nd, 3rd digits distal phalanges amputation, left 1st toe dehiscence of nail with discoloration (crush injury); no edema or cyanosis  NERVOUS SYSTEM:  Alert & Oriented X3; talkative, somewhat rambling speech        Labs:                         11.2   7.47  )-----------( 193      ( 22 Aug 2021 14:04 )             36.9     WBC Count: 7.47 K/uL (08-22 @ 14:04)  WBC Count: 19.60 K/uL (08-05 @ 05:10)  WBC Count: 28.32 K/uL (08-04 @ 07:05)      Neutophil% 52.6, Lymphocyte% 26.3, Monocyte% 15.8, Bands% -- 08-22-21 @ 14:04    22 Aug 2021 14:04    138    |  105    |  7      ----------------------------<  94     4.8     |  23     |  0.5      Potassium, Serum: 4.8 mmol/L (08-22-21 @ 14:04)  Potassium, Serum: 4.4 mmol/L (08-05-21 @ 05:10)  Potassium, Serum: 4.4 mmol/L (08-04-21 @ 07:05)  Potassium, Serum: 4.3 mmol/L (08-03-21 @ 06:43)  Potassium, Serum: 3.8 mmol/L (08-02-21 @ 04:30)  Potassium, Serum: 4.6 mmol/L (08-01-21 @ 16:25)    BUN/Creatinine  08-22 @ 14:04  BUN  7      Creatinine  0.5       Ca    8.6        22 Aug 2021 14:04    TPro  6.2    /  Alb  3.0    /  TBili  0.2    /  DBili  x      /  AST  42     /  ALT  8      /  AlkPhos  74     22 Aug 2021 14:04       pTT    36.2             ----< 0.98 INR  (08-22-21 @ 14:04)    11.30        PT      Carcinoembryonic Antigen (06.28.21 @ 07:06)    Carcinoembryonic Antigen: 10.0: Method: Roche Electrochemiluminescence Immuno Assay      Radiology:   IMAGING:  Cont and w/ IV Cont (07.27.21 @ 17:47) >      IMPRESSION:    When compared to 4/22/2021:    Interval decrease in size of rectal wall thickening and rectal mass, inherently limited in evaluation on CT.  The exophytic component which abuts the right posterior prostate gland and seminal vesicles measures approximately 4 x 3 cm (previously 6 x 3 cm). Foci of gas are noted within which may be secondary to communication with the rectal lumen. Limited CT ability to exclude superimposed infection.  Circumferential rectal wall thickening, decreased measuring up to 1 cm (previously up to at least 2 cm).  Pelvic MRI can be utilized for further detail/evaluation.    Redemonstration of perirectal lymph nodes measuring up to 0.7 cm.    No evidence for new or worsening metastatic disease/lymphadenopathy.  < end of copied text >           Hospital Day:  1d    Subjective:    No acute events overnight.    Chart reviewed, patient seen and examined at bedside in AM. Patient appears comfortable while at rest in bed. No complaints. ROS negative.       Past Medical Hx:   No pertinent past medical history    Iron deficiency anemia    Osteoarthritis      Past Sx:  Amputation of digit of left hand      Allergies:  No Known Allergies    Current Meds:   Standng Meds:  enoxaparin Injectable 40 milliGRAM(s) SubCutaneous daily  pantoprazole    Tablet 40 milliGRAM(s) Oral before breakfast  senna 2 Tablet(s) Oral at bedtime    PRN Meds:  aluminum hydroxide/magnesium hydroxide/simethicone Suspension 30 milliLiter(s) Oral every 4 hours PRN Dyspepsia  loperamide 2 milliGRAM(s) Oral two times a day PRN Diarrhea  melatonin 3 milliGRAM(s) Oral at bedtime PRN Insomnia  ondansetron Injectable 4 milliGRAM(s) IV Push every 8 hours PRN Nausea and/or Vomiting  oxycodone    5 mG/acetaminophen 325 mG 1 Tablet(s) Oral every 6 hours PRN Moderate Pain (4 - 6)    HOME MEDICATIONS:  lidocaine 5% topical film: Apply topically to affected area once, As Needed for pain..  loperamide 2 mg oral capsule: 1 cap(s) orally 2 times a day, As Needed  oxycodone-acetaminophen 5 mg-325 mg oral tablet: 1 tab(s) orally every 6 hours, As needed, Moderate Pain (4 - 6)  pantoprazole 40 mg oral delayed release tablet: 1 tab(s) orally once a day (before a meal)  senna oral tablet: 2 tab(s) orally once a day (at bedtime)  vitamin A &amp; D topical ointment: 1 application topically 2 times a day      Vital Signs:   T(F): 97.8 (08-23-21 @ 00:00), Max: 97.8 (08-22-21 @ 15:14)  HR: 77 (08-23-21 @ 00:00) (64 - 81)  BP: 131/60 (08-23-21 @ 00:00) (124/58 - 146/67)  RR: 18 (08-23-21 @ 00:00) (16 - 18)  SpO2: 100% (08-22-21 @ 15:14) (99% - 100%)      Physical Exam:   GENERAL: NAD, lying in bed comfortably  HEAD:  Atraumatic, Normocephalic  EYES: EOMI  ENT: Moist mucous membranes  NECK: No JVD  CHEST/LUNG: Clear to auscultation bilaterally; No rales, rhonchi, wheezing  HEART: Regular rate and rhythm; No murmurs, rubs, or gallops  ABDOMEN: BS +, abdomen firm, nontender, nondistended   EXTREMITIES:  Left hand 2nd, 3rd digits distal phalanges amputation, left 1st toe dehiscence of nail with discoloration (crush injury); no edema or cyanosis  NERVOUS SYSTEM:  Alert & Oriented X3; talkative, somewhat rambling speech        Labs:                         11.2   7.47  )-----------( 193      ( 22 Aug 2021 14:04 )             36.9     WBC Count: 7.47 K/uL (08-22 @ 14:04)  WBC Count: 19.60 K/uL (08-05 @ 05:10)  WBC Count: 28.32 K/uL (08-04 @ 07:05)      Neutophil% 52.6, Lymphocyte% 26.3, Monocyte% 15.8, Bands% -- 08-22-21 @ 14:04    22 Aug 2021 14:04    138    |  105    |  7      ----------------------------<  94     4.8     |  23     |  0.5      Potassium, Serum: 4.8 mmol/L (08-22-21 @ 14:04)  Potassium, Serum: 4.4 mmol/L (08-05-21 @ 05:10)  Potassium, Serum: 4.4 mmol/L (08-04-21 @ 07:05)  Potassium, Serum: 4.3 mmol/L (08-03-21 @ 06:43)  Potassium, Serum: 3.8 mmol/L (08-02-21 @ 04:30)  Potassium, Serum: 4.6 mmol/L (08-01-21 @ 16:25)    BUN/Creatinine  08-22 @ 14:04  BUN  7      Creatinine  0.5       Ca    8.6        22 Aug 2021 14:04    TPro  6.2    /  Alb  3.0    /  TBili  0.2    /  DBili  x      /  AST  42     /  ALT  8      /  AlkPhos  74     22 Aug 2021 14:04       pTT    36.2             ----< 0.98 INR  (08-22-21 @ 14:04)    11.30        PT      Carcinoembryonic Antigen (06.28.21 @ 07:06)    Carcinoembryonic Antigen: 10.0:     Carcinoembryonic Antigen (06.26.21 @ 07:13)    Carcinoembryonic Antigen: 10.1:         Radiology:   IMAGING:  Cont and w/ IV Cont (07.27.21 @ 17:47) >      IMPRESSION:    When compared to 4/22/2021:    Interval decrease in size of rectal wall thickening and rectal mass, inherently limited in evaluation on CT.  The exophytic component which abuts the right posterior prostate gland and seminal vesicles measures approximately 4 x 3 cm (previously 6 x 3 cm). Foci of gas are noted within which may be secondary to communication with the rectal lumen. Limited CT ability to exclude superimposed infection.  Circumferential rectal wall thickening, decreased measuring up to 1 cm (previously up to at least 2 cm).  Pelvic MRI can be utilized for further detail/evaluation.    Redemonstration of perirectal lymph nodes measuring up to 0.7 cm.    No evidence for new or worsening metastatic disease/lymphadenopathy.  < end of copied text >           Hospital Day:  1d    Subjective:    No acute events overnight.    Chart reviewed, patient seen and examined at bedside in AM. Patient appears comfortable while at rest in bed. No complaints. ROS negative.     Possible discharge today    Past Medical Hx:   No pertinent past medical history    Iron deficiency anemia    Osteoarthritis      Past Sx:  Amputation of digit of left hand      Allergies:  No Known Allergies    Current Meds:   Standng Meds:  enoxaparin Injectable 40 milliGRAM(s) SubCutaneous daily  pantoprazole    Tablet 40 milliGRAM(s) Oral before breakfast  senna 2 Tablet(s) Oral at bedtime    PRN Meds:  aluminum hydroxide/magnesium hydroxide/simethicone Suspension 30 milliLiter(s) Oral every 4 hours PRN Dyspepsia  loperamide 2 milliGRAM(s) Oral two times a day PRN Diarrhea  melatonin 3 milliGRAM(s) Oral at bedtime PRN Insomnia  ondansetron Injectable 4 milliGRAM(s) IV Push every 8 hours PRN Nausea and/or Vomiting  oxycodone    5 mG/acetaminophen 325 mG 1 Tablet(s) Oral every 6 hours PRN Moderate Pain (4 - 6)    HOME MEDICATIONS:  lidocaine 5% topical film: Apply topically to affected area once, As Needed for pain..  loperamide 2 mg oral capsule: 1 cap(s) orally 2 times a day, As Needed  oxycodone-acetaminophen 5 mg-325 mg oral tablet: 1 tab(s) orally every 6 hours, As needed, Moderate Pain (4 - 6)  pantoprazole 40 mg oral delayed release tablet: 1 tab(s) orally once a day (before a meal)  senna oral tablet: 2 tab(s) orally once a day (at bedtime)  vitamin A &amp; D topical ointment: 1 application topically 2 times a day      Vital Signs:   T(F): 97.8 (08-23-21 @ 00:00), Max: 97.8 (08-22-21 @ 15:14)  HR: 77 (08-23-21 @ 00:00) (64 - 81)  BP: 131/60 (08-23-21 @ 00:00) (124/58 - 146/67)  RR: 18 (08-23-21 @ 00:00) (16 - 18)  SpO2: 100% (08-22-21 @ 15:14) (99% - 100%)      Physical Exam:   GENERAL: NAD, lying in bed comfortably  HEAD:  Atraumatic, Normocephalic  EYES: EOMI  ENT: Moist mucous membranes  NECK: No JVD  CHEST/LUNG: Clear to auscultation bilaterally; No rales, rhonchi, wheezing  HEART: Regular rate and rhythm; No murmurs, rubs, or gallops  ABDOMEN: BS +, abdomen firm, nontender, nondistended   EXTREMITIES:  Left hand 2nd, 3rd digits distal phalanges amputation, left 1st toe dehiscence of nail with discoloration (crush injury); no edema or cyanosis  NERVOUS SYSTEM:  Alert & Oriented X3; talkative, somewhat rambling speech        Labs:                         11.2   7.47  )-----------( 193      ( 22 Aug 2021 14:04 )             36.9     WBC Count: 7.47 K/uL (08-22 @ 14:04)  WBC Count: 19.60 K/uL (08-05 @ 05:10)  WBC Count: 28.32 K/uL (08-04 @ 07:05)      Neutophil% 52.6, Lymphocyte% 26.3, Monocyte% 15.8, Bands% -- 08-22-21 @ 14:04    22 Aug 2021 14:04    138    |  105    |  7      ----------------------------<  94     4.8     |  23     |  0.5      Potassium, Serum: 4.8 mmol/L (08-22-21 @ 14:04)  Potassium, Serum: 4.4 mmol/L (08-05-21 @ 05:10)  Potassium, Serum: 4.4 mmol/L (08-04-21 @ 07:05)  Potassium, Serum: 4.3 mmol/L (08-03-21 @ 06:43)  Potassium, Serum: 3.8 mmol/L (08-02-21 @ 04:30)  Potassium, Serum: 4.6 mmol/L (08-01-21 @ 16:25)    BUN/Creatinine  08-22 @ 14:04  BUN  7      Creatinine  0.5       Ca    8.6        22 Aug 2021 14:04    TPro  6.2    /  Alb  3.0    /  TBili  0.2    /  DBili  x      /  AST  42     /  ALT  8      /  AlkPhos  74     22 Aug 2021 14:04       pTT    36.2             ----< 0.98 INR  (08-22-21 @ 14:04)    11.30        PT      Carcinoembryonic Antigen (06.28.21 @ 07:06)    Carcinoembryonic Antigen: 10.0:     Carcinoembryonic Antigen (06.26.21 @ 07:13)    Carcinoembryonic Antigen: 10.1:         Radiology:   IMAGING:  Cont and w/ IV Cont (07.27.21 @ 17:47) >      IMPRESSION:    When compared to 4/22/2021:    Interval decrease in size of rectal wall thickening and rectal mass, inherently limited in evaluation on CT.  The exophytic component which abuts the right posterior prostate gland and seminal vesicles measures approximately 4 x 3 cm (previously 6 x 3 cm). Foci of gas are noted within which may be secondary to communication with the rectal lumen. Limited CT ability to exclude superimposed infection.  Circumferential rectal wall thickening, decreased measuring up to 1 cm (previously up to at least 2 cm).  Pelvic MRI can be utilized for further detail/evaluation.    Redemonstration of perirectal lymph nodes measuring up to 0.7 cm.    No evidence for new or worsening metastatic disease/lymphadenopathy.  < end of copied text >           Hospital Day:  1d    Subjective:    No acute events overnight.    Chart reviewed, patient seen and examined at bedside in AM. Patient appears comfortable while at rest in bed. No complaints. ROS negative.     Possible discharge 8/24 pending NH bed availability.       Past Medical Hx:   No pertinent past medical history    Iron deficiency anemia    Osteoarthritis      Past Sx:  Amputation of digit of left hand      Allergies:  No Known Allergies    Current Meds:   Standng Meds:  enoxaparin Injectable 40 milliGRAM(s) SubCutaneous daily  pantoprazole    Tablet 40 milliGRAM(s) Oral before breakfast  senna 2 Tablet(s) Oral at bedtime    PRN Meds:  aluminum hydroxide/magnesium hydroxide/simethicone Suspension 30 milliLiter(s) Oral every 4 hours PRN Dyspepsia  loperamide 2 milliGRAM(s) Oral two times a day PRN Diarrhea  melatonin 3 milliGRAM(s) Oral at bedtime PRN Insomnia  ondansetron Injectable 4 milliGRAM(s) IV Push every 8 hours PRN Nausea and/or Vomiting  oxycodone    5 mG/acetaminophen 325 mG 1 Tablet(s) Oral every 6 hours PRN Moderate Pain (4 - 6)    HOME MEDICATIONS:  lidocaine 5% topical film: Apply topically to affected area once, As Needed for pain..  loperamide 2 mg oral capsule: 1 cap(s) orally 2 times a day, As Needed  oxycodone-acetaminophen 5 mg-325 mg oral tablet: 1 tab(s) orally every 6 hours, As needed, Moderate Pain (4 - 6)  pantoprazole 40 mg oral delayed release tablet: 1 tab(s) orally once a day (before a meal)  senna oral tablet: 2 tab(s) orally once a day (at bedtime)  vitamin A &amp; D topical ointment: 1 application topically 2 times a day      Vital Signs:   T(F): 97.8 (08-23-21 @ 00:00), Max: 97.8 (08-22-21 @ 15:14)  HR: 77 (08-23-21 @ 00:00) (64 - 81)  BP: 131/60 (08-23-21 @ 00:00) (124/58 - 146/67)  RR: 18 (08-23-21 @ 00:00) (16 - 18)  SpO2: 100% (08-22-21 @ 15:14) (99% - 100%)      Physical Exam:   GENERAL: NAD, lying in bed comfortably  HEAD:  Atraumatic, Normocephalic  EYES: EOMI  ENT: Moist mucous membranes  NECK: No JVD  CHEST/LUNG: Clear to auscultation bilaterally; No rales, rhonchi, wheezing  HEART: Regular rate and rhythm; No murmurs, rubs, or gallops  ABDOMEN: BS +, abdomen firm, nontender, nondistended   EXTREMITIES:  Left hand 2nd, 3rd digits distal phalanges amputation, left 1st toe dehiscence of nail with discoloration (crush injury); no edema or cyanosis  NERVOUS SYSTEM:  Alert & Oriented X3; talkative, somewhat rambling speech        Labs:                         11.2   7.47  )-----------( 193      ( 22 Aug 2021 14:04 )             36.9     WBC Count: 7.47 K/uL (08-22 @ 14:04)  WBC Count: 19.60 K/uL (08-05 @ 05:10)  WBC Count: 28.32 K/uL (08-04 @ 07:05)      Neutophil% 52.6, Lymphocyte% 26.3, Monocyte% 15.8, Bands% -- 08-22-21 @ 14:04    22 Aug 2021 14:04    138    |  105    |  7      ----------------------------<  94     4.8     |  23     |  0.5      Potassium, Serum: 4.8 mmol/L (08-22-21 @ 14:04)  Potassium, Serum: 4.4 mmol/L (08-05-21 @ 05:10)  Potassium, Serum: 4.4 mmol/L (08-04-21 @ 07:05)  Potassium, Serum: 4.3 mmol/L (08-03-21 @ 06:43)  Potassium, Serum: 3.8 mmol/L (08-02-21 @ 04:30)  Potassium, Serum: 4.6 mmol/L (08-01-21 @ 16:25)    BUN/Creatinine  08-22 @ 14:04  BUN  7      Creatinine  0.5       Ca    8.6        22 Aug 2021 14:04    TPro  6.2    /  Alb  3.0    /  TBili  0.2    /  DBili  x      /  AST  42     /  ALT  8      /  AlkPhos  74     22 Aug 2021 14:04       pTT    36.2             ----< 0.98 INR  (08-22-21 @ 14:04)    11.30        PT      Carcinoembryonic Antigen (06.28.21 @ 07:06)    Carcinoembryonic Antigen: 10.0:     Carcinoembryonic Antigen (06.26.21 @ 07:13)    Carcinoembryonic Antigen: 10.1:         Radiology:   IMAGING:  Cont and w/ IV Cont (07.27.21 @ 17:47) >      IMPRESSION:    When compared to 4/22/2021:    Interval decrease in size of rectal wall thickening and rectal mass, inherently limited in evaluation on CT.  The exophytic component which abuts the right posterior prostate gland and seminal vesicles measures approximately 4 x 3 cm (previously 6 x 3 cm). Foci of gas are noted within which may be secondary to communication with the rectal lumen. Limited CT ability to exclude superimposed infection.  Circumferential rectal wall thickening, decreased measuring up to 1 cm (previously up to at least 2 cm).  Pelvic MRI can be utilized for further detail/evaluation.    Redemonstration of perirectal lymph nodes measuring up to 0.7 cm.    No evidence for new or worsening metastatic disease/lymphadenopathy.  < end of copied text >

## 2021-08-23 NOTE — DISCHARGE NOTE PROVIDER - PROVIDER TOKENS
PROVIDER:[TOKEN:[[id number]],FOLLOWUP:[1-3 days]],PROVIDER:[TOKEN:[[id number]],FOLLOWUP:[2 weeks]] PROVIDER:[TOKEN:[[id number]],FOLLOWUP:[1-3 days]],PROVIDER:[TOKEN:[[id number]],FOLLOWUP:[2 weeks]],PROVIDER:[TOKEN:[[id number]],FOLLOWUP:[1 week]]

## 2021-08-23 NOTE — DISCHARGE NOTE PROVIDER - CARE PROVIDER_API CALL
Zaid Mcdaniel (DO)  Radiation Oncology  [address] [place] [address]  Phone: [phone]  Fax: [phone]  Follow Up Time: 1-3 days    ACE WALLACE  Internal Medicine  Tonya ROUSE,    Phone: ()-  Fax: ()-  Follow Up Time: 2 weeks   Zaid Mcdaniel (DO)  Radiation Oncology  [address] [place] [address]  Phone: [phone]  Fax: [phone]  Follow Up Time: 1-3 days    ACE WALLACE  Internal Medicine  [place]a ROUSE,    Phone: ()-  Fax: ()-  Follow Up Time: 2 weeks    Yamel Tubbs)  Hematology; Internal Medicine; Medical Oncology  [address] [place] [address]  Phone: [phone]  Fax: [phone]  Follow Up Time: 1 week

## 2021-08-23 NOTE — DISCHARGE NOTE PROVIDER - NSDCCPCAREPLAN_GEN_ALL_CORE_FT
PRINCIPAL DISCHARGE DIAGNOSIS  Diagnosis: Rectal adenocarcinoma  Assessment and Plan of Treatment:        PRINCIPAL DISCHARGE DIAGNOSIS  Diagnosis: Rectal adenocarcinoma  Assessment and Plan of Treatment: You will need to follow up with the radiation oncologist to schedule 5 radiation treatments and need to be scheduled with your skilled nursing facility.

## 2021-08-23 NOTE — DISCHARGE NOTE PROVIDER - NSDCMRMEDTOKEN_GEN_ALL_CORE_FT
lidocaine 5% topical film: Apply topically to affected area once, As Needed for pain..  loperamide 2 mg oral capsule: 1 cap(s) orally 2 times a day, As Needed  oxycodone-acetaminophen 5 mg-325 mg oral tablet: 1 tab(s) orally every 6 hours, As needed, Moderate Pain (4 - 6)  pantoprazole 40 mg oral delayed release tablet: 1 tab(s) orally once a day (before a meal)  senna oral tablet: 2 tab(s) orally once a day (at bedtime)  vitamin A &amp; D topical ointment: 1 application topically 2 times a day

## 2021-08-23 NOTE — DISCHARGE NOTE PROVIDER - CARE PROVIDERS DIRECT ADDRESSES
,[email or web address] ,[email or web address]

## 2021-08-23 NOTE — PHYSICAL THERAPY INITIAL EVALUATION ADULT - GENERAL OBSERVATIONS, REHAB EVAL
Chart reviewed, pt encountered supine, NAD, fixated perseverating on rad tx today and also wants to get "b/l knees injections",  and declined to participate in IE despite encouragement. Reports he walked to bathroom last night with RW. RN made aware of pt requesting knee injections. Will f/u as nory.

## 2021-08-23 NOTE — PROGRESS NOTE ADULT - ASSESSMENT
80 year old male with Advanced rectal adenocarcinoma (T4N0M0) diagnosed April 2021 s/p 5 cycles chemo, microcytic anemia, chronic osteoarthritis being admitted for Wenceslao-adjuvant radiotherapy from a SNF.     # Locally advanced rectal adenocarcinoma (T4N0M0) s/p 4 cycles  - Diagnosed late April 2021 - MRI: 18 cm long polypoidal rectal mass with invasion of the right meso rectal fascia and possibly the prostate gland. Additional involvement of the anal sphincter complex.  - repeat CT on 7/28 showed good tumor response to Chemo  - plan to have total neoadjuvant therapy (chemotherapy followed by chemoRT followed by surgery )  - radon consult placed  - s/p 6 cycles of chemo s/p port placement. Follows with Dr. Tubbs.  - last CEA on 6/28 was 10 (downtrending). Will repeat.    # Mycotic toenails - podiatry outpatient f/u     #Knee pain, patient reports RA history  - Lidocaine patch PRN    # R lung pulmonary nodules   # Pancreatic hypodensity  - c/w surveillance of the pulmonary nodules (too small to biopsy)  - MRI of pancreas to better assess the pancreatic hypodensity (which can be done outpatient)    Diet: Regular diet  GI ppx: Protonix  DVT ppx: Lovenox 40 mg qD  Activity: Increase as tolerated   80 year old male with Advanced rectal adenocarcinoma (T4N0M0) diagnosed April 2021 s/p 5 cycles chemo, microcytic anemia, chronic osteoarthritis being admitted for Wenceslao-adjuvant radiotherapy from a SNF.     # Locally advanced rectal adenocarcinoma (T4N0M0)  - Diagnosed late April 2021 - MRI: 18 cm long polypoidal rectal mass with invasion of the right meso rectal fascia and possibly the prostate gland. Additional involvement of the anal sphincter complex.  - repeat CT on 7/28 showed good tumor response to Chemo  - plan to have total neoadjuvant therapy (chemotherapy followed by chemoRT followed by surgery )  - f/u [facility] recs   - s/p 6 cycles of chemo s/p port placement. Follows with Dr. Tubbs.  - last CEA on 6/28 was 10 (downtrending). Will repeat.    # Mycotic toenails   - podiatry outpatient f/u     #Knee pain, patient reports RA history  - Lidocaine patch PRN    # R lung pulmonary nodules   # Pancreatic hypodensity  - c/w surveillance of the pulmonary nodules (too small to biopsy)  - MRI of pancreas to better assess the pancreatic hypodensity (which can be done outpatient)    Diet: Regular diet  GI ppx: Protonix  DVT ppx: Lovenox 40 mg qD  Activity: Increase as tolerated  Other: Patient needs MOLST form completed.  80 year old male with Advanced rectal adenocarcinoma (T4N0M0) diagnosed April 2021 s/p 5 cycles chemo, microcytic anemia, chronic osteoarthritis being admitted for Wenceslao-adjuvant radiotherapy from a SNF.     # Locally advanced rectal adenocarcinoma (T4N0M0)  - Diagnosed late April 2021 - MRI: 18 cm long polypoidal rectal mass with invasion of the right meso rectal fascia and possibly the prostate gland. Additional involvement of the anal sphincter complex.  - repeat CT on 7/28 showed good tumor response to Chemo  - plan to have total neoadjuvant therapy (chemotherapy followed by chemoRT followed by surgery )  - patient not ready for radiation yet per radiation therapy b/c last folfox tx was 2 weeks ago and pt needs 4 weeks post folfox prior to radiation tx  - s/p 6 cycles of chemo s/p port placement. Follows with Dr. Tubbs.  - last CEA on 6/28 was 10 (downtrending). Will repeat.    # Mycotic toenails   - podiatry outpatient f/u     #Knee pain, patient reports RA history  - Lidocaine patch PRN    # R lung pulmonary nodules   # Pancreatic hypodensity  - c/w surveillance of the pulmonary nodules (too small to biopsy)  - MRI of pancreas to better assess the pancreatic hypodensity (which can be done outpatient)    Diet: Regular diet  GI ppx: Protonix  DVT ppx: Lovenox 40 mg qD  Activity: Increase as tolerated  Other: Patient needs MOLST form completed.  80 year old male with Advanced rectal adenocarcinoma (T4N0M0) diagnosed April 2021 s/p 5 cycles chemo, microcytic anemia, chronic osteoarthritis being admitted for Wenceslao-adjuvant radiotherapy from a SNF.     # Locally advanced rectal adenocarcinoma (T4N0M0)  - Diagnosed late April 2021 - MRI: 18 cm long polypoidal rectal mass with invasion of the right meso rectal fascia and possibly the prostate gland. Additional involvement of the anal sphincter complex.  - repeat CT on 7/28 showed good tumor response to Chemo  - plan to have total neoadjuvant therapy (chemotherapy followed by chemoRT followed by surgery )  - For radiation treatments - spoke with heme-onc, who discussed with rad onc. Per discussion, NH can coordinate with radiation onc for treatment next week.   - s/p 6 cycles of chemo s/p port placement. Follows with Dr. Tubbs.  - last CEA on 6/28 was 10 (downtrending). Will repeat.    # Mycotic toenails   - podiatry outpatient f/u     #Knee pain, patient reports RA history  - Lidocaine patch PRN    # R lung pulmonary nodules   # Pancreatic hypodensity  - c/w surveillance of the pulmonary nodules (too small to biopsy)  - MRI of pancreas to better assess the pancreatic hypodensity (which can be done outpatient)    Diet: Regular diet  GI ppx: Protonix  DVT ppx: Lovenox 40 mg qD  Activity: Increase as tolerated  Other: MOLST form 80 year old male with Advanced rectal adenocarcinoma (T4N0M0) diagnosed April 2021 s/p 5 cycles chemo, microcytic anemia, chronic osteoarthritis being admitted for Wenceslao-adjuvant radiotherapy from a SNF.     # Locally advanced rectal adenocarcinoma (T4N0M0)  - Diagnosed late April 2021 - MRI: 18 cm long polypoidal rectal mass with invasion of the right meso rectal fascia and possibly the prostate gland. Additional involvement of the anal sphincter complex.  - repeat CT on 7/28 showed good tumor response to Chemo  - plan to have total neoadjuvant therapy (chemotherapy followed by chemoRT followed by surgery )  - For radiation treatments - spoke with heme-onc, who discussed with rad onc. Per discussion, NH can coordinate with radiation onc for treatment next week. Soumya heme-onc, patient should receive 5 treatments of radiation therapy.    - s/p 6 cycles of chemo s/p port placement. Follows with Dr. Tubbs.  - last CEA on 6/28 was 10 (downtrending). Will repeat.    # Mycotic toenails   - podiatry outpatient f/u     #Knee pain, patient reports RA history  - Lidocaine patch PRN    # R lung pulmonary nodules   # Pancreatic hypodensity  - c/w surveillance of the pulmonary nodules (too small to biopsy)  - MRI of pancreas to better assess the pancreatic hypodensity (which can be done outpatient)    Diet: Regular diet  GI ppx: Protonix  DVT ppx: Lovenox 40 mg qD  Activity: Increase as tolerated  Other: MOLST form 80 year old male with Advanced rectal adenocarcinoma (T4N0M0) diagnosed April 2021 s/p 5 cycles chemo, microcytic anemia, chronic osteoarthritis being admitted for Wenceslao-adjuvant radiotherapy from a SNF.     # Locally advanced rectal adenocarcinoma (T4N0M0)  - Diagnosed late April 2021 - MRI: 18 cm long polypoidal rectal mass with invasion of the right meso rectal fascia and possibly the prostate gland. Additional involvement of the anal sphincter complex.  - repeat CT on 7/28 showed good tumor response to Chemo  - plan to have total neoadjuvant therapy (chemotherapy followed by chemoRT followed by surgery )  - For radiation treatments - spoke with heme-onc, who discussed with rad onc. Per discussion, NH can coordinate with radiation onc for treatment next week. Soumya heme-onc, patient should receive 5 treatments of radiation therapy.    - s/p 6 cycles of chemo s/p port placement. Follows with Dr. Tubbs.  - last CEA on 6/28 was 10 (downtrending). Will repeat.    # Mycotic toenails   - podiatry outpatient f/u     #Knee pain, patient reports RA history  - Lidocaine patch PRN    # R lung pulmonary nodules   # Pancreatic hypodensity  - c/w surveillance of the pulmonary nodules (too small to biopsy)  - MRI of pancreas to better assess the pancreatic hypodensity (which can be done outpatient)    Diet: Regular diet  GI ppx: Protonix  DVT ppx: Lovenox 40 mg qD  Activity: Increase as tolerated 80 year old male with Advanced rectal adenocarcinoma (T4N0M0) diagnosed April 2021 s/p 5 cycles chemo, microcytic anemia, chronic osteoarthritis being admitted for Wenceslao-adjuvant radiotherapy from a SNF.     # Locally advanced rectal adenocarcinoma (T4N0M0)  - Diagnosed late April 2021 - MRI: 18 cm long polypoidal rectal mass with invasion of the right meso rectal fascia and possibly the prostate gland. Additional involvement of the anal sphincter complex.  - repeat CT on 7/28 showed good tumor response to Chemo  - plan to have total neoadjuvant therapy (chemotherapy followed by chemoRT followed by surgery )  - For radiation treatments - spoke with heme-onc, who discussed with rad onc. Per discussion, NH can coordinate with radiation onc for treatment next week. Per heme-onc, patient should receive 5 treatments of radiation therapy.    - s/p 6 cycles of chemo s/p port placement. Follows with Dr. Tubbs.  - last CEA on 6/28 was 10 (downtrending). Will repeat.    # Mycotic toenails   - podiatry outpatient f/u     #Knee pain, patient reports RA history  - Lidocaine patch PRN    # R lung pulmonary nodules   # Pancreatic hypodensity  - c/w surveillance of the pulmonary nodules (too small to biopsy)  - MRI of pancreas to better assess the pancreatic hypodensity (which can be done outpatient)    Diet: Regular diet  GI ppx: Protonix  DVT ppx: Lovenox 40 mg qD  Activity: Increase as tolerated 80 year old male with Advanced rectal adenocarcinoma (T4N0M0) diagnosed April 2021 s/p 5 cycles chemo, microcytic anemia, chronic osteoarthritis being admitted for Wenceslao-adjuvant radiotherapy from a SNF.     # Locally advanced rectal adenocarcinoma (T4N0M0)  - Diagnosed late April 2021 - MRI: 18 cm long polypoidal rectal mass with invasion of the right meso rectal fascia and possibly the prostate gland. Additional involvement of the anal sphincter complex.  - repeat CT on 7/28 showed good tumor response to Chemo  - plan to have total neoadjuvant therapy (chemotherapy followed by chemoRT followed by surgery )  - For radiation treatments - spoke with heme-onc, who discussed with rad onc. Per discussion, NH can coordinate with radiation onc for treatment next week. Per heme-onc, patient should receive 5 treatments of radiation therapy.    - s/p 6 cycles of chemo s/p port placement. Follows with Dr. Tubbs.  - last CEA on 6/28 was 10 (downtrending). Will repeat.    # Mycotic toenails   - podiatry outpatient f/u     #Knee pain, patient reports RA history  - Lidocaine patch PRN    # R lung pulmonary nodules   # Pancreatic hypodensity  - c/w surveillance of the pulmonary nodules (too small to biopsy)  - MRI of pancreas to better assess the pancreatic hypodensity (which can be done outpatient)    Diet: Regular diet  GI ppx: Protonix  DVT ppx: Lovenox 40 mg qD  Activity: Increase as tolerated    Dispo  - Possible discharge 8/24 pending NH bed availability.

## 2021-08-24 ENCOUNTER — TRANSCRIPTION ENCOUNTER (OUTPATIENT)
Age: 81
End: 2021-08-24

## 2021-08-24 VITALS
SYSTOLIC BLOOD PRESSURE: 132 MMHG | HEART RATE: 73 BPM | RESPIRATION RATE: 18 BRPM | DIASTOLIC BLOOD PRESSURE: 60 MMHG | TEMPERATURE: 97 F

## 2021-08-24 LAB
ALBUMIN SERPL ELPH-MCNC: 3.1 G/DL — LOW (ref 3.5–5.2)
ALP SERPL-CCNC: 69 U/L — SIGNIFICANT CHANGE UP (ref 30–115)
ALT FLD-CCNC: 6 U/L — SIGNIFICANT CHANGE UP (ref 0–41)
ANION GAP SERPL CALC-SCNC: 9 MMOL/L — SIGNIFICANT CHANGE UP (ref 7–14)
AST SERPL-CCNC: 14 U/L — SIGNIFICANT CHANGE UP (ref 0–41)
BASOPHILS # BLD AUTO: 0.11 K/UL — SIGNIFICANT CHANGE UP (ref 0–0.2)
BASOPHILS NFR BLD AUTO: 1.2 % — HIGH (ref 0–1)
BILIRUB SERPL-MCNC: 0.3 MG/DL — SIGNIFICANT CHANGE UP (ref 0.2–1.2)
BUN SERPL-MCNC: 9 MG/DL — LOW (ref 10–20)
CALCIUM SERPL-MCNC: 8.8 MG/DL — SIGNIFICANT CHANGE UP (ref 8.5–10.1)
CHLORIDE SERPL-SCNC: 107 MMOL/L — SIGNIFICANT CHANGE UP (ref 98–110)
CO2 SERPL-SCNC: 26 MMOL/L — SIGNIFICANT CHANGE UP (ref 17–32)
COVID-19 SPIKE DOMAIN AB INTERP: POSITIVE
COVID-19 SPIKE DOMAIN ANTIBODY RESULT: 124 U/ML — HIGH
CREAT SERPL-MCNC: <0.5 MG/DL — LOW (ref 0.7–1.5)
EOSINOPHIL # BLD AUTO: 0.19 K/UL — SIGNIFICANT CHANGE UP (ref 0–0.7)
EOSINOPHIL NFR BLD AUTO: 2.1 % — SIGNIFICANT CHANGE UP (ref 0–8)
GLUCOSE SERPL-MCNC: 95 MG/DL — SIGNIFICANT CHANGE UP (ref 70–99)
HCT VFR BLD CALC: 34.4 % — LOW (ref 42–52)
HGB BLD-MCNC: 10.4 G/DL — LOW (ref 14–18)
IMM GRANULOCYTES NFR BLD AUTO: 5.5 % — HIGH (ref 0.1–0.3)
LYMPHOCYTES # BLD AUTO: 1.58 K/UL — SIGNIFICANT CHANGE UP (ref 1.2–3.4)
LYMPHOCYTES # BLD AUTO: 17.5 % — LOW (ref 20.5–51.1)
MAGNESIUM SERPL-MCNC: 1.4 MG/DL — LOW (ref 1.8–2.4)
MCHC RBC-ENTMCNC: 27.9 PG — SIGNIFICANT CHANGE UP (ref 27–31)
MCHC RBC-ENTMCNC: 30.2 G/DL — LOW (ref 32–37)
MCV RBC AUTO: 92.2 FL — SIGNIFICANT CHANGE UP (ref 80–94)
MONOCYTES # BLD AUTO: 0.99 K/UL — HIGH (ref 0.1–0.6)
MONOCYTES NFR BLD AUTO: 11 % — HIGH (ref 1.7–9.3)
NEUTROPHILS # BLD AUTO: 5.64 K/UL — SIGNIFICANT CHANGE UP (ref 1.4–6.5)
NEUTROPHILS NFR BLD AUTO: 62.7 % — SIGNIFICANT CHANGE UP (ref 42.2–75.2)
NRBC # BLD: 0 /100 WBCS — SIGNIFICANT CHANGE UP (ref 0–0)
PLATELET # BLD AUTO: 225 K/UL — SIGNIFICANT CHANGE UP (ref 130–400)
POTASSIUM SERPL-MCNC: 4 MMOL/L — SIGNIFICANT CHANGE UP (ref 3.5–5)
POTASSIUM SERPL-SCNC: 4 MMOL/L — SIGNIFICANT CHANGE UP (ref 3.5–5)
PROT SERPL-MCNC: 5.8 G/DL — LOW (ref 6–8)
RBC # BLD: 3.73 M/UL — LOW (ref 4.7–6.1)
RBC # FLD: 17.3 % — HIGH (ref 11.5–14.5)
SARS-COV-2 IGG+IGM SERPL QL IA: 124 U/ML — HIGH
SARS-COV-2 IGG+IGM SERPL QL IA: POSITIVE
SODIUM SERPL-SCNC: 142 MMOL/L — SIGNIFICANT CHANGE UP (ref 135–146)
WBC # BLD: 9.01 K/UL — SIGNIFICANT CHANGE UP (ref 4.8–10.8)
WBC # FLD AUTO: 9.01 K/UL — SIGNIFICANT CHANGE UP (ref 4.8–10.8)

## 2021-08-24 PROCEDURE — 99239 HOSP IP/OBS DSCHRG MGMT >30: CPT

## 2021-08-24 RX ORDER — MAGNESIUM SULFATE 500 MG/ML
2 VIAL (ML) INJECTION ONCE
Refills: 0 | Status: COMPLETED | OUTPATIENT
Start: 2021-08-24 | End: 2021-08-24

## 2021-08-24 RX ADMIN — Medication 50 GRAM(S): at 14:34

## 2021-08-24 RX ADMIN — PANTOPRAZOLE SODIUM 40 MILLIGRAM(S): 20 TABLET, DELAYED RELEASE ORAL at 05:06

## 2021-08-24 RX ADMIN — ENOXAPARIN SODIUM 40 MILLIGRAM(S): 100 INJECTION SUBCUTANEOUS at 11:09

## 2021-08-24 NOTE — PROGRESS NOTE ADULT - ASSESSMENT
80 year old male with Advanced rectal adenocarcinoma (T4N0M0) diagnosed April 2021 s/p 5 cycles chemo, microcytic anemia, chronic osteoarthritis being admitted for [facility]-adjuvant radiotherapy from a SNF.     # Locally advanced rectal adenocarcinoma (T4N0M0)  - Diagnosed late April 2021 , currently with good response to chemotherapy   - plan to have total neoadjuvant therapy (chemotherapy followed by chemoRT followed by surgery ). This was intended to be a planned admission for radiation therapy, however per Rad Onc/Heme Onc physicians this will need to be delayed until next week.   - coordinate with NH for another planned readmission- CM aware    #Right Back Shoulder Lesion- according to daughter pt has possible abscess treated w/abx at NH? Will evaluate, although no s/s of sepsis at this time.     # Mycotic toenails   - podiatry outpatient f/u     #Knee pain, patient reports RA history  - Lidocaine patch PRN    # R lung pulmonary nodules   # Pancreatic hypodensity  - routine outpatient surveillance and Onc follow up       Diet: Regular diet  GI ppx: Protonix  DVT ppx: Lovenox 40 mg qD  Activity: Increase as tolerated    Dispo  - Possible discharge 8/24 pending NH bed availability.          80 year old male with Advanced rectal adenocarcinoma (T4N0M0) diagnosed April 2021 s/p 5 cycles chemo, microcytic anemia, chronic osteoarthritis being admitted for [facility]-adjuvant radiotherapy from a SNF.     # Locally advanced rectal adenocarcinoma (T4N0M0)  - Diagnosed late April 2021 , currently with good response to chemotherapy   - plan to have total neoadjuvant therapy (chemotherapy followed by chemoRT followed by surgery ). This was intended to be a planned admission for radiation therapy, however per Rad Onc/Heme Onc physicians this will need to be delayed until next week.   - coordinate with NH for another planned readmission- CM aware    #Right Back Shoulder Lesion  - according to daughter pt has possible abscess treated w/abx at NH? Will evaluate, although no s/s of sepsis at this time.     # Mycotic toenails   - podiatry outpatient f/u     #Knee pain, patient reports RA history, says he received knee injections before   - Lidocaine patch PRN    # R lung pulmonary nodules   # Pancreatic hypodensity  - routine outpatient surveillance and Onc follow up     Diet: Regular diet  GI ppx: Protonix  DVT ppx: Lovenox 40 mg qD  Activity: Increase as tolerated    Dispo  - Possible discharge 8/24 pending NH bed availability.      80 year old male with Advanced rectal adenocarcinoma (T4N0M0) diagnosed April 2021 s/p 5 cycles chemo, microcytic anemia, chronic osteoarthritis being admitted for [facility]-adjuvant radiotherapy from a SNF.     # Locally advanced rectal adenocarcinoma (T4N0M0)  - Diagnosed late April 2021 , currently with good response to chemotherapy   - plan to have total neoadjuvant therapy (chemotherapy followed by chemoRT followed by surgery ). This was intended to be a planned admission for radiation therapy, however per Rad Onc/Heme Onc physicians this will need to be delayed until next week.   - coordinate with NH for another planned readmission- CM aware    #Right Back Lesion  - continues to improve     # Mycotic toenails   - podiatry outpatient f/u     #Knee pain, patient reports RA history, says he received knee injections before   - Lidocaine patch PRN    # R lung pulmonary nodules   # Pancreatic hypodensity  - routine outpatient surveillance and Onc follow up     Diet: Regular diet  GI ppx: Protonix  DVT ppx: Lovenox 40 mg qD  Activity: Increase as tolerated    Dispo  - Possible discharge 8/24 pending NH bed availability.    80 year old male with Advanced rectal adenocarcinoma (T4N0M0) diagnosed April 2021 s/p 5 cycles chemo, microcytic anemia, chronic osteoarthritis being admitted for [facility]-adjuvant radiotherapy from a SNF.     # Locally advanced rectal adenocarcinoma (T4N0M0)  - Diagnosed late April 2021 , currently with good response to chemotherapy   - plan to have total neoadjuvant therapy (chemotherapy followed by chemoRT followed by surgery ). This was intended to be a planned admission for radiation therapy, however per Rad Onc/Heme Onc physicians this will need to be delayed until next week.   - repeat CT on 7/28 showed good tumor response to Chemo  - plan to have total neoadjuvant therapy (chemotherapy followed by chemoRT followed by surgery )  - For radiation treatments - spoke with heme-onc, who discussed with rad onc. Per discussion, NH can coordinate with radiation onc for treatment next week. Per heme-onc, patient should receive 5 treatments of radiation therapy.    - Follows with Dr. Tubbs.    #Right Back Lesion  - continues to improve     # Mycotic toenails   - podiatry outpatient f/u     #Knee pain, patient reports RA history, says he received knee injections before   - Lidocaine patch PRN    # R lung pulmonary nodules   # Pancreatic hypodensity  - routine outpatient surveillance and Onc follow up     Diet: Regular diet  GI ppx: Protonix  DVT ppx: Lovenox 40 mg qD  Activity: Increase as tolerated    Dispo  - Possible discharge 8/24 pending NH bed availability.    80 year old male with Advanced rectal adenocarcinoma (T4N0M0) diagnosed April 2021 s/p 5 cycles chemo, microcytic anemia, chronic osteoarthritis being admitted for [facility]-adjuvant radiotherapy from a SNF.     # Locally advanced rectal adenocarcinoma (T4N0M0)  - Diagnosed late April 2021 - MRI: 18 cm long polypoidal rectal mass with invasion of the right meso rectal fascia and possibly the prostate gland. Additional involvement of the anal sphincter complex.  - repeat CT on 7/28 showed good tumor response to Chemo  - plan to have total neoadjuvant therapy (chemotherapy followed by chemoRT followed by surgery ). This was intended to be a planned admission for radiation therapy, however per Rad Onc/Heme Onc physicians this will need to be delayed until next week.   - For radiation treatments - spoke with heme-onc, who discussed with rad onc. Per discussion, NH can coordinate with radiation onc for treatment next week. Per heme-onc, patient should receive 5 treatments of radiation therapy.    - Follows with Dr. Tubbs.    #Right Back Lesion  - continues to improve     # Mycotic toenails   - podiatry outpatient f/u     #Knee pain, patient reports RA history  - Lidocaine patch PRN    # R lung pulmonary nodules   # Pancreatic hypodensity  - routine outpatient surveillance and outpt Onc follow up     Diet: Regular diet  GI ppx: Protonix  DVT ppx: Lovenox 40 mg qD  Activity: Increase as tolerated    Dispo  - Possible discharge 8/24 pending NH bed availability.    80 year old male with Advanced rectal adenocarcinoma (T4N0M0) diagnosed April 2021 s/p 5 cycles chemo, microcytic anemia, chronic osteoarthritis being admitted for [facility]-adjuvant radiotherapy from a SNF.     # Locally advanced rectal adenocarcinoma (T4N0M0)  - Diagnosed late April 2021 - MRI: 18 cm long polypoidal rectal mass with invasion of the right meso rectal fascia and possibly the prostate gland. Additional involvement of the anal sphincter complex.  - repeat CT on 7/28 showed good tumor response to Chemo  - For radiation treatments - spoke with heme-onc, who discussed with rad onc. Per discussion, NH can coordinate with radiation onc for treatment next week. Per heme-onc, patient should receive 5 treatments of radiation therapy.    - Follows with Dr. Tubbs.    #Right Back Lesion  - continues to improve     # Mycotic toenails   - podiatry outpatient f/u     #Knee pain, patient reports RA history  - Lidocaine patch PRN    # R lung pulmonary nodules   # Pancreatic hypodensity  - routine outpatient surveillance and outpt Onc follow up     Diet: Regular diet  GI ppx: Protonix  DVT ppx: Lovenox 40 mg qD  Activity: Increase as tolerated    Dispo  - Possible discharge 8/24 pending NH bed availability.

## 2021-08-24 NOTE — PROGRESS NOTE ADULT - SUBJECTIVE AND OBJECTIVE BOX
Hospital Day:  2d    Subjective:    No acute events overnight.    Chart reviewed, patient seen and examined at bedside in AM. Patient appears comfortable while at rest in bed. No other complaints.       Past Medical Hx:   No pertinent past medical history    Iron deficiency anemia    Osteoarthritis      Past Sx:  Amputation of digit of left hand      Allergies:  No Known Allergies    Current Meds:   Standng Meds:  enoxaparin Injectable 40 milliGRAM(s) SubCutaneous daily  pantoprazole    Tablet 40 milliGRAM(s) Oral before breakfast  senna 2 Tablet(s) Oral at bedtime    PRN Meds:  aluminum hydroxide/magnesium hydroxide/simethicone Suspension 30 milliLiter(s) Oral every 4 hours PRN Dyspepsia  loperamide 2 milliGRAM(s) Oral two times a day PRN Diarrhea  melatonin 3 milliGRAM(s) Oral at bedtime PRN Insomnia  ondansetron Injectable 4 milliGRAM(s) IV Push every 8 hours PRN Nausea and/or Vomiting  oxycodone    5 mG/acetaminophen 325 mG 1 Tablet(s) Oral every 6 hours PRN Moderate Pain (4 - 6)    HOME MEDICATIONS:  lidocaine 5% topical film: Apply topically to affected area once, As Needed for pain..  loperamide 2 mg oral capsule: 1 cap(s) orally 2 times a day, As Needed  oxycodone-acetaminophen 5 mg-325 mg oral tablet: 1 tab(s) orally every 6 hours, As needed, Moderate Pain (4 - 6)  pantoprazole 40 mg oral delayed release tablet: 1 tab(s) orally once a day (before a meal)  senna oral tablet: 2 tab(s) orally once a day (at bedtime)  vitamin A &amp; D topical ointment: 1 application topically 2 times a day      Vital Signs:   T(F): 96 (08-23-21 @ 15:30), Max: 97 (08-23-21 @ 08:00)  HR: 74 (08-23-21 @ 15:30) (74 - 77)  BP: 117/57 (08-23-21 @ 15:30) (115/57 - 117/57)  RR: 18 (08-23-21 @ 15:30) (18 - 18)  SpO2: --      08-23-21 @ 07:01  -  08-24-21 @ 05:57  --------------------------------------------------------  IN: 720 mL / OUT: 1250 mL / NET: -530 mL        Physical Exam:   CONSTITUTIONAL: Well-developed; well-nourished; in no acute distress, speaking in full sentences  HEAD: Normocephalic; atraumatic  EYES: PERRL, EOMI, no conjunctival erythema  NECK: Supple; non tender, FROM  CARD: +S1, S2 Regular rate and rhythm.  RESP: CTABL  ABD: soft nontender, nondistended, no rebound, no guarding, no rigidity  EXT: moves all extremities,  No clubbing, cyanosis or edema.   NEURO: Alert, oriented, grossly unremarkable, no focal deficits, cn ii-xii grossly intact  PSYCH: Cooperative, appropriate         Labs:                         10.2   7.89  )-----------( 225      ( 23 Aug 2021 07:49 )             33.7     Neutophil% 58.1, Lymphocyte% 19.1, Monocyte% 14.2, Bands% 5.7 08-23-21 @ 07:49    23 Aug 2021 07:49    141    |  107    |  8      ----------------------------<  80     4.1     |  24     |  0.5      Ca    8.7        23 Aug 2021 07:49    TPro  5.6    /  Alb  2.9    /  TBili  0.3    /  DBili  x      /  AST  14     /  ALT  6      /  AlkPhos  73     23 Aug 2021 07:49       pTT    --             ----< 1.04 INR  (08-23-21 @ 07:49)    12.00        PT        Radiology:   IMAGING:             Hospital Day:  2d    Subjective:    No acute events overnight.    Chart reviewed, patient seen and examined at bedside in AM. Patient appears comfortable while at rest in bed. No other complaints.       Past Medical Hx:   No pertinent past medical history    Iron deficiency anemia    Osteoarthritis      Past Sx:  Amputation of digit of left hand      Allergies:  No Known Allergies    Current Meds:   Standng Meds:  enoxaparin Injectable 40 milliGRAM(s) SubCutaneous daily  pantoprazole    Tablet 40 milliGRAM(s) Oral before breakfast  senna 2 Tablet(s) Oral at bedtime    PRN Meds:  aluminum hydroxide/magnesium hydroxide/simethicone Suspension 30 milliLiter(s) Oral every 4 hours PRN Dyspepsia  loperamide 2 milliGRAM(s) Oral two times a day PRN Diarrhea  melatonin 3 milliGRAM(s) Oral at bedtime PRN Insomnia  ondansetron Injectable 4 milliGRAM(s) IV Push every 8 hours PRN Nausea and/or Vomiting  oxycodone    5 mG/acetaminophen 325 mG 1 Tablet(s) Oral every 6 hours PRN Moderate Pain (4 - 6)    HOME MEDICATIONS:  lidocaine 5% topical film: Apply topically to affected area once, As Needed for pain..  loperamide 2 mg oral capsule: 1 cap(s) orally 2 times a day, As Needed  oxycodone-acetaminophen 5 mg-325 mg oral tablet: 1 tab(s) orally every 6 hours, As needed, Moderate Pain (4 - 6)  pantoprazole 40 mg oral delayed release tablet: 1 tab(s) orally once a day (before a meal)  senna oral tablet: 2 tab(s) orally once a day (at bedtime)  vitamin A &amp; D topical ointment: 1 application topically 2 times a day      Vital Signs:   T(F): 96 (08-23-21 @ 15:30), Max: 97 (08-23-21 @ 08:00)  HR: 74 (08-23-21 @ 15:30) (74 - 77)  BP: 117/57 (08-23-21 @ 15:30) (115/57 - 117/57)  RR: 18 (08-23-21 @ 15:30) (18 - 18)  SpO2: --      08-23-21 @ 07:01  -  08-24-21 @ 05:57  --------------------------------------------------------  IN: 720 mL / OUT: 1250 mL / NET: -530 mL        Physical Exam:   GENERAL: NAD, lying in bed comfortably  HEAD:  Atraumatic, Normocephalic  EYES: EOMI  ENT: Moist mucous membranes  NECK: No JVD  CHEST/LUNG: Clear to auscultation bilaterally; No rales, rhonchi, wheezing  HEART: Regular rate and rhythm; No murmurs, rubs, or gallops  ABDOMEN: BS +, abdomen firm, nontender, nondistended   EXTREMITIES:  Left hand 2nd, 3rd digits distal phalanges amputation, left 1st toe dehiscence of nail with discoloration (crush injury); no edema or cyanosis  NERVOUS SYSTEM:  Alert & Oriented X3; talkative, somewhat rambling speech      Labs:                         10.2   7.89  )-----------( 225      ( 23 Aug 2021 07:49 )             33.7     WBC Count: 7.89 K/uL (08-23 @ 07:49)  WBC Count: 7.47 K/uL (08-22 @ 14:04)  WBC Count: 19.60 K/uL (08-05 @ 05:10)  WBC Count: 28.32 K/uL (08-04 @ 07:05)      Neutophil% 58.1, Lymphocyte% 19.1, Monocyte% 14.2, Bands% 5.7 08-23-21 @ 07:49    23 Aug 2021 07:49    141    |  107    |  8      ----------------------------<  80     4.1     |  24     |  0.5      Ca    8.7        23 Aug 2021 07:49    TPro  5.6    /  Alb  2.9    /  TBili  0.3    /  DBili  x      /  AST  14     /  ALT  6      /  AlkPhos  73     23 Aug 2021 07:49       pTT    --             ----< 1.04 INR  (08-23-21 @ 07:49)    12.00        PT        Radiology:   IMAGING:  Cont and w/ IV Cont (07.27.21 @ 17:47) >      IMPRESSION:    When compared to 4/22/2021:    Interval decrease in size of rectal wall thickening and rectal mass, inherently limited in evaluation on CT.  The exophytic component which abuts the right posterior prostate gland and seminal vesicles measures approximately 4 x 3 cm (previously 6 x 3 cm). Foci of gas are noted within which may be secondary to communication with the rectal lumen. Limited CT ability to exclude superimposed infection.  Circumferential rectal wall thickening, decreased measuring up to 1 cm (previously up to at least 2 cm).  Pelvic MRI can be utilized for further detail/evaluation.    Redemonstration of perirectal lymph nodes measuring up to 0.7 cm.    No evidence for new or worsening metastatic disease/lymphadenopathy.  < end of copied text >               Hospital Day:  2d    Subjective:    No acute events overnight.    Chart reviewed, patient seen and examined at bedside in AM. Patient appears comfortable while at rest in bed. No complaints.       Past Medical Hx:   No pertinent past medical history    Iron deficiency anemia    Osteoarthritis      Past Sx:  Amputation of digit of left hand      Allergies:  No Known Allergies    Current Meds:   Standng Meds:  enoxaparin Injectable 40 milliGRAM(s) SubCutaneous daily  pantoprazole    Tablet 40 milliGRAM(s) Oral before breakfast  senna 2 Tablet(s) Oral at bedtime    PRN Meds:  aluminum hydroxide/magnesium hydroxide/simethicone Suspension 30 milliLiter(s) Oral every 4 hours PRN Dyspepsia  loperamide 2 milliGRAM(s) Oral two times a day PRN Diarrhea  melatonin 3 milliGRAM(s) Oral at bedtime PRN Insomnia  ondansetron Injectable 4 milliGRAM(s) IV Push every 8 hours PRN Nausea and/or Vomiting  oxycodone    5 mG/acetaminophen 325 mG 1 Tablet(s) Oral every 6 hours PRN Moderate Pain (4 - 6)    HOME MEDICATIONS:  lidocaine 5% topical film: Apply topically to affected area once, As Needed for pain..  loperamide 2 mg oral capsule: 1 cap(s) orally 2 times a day, As Needed  oxycodone-acetaminophen 5 mg-325 mg oral tablet: 1 tab(s) orally every 6 hours, As needed, Moderate Pain (4 - 6)  pantoprazole 40 mg oral delayed release tablet: 1 tab(s) orally once a day (before a meal)  senna oral tablet: 2 tab(s) orally once a day (at bedtime)  vitamin A &amp; D topical ointment: 1 application topically 2 times a day      Vital Signs:   T(F): 96 (08-23-21 @ 15:30), Max: 97 (08-23-21 @ 08:00)  HR: 74 (08-23-21 @ 15:30) (74 - 77)  BP: 117/57 (08-23-21 @ 15:30) (115/57 - 117/57)  RR: 18 (08-23-21 @ 15:30) (18 - 18)  SpO2: --      08-23-21 @ 07:01  -  08-24-21 @ 05:57  --------------------------------------------------------  IN: 720 mL / OUT: 1250 mL / NET: -530 mL        Physical Exam:   GENERAL: NAD, lying in bed comfortably  HEAD:  Atraumatic, Normocephalic  EYES: EOMI  ENT: Moist mucous membranes  NECK: No JVD  CHEST/LUNG: Clear to auscultation bilaterally; No rales, rhonchi, wheezing  HEART: Regular rate and rhythm; No murmurs, rubs, or gallops  ABDOMEN: BS +, abdomen firm, nontender, nondistended   EXTREMITIES:  Left hand 2nd, 3rd digits distal phalanges amputation, left 1st toe dehiscence of nail with discoloration (crush injury); no edema or cyanosis  NERVOUS SYSTEM:  Alert & Oriented X3; talkative, somewhat rambling speech      Labs:                         10.2   7.89  )-----------( 225      ( 23 Aug 2021 07:49 )             33.7     WBC Count: 7.89 K/uL (08-23 @ 07:49)  WBC Count: 7.47 K/uL (08-22 @ 14:04)  WBC Count: 19.60 K/uL (08-05 @ 05:10)  WBC Count: 28.32 K/uL (08-04 @ 07:05)      Neutophil% 58.1, Lymphocyte% 19.1, Monocyte% 14.2, Bands% 5.7 08-23-21 @ 07:49    23 Aug 2021 07:49    141    |  107    |  8      ----------------------------<  80     4.1     |  24     |  0.5      Ca    8.7        23 Aug 2021 07:49    TPro  5.6    /  Alb  2.9    /  TBili  0.3    /  DBili  x      /  AST  14     /  ALT  6      /  AlkPhos  73     23 Aug 2021 07:49       pTT    --             ----< 1.04 INR  (08-23-21 @ 07:49)    12.00        PT        Radiology:   IMAGING:  Cont and w/ IV Cont (07.27.21 @ 17:47) >      IMPRESSION:    When compared to 4/22/2021:    Interval decrease in size of rectal wall thickening and rectal mass, inherently limited in evaluation on CT.  The exophytic component which abuts the right posterior prostate gland and seminal vesicles measures approximately 4 x 3 cm (previously 6 x 3 cm). Foci of gas are noted within which may be secondary to communication with the rectal lumen. Limited CT ability to exclude superimposed infection.  Circumferential rectal wall thickening, decreased measuring up to 1 cm (previously up to at least 2 cm).  Pelvic MRI can be utilized for further detail/evaluation.    Redemonstration of perirectal lymph nodes measuring up to 0.7 cm.    No evidence for new or worsening metastatic disease/lymphadenopathy.  < end of copied text >               Hospital Day:  2d    Subjective:    No acute events overnight.    Chart reviewed, patient seen and examined at bedside in AM. Patient appears comfortable while at rest in bed. No complaints. Patient reports that right back wound continues to improve. ROS negative.    Possible discharge today 8/24 pending NH bed availability.     Past Medical Hx:   No pertinent past medical history    Iron deficiency anemia    Osteoarthritis      Past Sx:  Amputation of digit of left hand      Allergies:  No Known Allergies    Current Meds:   Standng Meds:  enoxaparin Injectable 40 milliGRAM(s) SubCutaneous daily  pantoprazole    Tablet 40 milliGRAM(s) Oral before breakfast  senna 2 Tablet(s) Oral at bedtime    PRN Meds:  aluminum hydroxide/magnesium hydroxide/simethicone Suspension 30 milliLiter(s) Oral every 4 hours PRN Dyspepsia  loperamide 2 milliGRAM(s) Oral two times a day PRN Diarrhea  melatonin 3 milliGRAM(s) Oral at bedtime PRN Insomnia  ondansetron Injectable 4 milliGRAM(s) IV Push every 8 hours PRN Nausea and/or Vomiting  oxycodone    5 mG/acetaminophen 325 mG 1 Tablet(s) Oral every 6 hours PRN Moderate Pain (4 - 6)    HOME MEDICATIONS:  lidocaine 5% topical film: Apply topically to affected area once, As Needed for pain..  loperamide 2 mg oral capsule: 1 cap(s) orally 2 times a day, As Needed  oxycodone-acetaminophen 5 mg-325 mg oral tablet: 1 tab(s) orally every 6 hours, As needed, Moderate Pain (4 - 6)  pantoprazole 40 mg oral delayed release tablet: 1 tab(s) orally once a day (before a meal)  senna oral tablet: 2 tab(s) orally once a day (at bedtime)  vitamin A &amp; D topical ointment: 1 application topically 2 times a day      Vital Signs:   T(F): 96 (08-23-21 @ 15:30), Max: 97 (08-23-21 @ 08:00)  HR: 74 (08-23-21 @ 15:30) (74 - 77)  BP: 117/57 (08-23-21 @ 15:30) (115/57 - 117/57)  RR: 18 (08-23-21 @ 15:30) (18 - 18)  SpO2: --      08-23-21 @ 07:01  -  08-24-21 @ 05:57  --------------------------------------------------------  IN: 720 mL / OUT: 1250 mL / NET: -530 mL        Physical Exam:   GENERAL: NAD, lying in bed comfortably  HEAD:  Atraumatic, Normocephalic  EYES: EOMI  ENT: Moist mucous membranes  NECK: No JVD, +chemo port left side of neck  CHEST/LUNG: Clear to auscultation bilaterally; No rales, rhonchi, wheezing  Back: right middle back wound improved from yesterday  HEART: Regular rate and rhythm; No murmurs, rubs, or gallops  ABDOMEN: BS +, abdomen firm, nontender, nondistended   EXTREMITIES:  Left hand 2nd, 3rd digits distal phalanges amputation, left 1st toe dehiscence of nail with discoloration (crush injury); no edema or cyanosis  NERVOUS SYSTEM:  Alert & Oriented X3; talkative, somewhat rambling speech      Labs:                         10.2   7.89  )-----------( 225      ( 23 Aug 2021 07:49 )             33.7     WBC Count: 7.89 K/uL (08-23 @ 07:49)  WBC Count: 7.47 K/uL (08-22 @ 14:04)  WBC Count: 19.60 K/uL (08-05 @ 05:10)  WBC Count: 28.32 K/uL (08-04 @ 07:05)      Neutophil% 58.1, Lymphocyte% 19.1, Monocyte% 14.2, Bands% 5.7 08-23-21 @ 07:49    23 Aug 2021 07:49    141    |  107    |  8      ----------------------------<  80     4.1     |  24     |  0.5      Ca    8.7        23 Aug 2021 07:49    TPro  5.6    /  Alb  2.9    /  TBili  0.3    /  DBili  x      /  AST  14     /  ALT  6      /  AlkPhos  73     23 Aug 2021 07:49       pTT    --             ----< 1.04 INR  (08-23-21 @ 07:49)    12.00        PT        Radiology:   IMAGING:  Cont and w/ IV Cont (07.27.21 @ 17:47) >      IMPRESSION:    When compared to 4/22/2021:    Interval decrease in size of rectal wall thickening and rectal mass, inherently limited in evaluation on CT.  The exophytic component which abuts the right posterior prostate gland and seminal vesicles measures approximately 4 x 3 cm (previously 6 x 3 cm). Foci of gas are noted within which may be secondary to communication with the rectal lumen. Limited CT ability to exclude superimposed infection.  Circumferential rectal wall thickening, decreased measuring up to 1 cm (previously up to at least 2 cm).  Pelvic MRI can be utilized for further detail/evaluation.    Redemonstration of perirectal lymph nodes measuring up to 0.7 cm.    No evidence for new or worsening metastatic disease/lymphadenopathy.  < end of copied text >               Hospital Day:  2d    Subjective:    No acute events overnight.    Chart reviewed, patient seen and examined at bedside in AM. Patient appears comfortable while at rest in bed. No complaints. Patient reports that right back wound continues to improve. ROS negative.    Possible discharge today 8/24 pending NH bed availability.     Addendum: discharge today      Past Medical Hx:   No pertinent past medical history    Iron deficiency anemia    Osteoarthritis      Past Sx:  Amputation of digit of left hand      Allergies:  No Known Allergies    Current Meds:   Standng Meds:  enoxaparin Injectable 40 milliGRAM(s) SubCutaneous daily  pantoprazole    Tablet 40 milliGRAM(s) Oral before breakfast  senna 2 Tablet(s) Oral at bedtime    PRN Meds:  aluminum hydroxide/magnesium hydroxide/simethicone Suspension 30 milliLiter(s) Oral every 4 hours PRN Dyspepsia  loperamide 2 milliGRAM(s) Oral two times a day PRN Diarrhea  melatonin 3 milliGRAM(s) Oral at bedtime PRN Insomnia  ondansetron Injectable 4 milliGRAM(s) IV Push every 8 hours PRN Nausea and/or Vomiting  oxycodone    5 mG/acetaminophen 325 mG 1 Tablet(s) Oral every 6 hours PRN Moderate Pain (4 - 6)    HOME MEDICATIONS:  lidocaine 5% topical film: Apply topically to affected area once, As Needed for pain..  loperamide 2 mg oral capsule: 1 cap(s) orally 2 times a day, As Needed  oxycodone-acetaminophen 5 mg-325 mg oral tablet: 1 tab(s) orally every 6 hours, As needed, Moderate Pain (4 - 6)  pantoprazole 40 mg oral delayed release tablet: 1 tab(s) orally once a day (before a meal)  senna oral tablet: 2 tab(s) orally once a day (at bedtime)  vitamin A &amp; D topical ointment: 1 application topically 2 times a day      Vital Signs:   T(F): 96 (08-23-21 @ 15:30), Max: 97 (08-23-21 @ 08:00)  HR: 74 (08-23-21 @ 15:30) (74 - 77)  BP: 117/57 (08-23-21 @ 15:30) (115/57 - 117/57)  RR: 18 (08-23-21 @ 15:30) (18 - 18)  SpO2: --      08-23-21 @ 07:01  -  08-24-21 @ 05:57  --------------------------------------------------------  IN: 720 mL / OUT: 1250 mL / NET: -530 mL        Physical Exam:   GENERAL: NAD, lying in bed comfortably  HEAD:  Atraumatic, Normocephalic  EYES: EOMI  ENT: Moist mucous membranes  NECK: No JVD, +chemo port left side of neck  CHEST/LUNG: Clear to auscultation bilaterally; No rales, rhonchi, wheezing  Back: right middle back wound improved from yesterday  HEART: Regular rate and rhythm; No murmurs, rubs, or gallops  ABDOMEN: BS +, abdomen firm, nontender, nondistended   EXTREMITIES:  Left hand 2nd, 3rd digits distal phalanges amputation, left 1st toe dehiscence of nail with discoloration (crush injury); no edema or cyanosis  NERVOUS SYSTEM:  Alert & Oriented X3; talkative, somewhat rambling speech      Labs:                         10.2   7.89  )-----------( 225      ( 23 Aug 2021 07:49 )             33.7     WBC Count: 7.89 K/uL (08-23 @ 07:49)  WBC Count: 7.47 K/uL (08-22 @ 14:04)  WBC Count: 19.60 K/uL (08-05 @ 05:10)  WBC Count: 28.32 K/uL (08-04 @ 07:05)      Neutophil% 58.1, Lymphocyte% 19.1, Monocyte% 14.2, Bands% 5.7 08-23-21 @ 07:49    23 Aug 2021 07:49    141    |  107    |  8      ----------------------------<  80     4.1     |  24     |  0.5      Ca    8.7        23 Aug 2021 07:49    TPro  5.6    /  Alb  2.9    /  TBili  0.3    /  DBili  x      /  AST  14     /  ALT  6      /  AlkPhos  73     23 Aug 2021 07:49       pTT    --             ----< 1.04 INR  (08-23-21 @ 07:49)    12.00        PT        Radiology:   IMAGING:  Cont and w/ IV Cont (07.27.21 @ 17:47) >      IMPRESSION:    When compared to 4/22/2021:    Interval decrease in size of rectal wall thickening and rectal mass, inherently limited in evaluation on CT.  The exophytic component which abuts the right posterior prostate gland and seminal vesicles measures approximately 4 x 3 cm (previously 6 x 3 cm). Foci of gas are noted within which may be secondary to communication with the rectal lumen. Limited CT ability to exclude superimposed infection.  Circumferential rectal wall thickening, decreased measuring up to 1 cm (previously up to at least 2 cm).  Pelvic MRI can be utilized for further detail/evaluation.    Redemonstration of perirectal lymph nodes measuring up to 0.7 cm.    No evidence for new or worsening metastatic disease/lymphadenopathy.  < end of copied text >

## 2021-08-24 NOTE — DIETITIAN INITIAL EVALUATION ADULT. - PERTINENT MEDS FT
MEDICATIONS  (STANDING):  enoxaparin Injectable 40 milliGRAM(s) SubCutaneous daily  pantoprazole    Tablet 40 milliGRAM(s) Oral before breakfast  senna 2 Tablet(s) Oral at bedtime    MEDICATIONS  (PRN):  aluminum hydroxide/magnesium hydroxide/simethicone Suspension 30 milliLiter(s) Oral every 4 hours PRN Dyspepsia  loperamide 2 milliGRAM(s) Oral two times a day PRN Diarrhea  melatonin 3 milliGRAM(s) Oral at bedtime PRN Insomnia  ondansetron Injectable 4 milliGRAM(s) IV Push every 8 hours PRN Nausea and/or Vomiting  oxycodone    5 mG/acetaminophen 325 mG 1 Tablet(s) Oral every 6 hours PRN Moderate Pain (4 - 6)

## 2021-08-24 NOTE — PROGRESS NOTE ADULT - ATTENDING COMMENTS
80 year old male with Advanced rectal adenocarcinoma (T4N0M0) diagnosed April 2021 s/p 5 cycles chemo, microcytic anemia, chronic osteoarthritis being admitted for planned Wenceslao-adjuvant radiotherapy from a SNF.     # Locally advanced rectal adenocarcinoma (T4N0M0)  - Diagnosed late April 2021 , currently with good response to chemotherapy   - plan to have total neoadjuvant therapy (chemotherapy followed by chemoRT followed by surgery ). This was intended to be a planned admission for radiation therapy, however per Rad Onc/Heme Onc physicians this will need to be delayed until next week.   - coordinate with NH for another planned readmission- CM aware    #Knee pain- Lidocaine patch     # R lung pulmonary nodules   # Pancreatic hypodensity  - routine outpatient surveillance and Onc follow up     #Right Back Shoulder Lesion- healing well, dressing in place     DVT ppx: Lovenox 40 mg qD    #Progress Note Handoff:  Pending (specify):  Patient lost his bed at his LTC facility since he was expected to be gone for 5 days, will need to wait for bed availability  Family discussion: d/w pt at bedside  Disposition: Home___/SNF___/Other________/Unknown at this time______x__      Kristin Rebolledo, .

## 2021-08-24 NOTE — DISCHARGE NOTE NURSING/CASE MANAGEMENT/SOCIAL WORK - PATIENT PORTAL LINK FT
You can access the FollowMyHealth Patient Portal offered by [facility] by registering at the following website: [email or web address]. By joining [facility]’s FollowMyHealth portal, you will also be able to view your health information using other applications (apps) compatible with our system.

## 2021-08-24 NOTE — DISCHARGE NOTE NURSING/CASE MANAGEMENT/SOCIAL WORK - NSDCVIVACCINE_GEN_ALL_CORE_FT
COVID-19 vaccine, vector-nr, [id number], PF, 0.5 mL (Sveta); 10-May-2021 14:11; India Lal (CINDI); [facility]; [id number] (Exp. Date: 23-Jun-2021); IntraMuscular; Deltoid Left.; 0.5 milliLiter(s);

## 2021-08-24 NOTE — DIETITIAN INITIAL EVALUATION ADULT. - NAME AND PHONE
Nutrition Intervention:meals and snacks,medical food supplements; Nutrition Monitoring:diet order,energy intake,body composition,NFPF

## 2021-08-24 NOTE — DIETITIAN INITIAL EVALUATION ADULT. - OTHER CALCULATIONS
using ABW 81kg; Energy: 1883-2354kcal (MSJ 1.2-1.5 AF -- cancer on chemo/rad + PU considered); protein: 97-122g/day (1.2-1.5g/kg -- same reason as above); Fluid: 1mL/kcal or LIP

## 2021-08-24 NOTE — DIETITIAN INITIAL EVALUATION ADULT. - ORAL INTAKE PTA/DIET HISTORY
despite EMR documentation of AAOX4, pt is very confused and not able to answer questions appropriately. hx obtained from paper chart from [facility] -- NKFA, takes daily MVI, probiotics. On a regular texture diet.

## 2021-08-25 NOTE — PROGRESS NOTE ADULT - REASON FOR ADMISSION
radiotherapy for advanced Rectal CA

## 2021-08-25 NOTE — PROGRESS NOTE ADULT - SUBJECTIVE AND OBJECTIVE BOX
Hospital Day:  3d    Subjective:    No acute events overnight.    Chart reviewed, patient seen and examined at bedside in AM. Patient appears comfortable while at rest in bed. No other complaints.     Denies headaches, changes in vision, chest pains, SOB, n/v/d, abd pain, constipation, changes in urination, pain on urination, weakness, fatigue, joint pain or muscle pain.       Past Medical Hx:   No pertinent past medical history    Iron deficiency anemia    Osteoarthritis      Past Sx:  Amputation of digit of left hand      Allergies:  No Known Allergies    Current Meds:   Standng Meds:  enoxaparin Injectable 40 milliGRAM(s) SubCutaneous daily  pantoprazole    Tablet 40 milliGRAM(s) Oral before breakfast  senna 2 Tablet(s) Oral at bedtime    PRN Meds:  aluminum hydroxide/magnesium hydroxide/simethicone Suspension 30 milliLiter(s) Oral every 4 hours PRN Dyspepsia  loperamide 2 milliGRAM(s) Oral two times a day PRN Diarrhea  melatonin 3 milliGRAM(s) Oral at bedtime PRN Insomnia  ondansetron Injectable 4 milliGRAM(s) IV Push every 8 hours PRN Nausea and/or Vomiting  oxycodone    5 mG/acetaminophen 325 mG 1 Tablet(s) Oral every 6 hours PRN Moderate Pain (4 - 6)    HOME MEDICATIONS:  lidocaine 5% topical film: Apply topically to affected area once, As Needed for pain..  loperamide 2 mg oral capsule: 1 cap(s) orally 2 times a day, As Needed  oxycodone-acetaminophen 5 mg-325 mg oral tablet: 1 tab(s) orally every 6 hours, As needed, Moderate Pain (4 - 6)  pantoprazole 40 mg oral delayed release tablet: 1 tab(s) orally once a day (before a meal)  senna oral tablet: 2 tab(s) orally once a day (at bedtime)  vitamin A &amp; D topical ointment: 1 application topically 2 times a day      Vital Signs:   T(F): 96.9 (08-24-21 @ 15:30), Max: 96.9 (08-24-21 @ 15:30)  HR: 73 (08-24-21 @ 15:30) (71 - 73)  BP: 132/60 (08-24-21 @ 15:30) (113/58 - 132/60)  RR: 18 (08-24-21 @ 15:30) (18 - 18)  SpO2: --      08-23-21 @ 07:01  -  08-24-21 @ 07:00  --------------------------------------------------------  IN: 720 mL / OUT: 1750 mL / NET: -1030 mL    08-24-21 @ 07:01  -  08-25-21 @ 06:14  --------------------------------------------------------  IN: 720 mL / OUT: 800 mL / NET: -80 mL        Physical Exam:   CONSTITUTIONAL: Well-developed; well-nourished; in no acute distress, speaking in full sentences  HEAD: Normocephalic; atraumatic  EYES: PERRL, EOMI, no conjunctival erythema  NECK: Supple; non tender, FROM  CARD: +S1, S2 Regular rate and rhythm.  RESP: CTABL  ABD: soft nontender, nondistended, no rebound, no guarding, no rigidity  EXT: moves all extremities,  No clubbing, cyanosis or edema.   NEURO: Alert, oriented, grossly unremarkable, no focal deficits, cn ii-xii grossly intact  PSYCH: Cooperative, appropriate         Labs:                         10.4   9.01  )-----------( 225      ( 24 Aug 2021 07:30 )             34.4     Neutophil% 62.7, Lymphocyte% 17.5, Monocyte% 11.0, Bands% 5.5 08-24-21 @ 07:30    24 Aug 2021 07:30    142    |  107    |  9      ----------------------------<  95     4.0     |  26     |  <0.5     Ca    8.8        24 Aug 2021 07:30  Mg     1.4       24 Aug 2021 07:30    TPro  5.8    /  Alb  3.1    /  TBili  0.3    /  DBili  x      /  AST  14     /  ALT  6      /  AlkPhos  69     24 Aug 2021 07:30        Radiology:   IMAGING:             Hospital Day:  3d    Subjective:    No acute events overnight.    Chart reviewed, patient seen and examined at bedside in AM. Patient appears comfortable while at rest in bed. No other complaints.     Denies headaches, changes in vision, chest pains, SOB, n/v/d, abd pain, constipation, changes in urination, pain on urination, weakness, fatigue, joint pain or muscle pain.       Past Medical Hx:   No pertinent past medical history    Iron deficiency anemia    Osteoarthritis      Past Sx:  Amputation of digit of left hand      Allergies:  No Known Allergies    Current Meds:   Standng Meds:  enoxaparin Injectable 40 milliGRAM(s) SubCutaneous daily  pantoprazole    Tablet 40 milliGRAM(s) Oral before breakfast  senna 2 Tablet(s) Oral at bedtime    PRN Meds:  aluminum hydroxide/magnesium hydroxide/simethicone Suspension 30 milliLiter(s) Oral every 4 hours PRN Dyspepsia  loperamide 2 milliGRAM(s) Oral two times a day PRN Diarrhea  melatonin 3 milliGRAM(s) Oral at bedtime PRN Insomnia  ondansetron Injectable 4 milliGRAM(s) IV Push every 8 hours PRN Nausea and/or Vomiting  oxycodone    5 mG/acetaminophen 325 mG 1 Tablet(s) Oral every 6 hours PRN Moderate Pain (4 - 6)    HOME MEDICATIONS:  lidocaine 5% topical film: Apply topically to affected area once, As Needed for pain..  loperamide 2 mg oral capsule: 1 cap(s) orally 2 times a day, As Needed  oxycodone-acetaminophen 5 mg-325 mg oral tablet: 1 tab(s) orally every 6 hours, As needed, Moderate Pain (4 - 6)  pantoprazole 40 mg oral delayed release tablet: 1 tab(s) orally once a day (before a meal)  senna oral tablet: 2 tab(s) orally once a day (at bedtime)  vitamin A &amp; D topical ointment: 1 application topically 2 times a day      Vital Signs:   T(F): 96.9 (08-24-21 @ 15:30), Max: 96.9 (08-24-21 @ 15:30)  HR: 73 (08-24-21 @ 15:30) (71 - 73)  BP: 132/60 (08-24-21 @ 15:30) (113/58 - 132/60)  RR: 18 (08-24-21 @ 15:30) (18 - 18)  SpO2: --      08-23-21 @ 07:01  -  08-24-21 @ 07:00  --------------------------------------------------------  IN: 720 mL / OUT: 1750 mL / NET: -1030 mL    08-24-21 @ 07:01  -  08-25-21 @ 06:14  --------------------------------------------------------  IN: 720 mL / OUT: 800 mL / NET: -80 mL        Physical Exam:   GENERAL: NAD, lying in bed comfortably  HEAD:  Atraumatic, Normocephalic  EYES: EOMI  ENT: Moist mucous membranes  NECK: No JVD, +chemo port left side of neck  CHEST/LUNG: Clear to auscultation bilaterally; No rales, rhonchi, wheezing  Back: right middle back wound improved from yesterday  HEART: Regular rate and rhythm; No murmurs, rubs, or gallops  ABDOMEN: BS +, abdomen firm, nontender, nondistended   EXTREMITIES:  Left hand 2nd, 3rd digits distal phalanges amputation, left 1st toe dehiscence of nail with discoloration (crush injury); no edema or cyanosis  NERVOUS SYSTEM:  Alert & Oriented X3; talkative, somewhat rambling speech        Labs:                         10.4   9.01  )-----------( 225      ( 24 Aug 2021 07:30 )             34.4     Neutophil% 62.7, Lymphocyte% 17.5, Monocyte% 11.0, Bands% 5.5 08-24-21 @ 07:30    24 Aug 2021 07:30    142    |  107    |  9      ----------------------------<  95     4.0     |  26     |  <0.5     Ca    8.8        24 Aug 2021 07:30  Mg     1.4       24 Aug 2021 07:30    TPro  5.8    /  Alb  3.1    /  TBili  0.3    /  DBili  x      /  AST  14     /  ALT  6      /  AlkPhos  69     24 Aug 2021 07:30        Radiology:   IMAGING:             Hospital Day:  3d    Subjective:    No acute events overnight.    Chart reviewed, patient seen and examined at bedside in AM. Patient appears comfortable while at rest in bed. No other complaints.     Denies headaches, changes in vision, chest pains, SOB, n/v/d, abd pain, constipation, changes in urination, pain on urination, weakness, fatigue, joint pain or muscle pain.       Past Medical Hx:   No pertinent past medical history    Iron deficiency anemia    Osteoarthritis      Past Sx:  Amputation of digit of left hand      Allergies:  No Known Allergies    Current Meds:   Standng Meds:  enoxaparin Injectable 40 milliGRAM(s) SubCutaneous daily  pantoprazole    Tablet 40 milliGRAM(s) Oral before breakfast  senna 2 Tablet(s) Oral at bedtime    PRN Meds:  aluminum hydroxide/magnesium hydroxide/simethicone Suspension 30 milliLiter(s) Oral every 4 hours PRN Dyspepsia  loperamide 2 milliGRAM(s) Oral two times a day PRN Diarrhea  melatonin 3 milliGRAM(s) Oral at bedtime PRN Insomnia  ondansetron Injectable 4 milliGRAM(s) IV Push every 8 hours PRN Nausea and/or Vomiting  oxycodone    5 mG/acetaminophen 325 mG 1 Tablet(s) Oral every 6 hours PRN Moderate Pain (4 - 6)    HOME MEDICATIONS:  lidocaine 5% topical film: Apply topically to affected area once, As Needed for pain..  loperamide 2 mg oral capsule: 1 cap(s) orally 2 times a day, As Needed  oxycodone-acetaminophen 5 mg-325 mg oral tablet: 1 tab(s) orally every 6 hours, As needed, Moderate Pain (4 - 6)  pantoprazole 40 mg oral delayed release tablet: 1 tab(s) orally once a day (before a meal)  senna oral tablet: 2 tab(s) orally once a day (at bedtime)  vitamin A &amp; D topical ointment: 1 application topically 2 times a day      Vital Signs:   T(F): 96.9 (08-24-21 @ 15:30), Max: 96.9 (08-24-21 @ 15:30)  HR: 73 (08-24-21 @ 15:30) (71 - 73)  BP: 132/60 (08-24-21 @ 15:30) (113/58 - 132/60)  RR: 18 (08-24-21 @ 15:30) (18 - 18)  SpO2: --      08-23-21 @ 07:01  -  08-24-21 @ 07:00  --------------------------------------------------------  IN: 720 mL / OUT: 1750 mL / NET: -1030 mL    08-24-21 @ 07:01  -  08-25-21 @ 06:14  --------------------------------------------------------  IN: 720 mL / OUT: 800 mL / NET: -80 mL        Physical Exam:   GENERAL: NAD, lying in bed comfortably  HEAD:  Atraumatic, Normocephalic  EYES: EOMI  ENT: Moist mucous membranes  NECK: No JVD, +chemo port left side of neck  CHEST/LUNG: Clear to auscultation bilaterally; No rales, rhonchi, wheezing  Back: right middle back wound improved from yesterday  HEART: Regular rate and rhythm; No murmurs, rubs, or gallops  ABDOMEN: BS +, abdomen firm, nontender, nondistended   EXTREMITIES:  Left hand 2nd, 3rd digits distal phalanges amputation, left 1st toe dehiscence of nail with discoloration (crush injury); no edema or cyanosis  NERVOUS SYSTEM:  Alert & Oriented X3; talkative, somewhat rambling speech        Labs:                         10.4   9.01  )-----------( 225      ( 24 Aug 2021 07:30 )             34.4     Neutophil% 62.7, Lymphocyte% 17.5, Monocyte% 11.0, Bands% 5.5 08-24-21 @ 07:30    24 Aug 2021 07:30    142    |  107    |  9      ----------------------------<  95     4.0     |  26     |  <0.5     Ca    8.8        24 Aug 2021 07:30  Mg     1.4       24 Aug 2021 07:30    TPro  5.8    /  Alb  3.1    /  TBili  0.3    /  DBili  x      /  AST  14     /  ALT  6      /  AlkPhos  69     24 Aug 2021 07:30        Radiology:   IMAGING:    IMAGING:  Cont and w/ IV Cont (07.27.21 @ 17:47) >      IMPRESSION:    When compared to 4/22/2021:    Interval decrease in size of rectal wall thickening and rectal mass, inherently limited in evaluation on CT.  The exophytic component which abuts the right posterior prostate gland and seminal vesicles measures approximately 4 x 3 cm (previously 6 x 3 cm). Foci of gas are noted within which may be secondary to communication with the rectal lumen. Limited CT ability to exclude superimposed infection.  Circumferential rectal wall thickening, decreased measuring up to 1 cm (previously up to at least 2 cm).  Pelvic MRI can be utilized for further detail/evaluation.    Redemonstration of perirectal lymph nodes measuring up to 0.7 cm.    No evidence for new or worsening metastatic disease/lymphadenopathy.  < end of copied text >

## 2021-08-25 NOTE — PROGRESS NOTE ADULT - ASSESSMENT
80 year old male with Advanced rectal adenocarcinoma (T4N0M0) diagnosed April 2021 s/p 5 cycles chemo, microcytic anemia, chronic osteoarthritis being admitted for [facility]-adjuvant radiotherapy from a SNF.     # Locally advanced rectal adenocarcinoma (T4N0M0)  - Diagnosed late April 2021 - MRI: 18 cm long polypoidal rectal mass with invasion of the right meso rectal fascia and possibly the prostate gland. Additional involvement of the anal sphincter complex.  - repeat CT on 7/28 showed good tumor response to Chemo  - For radiation treatments - spoke with heme-onc, who discussed with rad onc. Per discussion, NH can coordinate with radiation onc for treatment next week. Per heme-onc, patient should receive 5 treatments of radiation therapy.    - Follows with Dr. Tubbs.    #Right Back Lesion  - continues to improve     # Mycotic toenails   - podiatry outpatient f/u     #Knee pain, patient reports RA history  - Lidocaine patch PRN    # R lung pulmonary nodules   # Pancreatic hypodensity  - routine outpatient surveillance and outpt Onc follow up     Diet: Regular diet  GI ppx: Protonix  DVT ppx: Lovenox 40 mg qD  Activity: Increase as tolerated    Dispo  - Possible discharge 8/24 pending NH bed availability.

## 2021-08-28 LAB
ACANTHOCYTES BLD QL SMEAR: SLIGHT — SIGNIFICANT CHANGE UP
ELLIPTOCYTES BLD QL SMEAR: SLIGHT — SIGNIFICANT CHANGE UP
GIANT PLATELETS BLD QL SMEAR: PRESENT — SIGNIFICANT CHANGE UP
OVALOCYTES BLD QL SMEAR: SLIGHT — SIGNIFICANT CHANGE UP
POIKILOCYTOSIS BLD QL AUTO: SIGNIFICANT CHANGE UP
POLYCHROMASIA BLD QL SMEAR: SLIGHT — SIGNIFICANT CHANGE UP

## 2021-08-30 PROCEDURE — 77387C: CUSTOM

## 2021-08-31 ENCOUNTER — NON-APPOINTMENT (OUTPATIENT)
Age: 81
End: 2021-08-31

## 2021-08-31 VITALS
BODY MASS INDEX: 21.43 KG/M2 | WEIGHT: 158 LBS | TEMPERATURE: 98.1 F | DIASTOLIC BLOOD PRESSURE: 83 MMHG | OXYGEN SATURATION: 98 % | RESPIRATION RATE: 16 BRPM | SYSTOLIC BLOOD PRESSURE: 126 MMHG | HEART RATE: 76 BPM

## 2021-08-31 PROCEDURE — 77387B: CUSTOM | Mod: 26

## 2021-08-31 NOTE — PHYSICAL EXAM
[Normal] : no respiratory distress, lungs were clear to auscultation bilaterally [Abdomen Soft] : soft

## 2021-08-31 NOTE — HISTORY OF PRESENT ILLNESS
[FreeTextEntry1] : 8/31/2021 OTV 1000cGy/2500cGy to the rectum: Pt states he had one episode of diarrhea last night. Educated him on diet and foods to avoid. Denies any pain, N/V.

## 2021-08-31 NOTE — DISEASE MANAGEMENT
[Clinical] : TNM Stage: c [TTNM] : 4 [NTNM] : 2 [MTNM] : 0 [IIIB] : IIIB [de-identified] : 1000cGy [de-identified] : 2500cGy [de-identified] : Rectum

## 2021-09-01 PROCEDURE — 77427 RADIATION TX MANAGEMENT X5: CPT

## 2021-09-01 PROCEDURE — 77387B: CUSTOM | Mod: 26

## 2021-09-02 PROCEDURE — 77387B: CUSTOM | Mod: 26

## 2021-09-03 ENCOUNTER — OUTPATIENT (OUTPATIENT)
Dept: OUTPATIENT SERVICES | Facility: HOSPITAL | Age: 81
LOS: 1 days | Discharge: HOME | End: 2021-09-03
Payer: MEDICARE

## 2021-09-03 DIAGNOSIS — S68.119A COMPLETE TRAUMATIC METACARPOPHALANGEAL AMPUTATION OF UNSPECIFIED FINGER, INITIAL ENCOUNTER: Chronic | ICD-10-CM

## 2021-09-03 DIAGNOSIS — C20 MALIGNANT NEOPLASM OF RECTUM: ICD-10-CM

## 2021-09-14 ENCOUNTER — TRANSCRIPTION ENCOUNTER (OUTPATIENT)
Age: 81
End: 2021-09-14

## 2021-10-11 ENCOUNTER — APPOINTMENT (OUTPATIENT)
Dept: ORTHOPEDIC SURGERY | Facility: CLINIC | Age: 81
End: 2021-10-11

## 2021-10-11 NOTE — PHYSICAL EXAM
[de-identified] : General appearance: well nourished and hydrated, pleasant, alert and oriented x 3, cooperative.\par Cardiovascular: no apparent abnormalities, no lower leg edema, no varicosities, pedal pulses are palpable.\par Neurologic: sensation is normal, no muscle weakness in upper or lower extremities\par Dermatologic no apparent skin lesions, moist, warm, no rash.\par Gait: nonantalgic.\par \par Left knee\par Inspection: no effusion or erythema.\par Wounds: none.\par Alignment: normal.\par Palpation: no specific tenderness on palpation.\par ROM: ACTIVE ROM IN DEGREES\par Ligamentous laxity: all ligaments appear stable\par Meniscal Test: negative McMurrays\par Muscle Test: good quad strength.\par \par Right knee\par Inspection: no effusion or erythema.\par Wounds: none.\par Alignment: normal.\par Palpation: no specific tenderness on palpation.\par ROM: ACTIVE ROM IN DEGREES\par Ligamentous laxity: all ligaments appear stable\par Meniscal Test: negative McMurrays.\par Muscle Test: good quad strength.\par \par Left hip\par Inspection: No swelling or ecchymosis.\par Wounds: none.\par Palpation: non-tender.\par Stability: no instability.\par Strength: 5/5 all motor groups.\par ROM: no pain with FROM.\par Leg length: equal.\par \par Right hip\par Inspection: No swelling or ecchymosis.\par Wounds: none.\par Palpation: non-tender.\par Stability: no instability.\par Strength: 5/5 all motor groups.\par ROM: no pain with FROM.\par Leg length: equal.\par \par   [de-identified] : Radiographs done today AP lateral and skyline of knees

## 2021-10-14 ENCOUNTER — APPOINTMENT (OUTPATIENT)
Dept: HEMATOLOGY ONCOLOGY | Facility: CLINIC | Age: 81
End: 2021-10-14

## 2021-10-14 ENCOUNTER — APPOINTMENT (OUTPATIENT)
Dept: RADIATION ONCOLOGY | Facility: HOSPITAL | Age: 81
End: 2021-10-14

## 2021-10-20 NOTE — PROGRESS NOTE ADULT - SUBJECTIVE AND OBJECTIVE BOX
MISAEL ORELLANA 80y Male  MRN#: [id number]   CODE STATUS:_______full_      SUBJECTIVE  Patient is a 80y old Male who presents with a chief complaint of Chemotherapy (28 May 2021 08:46)  Currently admitted to medicine with the primary diagnosis of Hospital course has been complicated by _______.   Today is hospital day 2d, and this morning he is feeling well and reports no issues. Continues to request knee injections.           OBJECTIVE  PAST MEDICAL & SURGICAL HISTORY  No pertinent past medical history    Amputation of digit of left hand      ALLERGIES:  No Known Allergies    MEDICATIONS:  STANDING MEDICATIONS  chlorhexidine 4% Liquid 1 Application(s) Topical <User Schedule>  fluorouracil IVPB (eMAR) 2420 milliGRAM(s) IV Intermittent every 24 hours  heparin   Injectable 5000 Unit(s) SubCutaneous every 8 hours  lidocaine   Patch 1 Patch Transdermal daily  pantoprazole    Tablet 40 milliGRAM(s) Oral before breakfast    PRN MEDICATIONS  loperamide 2 milliGRAM(s) Oral two times a day PRN  oxycodone    5 mG/acetaminophen 325 mG 1 Tablet(s) Oral every 6 hours PRN  simethicone 80 milliGRAM(s) Chew two times a day PRN      VITAL SIGNS: Last 24 Hours  T(C): 35.6 (28 May 2021 12:14), Max: 37.1 (27 May 2021 19:53)  T(F): 96 (28 May 2021 12:14), Max: 98.8 (27 May 2021 19:53)  HR: 64 (28 May 2021 12:14) (64 - 81)  BP: 115/59 (28 May 2021 12:14) (115/59 - 142/69)  BP(mean): --  RR: 18 (28 May 2021 12:14) (18 - 18)  SpO2: --    LABS:                        9.0    10.48 )-----------( 359      ( 28 May 2021 07:07 )             30.8     05-28    139  |  103  |  12  ----------------------------<  190<H>  5.0   |  24  |  0.5<L>    Ca    8.4<L>      28 May 2021 07:07  Phos  3.6     05-28  Mg     1.9     05-28    TPro  6.3  /  Alb  2.4<L>  /  TBili  0.2  /  DBili  x   /  AST  13  /  ALT  13  /  AlkPhos  67  05-28    PT/INR - ( 27 May 2021 06:07 )   PT: 14.50 sec;   INR: 1.26 ratio         PTT - ( 27 May 2021 06:07 )  PTT:35.6 sec        RADIOLOGY:      PHYSICAL EXAM:    GENERAL: NAD, well-developed, AAOx3  HEENT:  Atraumatic, Normocephalic. EOMI, PERRLA,  PULMONARY: Clear to auscultation bilaterally; No wheeze  CARDIOVASCULAR: Regular rate and rhythm; No murmurs, rubs, or gallops  GASTROINTESTINAL: Soft, Nontender, Nondistended; Bowel sounds present  MUSCULOSKELETAL:  2+ Peripheral Pulses, No clubbing, cyanosis, or edema  NEUROLOGY: non-focal  SKIN: No rashes or lesions      ADMISSION SUMMARY  Patient is a 80y old Male who presents with a chief complaint of Chemotherapy (28 May 2021 08:46)  Currently admitted to medicine with the primary diagnosis of Hospital course has been complicated by _______.       ASSESSMENT & PLAN    Mr. Orellana is a 80M with recently diagnosed locally advanced rectal adenocarcinoma presents for cycle 2 of chemo.    # Altered mental status, - RESOLVED  - CT head no acute pathology   - AAOx3 on my exam  - patient was likely sundowning on day of admission. patient gets confused intermittently at baseline per family    # Locally advanced rectal adenocarcinoma  - MRI: 18 cm long polypoidal rectal mass with invasion of the right meso rectal fascia and possibly the prostate gland. Additional involvement of the analsphincter complex--T4(a or b)N0M0  - Oncology plans for for total neoadjuvant chemotherapy followed by chemoRT followed by surgery  -  Cycle 2 (5/27) of FOLFOX, second infusion 5/28   - He will need filgastrim 24 hrs after completing chemo  - follow tumor lysis labs (uric acid, ldh, potassium, phosphorus) AM    # Iron deficiency anemia likely from LGIB from tumor  - On prior admission, Iron Saturation: 12%, Ferritin 34, MCV 68.9   - Venofer 200mg x 1 per heme/onc    # Subacute rib fracture noted last admission  - CT: Subacute fracture of the left 10th lateral rib  - Pain control PRN    #onychomycosis  -f/u podiatry outpatient     #Bilateral knee pain  -likely 2/2 OA  -requesting steroid injection - f/u outpatient for further management of OA   - lidocaine patch trial    # Misc  DVT ppx: lovenox  GI ppx: protonix  Diet: low fiber (was having diarrhea last admission)  Dispo: NH once chemo finishes     Home

## 2021-11-15 ENCOUNTER — APPOINTMENT (OUTPATIENT)
Dept: SURGERY | Facility: CLINIC | Age: 81
End: 2021-11-15
Payer: MEDICARE

## 2021-11-15 ENCOUNTER — RESULT REVIEW (OUTPATIENT)
Age: 81
End: 2021-11-15

## 2021-11-15 VITALS
SYSTOLIC BLOOD PRESSURE: 140 MMHG | BODY MASS INDEX: 20.45 KG/M2 | WEIGHT: 151 LBS | TEMPERATURE: 96.7 F | HEART RATE: 93 BPM | DIASTOLIC BLOOD PRESSURE: 70 MMHG | OXYGEN SATURATION: 98 % | HEIGHT: 72 IN

## 2021-11-15 DIAGNOSIS — M19.90 UNSPECIFIED OSTEOARTHRITIS, UNSPECIFIED SITE: ICD-10-CM

## 2021-11-15 DIAGNOSIS — R91.1 SOLITARY PULMONARY NODULE: ICD-10-CM

## 2021-11-15 DIAGNOSIS — K63.89 OTHER SPECIFIED DISEASES OF INTESTINE: ICD-10-CM

## 2021-11-15 PROCEDURE — 99214 OFFICE O/P EST MOD 30 MIN: CPT

## 2021-11-16 PROBLEM — K63.89: Status: ACTIVE | Noted: 2021-11-15

## 2021-11-16 PROBLEM — M19.90 OSTEOARTHRITIS: Status: ACTIVE | Noted: 2021-11-15

## 2021-11-16 PROBLEM — R91.1 PULMONARY NODULE: Status: ACTIVE | Noted: 2021-11-15

## 2021-11-19 ENCOUNTER — NON-APPOINTMENT (OUTPATIENT)
Age: 81
End: 2021-11-19

## 2021-12-24 ENCOUNTER — OUTPATIENT (OUTPATIENT)
Dept: OUTPATIENT SERVICES | Facility: HOSPITAL | Age: 81
LOS: 1 days | Discharge: HOME | End: 2021-12-24
Payer: MEDICARE

## 2021-12-24 DIAGNOSIS — C20 MALIGNANT NEOPLASM OF RECTUM: ICD-10-CM

## 2021-12-24 DIAGNOSIS — S68.119A COMPLETE TRAUMATIC METACARPOPHALANGEAL AMPUTATION OF UNSPECIFIED FINGER, INITIAL ENCOUNTER: Chronic | ICD-10-CM

## 2021-12-24 PROCEDURE — 72197 MRI PELVIS W/O & W/DYE: CPT | Mod: 26,MH

## 2022-01-10 ENCOUNTER — APPOINTMENT (OUTPATIENT)
Dept: INFUSION THERAPY | Facility: CLINIC | Age: 82
End: 2022-01-10

## 2022-01-10 ENCOUNTER — LABORATORY RESULT (OUTPATIENT)
Age: 82
End: 2022-01-10

## 2022-01-10 ENCOUNTER — APPOINTMENT (OUTPATIENT)
Dept: HEMATOLOGY ONCOLOGY | Facility: CLINIC | Age: 82
End: 2022-01-10
Payer: MEDICARE

## 2022-01-10 VITALS
BODY MASS INDEX: 26.28 KG/M2 | SYSTOLIC BLOOD PRESSURE: 153 MMHG | DIASTOLIC BLOOD PRESSURE: 65 MMHG | HEART RATE: 81 BPM | RESPIRATION RATE: 16 BRPM | WEIGHT: 194 LBS | TEMPERATURE: 97.1 F | HEIGHT: 72 IN

## 2022-01-10 PROCEDURE — 99214 OFFICE O/P EST MOD 30 MIN: CPT

## 2022-01-11 LAB
ALBUMIN SERPL ELPH-MCNC: 4.3 G/DL
ALP BLD-CCNC: 76 U/L
ALT SERPL-CCNC: 11 U/L
ANION GAP SERPL CALC-SCNC: 16 MMOL/L
AST SERPL-CCNC: 17 U/L
BILIRUB SERPL-MCNC: 0.5 MG/DL
BUN SERPL-MCNC: 15 MG/DL
CALCIUM SERPL-MCNC: 9.3 MG/DL
CEA SERPL-MCNC: 7.1 NG/ML
CHLORIDE SERPL-SCNC: 102 MMOL/L
CO2 SERPL-SCNC: 21 MMOL/L
CREAT SERPL-MCNC: 0.5 MG/DL
GLUCOSE SERPL-MCNC: 96 MG/DL
HCT VFR BLD CALC: 40.6 %
HGB BLD-MCNC: 12.8 G/DL
MCHC RBC-ENTMCNC: 28.7 PG
MCHC RBC-ENTMCNC: 31.5 G/DL
MCV RBC AUTO: 91 FL
PLATELET # BLD AUTO: 244 K/UL
PMV BLD: 8.8 FL
POTASSIUM SERPL-SCNC: 4.2 MMOL/L
PROT SERPL-MCNC: 7.5 G/DL
RBC # BLD: 4.46 M/UL
RBC # FLD: 14.7 %
SODIUM SERPL-SCNC: 139 MMOL/L
WBC # FLD AUTO: 9.17 K/UL

## 2022-01-14 NOTE — HISTORY OF PRESENT ILLNESS
[de-identified] : 80 year old male with no past medical history (has not seen a doctor in many\par years, not on any meds) presented to the ED due to loose bowel movements.\par Patient is alert and orient, but appears to be confused and is a poor\par historian. History obtained from patient and the charts. Patient has had\par chronic diarrhea for the past year which is foul smelling, then over the past\par week it has gotten worse with amount and frequency. The patients daughter\par believed that their was blood in here stools so she brought him to the ED. The\par patient endorses decreased PO intake and nausea, but denies abdominal pain. He\par is not on AC, but will take a baby aspirin from time to time. He denies SOB,\par CP, changes in urination, headache. He lives alone and ambulates with a walker.\par \par COLONOSCOPY  4/27/21\par  Circumferential, ulcerated, irregular and friable infiltrating thickening \par rectal thickening (mass) starting from anal verge up to 17 cm proximally and \par suggestive of adenocarcinoma. (Biopsy).  \par  Hard rectal mass felt on digital exam.  \par  Diverticulosis of the whole colon.  \par \par  Polyp (7 mm) in the ascending colon. (Polypectomy).  \par  Polyp (5 mm) in the proximal transverse colon. (Polypectomy).  \par  Polyp (8 mm) in the distal transverse colon. (Polypectomy).  \par  Polyp (1 cm) in the mid-transverse colon. (Polypectomy).  \par  Polyp (5 cm) in the distal transverse colon.  \par  Polyp (3 cm) in the descending colon. (Polypectomy).  \par  \par PATHOLOGY\par \par MMR proficient\par Rectal mass, \par - Invasive adenocarcinoma, moderately to poorly differentiated,\par involving and undermining squamous mucosa (see Comment).\par \par CT BRAIN\par IMPRESSION:\par \par No acute intracranial pathology.\par \par Mild chronic microvascular ischemic changes.\par \par \par MRI PELVIS\par IMPRESSION:\par \par 18 cm long polypoidal rectal mass with invasion of the right meso rectal fascia and possibly the prostate gland. Additional involvement of the anal sphincter complex--T4(a or b)N0Mx\par \par CT CHEST 4/28/21\par Impression:\par \par Small bilateral pleural effusions with compressive atelectasis.\par \par Nonspecific mediastinal lymph nodes.\par \par CT ABDOMEN AND PELVIS IC\par IMPRESSION:\par \par Circumferential rectal wall thickening spanning at least 14 cm in length with a partially exophytic mass extending into the right mesorectal fat measuring approximately 5.7 x 3.0 x 6.8 cm. The exophytic mass abuts the right posterior lateral prostate gland. Primary consideration is rectal cancer. Limited ability to exclude superimposed proctitis.\par \par 9 mm right lower lobe pulmonary nodule.\par \par Small wedge-shaped hypodensities at the superior aspect of the spleen may reflect splenic infarcts.\par \par \par \par -s/p mediport palcement by IR .\par \par \par He started FOLFOX cycle 1 chemo on 5/7/21-  5/9/21\par  [de-identified] : 5/24/21\par Pt tolerated 1st cycle of FOLFOX with some side effects. Mild nausea. Still has mild bleeding NH. No fever.\par \par 7/13/21\par Pt is here for follow up.\par He is s/p 4 cycles of FOLFOx given in patient.\par He feels well. So far has tolerated chemo well.\par He had been advised to go for imaging prior to his 5th cycle. However Ct scans have not been completed yet,\par He denies rectal bleeding, abdominal pain, N, V, D, fever\par \par 1/10/2022\par Pt is here for follow up for rectal cancer.\par He feels well, appetite is good.\par He denies rectal bleeding, abdominal pain, nausea, vomiting or fever.\par \par

## 2022-01-14 NOTE — PHYSICAL EXAM
[Ambulatory and capable of all self care but unable to carry out any work activities] : Status 2- Ambulatory and capable of all self care but unable to carry out any work activities. Up and about more than 50% of waking hours [Normal] : affect appropriate [de-identified] : Patient walks with a cane [de-identified] : Left chest port-a-cath intact, no erythema.

## 2022-01-14 NOTE — ASSESSMENT
[FreeTextEntry1] : Rectal adenocarcinoma locally advanced\par MRI showin cm long polypoidal rectal mass with invasion of the right meso rectal fascia and possibly the prostate gland. Additional involvement of the anal sphincter complex--  T4(a or b)N0M0\par \par He was told that he has locally advanced disease and we recommended total neoadjuvant therapy consisting of oxaliplatin based chemotherapy followed by chemo RT and then evaluation for surgical resection.\par \par Patient completed 6 cycles of FOLFOX with Neulasta on 2021.\par \par CT scan chest (2021) showed interval growth of right lung pulmonary nodule, now measuring 8 mm; interval development of adjacent 5 mm nodule; and nonspecific mediastinal lymph nodes.\par CT scan of the abdomen/pelvis on 2021 showed Interval decrease in size of rectal wall thickening and rectal mass; the exophytic component which abuts the right posterior prostate gland and seminal vesicles measures approximately 4 x 3 cm (previously 6 x 3 cm). Foci of gas are noted within which may be secondary to communication with the rectal lumen. Limited CT ability to exclude superimposed infection.\par Circumferential rectal wall thickening, decreased measuring up to 1 cm (previously up to at least 2 cm).\par Pelvic MRI can be utilized for further detail/evaluation.\par \par MR Pelvis on 21 showed interval marked improvement, with previously seen large rectal mass no longer definitely identified; lower right perirectal, extraluminal 2.4 x 1 cm nonenhancing structure, approximately 2 cm above anorectal junction, abutting right anterior rectal wall and posterior prostate gland with distortion of mesorectal fascia; findings may represent necrotic tissue.\par \par CEA is declining.\par \par RECOMMENDATION:\par Previous notes reviewed and all relevant laboratory and radiology results discussed with Dr Tubbs and communicated to the patient.\par \par The rectal mass appears to be improving. However not clear if the pulmonary lesion is metastatic focus or a separate primary. Will send pt for PET scan and reevaluate for surgery.\par Plan also d/w surgery team.\par Pt does not seem to have clear understanding of his disease and despite explaining the situation he would like the port removed and seems to be against the option of APR and need for colostomy. Family support is not readily available. \par \par -- Will flush port today.\par -- Will do CMP, CEA, CBC today.\par -- Will do PET scan - script given.\par \par Notes at today;s visit provided to [facility].\par \par RTC in 3-4  weeks.\par \par Case was seen and discussed with Dr. Tubbs who agreed with assessment and plan.\par

## 2022-01-14 NOTE — RESULTS/DATA
[FreeTextEntry1] : ACC: [id number]     EXAM:  MR PELVIS WAW IC\par PROCEDURE DATE:  12/24/2021\par \par INTERPRETATION:  MRI pelvis with and without IV contrast\par \par INDICATION: Rectal cancer, status post new adjuvant chemoradiotherapy.\par \par Technique: MRI pelvis with and without IV contrast performed. Multiplanar, multisequence MRI images of the pelvis were obtained using a phased-array surface coil before and after the administration of 7.5 cc gadolinium based intravenous contrast. DWI images obtained.\par \par Comparison: MRI pelvis May 1, 2021\par \par Findings:\par Interval marked improvement, with previously seen large rectal mass no longer definitely identified. Unremarkable rectal bowel mucosal signal intensity. Lower right perirectal, extraluminal 2.4 x 1 cm nonenhancing structure, approximately 2 cm above anorectal junction, abutting right anterior rectal wall and posterior prostate gland with distortion of mesorectal fascia; findings may represent necrotic tissue, with area of increased T1 signal may represent hemorrhage or proteinaceous debris (series 5, image 31; series 100, image 51).\par \par Trabeculated urinary bladder wall with multiple diverticula, measuring up to 1.1 cm. Degenerative change of lower lumbar spine. Diffuse low signal throughout prostate gland peripheral zone, which may be post therapy related. Minimal presacral edema. No significant pelvic ascites. No evidence of suspicious pelvic lymphadenopathy. Anal sphincter complex appears unremarkable.\par \par Impression:\par Since May 1, 2021:\par \par Interval marked improvement, with previously seen large rectal mass no longer definitely identified. Lower right perirectal, extraluminal 2.4 x 1 cm nonenhancing structure, approximately 2 cm above anorectal junction, abutting right anterior rectal wall and posterior prostate gland with distortion of mesorectal fascia; findings may represent necrotic tissue.\par \par

## 2022-02-14 ENCOUNTER — APPOINTMENT (OUTPATIENT)
Dept: INFUSION THERAPY | Facility: CLINIC | Age: 82
End: 2022-02-14

## 2022-02-14 ENCOUNTER — APPOINTMENT (OUTPATIENT)
Dept: HEMATOLOGY ONCOLOGY | Facility: CLINIC | Age: 82
End: 2022-02-14

## 2022-02-23 NOTE — OCCUPATIONAL THERAPY INITIAL EVALUATION ADULT - LEVEL OF INDEPENDENCE: EATING, OT EVAL
Patient has previously been cleared for surgery patient inquiring if she needs any additional testing. I informed patient she has done all that is needed.  I left a voice message with [facility] to reassure patient supervision

## 2022-03-11 NOTE — ED ADULT NURSE NOTE - HISTORY OF COVID-19 VACCINATION
Returned pt call. Left vm stating Prolia was denied. Advised pt to return call to discuss Dr. Cruz's recommendation for an alternative    Yes

## 2022-03-14 ENCOUNTER — APPOINTMENT (OUTPATIENT)
Dept: ORTHOPEDIC SURGERY | Facility: CLINIC | Age: 82
End: 2022-03-14
Payer: MEDICARE

## 2022-03-14 DIAGNOSIS — D50.9 IRON DEFICIENCY ANEMIA, UNSPECIFIED: ICD-10-CM

## 2022-03-14 DIAGNOSIS — M25.562 PAIN IN LEFT KNEE: ICD-10-CM

## 2022-03-14 DIAGNOSIS — F03.90 UNSPECIFIED DEMENTIA W/OUT BEHAVIORAL DISTURBANCE: ICD-10-CM

## 2022-03-14 DIAGNOSIS — M25.561 PAIN IN RIGHT KNEE: ICD-10-CM

## 2022-03-14 PROCEDURE — 73562 X-RAY EXAM OF KNEE 3: CPT | Mod: LT,RT

## 2022-03-14 PROCEDURE — 99204 OFFICE O/P NEW MOD 45 MIN: CPT

## 2022-03-14 RX ORDER — ONDANSETRON 8 MG/1
8 TABLET ORAL EVERY 8 HOURS
Qty: 30 | Refills: 1 | Status: DISCONTINUED | COMMUNITY
Start: 2021-05-24 | End: 2022-03-14

## 2022-03-14 RX ORDER — LOPERAMIDE HYDROCHLORIDE 2 MG/1
2 CAPSULE ORAL
Refills: 0 | Status: DISCONTINUED | COMMUNITY
End: 2022-03-14

## 2022-03-14 RX ORDER — CHLORHEXIDINE GLUCONATE 4 %
LIQUID (ML) TOPICAL
Refills: 0 | Status: ACTIVE | COMMUNITY

## 2022-03-14 RX ORDER — OXYCODONE HYDROCHLORIDE AND ACETAMINOPHEN 5; 325 MG/1; MG/1
5-325 TABLET ORAL
Refills: 0 | Status: DISCONTINUED | COMMUNITY
End: 2022-03-14

## 2022-03-14 RX ORDER — MULTIVIT-MIN/FOLIC/VIT K/LYCOP 400-300MCG
500 TABLET ORAL
Refills: 0 | Status: ACTIVE | COMMUNITY

## 2022-03-14 RX ORDER — CIPROFLOXACIN HYDROCHLORIDE 500 MG/1
500 TABLET, FILM COATED ORAL TWICE DAILY
Qty: 14 | Refills: 0 | Status: DISCONTINUED | COMMUNITY
Start: 2021-07-23 | End: 2022-03-14

## 2022-03-14 RX ORDER — LOPERAMIDE HYDROCHLORIDE 2 MG/1
2 TABLET ORAL
Refills: 0 | Status: ACTIVE | COMMUNITY

## 2022-03-14 RX ORDER — PNEUMOCOCCAL VACCINE POLYVALENT 25; 25; 25; 25; 25; 25; 25; 25; 25; 25; 25; 25; 25; 25; 25; 25; 25; 25; 25; 25; 25; 25; 25 UG/.5ML; UG/.5ML; UG/.5ML; UG/.5ML; UG/.5ML; UG/.5ML; UG/.5ML; UG/.5ML; UG/.5ML; UG/.5ML; UG/.5ML; UG/.5ML; UG/.5ML; UG/.5ML; UG/.5ML; UG/.5ML; UG/.5ML; UG/.5ML; UG/.5ML; UG/.5ML; UG/.5ML; UG/.5ML; UG/.5ML
INJECTION, SOLUTION INTRAMUSCULAR; SUBCUTANEOUS
Refills: 0 | Status: DISCONTINUED | COMMUNITY
End: 2022-03-14

## 2022-03-14 RX ORDER — PROCHLORPERAZINE MALEATE 10 MG/1
10 TABLET ORAL EVERY 6 HOURS
Qty: 30 | Refills: 2 | Status: DISCONTINUED | COMMUNITY
Start: 2021-05-24 | End: 2022-03-14

## 2022-03-14 RX ORDER — SILVER SULFADIAZINE 10 MG/G
1 CREAM TOPICAL
Refills: 0 | Status: DISCONTINUED | COMMUNITY
End: 2022-03-14

## 2022-03-14 NOTE — ASSESSMENT
[FreeTextEntry1] : 81 year old male presents to the office with complaints of bilateral knee pain, left worse than the right. He states that the pain is so bad that he requires to use a walker. But based on my exam and review of the radiographs I am unable to explain the severity of his symptoms or why he feels he needs to use a walker to ambulate on the basis of his knees. I feel conservative management of his knee arthritis is appropriate. We recommended OTC meds, topical ointments and PT.

## 2022-03-14 NOTE — PHYSICAL EXAM
[de-identified] : General appearance: well nourished and hydrated, pleasant, alert and oriented x 3, cooperative.\par Cardiovascular: no apparent abnormalities, no lower leg edema, no varicosities, pedal pulses are palpable.\par Neurologic: sensation is normal, no muscle weakness in upper or lower extremities\par Dermatologic no apparent skin lesions, moist, warm, no rash.\par Gait: nonantalgic.\par \par Left knee\par Inspection: no effusion or erythema.\par Wounds: none.\par Alignment: normal.\par Palpation: medial joint line tenderness \par ROM: 0-100 degrees \par Ligamentous laxity: all ligaments appear stable\par Meniscal Test: negative McMurrays\par Muscle Test: good quad strength.\par \par Right knee\par Inspection: no effusion or erythema.\par Wounds: none.\par Alignment: normal.\par Palpation: medial joint line tenderness \par ROM: 0-100 degrees \par Ligamentous laxity: all ligaments appear stable\par Meniscal Test: negative McMurrays.\par Muscle Test: good quad strength.\par \par Left hip\par Inspection: No swelling or ecchymosis.\par Wounds: none.\par Palpation: non-tender.\par Stability: no instability.\par Strength: 5/5 all motor groups.\par ROM: no pain with FROM.\par Leg length: equal.\par \par Right hip\par Inspection: No swelling or ecchymosis.\par Wounds: none.\par Palpation: non-tender.\par Stability: no instability.\par Strength: 5/5 all motor groups.\par ROM: no pain with FROM.\par Leg length: equal. [de-identified] : Radiographs done today AP lateral and skyline of both knees shows mild medial joint space narrowing bilaterally

## 2022-03-21 ENCOUNTER — APPOINTMENT (OUTPATIENT)
Dept: HEMATOLOGY ONCOLOGY | Facility: CLINIC | Age: 82
End: 2022-03-21

## 2022-03-21 ENCOUNTER — APPOINTMENT (OUTPATIENT)
Dept: INFUSION THERAPY | Facility: CLINIC | Age: 82
End: 2022-03-21

## 2022-03-23 NOTE — CONSULT LETTER
[Dear  ___] : Dear  [unfilled], [Consult Letter:] : I had the pleasure of evaluating your patient, [unfilled]. [Please see my note below.] : Please see my note below. [Consult Closing:] : Thank you very much for allowing me to participate in the care of this patient.  If you have any questions, please do not hesitate to contact me. [DrMarcellus  ___] : Dr. ELY [FreeTextEntry3] : Sincerely,\par \par Rico Lambert MD, Colon and Rectal Surgery\par \par [employer] [facility]\par [address] [place] [address]\par [facility], 3rd Floor\par [place], [place] [address]\par Tel [phone] ext [phone]\par Fax [phone]\par

## 2022-03-23 NOTE — HISTORY OF PRESENT ILLNESS
[FreeTextEntry1] : Patient is an 81M with no PMH who presents for evaluation of locally advanced rectal cancer.  Patient was originally evaluated in ER due to loose bloody BM.  On 4/27/2021 he underwent colonoscopy with Dr. Rodriguez that showed a rectal mass extending from the anal verge 17cm proximally and multiple other polyps. Pathology showed invasive adenocarcinoma in the rectum and possible adenocarcinoma in a transverse polyp however it is likely contamination from rectal biopsy.  CT chest abdomen and pelvis  showed 14cm rectal mass, 9mm right lower lobe pulmonary nodule.MRI pelvis shows a T4N0 18cm long rectal mass with invasion of the sphincter complex, right meso rectal fascia and possibly the prostate gland. He was started on ANABELL.  He was treated with FOLFOX and CRT but it is unclear from the notes the exact treatment course.  Patient unable to provide additional details.  Repeat CT chest abdomen and pelvis in 7/2021 showed improvement in rectal mass with worsening pulmonary nodules.  He presents today to discuss surgery.  He states he is tolerating a diet and has loose BM.  Denies bleeding. Patient denies fevers, chills, nausea, vomiting, abdominal pain or unexpected weight loss.  Patient denies a family history of colon cancer rectal cancer or inflammatory bowel disease. \par \par

## 2022-03-23 NOTE — ASSESSMENT
[FreeTextEntry1] : 81M with locally advanced and possibly metastatic rectal cancer\par \par I had a long conversation with the patient about his condition.  Patient has poor insight into his diagnosis.  We discussed the possibility of metastatic disease.  We also discussed the potential need for pelvic exenteration with permanent end colostomy and urostomy given prostatic and sphincter involvement.  We will repeat pelvic MRI and present the case at tumor board.  Patient will return after his images.

## 2022-03-23 NOTE — PHYSICAL EXAM
[No HSM] : no hepatosplenomegaly [Respiratory Effort] : normal respiratory effort [Normal Rate and Rhythm] : normal rate and rhythm [Abdomen Masses] : No abdominal masses [Abdomen Tenderness] : ~T No ~M abdominal tenderness [de-identified] : awake, alert and in no acute distress

## 2022-03-30 ENCOUNTER — NON-APPOINTMENT (OUTPATIENT)
Age: 82
End: 2022-03-30

## 2022-03-31 ENCOUNTER — LABORATORY RESULT (OUTPATIENT)
Age: 82
End: 2022-03-31

## 2022-03-31 ENCOUNTER — OUTPATIENT (OUTPATIENT)
Dept: OUTPATIENT SERVICES | Facility: HOSPITAL | Age: 82
LOS: 1 days | Discharge: HOME | End: 2022-03-31

## 2022-03-31 ENCOUNTER — APPOINTMENT (OUTPATIENT)
Dept: HEMATOLOGY ONCOLOGY | Facility: CLINIC | Age: 82
End: 2022-03-31
Payer: MEDICARE

## 2022-03-31 ENCOUNTER — APPOINTMENT (OUTPATIENT)
Dept: INFUSION THERAPY | Facility: CLINIC | Age: 82
End: 2022-03-31
Payer: MEDICARE

## 2022-03-31 DIAGNOSIS — S68.119A COMPLETE TRAUMATIC METACARPOPHALANGEAL AMPUTATION OF UNSPECIFIED FINGER, INITIAL ENCOUNTER: Chronic | ICD-10-CM

## 2022-03-31 DIAGNOSIS — C20 MALIGNANT NEOPLASM OF RECTUM: ICD-10-CM

## 2022-03-31 DIAGNOSIS — Z45.2 ENCOUNTER FOR ADJUSTMENT AND MANAGEMENT OF VASCULAR ACCESS DEVICE: ICD-10-CM

## 2022-03-31 PROCEDURE — 99214 OFFICE O/P EST MOD 30 MIN: CPT

## 2022-04-01 LAB
ALBUMIN SERPL ELPH-MCNC: 4 G/DL
ALP BLD-CCNC: 85 U/L
ALT SERPL-CCNC: 12 U/L
ANION GAP SERPL CALC-SCNC: 13 MMOL/L
AST SERPL-CCNC: 19 U/L
BILIRUB SERPL-MCNC: 0.5 MG/DL
BUN SERPL-MCNC: 14 MG/DL
CALCIUM SERPL-MCNC: 9.3 MG/DL
CEA SERPL-MCNC: 8.8 NG/ML
CHLORIDE SERPL-SCNC: 102 MMOL/L
CO2 SERPL-SCNC: 23 MMOL/L
CREAT SERPL-MCNC: 0.6 MG/DL
EGFR: 98 ML/MIN/1.73M2
GLUCOSE SERPL-MCNC: 90 MG/DL
HCT VFR BLD CALC: 42 %
HGB BLD-MCNC: 13.7 G/DL
MCHC RBC-ENTMCNC: 30.7 PG
MCHC RBC-ENTMCNC: 32.6 G/DL
MCV RBC AUTO: 94.2 FL
PLATELET # BLD AUTO: 183 K/UL
PMV BLD: 9.8 FL
POTASSIUM SERPL-SCNC: 4.2 MMOL/L
PROT SERPL-MCNC: 7 G/DL
RBC # BLD: 4.46 M/UL
RBC # FLD: 14.1 %
SODIUM SERPL-SCNC: 138 MMOL/L
WBC # FLD AUTO: 8.32 K/UL

## 2022-04-03 NOTE — HISTORY OF PRESENT ILLNESS
[de-identified] : 80 year old male with no past medical history (has not seen a doctor in many\par years, not on any meds) presented to the ED due to loose bowel movements.\par Patient is alert and orient, but appears to be confused and is a poor\par historian. History obtained from patient and the charts. Patient has had\par chronic diarrhea for the past year which is foul smelling, then over the past\par week it has gotten worse with amount and frequency. The patients daughter\par believed that their was blood in here stools so she brought him to the ED. The\par patient endorses decreased PO intake and nausea, but denies abdominal pain. He\par is not on AC, but will take a baby aspirin from time to time. He denies SOB,\par CP, changes in urination, headache. He lives alone and ambulates with a walker.\par \par COLONOSCOPY  4/27/21\par  Circumferential, ulcerated, irregular and friable infiltrating thickening \par rectal thickening (mass) starting from anal verge up to 17 cm proximally and \par suggestive of adenocarcinoma. (Biopsy).  \par  Hard rectal mass felt on digital exam.  \par  Diverticulosis of the whole colon.  \par \par  Polyp (7 mm) in the ascending colon. (Polypectomy).  \par  Polyp (5 mm) in the proximal transverse colon. (Polypectomy).  \par  Polyp (8 mm) in the distal transverse colon. (Polypectomy).  \par  Polyp (1 cm) in the mid-transverse colon. (Polypectomy).  \par  Polyp (5 cm) in the distal transverse colon.  \par  Polyp (3 cm) in the descending colon. (Polypectomy).  \par  \par PATHOLOGY\par \par MMR proficient\par Rectal mass, \par - Invasive adenocarcinoma, moderately to poorly differentiated,\par involving and undermining squamous mucosa (see Comment).\par \par CT BRAIN\par IMPRESSION:\par \par No acute intracranial pathology.\par \par Mild chronic microvascular ischemic changes.\par \par \par MRI PELVIS\par IMPRESSION:\par \par 18 cm long polypoidal rectal mass with invasion of the right meso rectal fascia and possibly the prostate gland. Additional involvement of the anal sphincter complex--T4(a or b)N0Mx\par \par CT CHEST 4/28/21\par Impression:\par \par Small bilateral pleural effusions with compressive atelectasis.\par \par Nonspecific mediastinal lymph nodes.\par \par CT ABDOMEN AND PELVIS IC\par IMPRESSION:\par \par Circumferential rectal wall thickening spanning at least 14 cm in length with a partially exophytic mass extending into the right mesorectal fat measuring approximately 5.7 x 3.0 x 6.8 cm. The exophytic mass abuts the right posterior lateral prostate gland. Primary consideration is rectal cancer. Limited ability to exclude superimposed proctitis.\par \par 9 mm right lower lobe pulmonary nodule.\par \par Small wedge-shaped hypodensities at the superior aspect of the spleen may reflect splenic infarcts.\par \par \par \par -s/p mediport palcement by IR .\par \par \par He started FOLFOX cycle 1 chemo on 5/7/21-  5/9/21\par  [de-identified] : 5/24/21\par Pt tolerated 1st cycle of FOLFOX with some side effects. Mild nausea. Still has mild bleeding [facility]. No fever.\par \par 7/13/21\par Pt is here for follow up.\par He is s/p 4 cycles of FOLFOx given in patient.\par He feels well. So far has tolerated chemo well.\par He had been advised to go for imaging prior to his 5th cycle. However Ct scans have not been completed yet,\par He denies rectal bleeding, abdominal pain, N, V, D, fever\par \par 1/10/2022\par Pt is here for follow up for rectal cancer.\par He feels well, appetite is good.\par He denies rectal bleeding, abdominal pain, nausea, vomiting or fever.\par \par 3/31/22\par Pt is here for a follo up.\par Has no new complaints.\par Had PET scan at .\par \par

## 2022-04-03 NOTE — ASSESSMENT
[FreeTextEntry1] : Rectal adenocarcinoma locally advanced\par MRI showin cm long polypoidal rectal mass with invasion of the right meso rectal fascia and possibly the prostate gland. Additional involvement of the anal sphincter complex--  T4(a or b)N0M0\par \par He was told that he has locally advanced disease and we recommended total neoadjuvant therapy consisting of oxaliplatin based chemotherapy followed by chemo RT and then evaluation for surgical resection.\par \par Patient completed 6 cycles of FOLFOX with Neulasta on 2021.\par \par PET scan shows local disease only. Lung lesion is not FDG avid\par \par RECOMMENDATION:\par Previous notes reviewed and all relevant laboratory and radiology results discussed with Dr Tubbs and communicated to the patient.\par \par Discussed results of PET scan with pt. I had also discussed the case with surgery team.\par Pt would rather go for surgery even if it means getting a colostomy.\par He will not agree to chemotherapy.\par I also discussed above with pts  [facility] [phone]\par

## 2022-04-18 ENCOUNTER — APPOINTMENT (OUTPATIENT)
Dept: SURGERY | Facility: CLINIC | Age: 82
End: 2022-04-18
Payer: MEDICARE

## 2022-04-18 ENCOUNTER — APPOINTMENT (OUTPATIENT)
Dept: SURGERY | Facility: CLINIC | Age: 82
End: 2022-04-18

## 2022-04-18 VITALS
HEART RATE: 107 BPM | DIASTOLIC BLOOD PRESSURE: 76 MMHG | BODY MASS INDEX: 28.99 KG/M2 | SYSTOLIC BLOOD PRESSURE: 140 MMHG | WEIGHT: 214 LBS | HEIGHT: 72 IN | TEMPERATURE: 96.9 F | OXYGEN SATURATION: 98 %

## 2022-04-18 PROCEDURE — 99213 OFFICE O/P EST LOW 20 MIN: CPT

## 2022-04-19 NOTE — PHYSICAL EXAM
[Abdomen Masses] : No abdominal masses [Abdomen Tenderness] : ~T No ~M abdominal tenderness [No HSM] : no hepatosplenomegaly [Respiratory Effort] : normal respiratory effort [Normal Rate and Rhythm] : normal rate and rhythm [de-identified] : awake, alert and in no acute distress

## 2022-04-19 NOTE — ASSESSMENT
[FreeTextEntry1] : 81M with locally advanced and possibly metastatic rectal cancer\par \par Patient to follow up with Dr. Rodriguez for flexible sigmoidoscopy and biopsy.  provided there is residual disease he will continue with chemotherapy with Dr. Tubbs.

## 2022-04-19 NOTE — HISTORY OF PRESENT ILLNESS
[FreeTextEntry1] : Patient is an 81M with no PMH who presents for follow up of locally advanced rectal cancer.  Patient was originally evaluated in ER due to loose bloody BM.  On 4/27/2021 he underwent colonoscopy with Dr. Rodriguez that showed a rectal mass extending from the anal verge 17cm proximally and multiple other polyps. Pathology showed invasive adenocarcinoma in the rectum and possible adenocarcinoma in a transverse polyp however it is likely contamination from rectal biopsy.  CT chest abdomen and pelvis  showed 14cm rectal mass, 9mm right lower lobe pulmonary nodule.MRI pelvis shows a T4N0 18cm long rectal mass with invasion of the sphincter complex, right meso rectal fascia and possibly the prostate gland. He was started on ANABELL.  He was treated with FOLFOX and short course CRT. Repeat CT chest abdomen and pelvis in 7/2021 showed improvement in rectal mass with worsening pulmonary nodules.  PET scan shows rectal mass with a 2.1cm lesion abutting the prostate.  The case was discussed at tumor board.  He wound require pelvic exenteration, flap reconstruction, colostomy and urostomy.  Given his condition, we felt he would not tolerate this aggressive treatment.  He will follow up with Dr. Rodriguez for flexible sigmoidoscopy and biopsy and Dr. Tubbs for additional chemotherapy. Patient denies fevers, chills, nausea, vomiting, abdominal pain or unexpected weight loss.  Patient denies a family history of colon cancer rectal cancer or inflammatory bowel disease. \par \par

## 2022-04-26 NOTE — PRE-ANESTHESIA EVALUATION ADULT - NSANTHPMHFT_GEN_ALL_CORE
----- Message from Luzma Flores MD sent at 2022  6:06 PM CDT -----  Regarding: mammogram order  Please inform pt that her order  now (after 6 motnhs ), is she planning to do mammogram?Should she get new order?  Dr. Luzma Flores    
Unable to reach the pt . Left detailed message on the voicemail regarding  mammogram order . Instructed to contact the office  with information if new order needed..  
+iron defiency anemia  +colorectal adenocarcinoma
Summary:   1. Left ventricular ejection fraction, by visual estimation, is 60 to 65%.   2. Normal global left ventricular systolic function.   3. Mild thickening of the anterior and posterior mitral valve leaflets.   4. No evidence of mitral valve regurgitation.   5. Sclerotic aortic valve with normal opening.   6. Myxomatous tricuspid valve.   7. Increased Tricuspid valve leaflet thickening with no evidence of vegetation.   8. Mild tricuspid regurgitation.   9. Color flow doppler and intravenous injection of agitated saline demonstrates the presence of an intact intra atrial septum.  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  11. Simple atheroma seen in the aortic arch and descending aorta.
encephalopathy; anemia; bilat pleural effusions; lung nodole; rectal mass

## 2022-04-28 NOTE — PHYSICAL THERAPY INITIAL EVALUATION ADULT - WORK/LEISURE ACTIVITY, REHAB EVAL
PT orders received and chart reviewed. Per discussion with OT, patient being discharged to TCU tomorrow morning at 11 am. PT evaluation deferred to next level of care as discharge plan is in place, OT addressing mobility during hospital stay. PT orders completed.             independent/needs device

## 2022-06-06 ENCOUNTER — OUTPATIENT (OUTPATIENT)
Dept: OUTPATIENT SERVICES | Facility: HOSPITAL | Age: 82
LOS: 1 days | Discharge: HOME | End: 2022-06-06

## 2022-06-06 ENCOUNTER — APPOINTMENT (OUTPATIENT)
Dept: HEMATOLOGY ONCOLOGY | Facility: CLINIC | Age: 82
End: 2022-06-06
Payer: MEDICARE

## 2022-06-06 ENCOUNTER — APPOINTMENT (OUTPATIENT)
Dept: INFUSION THERAPY | Facility: CLINIC | Age: 82
End: 2022-06-06

## 2022-06-06 VITALS
HEIGHT: 72 IN | BODY MASS INDEX: 28.99 KG/M2 | DIASTOLIC BLOOD PRESSURE: 70 MMHG | HEART RATE: 78 BPM | TEMPERATURE: 97.3 F | WEIGHT: 214 LBS | SYSTOLIC BLOOD PRESSURE: 154 MMHG

## 2022-06-06 DIAGNOSIS — S68.119A COMPLETE TRAUMATIC METACARPOPHALANGEAL AMPUTATION OF UNSPECIFIED FINGER, INITIAL ENCOUNTER: Chronic | ICD-10-CM

## 2022-06-06 DIAGNOSIS — Z45.2 ENCOUNTER FOR ADJUSTMENT AND MANAGEMENT OF VASCULAR ACCESS DEVICE: ICD-10-CM

## 2022-06-06 DIAGNOSIS — Z51.11 ENCOUNTER FOR ANTINEOPLASTIC CHEMOTHERAPY: ICD-10-CM

## 2022-06-06 PROCEDURE — 99214 OFFICE O/P EST MOD 30 MIN: CPT

## 2022-06-07 PROBLEM — Z51.11 ENCOUNTER FOR ANTINEOPLASTIC CHEMOTHERAPY: Status: ACTIVE | Noted: 2021-05-24

## 2022-06-07 PROBLEM — Z45.2 ENCOUNTER FOR ADJUSTMENT OR MANAGEMENT OF VASCULAR ACCESS DEVICE: Status: ACTIVE | Noted: 2022-01-13

## 2022-06-08 DIAGNOSIS — C20 MALIGNANT NEOPLASM OF RECTUM: ICD-10-CM

## 2022-06-08 DIAGNOSIS — Z45.2 ENCOUNTER FOR ADJUSTMENT AND MANAGEMENT OF VASCULAR ACCESS DEVICE: ICD-10-CM

## 2022-06-08 DIAGNOSIS — Z51.11 ENCOUNTER FOR ANTINEOPLASTIC CHEMOTHERAPY: ICD-10-CM

## 2022-06-08 NOTE — ASSESSMENT
[FreeTextEntry1] : Rectal adenocarcinoma locally advanced\par MRI showin cm long polypoidal rectal mass with invasion of the right meso rectal fascia and possibly the prostate gland. Additional involvement of the anal sphincter complex--  T4(a or b)N0M0\par \par He was told that he has locally advanced disease and we recommended total neoadjuvant therapy consisting of oxaliplatin based chemotherapy followed by chemo RT and then evaluation for surgical resection.\par \par Patient completed 6 cycles of FOLFOX with Neulasta on 2021.\par \par PET scan shows local disease only. Lung lesion is not FDG avid\par \par RECOMMENDATION:\par Previous notes reviewed and all relevant laboratory and radiology results discussed with Dr Tubbs and communicated to the patient.\par \par Discussed results of PET scan with pt. I had also discussed the case with surgery team.\par Pt would rather go for surgery even if it means getting a colostomy, however he was now deemed not a surgical candidate by colorectal.\par He continues to not agree to chemotherapy, but now has agreed to go for colonoscopy and if it is biopsy proven he may consider further treatment\par The above was discussed with the patient and his HCP who accompanied with him on this visit: [phone]\par

## 2022-06-08 NOTE — PHYSICAL EXAM
[Ambulatory and capable of all self care but unable to carry out any work activities] : Status 2- Ambulatory and capable of all self care but unable to carry out any work activities. Up and about more than 50% of waking hours [Normal] : grossly intact [de-identified] : Poor insight

## 2022-06-08 NOTE — HISTORY OF PRESENT ILLNESS
[de-identified] : 80 year old male with no past medical history (has not seen a doctor in many\par years, not on any meds) presented to the ED due to loose bowel movements.\par Patient is alert and orient, but appears to be confused and is a poor\par historian. History obtained from patient and the charts. Patient has had\par chronic diarrhea for the past year which is foul smelling, then over the past\par week it has gotten worse with amount and frequency. The patients daughter\par believed that their was blood in here stools so she brought him to the ED. The\par patient endorses decreased PO intake and nausea, but denies abdominal pain. He\par is not on AC, but will take a baby aspirin from time to time. He denies SOB,\par CP, changes in urination, headache. He lives alone and ambulates with a walker.\par \par COLONOSCOPY  4/27/21\par  Circumferential, ulcerated, irregular and friable infiltrating thickening \par rectal thickening (mass) starting from anal verge up to 17 cm proximally and \par suggestive of adenocarcinoma. (Biopsy).  \par  Hard rectal mass felt on digital exam.  \par  Diverticulosis of the whole colon.  \par \par  Polyp (7 mm) in the ascending colon. (Polypectomy).  \par  Polyp (5 mm) in the proximal transverse colon. (Polypectomy).  \par  Polyp (8 mm) in the distal transverse colon. (Polypectomy).  \par  Polyp (1 cm) in the mid-transverse colon. (Polypectomy).  \par  Polyp (5 cm) in the distal transverse colon.  \par  Polyp (3 cm) in the descending colon. (Polypectomy).  \par  \par PATHOLOGY\par \par MMR proficient\par Rectal mass, \par - Invasive adenocarcinoma, moderately to poorly differentiated,\par involving and undermining squamous mucosa (see Comment).\par \par CT BRAIN\par IMPRESSION:\par \par No acute intracranial pathology.\par \par Mild chronic microvascular ischemic changes.\par \par \par MRI PELVIS\par IMPRESSION:\par \par 18 cm long polypoidal rectal mass with invasion of the right meso rectal fascia and possibly the prostate gland. Additional involvement of the anal sphincter complex--T4(a or b)N0Mx\par \par CT CHEST 4/28/21\par Impression:\par \par Small bilateral pleural effusions with compressive atelectasis.\par \par Nonspecific mediastinal lymph nodes.\par \par CT ABDOMEN AND PELVIS IC\par IMPRESSION:\par \par Circumferential rectal wall thickening spanning at least 14 cm in length with a partially exophytic mass extending into the right mesorectal fat measuring approximately 5.7 x 3.0 x 6.8 cm. The exophytic mass abuts the right posterior lateral prostate gland. Primary consideration is rectal cancer. Limited ability to exclude superimposed proctitis.\par \par 9 mm right lower lobe pulmonary nodule.\par \par Small wedge-shaped hypodensities at the superior aspect of the spleen may reflect splenic infarcts.\par \par \par \par -s/p mediport palcement by IR .\par \par \par He started FOLFOX cycle 1 chemo on 5/7/21-  5/9/21\par  [de-identified] : 5/24/21\par Pt tolerated 1st cycle of FOLFOX with some side effects. Mild nausea. Still has mild bleeding VT. No fever.\par \par 7/13/21\par Pt is here for follow up.\par He is s/p 4 cycles of FOLFOx given in patient.\par He feels well. So far has tolerated chemo well.\par He had been advised to go for imaging prior to his 5th cycle. However Ct scans have not been completed yet,\par He denies rectal bleeding, abdominal pain, N, V, D, fever\par \par 1/10/2022\par Pt is here for follow up for rectal cancer.\par He feels well, appetite is good.\par He denies rectal bleeding, abdominal pain, nausea, vomiting or fever.\par \par 3/31/22\par Pt is here for a follo up.\par Has no new complaints.\par Had PET scan at .\par \par 6/6/2022\par Pt returns for followup\par He was deemed not a surgical candidate by colorectal surgery, plan was for repeat colonoscopy and biopsy\par Since his last visit he has left SNF and is back living at home\par He has not yet seen GI. He has an appointment for 7/1/22 \par He is refusing chemotherapy currently\par He is able to work and perform most ADLs without assistance. \par

## 2022-07-01 ENCOUNTER — APPOINTMENT (OUTPATIENT)
Dept: GASTROENTEROLOGY | Facility: CLINIC | Age: 82
End: 2022-07-01

## 2022-07-05 ENCOUNTER — APPOINTMENT (OUTPATIENT)
Dept: OPHTHALMOLOGY | Facility: CLINIC | Age: 82
End: 2022-07-05

## 2022-07-05 ENCOUNTER — OUTPATIENT (OUTPATIENT)
Dept: OUTPATIENT SERVICES | Facility: HOSPITAL | Age: 82
LOS: 1 days | Discharge: HOME | End: 2022-07-05

## 2022-07-05 DIAGNOSIS — S68.119A COMPLETE TRAUMATIC METACARPOPHALANGEAL AMPUTATION OF UNSPECIFIED FINGER, INITIAL ENCOUNTER: Chronic | ICD-10-CM

## 2022-07-05 DIAGNOSIS — H04.123 DRY EYE SYNDROME OF BILATERAL LACRIMAL GLANDS: ICD-10-CM

## 2022-07-05 PROCEDURE — 92004 COMPRE OPH EXAM NEW PT 1/>: CPT

## 2022-07-05 RX ORDER — DEXTRAN 70/HYPROMELLOSE 0.1%-0.3%
0.1-0.3 DROPS OPHTHALMIC (EYE)
Qty: 1 | Refills: 10 | Status: ACTIVE | COMMUNITY
Start: 2022-07-05 | End: 1900-01-01

## 2022-07-06 ENCOUNTER — APPOINTMENT (OUTPATIENT)
Dept: INFUSION THERAPY | Facility: CLINIC | Age: 82
End: 2022-07-06

## 2022-07-07 DIAGNOSIS — H35.3190 NONEXUDATIVE AGE-RELATED MACULAR DEGENERATION, UNSPECIFIED EYE, STAGE UNSPECIFIED: ICD-10-CM

## 2022-07-07 DIAGNOSIS — H04.123 DRY EYE SYNDROME OF BILATERAL LACRIMAL GLANDS: ICD-10-CM

## 2022-07-07 DIAGNOSIS — H25.13 AGE-RELATED NUCLEAR CATARACT, BILATERAL: ICD-10-CM

## 2022-07-27 ENCOUNTER — NON-APPOINTMENT (OUTPATIENT)
Age: 82
End: 2022-07-27

## 2022-08-01 ENCOUNTER — APPOINTMENT (OUTPATIENT)
Dept: INFUSION THERAPY | Facility: CLINIC | Age: 82
End: 2022-08-01

## 2022-08-01 ENCOUNTER — APPOINTMENT (OUTPATIENT)
Dept: HEMATOLOGY ONCOLOGY | Facility: CLINIC | Age: 82
End: 2022-08-01

## 2022-08-02 ENCOUNTER — APPOINTMENT (OUTPATIENT)
Dept: SURGERY | Facility: CLINIC | Age: 82
End: 2022-08-02

## 2022-08-02 ENCOUNTER — NON-APPOINTMENT (OUTPATIENT)
Age: 82
End: 2022-08-02

## 2022-08-02 VITALS
TEMPERATURE: 96.7 F | DIASTOLIC BLOOD PRESSURE: 82 MMHG | WEIGHT: 200 LBS | HEIGHT: 72 IN | SYSTOLIC BLOOD PRESSURE: 148 MMHG | HEART RATE: 94 BPM | OXYGEN SATURATION: 96 % | BODY MASS INDEX: 27.09 KG/M2

## 2022-08-02 DIAGNOSIS — C20 MALIGNANT NEOPLASM OF RECTUM: ICD-10-CM

## 2022-08-02 PROCEDURE — 99024 POSTOP FOLLOW-UP VISIT: CPT

## 2022-08-09 ENCOUNTER — APPOINTMENT (OUTPATIENT)
Dept: SURGERY | Facility: CLINIC | Age: 82
End: 2022-08-09

## 2022-08-09 VITALS
SYSTOLIC BLOOD PRESSURE: 144 MMHG | OXYGEN SATURATION: 96 % | TEMPERATURE: 97.5 F | DIASTOLIC BLOOD PRESSURE: 82 MMHG | HEART RATE: 108 BPM | HEIGHT: 72 IN | BODY MASS INDEX: 27.09 KG/M2 | WEIGHT: 200 LBS

## 2022-08-09 PROCEDURE — 99024 POSTOP FOLLOW-UP VISIT: CPT | Mod: NC

## 2022-08-09 NOTE — REASON FOR VISIT
[Procedure: _________] : a [unfilled] procedure visit [Friend] : friend [FreeTextEntry1] : post port removal 8/2/22

## 2022-08-09 NOTE — ASSESSMENT
[FreeTextEntry1] : Mr. Orellana is here today for a post procedure wound check. Venous access device (portacath) was removed in office by DR. Avitia on August 2, 2022. Site is clean, dry and intact. Steri strips intact. No drainage or erythema noted. Instructed to follow up with primary care and medical oncology. PAtient has also been instructed to call office with any questions or concerns.

## 2022-08-12 PROBLEM — C20 RECTAL CARCINOMA: Status: ACTIVE | Noted: 2021-05-24

## 2022-08-31 NOTE — ASSESSMENT
[FreeTextEntry1] : Mr. Orellana is here today for a post procedure wound check. Venous access device (portacath) was removed in office by DR. Avitia on August 2, 2022. Site is clean, dry and intact. Steri strips intact. No drainage or erythema noted. Instructed to follow up with primary care and medical oncology. PAtient has also been instructed to call office with any questions or concerns.\par 8/9/22 Pt seen for post procedure visit. Port removal.Site healing well. No erythema or drainage note. ZEncouraged to call office prn.

## 2022-09-26 NOTE — PHYSICAL THERAPY INITIAL EVALUATION ADULT - WEIGHT-BEARING RESTRICTIONS: SIT/STAND, REHAB EVAL
full weight-bearing
Pt. is a 27 yo F with PMHx of IUD presents with dysuria X 1 day.  Patient states she has urinary urgency and frequency and urine has smelled and looked different.  Patient has burning at the end of urination.  Denies vaginal discharge or rash. Noticed achy low back pain yesterday.  Concerned she has a UTI as she had same symptoms with a UTI in the past.  Denies hx of kidney stones, kidney infections.  Denies fever , nausea, vomiting.  No meds taken prior to arrival.

## 2022-10-11 ENCOUNTER — APPOINTMENT (OUTPATIENT)
Dept: OPHTHALMOLOGY | Facility: CLINIC | Age: 82
End: 2022-10-11

## 2022-10-11 ENCOUNTER — OUTPATIENT (OUTPATIENT)
Dept: OUTPATIENT SERVICES | Facility: HOSPITAL | Age: 82
LOS: 1 days | Discharge: HOME | End: 2022-10-11

## 2022-10-11 DIAGNOSIS — S68.119A COMPLETE TRAUMATIC METACARPOPHALANGEAL AMPUTATION OF UNSPECIFIED FINGER, INITIAL ENCOUNTER: Chronic | ICD-10-CM

## 2022-10-11 PROCEDURE — 92014 COMPRE OPH EXAM EST PT 1/>: CPT

## 2022-10-14 DIAGNOSIS — H25.13 AGE-RELATED NUCLEAR CATARACT, BILATERAL: ICD-10-CM

## 2022-10-14 DIAGNOSIS — H35.3190 NONEXUDATIVE AGE-RELATED MACULAR DEGENERATION, UNSPECIFIED EYE, STAGE UNSPECIFIED: ICD-10-CM

## 2022-10-14 DIAGNOSIS — H04.123 DRY EYE SYNDROME OF BILATERAL LACRIMAL GLANDS: ICD-10-CM

## 2022-10-17 ENCOUNTER — APPOINTMENT (OUTPATIENT)
Dept: ORTHOPEDIC SURGERY | Facility: CLINIC | Age: 82
End: 2022-10-17

## 2022-10-17 PROCEDURE — 73560 X-RAY EXAM OF KNEE 1 OR 2: CPT | Mod: 50

## 2022-10-17 PROCEDURE — 99204 OFFICE O/P NEW MOD 45 MIN: CPT

## 2022-10-17 RX ORDER — MELOXICAM 15 MG/1
15 TABLET ORAL DAILY
Qty: 30 | Refills: 1 | Status: ACTIVE | COMMUNITY
Start: 2022-10-17 | End: 1900-01-01

## 2022-10-17 NOTE — HISTORY OF PRESENT ILLNESS
[de-identified] : Patient comes in with bilateral knee pain comes in with bilateral crutches having history of rectal cancer was in a assisted living facility for while now is back at the living in his house was a bit downstairs to do his only injury was getting Advil for some pain relief but he is interested in obtaining better pain relief\par \par  knee x-rays show significant decreased joint space along the medial left and right knee compartments some patellofemoral spurring mild\par \par  knees have good range of motion minimally tender over the joint lines with palpation some patellofemoral crepitus but no effusion\par \par  recommend anti-inflammatories side effects were discussed he is on no other medicines held also discuss this with her medical doctor to make sure it is okay for him to take anti-inflammatories, if he can tolerate them would consider cortisone shots if they fail a gel injections, his a cancer of the rectum treatment , there still recurrence but does not want any further treatment on that issue\par Instructions: NSAIDS\par Patient is to begin over the counter oral anti-inflammatory medications on an as needed basis, as long as there are no contra-indications.  Patient is counseled on possible GI and blood pressure side effects.\par \par

## 2022-11-03 NOTE — PROGRESS NOTE ADULT - SUBJECTIVE AND OBJECTIVE BOX
"Anesthesia Transfer of Care Note    Patient: Isabela Read    Procedure(s) Performed: Procedure(s) (LRB):  TRANSPLANT, KIDNEY (Right)  TRANSPLANT, PANCREAS (N/A)    Patient location: ICU    Anesthesia Type: general    Transport from OR: Transported from OR on 6-10 L/min O2 by face mask with adequate spontaneous ventilation. Continuos invasive BP monitoring in transport. Continuous ECG monitoring in transport. Continuous SpO2 monitoring in transport    Post pain: adequate analgesia    Post assessment: no apparent anesthetic complications and tolerated procedure well    Post vital signs: stable    Level of consciousness: awake, alert and oriented    Nausea/Vomiting: no nausea/vomiting    Complications: none    Transfer of care protocol was followedComments: Transported via ICU bed on 100% O2 via simple face mask. Patient opens eyes to voice. VSS. See OR record for details.       Last vitals:   Visit Vitals  /89 (BP Location: Right arm, Patient Position: Standing)   Pulse 98   Temp 36.9 °C (98.4 °F) (Oral)   Resp 18   Ht 5' 4" (1.626 m)   Wt 66.4 kg (146 lb 6.2 oz)   SpO2 100%   Breastfeeding No   BMI 25.13 kg/m²     " 24H events:    No acute events  Patient agreeable to discharge    PAST MEDICAL & SURGICAL HISTORY  No pertinent past medical history    Amputation of digit of left hand      SOCIAL HISTORY:  Negative for smoking/alcohol/drug use.     ALLERGIES:  No Known Allergies    MEDICATIONS:  STANDING MEDICATIONS  chlorhexidine 4% Liquid 1 Application(s) Topical <User Schedule>  heparin   Injectable 5000 Unit(s) SubCutaneous every 8 hours  lidocaine   Patch 2 Patch Transdermal daily  nystatin Ointment 1 Application(s) Topical two times a day  pantoprazole    Tablet 40 milliGRAM(s) Oral before breakfast    PRN MEDICATIONS  loperamide 2 milliGRAM(s) Oral five times a day PRN  loperamide 2 milliGRAM(s) Oral two times a day PRN  simethicone 80 milliGRAM(s) Chew two times a day PRN    VITALS:   T(F): 96.9  HR: 87  BP: 118/58  RR: 19  SpO2: --    LABS:                        RADIOLOGY:    PHYSICAL EXAM:  GEN: No acute distress  HENT: NCAT, EOMI  LYMPH: No appreciable adenopathy  LUNGS: No respiratory distress, clear to auscultation bilaterally   HEART: regular rate and rhythm  ABD: Soft, non-tender, non-distended  SKIN: No rash  EXT: No edema, missing fingeritps  NEURO: non focal

## 2022-11-10 ENCOUNTER — OUTPATIENT (OUTPATIENT)
Dept: OUTPATIENT SERVICES | Facility: HOSPITAL | Age: 82
LOS: 1 days | Discharge: HOME | End: 2022-11-10

## 2022-11-10 ENCOUNTER — APPOINTMENT (OUTPATIENT)
Dept: OPHTHALMOLOGY | Facility: CLINIC | Age: 82
End: 2022-11-10

## 2022-11-10 DIAGNOSIS — S68.119A COMPLETE TRAUMATIC METACARPOPHALANGEAL AMPUTATION OF UNSPECIFIED FINGER, INITIAL ENCOUNTER: Chronic | ICD-10-CM

## 2022-11-10 PROCEDURE — 92002 INTRM OPH EXAM NEW PATIENT: CPT

## 2022-11-16 DIAGNOSIS — H04.123 DRY EYE SYNDROME OF BILATERAL LACRIMAL GLANDS: ICD-10-CM

## 2022-11-16 DIAGNOSIS — H25.13 AGE-RELATED NUCLEAR CATARACT, BILATERAL: ICD-10-CM

## 2022-11-16 DIAGNOSIS — H35.3190 NONEXUDATIVE AGE-RELATED MACULAR DEGENERATION, UNSPECIFIED EYE, STAGE UNSPECIFIED: ICD-10-CM

## 2022-11-30 ENCOUNTER — APPOINTMENT (OUTPATIENT)
Dept: ORTHOPEDIC SURGERY | Facility: CLINIC | Age: 82
End: 2022-11-30

## 2022-11-30 PROCEDURE — 99213 OFFICE O/P EST LOW 20 MIN: CPT

## 2022-11-30 NOTE — DISCUSSION/SUMMARY
[de-identified] :  At this point he is doing well.  He will continue meloxicam, Rx sent to the pharmacy.  I will see him back in 6 weeks for further evaluation.\par \par Supervising physician:  Dr. Smart

## 2022-11-30 NOTE — HISTORY OF PRESENT ILLNESS
[de-identified] : The patient is an 82-year-old male here for a re-evaluation of both knees.  He has bilateral knee osteoarthritis.  He is taking meloxicam and it has helped him.  He states his pain is markedly reduced.  He is happy with the way his knee feel.

## 2022-11-30 NOTE — PHYSICAL EXAM
[Bilateral] : knee bilaterally [NL (0)] : extension 0 degrees [] : no erythema [FreeTextEntry8] :  Mild tenderness to palpation over the anterior aspect of each knee. [de-identified] :   He is ambulating today with 2 canes. [TWNoteComboBox7] : flexion 125 degrees

## 2023-01-06 ENCOUNTER — RX RENEWAL (OUTPATIENT)
Age: 83
End: 2023-01-06

## 2023-01-18 ENCOUNTER — APPOINTMENT (OUTPATIENT)
Dept: ORTHOPEDIC SURGERY | Facility: CLINIC | Age: 83
End: 2023-01-18
Payer: MEDICARE

## 2023-01-18 DIAGNOSIS — M17.12 UNILATERAL PRIMARY OSTEOARTHRITIS, LEFT KNEE: ICD-10-CM

## 2023-01-18 DIAGNOSIS — M17.11 UNILATERAL PRIMARY OSTEOARTHRITIS, RIGHT KNEE: ICD-10-CM

## 2023-01-18 PROCEDURE — 99213 OFFICE O/P EST LOW 20 MIN: CPT | Mod: 25

## 2023-01-18 PROCEDURE — 20611 DRAIN/INJ JOINT/BURSA W/US: CPT | Mod: LT

## 2023-01-18 NOTE — PROCEDURE
[Large Joint Injection] : Large joint injection [Left] : of the left [Knee] : knee [___ cc    1%] : Lidocaine ~Vcc of 1%  [___ cc    4mg] : Dexamethasone (Decadron) ~Vcc of 4 mg  [Risks, benefits, alternatives discussed / Verbal consent obtained] : the risks benefits, and alternatives have been discussed, and verbal consent was obtained [All ultrasound images have been permanently captured and stored accordingly in our picture archiving and communication system] : All ultrasound images have been permanently captured and stored accordingly in our picture archiving and communication system [Visualization of the needle and placement of injection was performed without complication] : visualization of the needle and placement of injection was performed without complication

## 2023-01-18 NOTE — DISCUSSION/SUMMARY
[de-identified] : Today I recommend a cortisone injection for the left knee.  The patient agreed.  The left knee was injected with 2 cc lidocaine, 1 cc dexamethasone.  He may continue taking meloxicam as needed.  I will see him back in 3 months for further evaluation.\par \par Supervising physician:  Dr. Smart

## 2023-01-18 NOTE — PHYSICAL EXAM
[Bilateral] : knee bilaterally [NL (0)] : extension 0 degrees [] : light touch is intact throughout [FreeTextEntry8] :    Mild tenderness to palpation over the anterior aspect of the left knee. [de-identified] :   He is ambulating today with 2 canes. [TWNoteComboBox7] : flexion 125 degrees

## 2023-01-18 NOTE — HISTORY OF PRESENT ILLNESS
[de-identified] : The patient is an 82-year-old male here for a re-evaluation of both knees.  He has bilateral knee osteoarthritis.  He is taking meloxicam and it has helped him.  He states he feels pain in his left knee and stiffness.  His right knee is doing very well.

## 2023-02-03 ENCOUNTER — RX RENEWAL (OUTPATIENT)
Age: 83
End: 2023-02-03

## 2023-03-01 ENCOUNTER — RX RENEWAL (OUTPATIENT)
Age: 83
End: 2023-03-01

## 2023-03-27 ENCOUNTER — RX RENEWAL (OUTPATIENT)
Age: 83
End: 2023-03-27

## 2023-04-04 ENCOUNTER — APPOINTMENT (OUTPATIENT)
Dept: OPHTHALMOLOGY | Facility: CLINIC | Age: 83
End: 2023-04-04

## 2023-04-24 ENCOUNTER — APPOINTMENT (OUTPATIENT)
Dept: ORTHOPEDIC SURGERY | Facility: CLINIC | Age: 83
End: 2023-04-24

## 2023-04-24 ENCOUNTER — RX RENEWAL (OUTPATIENT)
Age: 83
End: 2023-04-24

## 2023-04-24 RX ORDER — MELOXICAM 15 MG/1
15 TABLET ORAL
Qty: 30 | Refills: 0 | Status: ACTIVE | COMMUNITY
Start: 2022-11-30 | End: 1900-01-01

## 2023-06-06 ENCOUNTER — APPOINTMENT (OUTPATIENT)
Dept: OPHTHALMOLOGY | Facility: CLINIC | Age: 83
End: 2023-06-06

## 2023-06-28 NOTE — ED PROVIDER NOTE - TOBACCO USE
Never smoker Winlevi Pregnancy And Lactation Text: This medication is considered safe during pregnancy and breastfeeding.

## 2023-08-08 NOTE — ED PROVIDER NOTE - OBJECTIVE STATEMENT
Abdominal pressure applied. Ok ENT consult  Order placed   81 y/o M PMx 79 y/o M no reported PMHx, has not seen doctor in 40 years, brought in by daughter for 1 year of foul smelling diarrhea that worsened over past week by increasing in amount and frequency. Unsure if there is blood in stools. No fever, abdominal pain, nausea, vomiting. Pt denies cough, SOB, chest pain. Reports drinking 3 beers per day.

## 2023-10-16 NOTE — DISCHARGE NOTE PROVIDER - NSDCCPCAREPLAN_GEN_ALL_CORE_FT
PRINCIPAL DISCHARGE DIAGNOSIS  Diagnosis: Rectal adenocarcinoma  Assessment and Plan of Treatment: You were  found to have rectal cancer: adenocarcinoma. you will undergo Chemotherapy and radiation therapy.   Please follow up with your oncologist for further treatment      SECONDARY DISCHARGE DIAGNOSES  Diagnosis: Diarrhea  Assessment and Plan of Treatment:     Diagnosis: Social problem  Assessment and Plan of Treatment:      PRINCIPAL DISCHARGE DIAGNOSIS  Diagnosis: Rectal adenocarcinoma  Assessment and Plan of Treatment: You were  found to have rectal cancer: adenocarcinoma. have a had a chemo port placed and chemotherapy with FOLFOX was started. Please follow up with your oncologist for further treatment. It is vital that you follow up with your PMD and an opthalmologist within the next to 1-2 weeks.       Speaking Coherently PRINCIPAL DISCHARGE DIAGNOSIS  Diagnosis: Rectal adenocarcinoma  Assessment and Plan of Treatment: You were  found to have rectal cancer: adenocarcinoma. have a had a chemo port placed and chemotherapy with FOLFOX was started. Please follow up with your oncologist for further treatment. It is vital that you follow up with your PMD and an opthalmologist within the next to 1-2 weeks.      SECONDARY DISCHARGE DIAGNOSES  Diagnosis: Pulmonary nodule  Assessment and Plan of Treatment: You have been noted to have pulmonary nodules on your CT scan, please follow up with a PMD or pulmonologist to follow up on the nodules.     PRINCIPAL DISCHARGE DIAGNOSIS  Diagnosis: Rectal adenocarcinoma  Assessment and Plan of Treatment: You were  found to have rectal cancer: adenocarcinoma. have a had a chemo port placed and chemotherapy with FOLFOX was started. Please follow up with your oncologist for further treatment. It is vital that you follow up with your PMD and an opthalmologist within the next to 1-2 weeks.      SECONDARY DISCHARGE DIAGNOSES  Diagnosis: Pulmonary nodule  Assessment and Plan of Treatment: You have been noted to have pulmonary nodules on your CT scan, please follow up with a PMD or pulmonologist to follow up on the nodules.    Diagnosis: Acute UTI  Assessment and Plan of Treatment: You had a urinary tract infection, you were treated with intravenous antibiotics. You will need to take one more pill of antibiotic levaquin on 5/18 to fifnish your course.

## 2023-11-20 NOTE — ED PROVIDER NOTE - NS ED MD DISPO ADMITTING SERVICE
Returned call to pt  Pt reports taking two OCPs in one day and wondering when     Pt reports that she is having bleeding between week 2-3 for as little as three days to a week  She does not bleed while on placebo pills    Reassured pt that this happens - she can start taking placebo tomorrow and use back up contraceptive methods for a week just in case    Suggested that pt make an appointment for to discuss abnormal bleeding with provider as her rx may need to be changed - will send suggestions for providers via [employer]     Pt denies additional questions or concerns    [facility] Contreras  [facility]         MED

## 2024-01-14 NOTE — H&P ADULT - NSICDXPASTMEDICALHX_GEN_ALL_CORE_FT
PAST MEDICAL HISTORY:  Iron deficiency anemia     Osteoarthritis     
Spontaneous, unlabored and symmetrical

## 2024-02-01 NOTE — REASON FOR VISIT
[Follow-Up Visit] : a follow-up [FreeTextEntry2] : Rectal Cancer Initiate Treatment: Apply thin layer of Mupiricin to abrasion on left forearm 1-2 times daily until healed. Render In Strict Bullet Format?: No Detail Level: Detailed
